# Patient Record
Sex: FEMALE | Race: WHITE | NOT HISPANIC OR LATINO | Employment: PART TIME | URBAN - METROPOLITAN AREA
[De-identification: names, ages, dates, MRNs, and addresses within clinical notes are randomized per-mention and may not be internally consistent; named-entity substitution may affect disease eponyms.]

---

## 2017-01-03 ENCOUNTER — APPOINTMENT (OUTPATIENT)
Dept: PHYSICAL THERAPY | Facility: REHABILITATION | Age: 57
End: 2017-01-03
Payer: COMMERCIAL

## 2017-01-03 PROCEDURE — 97140 MANUAL THERAPY 1/> REGIONS: CPT

## 2017-01-16 ENCOUNTER — APPOINTMENT (OUTPATIENT)
Dept: PHYSICAL THERAPY | Facility: REHABILITATION | Age: 57
End: 2017-01-16
Payer: COMMERCIAL

## 2017-01-16 PROCEDURE — 97140 MANUAL THERAPY 1/> REGIONS: CPT

## 2017-01-30 ENCOUNTER — APPOINTMENT (OUTPATIENT)
Dept: PHYSICAL THERAPY | Facility: REHABILITATION | Age: 57
End: 2017-01-30
Payer: COMMERCIAL

## 2017-01-30 PROCEDURE — 97140 MANUAL THERAPY 1/> REGIONS: CPT

## 2017-02-06 ENCOUNTER — APPOINTMENT (OUTPATIENT)
Dept: PHYSICAL THERAPY | Facility: REHABILITATION | Age: 57
End: 2017-02-06
Payer: COMMERCIAL

## 2017-02-13 ENCOUNTER — APPOINTMENT (OUTPATIENT)
Dept: PHYSICAL THERAPY | Facility: REHABILITATION | Age: 57
End: 2017-02-13
Payer: COMMERCIAL

## 2017-02-17 DIAGNOSIS — D69.6 THROMBOCYTOPENIA (HCC): ICD-10-CM

## 2017-02-17 DIAGNOSIS — K58.9 IRRITABLE BOWEL SYNDROME WITHOUT DIARRHEA: ICD-10-CM

## 2017-02-17 DIAGNOSIS — E04.1 NONTOXIC SINGLE THYROID NODULE: ICD-10-CM

## 2017-02-17 DIAGNOSIS — N30.10 CHRONIC INTERSTITIAL CYSTITIS WITHOUT HEMATURIA: ICD-10-CM

## 2017-02-17 DIAGNOSIS — E78.5 HYPERLIPIDEMIA: ICD-10-CM

## 2017-02-17 DIAGNOSIS — Z12.11 ENCOUNTER FOR SCREENING FOR MALIGNANT NEOPLASM OF COLON: ICD-10-CM

## 2017-02-20 ENCOUNTER — APPOINTMENT (OUTPATIENT)
Dept: PHYSICAL THERAPY | Facility: REHABILITATION | Age: 57
End: 2017-02-20
Payer: COMMERCIAL

## 2017-02-20 ENCOUNTER — APPOINTMENT (OUTPATIENT)
Dept: LAB | Facility: CLINIC | Age: 57
End: 2017-02-20
Payer: COMMERCIAL

## 2017-02-20 DIAGNOSIS — D69.6 THROMBOCYTOPENIA (HCC): ICD-10-CM

## 2017-02-20 DIAGNOSIS — N30.10 CHRONIC INTERSTITIAL CYSTITIS WITHOUT HEMATURIA: ICD-10-CM

## 2017-02-20 DIAGNOSIS — E78.5 HYPERLIPIDEMIA: ICD-10-CM

## 2017-02-20 DIAGNOSIS — K58.9 IRRITABLE BOWEL SYNDROME WITHOUT DIARRHEA: ICD-10-CM

## 2017-02-20 DIAGNOSIS — E04.1 NONTOXIC SINGLE THYROID NODULE: ICD-10-CM

## 2017-02-20 LAB
ALBUMIN SERPL BCP-MCNC: 3.8 G/DL (ref 3.5–5)
ALP SERPL-CCNC: 37 U/L (ref 46–116)
ALT SERPL W P-5'-P-CCNC: 22 U/L (ref 12–78)
ANION GAP SERPL CALCULATED.3IONS-SCNC: 7 MMOL/L (ref 4–13)
AST SERPL W P-5'-P-CCNC: 14 U/L (ref 5–45)
BACTERIA UR QL AUTO: NORMAL /HPF
BASOPHILS # BLD AUTO: 0.02 THOUSANDS/ΜL (ref 0–0.1)
BASOPHILS NFR BLD AUTO: 1 % (ref 0–1)
BILIRUB SERPL-MCNC: 0.54 MG/DL (ref 0.2–1)
BILIRUB UR QL STRIP: ABNORMAL
BUN SERPL-MCNC: 17 MG/DL (ref 5–25)
CALCIUM SERPL-MCNC: 8.8 MG/DL (ref 8.3–10.1)
CHLORIDE SERPL-SCNC: 105 MMOL/L (ref 100–108)
CHOLEST SERPL-MCNC: 172 MG/DL (ref 50–200)
CLARITY UR: CLEAR
CO2 SERPL-SCNC: 30 MMOL/L (ref 21–32)
COLOR UR: ABNORMAL
CREAT SERPL-MCNC: 0.82 MG/DL (ref 0.6–1.3)
EOSINOPHIL # BLD AUTO: 0.03 THOUSAND/ΜL (ref 0–0.61)
EOSINOPHIL NFR BLD AUTO: 1 % (ref 0–6)
ERYTHROCYTE [DISTWIDTH] IN BLOOD BY AUTOMATED COUNT: 13.2 % (ref 11.6–15.1)
GFR SERPL CREATININE-BSD FRML MDRD: >60 ML/MIN/1.73SQ M
GLUCOSE SERPL-MCNC: 90 MG/DL (ref 65–140)
GLUCOSE UR STRIP-MCNC: ABNORMAL MG/DL
HCT VFR BLD AUTO: 41.1 % (ref 34.8–46.1)
HDLC SERPL-MCNC: 88 MG/DL (ref 40–60)
HGB BLD-MCNC: 12.8 G/DL (ref 11.5–15.4)
HGB UR QL STRIP.AUTO: ABNORMAL
KETONES UR STRIP-MCNC: ABNORMAL MG/DL
LDLC SERPL CALC-MCNC: 72 MG/DL (ref 0–100)
LEUKOCYTE ESTERASE UR QL STRIP: ABNORMAL
LYMPHOCYTES # BLD AUTO: 0.98 THOUSANDS/ΜL (ref 0.6–4.47)
LYMPHOCYTES NFR BLD AUTO: 22 % (ref 14–44)
MCH RBC QN AUTO: 28.3 PG (ref 26.8–34.3)
MCHC RBC AUTO-ENTMCNC: 31.1 G/DL (ref 31.4–37.4)
MCV RBC AUTO: 91 FL (ref 82–98)
MONOCYTES # BLD AUTO: 0.42 THOUSAND/ΜL (ref 0.17–1.22)
MONOCYTES NFR BLD AUTO: 10 % (ref 4–12)
NEUTROPHILS # BLD AUTO: 2.93 THOUSANDS/ΜL (ref 1.85–7.62)
NEUTS SEG NFR BLD AUTO: 66 % (ref 43–75)
NITRITE UR QL STRIP: ABNORMAL
NON-SQ EPI CELLS URNS QL MICRO: NORMAL /HPF
NRBC BLD AUTO-RTO: 0 /100 WBCS
PH UR STRIP.AUTO: 6.5 [PH] (ref 4.5–8)
PLATELET # BLD AUTO: 124 THOUSANDS/UL (ref 149–390)
PMV BLD AUTO: 12.1 FL (ref 8.9–12.7)
POTASSIUM SERPL-SCNC: 3.8 MMOL/L (ref 3.5–5.3)
PROT SERPL-MCNC: 7.3 G/DL (ref 6.4–8.2)
PROT UR STRIP-MCNC: ABNORMAL MG/DL
RBC # BLD AUTO: 4.53 MILLION/UL (ref 3.81–5.12)
RBC #/AREA URNS AUTO: NORMAL /HPF
SODIUM SERPL-SCNC: 142 MMOL/L (ref 136–145)
SP GR UR STRIP.AUTO: 1.01 (ref 1–1.03)
T4 FREE SERPL-MCNC: 0.88 NG/DL (ref 0.76–1.46)
TRIGL SERPL-MCNC: 58 MG/DL
TSH SERPL DL<=0.05 MIU/L-ACNC: 4.81 UIU/ML (ref 0.36–3.74)
UROBILINOGEN UR QL STRIP.AUTO: ABNORMAL E.U./DL
WBC # BLD AUTO: 4.39 THOUSAND/UL (ref 4.31–10.16)
WBC #/AREA URNS AUTO: NORMAL /HPF

## 2017-02-20 PROCEDURE — 80061 LIPID PANEL: CPT

## 2017-02-20 PROCEDURE — 80053 COMPREHEN METABOLIC PANEL: CPT

## 2017-02-20 PROCEDURE — 85025 COMPLETE CBC W/AUTO DIFF WBC: CPT

## 2017-02-20 PROCEDURE — 36415 COLL VENOUS BLD VENIPUNCTURE: CPT

## 2017-02-20 PROCEDURE — 81001 URINALYSIS AUTO W/SCOPE: CPT

## 2017-02-20 PROCEDURE — 84443 ASSAY THYROID STIM HORMONE: CPT

## 2017-02-20 PROCEDURE — 84439 ASSAY OF FREE THYROXINE: CPT

## 2017-02-21 ENCOUNTER — APPOINTMENT (OUTPATIENT)
Dept: LAB | Facility: CLINIC | Age: 57
End: 2017-02-21
Payer: COMMERCIAL

## 2017-02-21 DIAGNOSIS — N30.10 CHRONIC INTERSTITIAL CYSTITIS WITHOUT HEMATURIA: ICD-10-CM

## 2017-02-21 DIAGNOSIS — D69.6 THROMBOCYTOPENIA (HCC): ICD-10-CM

## 2017-02-21 DIAGNOSIS — E04.1 NONTOXIC SINGLE THYROID NODULE: ICD-10-CM

## 2017-02-21 DIAGNOSIS — K58.9 IRRITABLE BOWEL SYNDROME WITHOUT DIARRHEA: ICD-10-CM

## 2017-02-21 DIAGNOSIS — E78.5 HYPERLIPIDEMIA: ICD-10-CM

## 2017-02-21 DIAGNOSIS — Z12.11 ENCOUNTER FOR SCREENING FOR MALIGNANT NEOPLASM OF COLON: ICD-10-CM

## 2017-02-21 LAB — HEMOCCULT STL QL IA: POSITIVE

## 2017-02-21 PROCEDURE — G0328 FECAL BLOOD SCRN IMMUNOASSAY: HCPCS

## 2017-02-22 ENCOUNTER — ALLSCRIPTS OFFICE VISIT (OUTPATIENT)
Dept: OTHER | Facility: OTHER | Age: 57
End: 2017-02-22

## 2017-02-24 ENCOUNTER — GENERIC CONVERSION - ENCOUNTER (OUTPATIENT)
Dept: OTHER | Facility: OTHER | Age: 57
End: 2017-02-24

## 2017-02-27 ENCOUNTER — APPOINTMENT (OUTPATIENT)
Dept: PHYSICAL THERAPY | Facility: REHABILITATION | Age: 57
End: 2017-02-27
Payer: COMMERCIAL

## 2017-02-27 PROCEDURE — 97112 NEUROMUSCULAR REEDUCATION: CPT

## 2017-03-13 ENCOUNTER — APPOINTMENT (OUTPATIENT)
Dept: LAB | Facility: CLINIC | Age: 57
End: 2017-03-13
Payer: COMMERCIAL

## 2017-03-13 DIAGNOSIS — N30.10 CHRONIC INTERSTITIAL CYSTITIS WITHOUT HEMATURIA: ICD-10-CM

## 2017-03-13 PROCEDURE — 87086 URINE CULTURE/COLONY COUNT: CPT

## 2017-03-14 LAB — BACTERIA UR CULT: NORMAL

## 2017-04-05 ENCOUNTER — ALLSCRIPTS OFFICE VISIT (OUTPATIENT)
Dept: OTHER | Facility: OTHER | Age: 57
End: 2017-04-05

## 2017-04-14 ENCOUNTER — APPOINTMENT (OUTPATIENT)
Dept: LAB | Facility: CLINIC | Age: 57
End: 2017-04-14
Payer: COMMERCIAL

## 2017-04-14 DIAGNOSIS — R39.15 URGENCY OF URINATION: ICD-10-CM

## 2017-04-14 LAB
BILIRUB UR QL STRIP: NEGATIVE
CLARITY UR: NORMAL
COLOR UR: YELLOW
GLUCOSE UR STRIP-MCNC: NEGATIVE MG/DL
HGB UR QL STRIP.AUTO: NEGATIVE
KETONES UR STRIP-MCNC: NEGATIVE MG/DL
LEUKOCYTE ESTERASE UR QL STRIP: NEGATIVE
NITRITE UR QL STRIP: NEGATIVE
PH UR STRIP.AUTO: 6 [PH] (ref 4.5–8)
PROT UR STRIP-MCNC: NEGATIVE MG/DL
SP GR UR STRIP.AUTO: 1.03 (ref 1–1.03)
UROBILINOGEN UR QL STRIP.AUTO: 0.2 E.U./DL

## 2017-04-14 PROCEDURE — 81003 URINALYSIS AUTO W/O SCOPE: CPT

## 2017-06-01 DIAGNOSIS — E04.1 NONTOXIC SINGLE THYROID NODULE: ICD-10-CM

## 2017-06-01 DIAGNOSIS — E03.9 HYPOTHYROIDISM: ICD-10-CM

## 2017-06-02 ENCOUNTER — APPOINTMENT (OUTPATIENT)
Dept: LAB | Facility: CLINIC | Age: 57
End: 2017-06-02
Payer: COMMERCIAL

## 2017-06-02 DIAGNOSIS — E03.9 HYPOTHYROIDISM: ICD-10-CM

## 2017-06-02 LAB
T4 FREE SERPL-MCNC: 0.76 NG/DL (ref 0.76–1.46)
TSH SERPL DL<=0.05 MIU/L-ACNC: 2.65 UIU/ML (ref 0.36–3.74)

## 2017-06-02 PROCEDURE — 84443 ASSAY THYROID STIM HORMONE: CPT

## 2017-06-02 PROCEDURE — 84439 ASSAY OF FREE THYROXINE: CPT

## 2017-06-02 PROCEDURE — 36415 COLL VENOUS BLD VENIPUNCTURE: CPT

## 2017-06-09 ENCOUNTER — ALLSCRIPTS OFFICE VISIT (OUTPATIENT)
Dept: OTHER | Facility: OTHER | Age: 57
End: 2017-06-09

## 2017-06-13 ENCOUNTER — TRANSCRIBE ORDERS (OUTPATIENT)
Dept: LAB | Facility: CLINIC | Age: 57
End: 2017-06-13

## 2017-06-13 ENCOUNTER — APPOINTMENT (OUTPATIENT)
Dept: LAB | Facility: CLINIC | Age: 57
End: 2017-06-13
Payer: COMMERCIAL

## 2017-06-13 DIAGNOSIS — Z00.8 HEALTH EXAMINATION IN POPULATION SURVEY: Primary | ICD-10-CM

## 2017-06-13 DIAGNOSIS — E55.9 UNSPECIFIED VITAMIN D DEFICIENCY: Primary | ICD-10-CM

## 2017-06-13 DIAGNOSIS — Z00.8 HEALTH EXAMINATION IN POPULATION SURVEY: ICD-10-CM

## 2017-06-13 LAB
25(OH)D3 SERPL-MCNC: 40.6 NG/ML (ref 30–100)
CHOLEST SERPL-MCNC: 195 MG/DL (ref 50–200)
EST. AVERAGE GLUCOSE BLD GHB EST-MCNC: 105 MG/DL
HBA1C MFR BLD: 5.3 % (ref 4.2–6.3)
HDLC SERPL-MCNC: 87 MG/DL (ref 40–60)
LDLC SERPL CALC-MCNC: 98 MG/DL (ref 0–100)
TRIGL SERPL-MCNC: 49 MG/DL

## 2017-06-13 PROCEDURE — 82306 VITAMIN D 25 HYDROXY: CPT

## 2017-06-13 PROCEDURE — 80061 LIPID PANEL: CPT

## 2017-06-13 PROCEDURE — 83036 HEMOGLOBIN GLYCOSYLATED A1C: CPT

## 2017-06-13 PROCEDURE — 36415 COLL VENOUS BLD VENIPUNCTURE: CPT

## 2017-07-28 ENCOUNTER — ANESTHESIA EVENT (OUTPATIENT)
Dept: GASTROENTEROLOGY | Facility: HOSPITAL | Age: 57
End: 2017-07-28
Payer: COMMERCIAL

## 2017-07-28 ENCOUNTER — ANESTHESIA (OUTPATIENT)
Dept: GASTROENTEROLOGY | Facility: HOSPITAL | Age: 57
End: 2017-07-28
Payer: COMMERCIAL

## 2017-07-28 ENCOUNTER — HOSPITAL ENCOUNTER (OUTPATIENT)
Facility: HOSPITAL | Age: 57
Setting detail: OUTPATIENT SURGERY
Discharge: HOME | End: 2017-07-28
Attending: COLON & RECTAL SURGERY | Admitting: COLON & RECTAL SURGERY
Payer: COMMERCIAL

## 2017-07-28 VITALS
TEMPERATURE: 97.8 F | HEIGHT: 67 IN | BODY MASS INDEX: 18.69 KG/M2 | WEIGHT: 119.05 LBS | HEART RATE: 67 BPM | OXYGEN SATURATION: 100 % | DIASTOLIC BLOOD PRESSURE: 68 MMHG | RESPIRATION RATE: 20 BRPM | SYSTOLIC BLOOD PRESSURE: 113 MMHG

## 2017-07-28 DIAGNOSIS — K52.9 COLITIS: ICD-10-CM

## 2017-07-28 PROCEDURE — 88305 TISSUE EXAM BY PATHOLOGIST: CPT | Performed by: COLON & RECTAL SURGERY

## 2017-07-28 RX ORDER — SODIUM CHLORIDE, SODIUM LACTATE, POTASSIUM CHLORIDE, CALCIUM CHLORIDE 600; 310; 30; 20 MG/100ML; MG/100ML; MG/100ML; MG/100ML
125 INJECTION, SOLUTION INTRAVENOUS CONTINUOUS
Status: DISCONTINUED | OUTPATIENT
Start: 2017-07-28 | End: 2017-07-28 | Stop reason: HOSPADM

## 2017-07-28 RX ORDER — OLOPATADINE HYDROCHLORIDE 2 MG/ML
1 SOLUTION/ DROPS OPHTHALMIC AS NEEDED
COMMUNITY
End: 2020-10-05 | Stop reason: SDUPTHER

## 2017-07-28 RX ORDER — MESALAMINE 800 MG/1
800 TABLET, DELAYED RELEASE ORAL 3 TIMES DAILY
COMMUNITY
End: 2018-12-27 | Stop reason: SDUPTHER

## 2017-07-28 RX ORDER — DICYCLOMINE HYDROCHLORIDE 10 MG/1
10 CAPSULE ORAL 2 TIMES DAILY
COMMUNITY
End: 2018-04-30 | Stop reason: SDUPTHER

## 2017-07-28 RX ORDER — ROSUVASTATIN CALCIUM 5 MG/1
5 TABLET, COATED ORAL EVERY OTHER DAY
COMMUNITY
End: 2019-12-09

## 2017-07-28 RX ORDER — PROPOFOL 10 MG/ML
INJECTION, EMULSION INTRAVENOUS AS NEEDED
Status: DISCONTINUED | OUTPATIENT
Start: 2017-07-28 | End: 2017-07-28 | Stop reason: SURG

## 2017-07-28 RX ORDER — CYCLOSPORINE 0.5 MG/ML
1 EMULSION OPHTHALMIC 2 TIMES DAILY
COMMUNITY

## 2017-07-28 RX ADMIN — SODIUM CHLORIDE, SODIUM LACTATE, POTASSIUM CHLORIDE, AND CALCIUM CHLORIDE 125 ML/HR: .6; .31; .03; .02 INJECTION, SOLUTION INTRAVENOUS at 10:01

## 2017-07-28 RX ADMIN — PROPOFOL 50 MG: 10 INJECTION, EMULSION INTRAVENOUS at 11:05

## 2017-07-28 RX ADMIN — PROPOFOL 50 MG: 10 INJECTION, EMULSION INTRAVENOUS at 11:09

## 2017-07-28 RX ADMIN — PROPOFOL 50 MG: 10 INJECTION, EMULSION INTRAVENOUS at 11:01

## 2017-07-28 RX ADMIN — PROPOFOL 100 MG: 10 INJECTION, EMULSION INTRAVENOUS at 10:58

## 2017-09-05 ENCOUNTER — GENERIC CONVERSION - ENCOUNTER (OUTPATIENT)
Dept: OTHER | Facility: OTHER | Age: 57
End: 2017-09-05

## 2017-09-06 ENCOUNTER — GENERIC CONVERSION - ENCOUNTER (OUTPATIENT)
Dept: OTHER | Facility: OTHER | Age: 57
End: 2017-09-06

## 2017-09-11 ENCOUNTER — APPOINTMENT (OUTPATIENT)
Dept: LAB | Facility: CLINIC | Age: 57
End: 2017-09-11
Payer: COMMERCIAL

## 2017-09-11 ENCOUNTER — TRANSCRIBE ORDERS (OUTPATIENT)
Dept: LAB | Facility: CLINIC | Age: 57
End: 2017-09-11

## 2017-09-11 DIAGNOSIS — R30.0 DYSURIA: Primary | ICD-10-CM

## 2017-09-11 DIAGNOSIS — R30.0 DYSURIA: ICD-10-CM

## 2017-09-11 PROCEDURE — 87086 URINE CULTURE/COLONY COUNT: CPT

## 2017-09-11 PROCEDURE — 87077 CULTURE AEROBIC IDENTIFY: CPT

## 2017-09-11 PROCEDURE — 87186 SC STD MICRODIL/AGAR DIL: CPT

## 2017-09-13 LAB — BACTERIA UR CULT: NORMAL

## 2017-10-01 DIAGNOSIS — E03.9 HYPOTHYROIDISM: ICD-10-CM

## 2017-10-02 ENCOUNTER — TRANSCRIBE ORDERS (OUTPATIENT)
Dept: LAB | Facility: CLINIC | Age: 57
End: 2017-10-02

## 2017-10-02 ENCOUNTER — ALLSCRIPTS OFFICE VISIT (OUTPATIENT)
Dept: OTHER | Facility: OTHER | Age: 57
End: 2017-10-02

## 2017-10-02 ENCOUNTER — APPOINTMENT (OUTPATIENT)
Dept: LAB | Facility: CLINIC | Age: 57
End: 2017-10-02
Payer: COMMERCIAL

## 2017-10-02 DIAGNOSIS — E03.9 UNSPECIFIED HYPOTHYROIDISM: Primary | ICD-10-CM

## 2017-10-02 DIAGNOSIS — E03.9 HYPOTHYROIDISM: ICD-10-CM

## 2017-10-02 LAB
BACTERIA UR QL AUTO: NORMAL /HPF
BILIRUB UR QL STRIP: NEGATIVE
CLARITY UR: CLEAR
COLOR UR: YELLOW
GLUCOSE UR STRIP-MCNC: NEGATIVE MG/DL
HGB UR QL STRIP.AUTO: NEGATIVE
HYALINE CASTS #/AREA URNS LPF: NORMAL /LPF
KETONES UR STRIP-MCNC: NEGATIVE MG/DL
LEUKOCYTE ESTERASE UR QL STRIP: NEGATIVE
NITRITE UR QL STRIP: NEGATIVE
NON-SQ EPI CELLS URNS QL MICRO: NORMAL /HPF
PH UR STRIP.AUTO: 7 [PH] (ref 4.5–8)
PROT UR STRIP-MCNC: NEGATIVE MG/DL
RBC #/AREA URNS AUTO: NORMAL /HPF
SP GR UR STRIP.AUTO: 1.01 (ref 1–1.03)
T4 FREE SERPL-MCNC: 0.78 NG/DL (ref 0.76–1.46)
TSH SERPL DL<=0.05 MIU/L-ACNC: 2.1 UIU/ML (ref 0.36–3.74)
UROBILINOGEN UR QL STRIP.AUTO: 0.2 E.U./DL
WBC #/AREA URNS AUTO: NORMAL /HPF

## 2017-10-02 PROCEDURE — 84443 ASSAY THYROID STIM HORMONE: CPT

## 2017-10-02 PROCEDURE — 87086 URINE CULTURE/COLONY COUNT: CPT

## 2017-10-02 PROCEDURE — 36415 COLL VENOUS BLD VENIPUNCTURE: CPT

## 2017-10-02 PROCEDURE — 84439 ASSAY OF FREE THYROXINE: CPT

## 2017-10-02 PROCEDURE — 81001 URINALYSIS AUTO W/SCOPE: CPT

## 2017-10-03 LAB — BACTERIA UR CULT: NORMAL

## 2017-10-20 ENCOUNTER — ALLSCRIPTS OFFICE VISIT (OUTPATIENT)
Dept: OTHER | Facility: OTHER | Age: 57
End: 2017-10-20

## 2017-10-21 NOTE — PROGRESS NOTES
Assessment  1  Nontoxic single thyroid nodule (241 0) (E04 1)   2  Irritable bowel syndrome (564 1) (K58 9)   3  Interstitial cystitis (595 1) (N30 10)   4  Other ulcerative colitis (556 8) (K51 80)   5  Hyperlipidemia (272 4) (E78 5)    Plan  Colon cancer screening    · (1) URINALYSIS (will reflex a microscopy if leukocytes, occult blood, protein or nitrites are not within  normal limits); Status:Active; Requested for:20Oct2017;   Colon cancer screening, Hyperlipidemia, Hypothyroid, Nephrolithiasis, Osteopenia,  Thrombocytopenia    · (1) T4, FREE; Status:Active; Requested for:20Oct2017;    · (1) TSH; Status:Active; Requested UPI:[id number];    · (1) VITAMIN D 25-HYDROXY; Status:Active; Requested for:20Oct2017;   Colon cancer screening, Hypothyroid    · (1) OCCULT BLOOD, FECAL IMMUNOCHEMICAL TEST; Status:Active; Requested MUH:[id number];   Hypothyroid    · (1) CBC/PLT/DIFF; Status:Active; Requested BPQ:55HBX9677;    · (1) COMPREHENSIVE METABOLIC PANEL; Status:Active; Requested for:20Oct2017;    · (1) LIPID PANEL FASTING W DIRECT LDL REFLEX; Status:Active; Requested KHW:[id number];     1  Nontoxic single thyroid nodule continue surveillance with ultrasound due for next study in 6 months  Present thyroid hormone and thyroid stimulating hormone values are normal   Repeat with next regular visit in 4 months  2   History of irritable bowel syndrome and mild indeterminate colitis  She has no active symptoms at this time  She is encouraged to continue with regular follow-up with her colon and rectal physician  Her most recent examination was this year for her colonoscopy  3   Hyperlipidemia good control continue Crestor 3 days a week 4  History of interstitial cystitis and recent urinary tract infection  She continues on Elmiron and Uribel medications per her urologist   Her urinary tract infection was successfully treated and follow-up culture was negative  Discussion/Summary  Discussion Summary:    In summary on this routine follow-up visit for this 20-year-old female patient she appears to be stable  We reviewed her most recent thyroid blood test as well as her most recent thyroid ultrasound  We will see her in approximately 4 months for annual complete physical examination with comprehensive blood work and electrocardiogram at that time  She will be receiving the flu shot outside the office in the near future  Counseling Documentation With Imm: total time of encounter was 25 minutes-- and-- 20 minutes was spent counseling  Chief Complaint  Chief Complaint Free Text Note Form: Patient is here today for a 4 month follow up w/ labs  History of Present Illness  HPI: This is a routine for month follow-up visit for this 20-year-old female patient  She presents today with no new complaints  She recently underwent a follow-up ultrasound of her thyroid gland for monitoring of her thyroid nodule  There was a marginal increase in the size of the thyroid nodule but it is still below 2-1/2 centimeters in diameter  On the recommendation of her endocrinologist she will have a follow-up ultrasound in 6 months  A review of her most recent blood work indicates that her free T4 and TSH values are in a normal range  She has no symptoms from this thyroid nodule  patient has recently had a urinary tract infection as well as an exacerbation of her interstitial cystitis  Most recent urine culture indicates resolution of the urinary tract infection  She also has a history of ulcerative colitis findings on colonoscopy with her most recent examination stable  Review of Systems  Complete-Female:   Constitutional: No fever, no chills, feels well, no tiredness, no recent weight gain or weight loss  Eyes: No complaints of eye pain, no red eyes, no eyesight problems, no discharge, no dry eyes, no itching of eyes     ENT: no complaints of earache, no loss of hearing, no nose bleeds, no nasal discharge, no sore throat, no hoarseness  Cardiovascular: No complaints of slow heart rate, no fast heart rate, no chest pain, no palpitations, no leg claudication, no lower extremity edema  Respiratory: No complaints of shortness of breath, no wheezing, no cough, no SOB on exertion, no orthopnea, no PND  Gastrointestinal: No complaints of abdominal pain, no constipation, no nausea or vomiting, no diarrhea, no bloody stools  Genitourinary: Recent flare-up of cystitis and urinary tract infection  Musculoskeletal: No complaints of arthralgias, no myalgias, no joint swelling or stiffness, no limb pain or swelling  Integumentary: No complaints of skin rash or lesions, no itching, no skin wounds, no breast pain or lump  Neurological: No complaints of headache, no confusion, no convulsions, no numbness, no dizziness or fainting, no tingling, no limb weakness, no difficulty walking  Psychiatric: Not suicidal, no sleep disturbance, no anxiety or depression, no change in personality, no emotional problems  Endocrine: No complaints of proptosis, no hot flashes, no muscle weakness, no deepening of the voice, no feelings of weakness  Hematologic/Lymphatic: No complaints of swollen glands, no swollen glands in the neck, does not bleed easily, does not bruise easily  Active Problems  1  Allergic rhinitis (477 9) (J30 9)   2  Anxiety (300 00) (F41 9)   3  Bronchitis (490) (J40)   4  Cellulitis of knee (682 6) (L03 119)   5  Chronic interstitial cystitis (595 1) (N30 10)   6  Dense breasts (793 82) (R92 2)   7  Dysuria (788 1) (R30 0)   8  Heme positive stool (792 1) (R19 5)   9  Hyperlipidemia (272 4) (E78 5)   10  Hypothyroid (244 9) (E03 9)   11  Interstitial cystitis (595 1) (N30 10)   12  Irritable bowel syndrome (564 1) (K58 9)   13  Nephrolithiasis (592 0) (N20 0)   14  Nontoxic single thyroid nodule (241 0) (E04 1)   15  Osteopenia (733 90) (M85 80)   16  Other ulcerative colitis (556 8) (K51 80)   17   Recurrent UTI (599 0) (N39 0)   18  Thrombocytopenia (287 5) (D69 6)   19  Urinary urgency (788 63) (R39 15)   20  Vaginal candidiasis (112 1) (B37 3)    Past Medical History  1  History of Appendiceal Procedure Assessment Fecolith   2  History of Encounter for routine gynecological examination (V72 31) (Z01 419)   3  History of Encounter for routine gynecological examination (V72 31) (Z01 419)   4  History of Encounter for screening mammogram for malignant neoplasm of breast (V76 12) (Z12 31)   5  History of Fungal infection (117 9) (B49)   6  History of atopic dermatitis (V13 3) (Z87 2)   7  History of herpes simplex infection (V12 09) (Z86 19)   8  History of senile atrophic vaginitis (V13 29) (Z87 42)   9  History of urinary frequency (V13 09) (Z87 898)   10  History of urinary tract infection (V13 02) (Z87 440)   11  History of Pap smear, as part of routine gynecological examination (V76 2) (Z01 419)   12  History of Pap smear, as part of routine gynecological examination (V76 2) (Z01 419)   13  History of Periodontal abscess (523 31) (K05 219)   14  History of Screening for colon cancer (V76 51) (Z12 11)   15  History of Screening for osteoporosis (V82 81) (Z13 820)   16  History of Vaginal atrophy (627 3) (N95 2)   17  History of Visit for screening mammogram (V76 12) (Z12 31)   18  History of Visit for screening mammogram (V76 12) (Z12 31)   19  History of Vitamin D deficiency (268 9) (E55 9)   20  History of Voiding dysfunction (599 9) (N39 8)  Active Problems And Past Medical History Reviewed: The active problems and past medical history were reviewed and updated today  Surgical History  1  History of  Section Low Transverse   2  History of Cholecystectomy   3  History of Diagnostic Cystoscopy   4  History of Open Treat Of Prox  Ulnar Fracture With Radial Dislocation  Surgical History Reviewed: The surgical history was reviewed and updated today  Family History  Mother    1   Family history of gallbladder disease (V18 59) (Z83 79)   2  Family history of thyroid disease (V18 19) (Z83 49)   3  Family history of Renal Disease  Father    4  Family history of diabetes mellitus (V18 0) (Z83 3)   5  Family history of Hypertension (V17 49)   6  Family history of Nephrolithiasis  Paternal Grandmother    9  Family history of diabetes mellitus (V18 0) (Z83 3)  Maternal Grandfather    8  Family history of lymphoma (V16 7) (Z80 2)  Paternal Aunt    5  Family history of malignant neoplasm of breast (V16 3) (Z80 3)  Family History Reviewed: The family history was reviewed and updated today  Social History   · Denied: History of Alcohol Use (History)   · Marital History - Currently    · Never A Smoker   · Denied: History of Smoker, Current Status Unknown  Social History Reviewed: The social history was reviewed and updated today  The social history was reviewed and is unchanged  Current Meds   1  ALPRAZolam 0 25 MG Oral Tablet; TAKE 1 TABLET Every 6 hours; Therapy: 18XQK8449 to (Evaluate:11Jun2017); Last Rx:01Jun2017 Ordered   2  Clotrimazole-Betamethasone 1-0 05 % External Cream; APPLY SPARINGLY TO THE AFFECTED   AREA(S) TWICE DAILY; Therapy: 15Gry3467 to (Evaluate:11Mar2018)  Requested for: 40Xqt1058; Last Rx:12Sep2017   Ordered   3  Crestor 5 MG Oral Tablet; Therapy: (Recorded:05Apr2017) to Recorded   4  Delzicol 400 MG Oral Capsule Delayed Release; TAKE TWO CAPSULES BY MOUTH 3 TIMES A DAY; Therapy: 22FFL5990 to (Last Rx:94Sth8395)  Requested for: 29Oab0221 Ordered   5  Dicyclomine HCl - 10 MG Oral Capsule; TAKE 1 CAPSULE 3 TIMES DAILY; Therapy: 76UAL5002 to (Evaluate:14Oct2016)  Requested for: 45RYP3361; Last Rx:20Oct2015   Ordered   6  Elmiron 100 MG Oral Capsule; TAKE 1 CAPSULE 3 TIMES DAILY; Therapy: 30YZF6204 to (Last Rx:79Yhg4803)  Requested for: 39Nkm7606 Ordered   7  Estrace 0 1 MG/GM Vaginal Cream; INSERT 1 APPLICATORFUL INTRAVAGINALLY AT BEDTIME   NIGHTLY;    Therapy: 04ERB0328 to ([id number])  Requested for: 70WWM5606; Last Rx:42Kub0875   Ordered   8  Fluconazole 100 MG Oral Tablet; TAKE 1 TABLET DAILY AS DIRECTED; Therapy: 53JTH8691 to (Ellie Waldron)  Requested for: 53EYJ8464; Last Rx:2017   Ordered   9  Fluticasone Propionate 50 MCG/ACT Nasal Suspension; USE 1 SPRAY IN EACH NOSTRIL TWICE   DAILY; Therapy: 57RYQ9539 to (Last Rx:2017)  Requested for: 60MMU7990 Ordered   10  Myrbetriq 25 MG Oral Tablet Extended Release 24 Hour; Take 1 tablet daily; Therapy: 02GGW2679 to (Evaluate:10Alo9796)  Requested for: 82ZPF2954; Last Rx:2017    Ordered   11  Omega 3 CAPS; Therapy: (Recorded:42Ued4814) to Recorded   12  Restasis 0 05 % Ophthalmic Emulsion; Therapy: 06HWX6378 to Recorded   13  Uribel 118 MG Oral Capsule; Therapy: (Recorded:2017) to Recorded   14  Vitamin D3 TABS; Therapy: (Recorded:75Pol8754) to Recorded  Medication List Reviewed: The medication list was reviewed and updated today  Allergies  1  Iodides  2  IVP Dye   3  Seasonal    Vitals  Vital Signs    Recorded: 93QPW3957 08:23AM   Temperature 98 5 F   Heart Rate 81   Systolic 784   Diastolic 68   Height 5 ft 7 in   Weight 126 lb    BMI Calculated 19 73   BSA Calculated 1 66   O2 Saturation 99     Physical Exam    Constitutional   General appearance: No acute distress, well appearing and well nourished  Eyes   Conjunctiva and lids: No swelling, erythema or discharge  Ears, Nose, Mouth, and Throat   External inspection of ears and nose: Normal     Pulmonary   Respiratory effort: No increased work of breathing or signs of respiratory distress  Auscultation of lungs: Clear to auscultation  Cardiovascular   Auscultation of heart: Normal rate and rhythm, normal S1 and S2, without murmurs  Examination of extremities for edema and/or varicosities: Normal     Lymphatic   Palpation of lymph nodes in neck: No lymphadenopathy      Musculoskeletal   Gait and station: Normal  Psychiatric   Orientation to person, place, and time: Normal     Mood and affect: Normal     Additional Exam:  Examination the patient is neck does not reveal any palpable thyroid nodule  Future Appointments    Date/Time Provider Specialty Site   12/15/2017 09:50 AM JANA Petty  Endocrinology [facility] ENDOCRINOLOGY   02/28/2018 08:30 AM JANA Eagle  Internal Medicine [facility] INTERNAL MED   04/10/2018 08:15 AM JANA Black  Urology [facility]   10/30/2017 08:30 AM JANA Mcpherson   Obstetrics/Gynecology [facility]   11/07/2017 02:00 PM Trista Slider, [address]  Urology [facility]     Signatures   Electronically signed by : JANA Watson ; Oct 20 2017 10:17AM EST                       (Author)

## 2017-10-27 NOTE — PROGRESS NOTES
Assessment  1  Dysuria (788 1) (R30 0)   2  Recurrent UTI (599 0) (N39 0)    Plan  Recurrent UTI    · Cephalexin 500 MG Oral Capsule; TAKE 1 CAPSULE 3 TIMES DAILY UNTIL GONE   Rx By: Chyna Alvarado; Dispense: 7 Days ; #:21 Capsule; Refill: 0;For: Recurrent UTI; ELISA = N; Verified Transmission to [address]; Last Updated By: System, SureScript[employer]; 10/2/2017 2:17:17 PM   · Myrbetriq 25 MG Oral Tablet Extended Release 24 Hour; Take 1 tablet daily   Rx By: Chyna Alvarado; Dispense: 30 Days ; #:30 Tablet Extended Release 24 Hour; Refill: 11; For: Recurrent UTI; ELISA = N; Verified Transmission to [address]; Last Updated By: System, SureScripts; 10/2/2017 2:17:18 PM   · US KIDNEY AND BLADDER WITH PVR; Status:Active; Requested ZFN:[id number];    Perform:[facility]iology; JIX:[id number];CTKPJRJ; For:Recurrent UTI; Ordered By:Ludivina Hooks; Discussion/Summary  Discussion Summary:   UTI, interstitial cystitis, dysuria, flank pain  is a 63 y/o female being managed by Dr Poole Grew  We will await her urine culture results  I recommended starting antibiotics  She was prescribed Keflex TID  This will be adjusted based on the final culture results  We discussed that if urine culture is positive for an infection she would complete the 7 day course of antibiotics and then would take a 30 day course of a low dose daily antibiotic suppression  She was instructed on a strict diet and to avoid bladder irritants  She will have a renal/bladder ultrasound to rule out kidney stones and ensure she is properly emptying her bladder  She was provided samples of Myrbetriq 25mg to help with her urinary symptoms  Use and side effects reviewed  She will continue to take Elmiron and Uribel  She will return for follow up in the next few weeks but was instructed to call with any worsening symptoms  All questions answered        Chief Complaint  Chief Complaint Free Text Note Form: Patient presents for severe dysuria      History of Present Illness  HPI: 63 y/o female with a history of interstitial cystitis and urethral stricture presents today with complaints of persistent dysuria and pain  She continues to take Elmiron and Uribel daily  She continues to follow with Dr Bradley Mckeon  She states last month she had dysuria and urinary frequency  She was found to have an UTI and was treated with 7 days of Macrobid  Her dysuria resolved but she continued to have urinary frequency and urgency  Then about 4 days after completing antibiotics she developed severe dysuria, suprapubic pain, worsening frequency, nocturia, and incomplete bladder emptying  She was prescribed a second course of antibiotics which she completed last week  Symptoms did not improve  She has also developed left flank pain  She had an episode of nausea today but no vomiting  She denies any fevers  She denies any gross hematuria  She continues to perform self dilation 5 to 6 times a week  She has a history of kidney stones and her last stone episode was over 20 years ago  Review of Systems  Complete-Female Urology:   Constitutional: No fever, no chills, feels well, no tiredness, no recent weight gain or weight loss  Respiratory: No complaints of shortness of breath, no wheezing, no cough, no SOB on exertion, no orthopnea, no PND  Cardiovascular: No complaints of slow heart rate, no fast heart rate, no chest pain, no palpitations, no leg claudication, no lower extremity edema  Gastrointestinal: No complaints of abdominal pain, no constipation, no nausea or vomiting, no diarrhea, no bloody stools  Genitourinary: dysuria,-- feelings of urinary urgency-- and-- stream quality good, but-- no urinary hesitancy,-- no empty sensation,-- no incontinence-- and-- no hematuria--    The patient presents with complaints of nocturia (5 times )  Musculoskeletal: No complaints of arthralgias, no myalgias, no joint swelling or stiffness, no limb pain or swelling     Integumentary: No complaints of skin rash or lesions, no itching, no skin wounds, no breast pain or lump  Hematologic/Lymphatic: No complaints of swollen glands, no swollen glands in the neck, does not bleed easily, does not bruise easily  Neurological: No complaints of headache, no confusion, no convulsions, no numbness, no dizziness or fainting, no tingling, no limb weakness, no difficulty walking  ROS Reviewed:   ROS reviewed  Active Problems  1  Allergic rhinitis (477 9) (J30 9)   2  Anxiety (300 00) (F41 9)   3  Bronchitis (490) (J40)   4  Cellulitis of knee (682 6) (L03 119)   5  Chronic interstitial cystitis (595 1) (N30 10)   6  Dense breasts (793 82) (R92 2)   7  Heme positive stool (792 1) (R19 5)   8  Hyperlipidemia (272 4) (E78 5)   9  Hypothyroid (244 9) (E03 9)   10  Interstitial cystitis (595 1) (N30 10)   11  Irritable bowel syndrome (564 1) (K58 9)   12  Nephrolithiasis (592 0) (N20 0)   13  Nontoxic single thyroid nodule (241 0) (E04 1)   14  Osteopenia (733 90) (M85 80)   15  Other ulcerative colitis (556 8) (K51 80)   16  Thrombocytopenia (287 5) (D69 6)   17  Urinary urgency (788 63) (R39 15)   18  Vaginal candidiasis (112 1) (B37 3)    Past Medical History  1  History of Appendiceal Procedure Assessment Fecolith   2  History of Encounter for routine gynecological examination (V72 31) (Z01 419)   3  History of Encounter for routine gynecological examination (V72 31) (Z01 419)   4  History of Encounter for screening mammogram for malignant neoplasm of breast   (V76 12) (Z12 31)   5  History of Fungal infection (117 9) (B49)   6  History of atopic dermatitis (V13 3) (Z87 2)   7  History of herpes simplex infection (V12 09) (Z86 19)   8  History of senile atrophic vaginitis (V13 29) (Z87 42)   9  History of urinary frequency (V13 09) (Z87 898)   10  History of urinary tract infection (V13 02) (Z87 440)   11  History of Pap smear, as part of routine gynecological examination (V76 2) (Z01 419)   12  History of Pap smear, as part of routine gynecological examination (V76 2) (Z01 419)   13  History of Periodontal abscess (523 31) (K05 219)   14  History of Screening for colon cancer (V76 51) (Z12 11)   15  History of Screening for osteoporosis (V82 81) (Z13 820)   16  History of Vaginal atrophy (627 3) (N95 2)   17  History of Visit for screening mammogram (V76 12) (Z12 31)   18  History of Visit for screening mammogram (V76 12) (Z12 31)   19  History of Vitamin D deficiency (268 9) (E55 9)   20  History of Voiding dysfunction (599 9) (N39 8)  Active Problems And Past Medical History Reviewed: The active problems and past medical history were reviewed and updated today  Surgical History  1  History of  Section Low Transverse   2  History of Cholecystectomy   3  History of Diagnostic Cystoscopy   4  History of Open Treat Of Prox  Ulnar Fracture With Radial Dislocation  Surgical History Reviewed: The surgical history was reviewed and updated today  Family History  Mother    1  Family history of gallbladder disease (V18 59) (Z83 79)   2  Family history of thyroid disease (V18 19) (Z83 49)   3  Family history of Renal Disease  Father    4  Family history of diabetes mellitus (V18 0) (Z83 3)   5  Family history of Hypertension (V17 49)   6  Family history of Nephrolithiasis  Paternal Grandmother    9  Family history of diabetes mellitus (V18 0) (Z83 3)  Maternal Grandfather    8  Family history of lymphoma (V16 7) (Z80 2)  Paternal Aunt    5  Family history of malignant neoplasm of breast (V16 3) (Z80 3)  Family History Reviewed: The family history was reviewed and updated today  Social History   · Denied: History of Alcohol Use (History)   · Marital History - Currently    · Never A Smoker   · Denied: History of Smoker, Current Status Unknown  Social History Reviewed: The social history was reviewed and updated today  The social history was reviewed and is unchanged        Current Meds   1  ALPRAZolam 0 25 MG Oral Tablet; TAKE 1 TABLET Every 6 hours; Therapy: 14NMV4371 to (Evaluate:11Jun2017); Last Rx:01Jun2017 Ordered   2  Azithromycin 250 MG Oral Tablet; Take as directed per package instructions; Therapy: 33KSJ2090 to (Last Rx:03Jun2017)  Requested for: 03Jun2017 Ordered   3  Cephalexin 500 MG Oral Capsule; TAKE 1 CAPSULE TWICE DAILY UNTIL GONE;   Therapy: 40QXC4780 to (Evaluate:07Sep2017)  Requested for: 17Nrt2633; Last   Rx:18Aug2017 Ordered   4  Clotrimazole-Betamethasone 1-0 05 % External Cream; APPLY SPARINGLY TO THE   AFFECTED AREA(S) TWICE DAILY; Therapy: 70Tij0281 to (Evaluate:11Mar2018)  Requested for: 12Sep2017; Last   Rx:12Sep2017 Ordered   5  Crestor 5 MG Oral Tablet; Therapy: (Recorded:05Apr2017) to Recorded   6  Delzicol 400 MG Oral Capsule Delayed Release; TAKE TWO CAPSULES BY MOUTH 3   TIMES A DAY; Therapy: 68JMM4290 to (Last Rx:30Aug2016)  Requested for: 30Aug2016 Ordered   7  Dicyclomine HCl - 10 MG Oral Capsule; TAKE 1 CAPSULE 3 TIMES DAILY; Therapy: 76CDG1131 to (Evaluate:14Oct2016)  Requested for: 53QOB5282; Last   Rx:20Oct2015 Ordered   8  DrRx Diflucan 150 MG #1; One pill today  Repeat in 4days; Therapy: 66FOS1792 to (Last Rx:03Jun2017) Ordered   9  Elmiron 100 MG Oral Capsule; TAKE 1 CAPSULE 3 TIMES DAILY; Therapy: 67NIC2163 to (Last Rx:65Jkx2738)  Requested for: 87Gvr4101 Ordered   10  Estrace 0 1 MG/GM Vaginal Cream; INSERT 1 APPLICATORFUL INTRAVAGINALLY AT    BEDTIME NIGHTLY; Therapy: 29JSI6327 to (Evaluate:27Jan2018)  Requested for: 51PHB5932; Last    Rx:16Myh4862 Ordered   11  Fluticasone Propionate 50 MCG/ACT Nasal Suspension; USE 1 SPRAY IN EACH    NOSTRIL TWICE DAILY; Therapy: 81IZI2857 to (Last Rx:03Jun2017)  Requested for: 03Jun2017 Ordered   12  Nitrofurantoin Macrocrystal 100 MG Oral Capsule; TAKE 1 CAPSULE EVERY 12 HOURS    DAILY; Therapy: 12Sep2017 to (Last Rx:12Sep2017)  Requested for: 12Sep2017 Ordered   13   Omega 3 CAPS; Therapy: (Recorded:57Oai6522) to Recorded   14  Osphena 60 MG Oral Tablet; One pill daily; Therapy: 02Sep2015 to (Evaluate:74Hnt1970)  Requested for: 02Sep2015; Last    Rx:02Sep2015 Ordered   15  Restasis 0 05 % Ophthalmic Emulsion; Therapy: 76ZRR6614 to Recorded   16  Uribel 118 MG Oral Capsule; Therapy: (Recorded:44Yxf3272) to Recorded   17  Vitamin D3 TABS; Therapy: (Recorded:73Hdx7602) to Recorded  Medication List Reviewed: The medication list was reviewed and updated today  Allergies  1  Iodides  2  IVP Dye   3  Seasonal    Vitals  Vital Signs    Recorded: 67YPO5494 01:47PM   Heart Rate 68   Systolic 535   Diastolic 64   Height 5 ft 7 in   Weight 123 lb 6 oz   BMI Calculated 19 32   BSA Calculated 1 65     Physical Exam    Constitutional   General appearance: No acute distress, well appearing and well nourished  Pulmonary   Respiratory effort: No increased work of breathing or signs of respiratory distress  Cardiovascular   Palpation of heart: Normal PMI, no thrills  Examination of extremities for edema and/or varicosities: Normal     Abdomen   Abdomen: Non-tender, no masses  Musculoskeletal   Gait and station: Normal     Skin   Skin and subcutaneous tissue: Normal without rashes or lesions  Future Appointments    Date/Time Provider Specialty Site   12/15/2017 09:50 AM JANA Grace  Endocrinology [facility] ENDOCRINOLOGY   10/20/2017 08:45 AM JANA Navarro  Internal Medicine [facility] INTERNAL MED   02/28/2018 08:30 AM JANA Navarro  Internal Medicine [facility] INTERNAL MED   04/10/2018 08:15 AM JANA Liu  Urology [facility] [address] [place] [address]   10/30/2017 08:30 AM JANA Mcdonnell   Obstetrics/Gynecology [place] [address] [facility]   11/07/2017 02:00 PM [facility]  Urology [facility] Bridget Sherwood     Signatures   Electronically signed by : Francine Laureano; Oct  2 2017  3:31PM EST (Author)    Electronically signed by : JANA Hernandez ; Oct  3 2017  8:13AM EST

## 2017-10-30 ENCOUNTER — ALLSCRIPTS OFFICE VISIT (OUTPATIENT)
Dept: OTHER | Facility: OTHER | Age: 57
End: 2017-10-30

## 2017-10-30 ENCOUNTER — LAB REQUISITION (OUTPATIENT)
Dept: LAB | Facility: HOSPITAL | Age: 57
End: 2017-10-30
Payer: COMMERCIAL

## 2017-10-30 DIAGNOSIS — Z01.419 ENCOUNTER FOR GYNECOLOGICAL EXAMINATION WITHOUT ABNORMAL FINDING: ICD-10-CM

## 2017-10-30 PROCEDURE — G0145 SCR C/V CYTO,THINLAYER,RESCR: HCPCS | Performed by: OBSTETRICS & GYNECOLOGY

## 2017-10-31 NOTE — PROGRESS NOTES
Assessment  1  Encounter for gynecological examination (2 31) (Z01 419)   2  Pap smear, as part of routine gynecological examination (V76 2) (Z01 419)   3  Visit for screening mammogram (6 12) (Z12 31)   4  Dense breasts (793 82) (R92 2)    Plan  Encounter for gynecological examination    · Follow-up visit in 1 year Evaluation and Treatment  Follow-up  Status: Hold For -  Scheduling  Requested for: [id number]   Ordered; For: Encounter for gynecological examination; Ordered By: Dio Allen Performed:  Due: 33LCA5276  Encounter for gynecological examination, Pap smear, as part of routine gynecological  examination    · (1) THIN PREP PAP WITH IMAGING; Status: In Progress - Specimen/Data  Collected,Retrospective By Protocol Authorization;   Done: 29PNQ9916   Perform:[facility] In Office Collection; Order Comments:Cervical/Endocervical; [id number]; Last Updated By:Amauri Spicer; 10/30/2017 8:50:13 AM;Ordered;For:Encounter for gynecological examination, Pap smear, as part of routine gynecological examination; Ordered By:Leda Chaudhry; Maturation index required? : No  HPV? : if ASCUS  PMH: Visit for screening mammogram    · * MAMMO SCREENING BILATERAL W CAD; Status:Hold For - Scheduling,Exact  Date,Retrospective By Protocol Authorization; Requested for:2017;    Perform:[facility]; BKE:[id number]; Last Updated By:Amauri Spicer; 10/30/2017 8:50:13 AM;Ordered; [address]: Visit for screening mammogram; Ordered By:Leda Chaudhry; Discussion/Summary  healthy adult female Currently, she eats a healthy diet  cervical cancer screening is current Pap test was done today Breast cancer screening: monthly self breast exam was advised, mammogram is current and mammogram has been ordered  Colorectal cancer screening: colorectal cancer screening is current  Osteoporosis screening: bone mineral density testing is current and bone mineral density testing has been ordered   Advice and education were given regarding nutrition, aerobic exercise, calcium supplements, vitamin D supplements and sunscreen use  Normal GYN Exam  Stressed  ordered    Vaginal Surgery - excellent results  SMear DOne without difficulty  GYN Exam in 1 year  if any problems develop during the interim    followed for a Thyroid Nodule by Dr [address] [facility]  The patient has the current Goals: [address]  The patent has the current Barriers: None  Patient is able to Self-Care  PATIENT EDUCATION RECORD She was given the following educational materials:  Ideas for a Healthy Life Style  The treatment plan was reviewed with the patient/guardian  The patient/guardian understands and agrees with the treatment plan     Self Referrals: No      Chief Complaint  Annual Visithas no problem or concern      History of Present Illness  HPI: No periods    Minimal Vasomotor symptoms  surgery with Dr Lashaun Jones  normal Bowel and Bladder habits (Does have IC and IBS)  pelvic or abdominal pain   GYN , Adult Female [facility]: The patient is being seen for a gynecology evaluation  The last health maintenance visit was 1 year(s) ago  General Health: The patient's health since the last visit is described as good  She has regular dental visits  -- She denies vision problems  -- She denies hearing loss  Lifestyle:  She consumes a diverse and healthy diet  -- She does not have any weight concerns  -- She exercises regularly  -- She does not use tobacco -- She denies drug use  Reproductive health: the patient is postmenopausal    Screening: cancer screening reviewed and current  metabolic screening reviewed and current  risk screening reviewed and current  Review of Systems    Constitutional: No fever, no chills, feels well, no tiredness, no recent weight gain or loss  ENT: no ear ache, no loss of hearing, no nosebleeds or nasal discharge, no sore throat or hoarseness     Cardiovascular: CHOLESTEROL, but-- no complaints of slow or fast heart rate, no chest pain, no palpitations, no leg claudication or lower extremity edema  Respiratory: no complaints of shortness of breath, no wheezing, no dyspnea on exertion, no orthopnea or PND  Breasts: no complaints of breast pain, breast lump or nipple discharge  Gastrointestinal: no complaints of abdominal pain, no constipation, no nausea or diarrhea, no vomiting, no bloody stools  Genitourinary: no complaints of dysuria, no incontinence, no pelvic pain, no dysmenorrhea, no vaginal discharge or abnormal vaginal bleeding  Musculoskeletal: no complaints of arthralgia, no myalgia, no joint swelling or stiffness, no limb pain or swelling  Integumentary: no complaints of skin rash or lesion, no itching or dry skin, no skin wounds  Neurological: no complaints of headache, no confusion, no numbness or tingling, no dizziness or fainting  Active Problems  1  Allergic rhinitis (477 9) (J30 9)   2  Anxiety (300 00) (F41 9)   3  Bronchitis (490) (J40)   4  Cellulitis of knee (682 6) (L03 119)   5  Chronic interstitial cystitis (595 1) (N30 10)   6  Colon cancer screening (V76 51) (Z12 11)   7  Dense breasts (793 82) (R92 2)   8  Dysuria (788 1) (R30 0)   9  Encounter for gynecological examination (V72 31) (Z01 419)   10  Heme positive stool (792 1) (R19 5)   11  Hyperlipidemia (272 4) (E78 5)   12  Hypothyroid (244 9) (E03 9)   13  Interstitial cystitis (595 1) (N30 10)   14  Irritable bowel syndrome (564 1) (K58 9)   15  Nephrolithiasis (592 0) (N20 0)   16  Nontoxic single thyroid nodule (241 0) (E04 1)   17  Osteopenia (733 90) (M85 80)   18  Other ulcerative colitis (556 8) (K51 80)   19  Pap smear, as part of routine gynecological examination (V76 2) (Z01 419)   20  Recurrent UTI (599 0) (N39 0)   21  Thrombocytopenia (287 5) (D69 6)   22  Urinary urgency (788 63) (R39 15)   23   Vaginal candidiasis (112 1) (B37 3)    Past Medical History   · History of Appendiceal Procedure Assessment Fecolith   · History of Encounter for routine gynecological examination (V72 31) (Z01 419)   · History of Encounter for routine gynecological examination (V72 31) (Z01 419)   · History of Encounter for screening mammogram for malignant neoplasm of breast  (V76 12) (Z12 31)   · History of Fungal infection (117 9) (B49)   · History of atopic dermatitis (V13 3) (Z87 2)   · History of herpes simplex infection (V12 09) (Z86 19)   · History of senile atrophic vaginitis (V13 29) (Z87 42)   · History of urinary frequency (V13 09) (H34 723)   · History of urinary tract infection (V13 02) (Z87 440)   · History of Pap smear, as part of routine gynecological examination (V76 2) (Z01 419)   · History of Pap smear, as part of routine gynecological examination (V76 2) (Z01 419)   · History of Periodontal abscess (523 31) (K05 219)   · History of Screening for colon cancer (V76 51) (Z12 11)   · History of Screening for osteoporosis (V82 81) (L25 146)   · History of Vaginal atrophy (627 3) (N95 2)   · History of Visit for screening mammogram (V76 12) (Z12 31)   · History of Visit for screening mammogram (V76 12) (Z12 31)   · History of Vitamin D deficiency (268 9) (E55 9)   · History of Voiding dysfunction (599 9) (N39 8)    Surgical History   · History of  Section Low Transverse   · History of Cholecystectomy   · History of Diagnostic Cystoscopy   · History of Open Treat Of Prox   Ulnar Fracture With Radial Dislocation    Family History  Mother    · Family history of gallbladder disease (V18 59) (Z83 79)   · Family history of thyroid disease (V18 19) (Z83 49)   · Family history of Renal Disease  Father    · Family history of diabetes mellitus (V18 0) (Z83 3)   · Family history of Hypertension (V17 49)   · Family history of Nephrolithiasis  Paternal Grandmother    · Family history of diabetes mellitus (V18 0) (Z83 3)  Maternal Grandfather    · Family history of lymphoma (V16 7) (Z80 2)  Paternal Aunt    · Family history of malignant neoplasm of breast (V16 3) (Z80 3)    Social History   · Denied: History of Alcohol Use (History)   · Marital History - Currently    · Never A Smoker   · Denied: History of Smoker, Current Status Unknown    Current Meds   1  ALPRAZolam 0 25 MG Oral Tablet; TAKE 1 TABLET Every 6 hours; Therapy: 20JMN4656 to (Evaluate:11Jun2017); Last Rx:01Jun2017 Ordered   2  Clotrimazole-Betamethasone 1-0 05 % External Cream; APPLY SPARINGLY TO THE   AFFECTED AREA(S) TWICE DAILY; Therapy: 72Gma8066 to (Evaluate:11Mar2018)  Requested for: 49Fba8131; Last   Rx:12Sep2017 Ordered   3  Crestor 5 MG Oral Tablet; Therapy: (Recorded:05Apr2017) to Recorded   4  Delzicol 400 MG Oral Capsule Delayed Release; TAKE TWO CAPSULES BY MOUTH 3   TIMES A DAY; Therapy: 39NMG5166 to (Last Rx:07Dlp8122)  Requested for: 70Hhf8941 Ordered   5  Dicyclomine HCl - 10 MG Oral Capsule; TAKE 1 CAPSULE 3 TIMES DAILY; Therapy: 96JKH8150 to (Evaluate:14Oct2016)  Requested for: 03GCS5376; Last   Rx:20Oct2015 Ordered   6  Elmiron 100 MG Oral Capsule; TAKE 1 CAPSULE 3 TIMES DAILY; Therapy: 26ZFV3180 to (Last Rx:16Mlu6390)  Requested for: 07Opj1604 Ordered   7  Estrace 0 1 MG/GM Vaginal Cream; INSERT 1 APPLICATORFUL INTRAVAGINALLY AT   BEDTIME NIGHTLY; Therapy: 61MAZ2280 to (Evaluate:27Jan2018)  Requested for: 22Qxc4102; Last   Rx:16Kjy1974 Ordered   8  Fluconazole 100 MG Oral Tablet; TAKE 1 TABLET DAILY AS DIRECTED; Therapy: 50WZE5610 to (Slick Alford)  Requested for: 28PTU9767; Last   Rx:03Oct2017 Ordered   9  Fluticasone Propionate 50 MCG/ACT Nasal Suspension; USE 1 SPRAY IN EACH   NOSTRIL TWICE DAILY; Therapy: 53HRI1434 to (Last Rx:03Oct2017)  Requested for: 08RJO6587 Ordered   10  Myrbetriq 25 MG Oral Tablet Extended Release 24 Hour; Take 1 tablet daily; Therapy: 00IGV5768 to (Evaluate:27Sep2018)  Requested for: 58OKI8315; Last    Rx:02Oct2017 Ordered   11  Omega 3 CAPS; Therapy: (Recorded:85Nex5850) to Recorded   12   Restasis 0 05 % Ophthalmic Emulsion; Therapy: 51JSO7208 to Recorded   13  Uribel 118 MG Oral Capsule; Therapy: (Recorded:96Pzr0766) to Recorded   14  Vitamin D3 TABS; Therapy: (Recorded:58Wwp7782) to Recorded    Allergies  1  Iodides  2  IVP Dye   3  Seasonal    Vitals   Recorded: 74LQT1082 79:13LQ   Systolic 249   Diastolic 64   Height 5 ft 7 in   Weight 124 lb 4 oz   BMI Calculated 19 46   BSA Calculated 1 65     Physical Exam    Constitutional   General appearance: No acute distress, well appearing and well nourished  Neck   Neck: Normal, supple, trachea midline, no masses  Thyroid: Normal, no thyromegaly  Pulmonary   Respiratory effort: No increased work of breathing or signs of respiratory distress  Auscultation of lungs: Clear to auscultation  Cardiovascular   Auscultation of heart: Normal rate and rhythm, normal S1 and S2, no murmurs  Peripheral vascular exam: Normal pulses Throughout  Genitourinary   External genitalia: Normal and no lesions appreciated  Vagina: Normal, no lesions or dryness appreciated  Urethra: Normal     Urethral meatus: Normal     Bladder: Normal, soft, non-tender and no prolapse or masses appreciated  Cervix: Normal, no palpable masses  Uterus: Normal, non-tender, not enlarged, and no palpable masses  Adnexa/parametria: Normal, non-tender and no fullness or masses appreciated  Anus, perineum, and rectum: Normal sphincter tone, no masses, and no prolapse  Chest   Breasts: Normal and no dimpling or skin changes noted  Abdomen   Abdomen: Normal, non-tender, and no organomegaly noted  Liver and spleen: No hepatomegaly or splenomegaly  Examination for hernias: No hernias appreciated  Stool sample for occult blood: Negative  Lymphatic   Palpation of lymph nodes in neck, axillae, groin and/or other locations: No lymphadenopathy or masses noted  Skin   Skin and subcutaneous tissue: Normal skin turgor and no rashes      Palpation of skin and subcutaneous tissue: Normal     Psychiatric   Orientation to person, place, and time: Normal     Mood and affect: Normal        Provider Comments  Provider Comments:   Boy working at [address] [place] [address] Dr    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

## 2017-11-06 LAB
LAB AP GYN PRIMARY INTERPRETATION: NORMAL
Lab: NORMAL

## 2017-11-07 ENCOUNTER — ALLSCRIPTS OFFICE VISIT (OUTPATIENT)
Dept: OTHER | Facility: OTHER | Age: 57
End: 2017-11-07

## 2017-11-09 NOTE — PROGRESS NOTES
Assessment    1  Recurrent UTI (599 0) (N39 0)    Plan  Recurrent UTI    · (1) URINE CULTURE; Source:Urine, Clean Catch; Status:Active - Retrospective ByProtocol Authorization; Requested TABBY:[id number];    Perform:[facility]; [id number]; Last Updated Nancy Lim; 2017 3:27:38 PM;Ordered;UTI; Texie Pac By:Jerel Hooks; Discussion/Summary  Discussion Summary:   UTI, interstitial cystitis, dysuria, flank pain  is a 61 y/o female being managed by Dr Obed Sal  There is no evidence of infection or kidney stones as a cause of her recent urinary symptoms  Her renal ultrasound was reviewed and there were no abnormal findings  PVR was 80 mL  Currently her symptoms have improved and she is doing well  We discussed continuing the use of Myrbetriq but she defers at this point secondary to lack of insurance coverage  She understands that we can restart this if symptoms would again worsened  We also discussed the need for urethral dilation  She wishes to continue with performing this 4 to 5 times a week  We discussed further evaluation with cystoscopy to see if this is truly necessary  She will further consider and discussed with her   She will continue to take Elmiron and Uribel  She will return in 3 months for follow-up was instructed to call with any worsening symptoms  All questions answered  Goals and Barriers: The patient has the current Goals: None  The patent has the current Barriers: None  Patient's Capacity to Self-Care: Patient is able to Self-Care  Chief Complaint  Chief Complaint Free Text Note Form: Patient presents for recurring UTI's      History of Present Illness  HPI: 61 y/o female with a history of interstitial cystitis and urethral stricture was recently evaluated for persistent dysuria and pain  She continues to take Elmiron and Uribel daily  She continues to follow with Dr Rashida Good  She continues to perform self dilation 5 to 6 times a week   Her urine testing was negative for bacterial infection  She was prescribed Myrbetriq which she took for 1 month  Currently she is doing well and symptoms have improved  She denies any significant complaints at this time  She does feel that her risk of dilation is a trigger for her cystitis flare ups  She denies any changes in her overall health her last visit  Review of Systems  Complete-Female Urology:  Constitutional: No fever, no chills, feels well, no tiredness, no recent weight gain or weight loss  Respiratory: No complaints of shortness of breath, no wheezing, no cough, no SOB on exertion, no orthopnea, no PND  Cardiovascular: No complaints of slow heart rate, no fast heart rate, no chest pain, no palpitations, no leg claudication, no lower extremity edema  Gastrointestinal: No complaints of abdominal pain, no constipation, no nausea or vomiting, no diarrhea, no bloody stools  Genitourinary: feelings of urinary urgency,-- Empty sensation-- (occasionally)-- and-- stream quality good, but-- no dysuria,-- no urinary hesitancy,-- no incontinence-- and-- no hematuria--   The patient presents with complaints of nocturia (2 times per night)  Musculoskeletal: No complaints of arthralgias, no myalgias, no joint swelling or stiffness, no limb pain or swelling  Integumentary: No complaints of skin rash or lesions, no itching, no skin wounds, no breast pain or lump  Hematologic/Lymphatic: No complaints of swollen glands, no swollen glands in the neck, does not bleed easily, does not bruise easily  Neurological: No complaints of headache, no confusion, no convulsions, no numbness, no dizziness or fainting, no tingling, no limb weakness, no difficulty walking  ROS Reviewed:   ROS reviewed  Active Problems    1  Allergic rhinitis (477 9) (J30 9)   2  Anxiety (300 00) (F41 9)   3  Bronchitis (490) (J40)   4  Cellulitis of knee (682 6) (L03 119)   5  Chronic interstitial cystitis (595 1) (N30 10)   6   Colon cancer screening (V76 51) (Z12 11)   7  Dense breasts (793 82) (R92 2)   8  Dysuria (788 1) (R30 0)   9  Encounter for gynecological examination (V72 31) (Z01 419)   10  Heme positive stool (792 1) (R19 5)   11  Hyperlipidemia (272 4) (E78 5)   12  Hypothyroid (244 9) (E03 9)   13  Interstitial cystitis (595 1) (N30 10)   14  Irritable bowel syndrome (564 1) (K58 9)   15  Nephrolithiasis (592 0) (N20 0)   16  Nontoxic single thyroid nodule (241 0) (E04 1)   17  Osteopenia (733 90) (M85 80)   18  Other ulcerative colitis (556 8) (K51 80)   19  Pap smear, as part of routine gynecological examination (V76 2) (Z01 419)   20  Recurrent UTI (599 0) (N39 0)   21  Thrombocytopenia (287 5) (D69 6)   22  Urinary urgency (788 63) (R39 15)   23  Vaginal candidiasis (112 1) (B37 3)   24  Visit for screening mammogram (V76 12) (Z12 31)    Past Medical History    1  History of Appendiceal Procedure Assessment Fecolith   2  History of Encounter for routine gynecological examination (V72 31) (Z01 419)   3  History of Encounter for routine gynecological examination (V72 31) (Z01 419)   4  History of Encounter for screening mammogram for malignant neoplasm of breast (V76 12) (Z12 31)   5  History of Fungal infection (117 9) (B49)   6  History of atopic dermatitis (V13 3) (Z87 2)   7  History of herpes simplex infection (V12 09) (Z86 19)   8  History of senile atrophic vaginitis (V13 29) (Z87 42)   9  History of urinary frequency (V13 09) (Z87 898)   10  History of urinary tract infection (V13 02) (Z87 440)   11  History of Pap smear, as part of routine gynecological examination (V76 2) (Z01 419)   12  History of Pap smear, as part of routine gynecological examination (V76 2) (Z01 419)   13  History of Periodontal abscess (523 31) (K05 219)   14  History of Screening for colon cancer (V76 51) (Z12 11)   15  History of Screening for osteoporosis (V82 81) (Z13 820)   16  History of Vaginal atrophy (627 3) (N95 2)   17   History of Visit for screening mammogram (V76 12) (Z12 31)   18  History of Visit for screening mammogram (V76 12) (Z12 31)   19  History of Vitamin D deficiency (268 9) (E55 9)   20  History of Voiding dysfunction (599 9) (N39 8)  Active Problems And Past Medical History Reviewed: The active problems and past medical history were reviewed and updated today  Surgical History  1  History of  Section Low Transverse   2  History of Cholecystectomy   3  History of Diagnostic Cystoscopy   4  History of Open Treat Of Prox  Ulnar Fracture With Radial Dislocation  Surgical History Reviewed: The surgical history was reviewed and updated today  Family History  Mother    1  Family history of gallbladder disease (V18 59) (Z83 79)   2  Family history of thyroid disease (V18 19) (Z83 49)   3  Family history of Renal Disease  Father    4  Family history of diabetes mellitus (V18 0) (Z83 3)   5  Family history of Hypertension (V17 49)   6  Family history of Nephrolithiasis  Paternal Grandmother    9  Family history of diabetes mellitus (V18 0) (Z83 3)  Maternal Grandfather    8  Family history of lymphoma (V16 7) (Z80 2)  Paternal Aunt    5  Family history of malignant neoplasm of breast (V16 3) (Z80 3)  Family History Reviewed: The family history was reviewed and updated today  Social History     · Denied: History of Alcohol Use (History)   · Marital History - Currently    · Never A Smoker   · Denied: History of Smoker, Current Status Unknown  Social History Reviewed: The social history was reviewed and updated today  The social history was reviewed and is unchanged  Current Meds   1  Crestor 5 MG Oral Tablet; Therapy: (Recorded:59Pib4730) to Recorded   2  Delzicol 400 MG Oral Capsule Delayed Release; TAKE TWO CAPSULES BY MOUTH 3 TIMES A DAY; Therapy: 39PVH9211 to (Last Rx:31Aok6907)  Requested for: 53Oqk5925 Ordered   3  Dicyclomine HCl - 10 MG Oral Capsule; TAKE 1 CAPSULE 3 TIMES DAILY;  Therapy: 51FGF3610 to (Evaluate:14Oct2016)  Requested for: 49HSM3178; Last Rx:20Oct2015 Ordered   4  Elmiron 100 MG Oral Capsule; TAKE 1 CAPSULE 3 TIMES DAILY; Therapy: 23KTK4552 to (Last Rx:42Tra0391)  Requested for: 60Syi4996 Ordered   5  Estrace 0 1 MG/GM Vaginal Cream; INSERT 1 APPLICATORFUL INTRAVAGINALLY AT BEDTIME NIGHTLY; Therapy: 33NDU0074 to (Evaluate:27Jan2018)  Requested for: 76Cvw0475; Last Rx:66Gas9705 Ordered   6  Restasis 0 05 % Ophthalmic Emulsion; Therapy: 14STV7775 to Recorded   7  Uribel 118 MG Oral Capsule; Therapy: (Recorded:05Apr2017) to Recorded   8  Vitamin D3 TABS; Therapy: (Recorded:01Jal9629) to Recorded  Medication List Reviewed: The medication list was reviewed and updated today  Allergies  1  Iodides  2  IVP Dye   3  Seasonal    Vitals  Vital Signs    Recorded: 28DSP9792 02:17PM   Heart Rate 64   Systolic 262   Diastolic 70   Height 5 ft 7 in   Weight 124 lb    BMI Calculated 19 42   BSA Calculated 1 65       Physical Exam   Constitutional  General appearance: No acute distress, well appearing and well nourished  Pulmonary  Respiratory effort: No increased work of breathing or signs of respiratory distress  Cardiovascular  Palpation of heart: Normal PMI, no thrills  Examination of extremities for edema and/or varicosities: Normal    Abdomen  Abdomen: Non-tender, no masses  Musculoskeletal  Gait and station: Normal    Skin  Skin and subcutaneous tissue: Normal without rashes or lesions  Future Appointments    Date/Time Provider Specialty Site   12/15/2017 09:50 AM JANA Grace  Endocrinology [facility] ENDOCRINOLOGY   02/28/2018 08:30 AM JANA Navarro  Internal Medicine [facility] INTERNAL MED   01/17/2018 02:45 PM JANA Liu   Urology [address]       Signatures   Electronically signed by : Francine Laureano [facility]; Nov 7 2017  8:11PM EST                       (Author)    Electronically signed by : JANA Farrell ; Nov 8 2017  8:12AM EST

## 2017-11-15 ENCOUNTER — ALLSCRIPTS OFFICE VISIT (OUTPATIENT)
Dept: OTHER | Facility: OTHER | Age: 57
End: 2017-11-15

## 2017-11-16 NOTE — PROGRESS NOTES
Assessment    1  Nontoxic single thyroid nodule (241 0) (E04 1)    Plan  Nontoxic single thyroid nodule    · Follow-up visit in 1 year Evaluation and Treatment  Follow-up  Status: Complete  Done:50Gup7497   Ordered;Nontoxic single thyroid nodule; Ordered By: Rashid Brooke Performed:  Due: 46FWN8780; Last Updated By: Juan Valenzuela; 11/15/2017 8:44:15 AM   · Follow-Up With Advanced Practitioner Evaluation and Treatment  Follow-up  Status:Complete  Done: 78RQR7486   Ordered; For: Nontoxic single thyroid nodule; Ordered By: Rashid Brooke Performed:  Due: 82WMT5787; Last Updated By: Juan Valenzuela; 11/15/2017 8:44:15 AM   · US THYROID; Status:Active; Requested JHS:[id number]; Perform:[facility]; UTI:41VSU3392;GJBHBAB;ZIPMBB thyroid nodule; Ordered By:French Hogue; Discussion/Summary  Discussion Summary:   Thyroid nodule-there is a slight increase in the size of the nodule  Therefore, repeat ultrasound in about a year  Counseling Documentation With Imm: The patient was counseled regarding diagnostic results,-- instructions for management,-- impressions  Medication SE Review and Pt Understands Tx: The treatment plan was reviewed with the patient/guardian  The patient/guardian understands and agrees with the treatment plan      Chief Complaint  Chief Complaint Free Text Note Form: Follow up  History of Present Illness  Thyroid Nodule (Brief): The patient is being seen for a routine clinic follow-up of a thyroid nodule  The patient is currently asymptomatic  No associated symptoms are reported  The patient is not currently being treated for this problem  Review of Systems  ROS Reviewed:   ROS reviewed  Endo Adult ROS Female Established v2 Update - [facility]:  Constitutional/General: no recent weight gain,-- no recent weight loss,-- no poor energy/fatigue,-- no increased energy level,-- no insomnia/sleep problems,-- no fever-- and-- no feeling weak  Breasts: no nipple discharge    Heart: no high blood pressure,-- no chest pain/tightness,-- no rapid/racing heart rate-- and-- no palpitations  Genitourinary - Urinary: frequent urination-- and-- urinating during the night, but-- no excess urination  Eyes: no blurred vision,-- no double vision,-- no bulging eyes,-- no gritty/scratchy eyes-- and-- no excessive tearing  Mouth / Throat: no hoarseness-- and-- no difficulty swallowing  Neck: no lumps,-- no swollen glands,-- no neck pain,-- no neck stiffness-- and-- no enlarged thyroid  Respiratory: no wheezing,-- no asthma-- and-- no persistent cough  Musculoskeletal: no muscle aches/pain,-- no joint aches/pain-- and-- no muscle weakness  Skin & Hair: dry skin,-- no acne,-- the hair texture was not oily,-- no hair loss-- and-- no excessive hair growth  Gastrointestinal: no constipation,-- diarrhea,-- no waking at night to drink-- and-- stomach ache  Neurological: no blackouts,-- no weakness-- and-- no tremors  Reproductive:  frequency of period is not applicable  -- duration of period is not applicable  -- Date of last menstruation is not applicable  -- regular periods are not applicable  -- discomfort with periods is not applicable  -- excessive bleeding during period is not applicable  -- mood swings are not applicable  Endocrine: no feeling hot frequently,-- no feeling cold frequently,-- no shifts between feeling hot and cold,-- cold hands or feet,-- no excessive sweating,-- no thyroid problems,-- no blood sugar problems,-- no excessive thirst,-- no excessive hunger,-- no change in shoe size,-- no nausea or vomiting-- and-- no shaky hands  Active Problems    1  Allergic rhinitis (477 9) (J30 9)   2  Anxiety (300 00) (F41 9)   3  Bronchitis (490) (J40)   4  Cellulitis of knee (682 6) (L03 119)   5  Chronic interstitial cystitis (595 1) (N30 10)   6  Colon cancer screening (V76 51) (Z12 11)   7  Dense breasts (793 82) (R92 2)   8  Dysuria (788 1) (R30 0)   9   Encounter for gynecological examination (N40 22) (Z01 419)   10  Heme positive stool (792 1) (R19 5)   11  Hyperlipidemia (272 4) (E78 5)   12  Hypothyroid (244 9) (E03 9)   13  Interstitial cystitis (595 1) (N30 10)   14  Irritable bowel syndrome (564 1) (K58 9)   15  Nephrolithiasis (592 0) (N20 0)   16  Nontoxic single thyroid nodule (241 0) (E04 1)   17  Osteopenia (733 90) (M85 80)   18  Other ulcerative colitis (556 8) (K51 80)   19  Pap smear, as part of routine gynecological examination (V76 2) (Z01 419)   20  Recurrent UTI (599 0) (N39 0)   21  Thrombocytopenia (287 5) (D69 6)   22  Urinary urgency (788 63) (R39 15)   23  Vaginal candidiasis (112 1) (B37 3)   24  Visit for screening mammogram (V76 12) (Z12 31)    Past Medical History    1  History of Appendiceal Procedure Assessment Fecolith   2  History of Encounter for routine gynecological examination (V72 31) (Z01 419)   3  History of Encounter for routine gynecological examination (V72 31) (Z01 419)   4  History of Encounter for screening mammogram for malignant neoplasm of breast (V76 12) (Z12 31)   5  History of Fungal infection (117 9) (B49)   6  History of atopic dermatitis (V13 3) (Z87 2)   7  History of herpes simplex infection (V12 09) (Z86 19)   8  History of senile atrophic vaginitis (V13 29) (Z87 42)   9  History of urinary frequency (V13 09) (Z87 898)   10  History of urinary tract infection (V13 02) (Z87 440)   11  History of Pap smear, as part of routine gynecological examination (V76 2) (Z01 419)   12  History of Pap smear, as part of routine gynecological examination (V76 2) (Z01 419)   13  History of Periodontal abscess (523 31) (K05 219)   14  History of Screening for colon cancer (V76 51) (Z12 11)   15  History of Screening for osteoporosis (V82 81) (Z13 820)   16  History of Vaginal atrophy (627 3) (N95 2)   17  History of Visit for screening mammogram (V76 12) (Z12 31)   18  History of Visit for screening mammogram (V76 12) (Z12 31)   19   History of Vitamin D deficiency (268 9) (E55 9)   20  History of Voiding dysfunction (599 9) (N39 8)    Surgical History  1  History of  Section Low Transverse   2  History of Cholecystectomy   3  History of Diagnostic Cystoscopy   4  History of Open Treat Of Prox  Ulnar Fracture With Radial Dislocation    Family History  Mother    1  Family history of gallbladder disease (V18 59) (Z83 79)   2  Family history of thyroid disease (V18 19) (Z83 49)   3  Family history of Renal Disease  Father    4  Family history of diabetes mellitus (V18 0) (Z83 3)   5  Family history of Hypertension (V17 49)   6  Family history of Nephrolithiasis  Paternal Grandmother    9  Family history of diabetes mellitus (V18 0) (Z83 3)  Maternal Grandfather    8  Family history of lymphoma (V16 7) (Z80 2)  Paternal Aunt    5  Family history of malignant neoplasm of breast (V16 3) (Z80 3)    Social History     · Denied: History of Alcohol Use (History)   · Marital History - Currently    · Never A Smoker   · Denied: History of Smoker, Current Status Unknown    Current Meds   1  Crestor 5 MG Oral Tablet; Therapy: (Recorded:2017) to Recorded   2  Delzicol 400 MG Oral Capsule Delayed Release; TAKE TWO CAPSULES BY MOUTH 3 TIMES A DAY; Therapy: 82FBI6053 to (Last Rx:2016)  Requested for: 44Rbr7820 Ordered   3  Dicyclomine HCl - 10 MG Oral Capsule; TAKE 1 CAPSULE 3 TIMES DAILY; Therapy: 60FWQ4732 to (Evaluate:2016)  Requested for: 88KXM6717; Last Rx:2015 Ordered   4  Elmiron 100 MG Oral Capsule; TAKE 1 CAPSULE 3 TIMES DAILY; Therapy: 55PDZ9876 to (Last Rx:04Hdf8600)  Requested for: 67Rnp0752 Ordered   5  Estrace 0 1 MG/GM Vaginal Cream; INSERT 1 APPLICATORFUL INTRAVAGINALLY AT BEDTIME NIGHTLY; Therapy: 15EQP7353 to (Evaluate:2018)  Requested for: 39Xfe5427; Last Rx:09Kdn1593 Ordered   6  Restasis 0 05 % Ophthalmic Emulsion; Therapy: 26XFW2937 to Recorded   7  Uribel 118 MG Oral Capsule; Therapy: (Recorded:2017) to Recorded   8   Vitamin D3 TABS; Therapy: (Recorded:24Ujm0207) to Recorded  Medication List Reviewed: The medication list was reviewed and updated today  Allergies  1  Iodides  2  IVP Dye   3  Seasonal    Vitals  Vital Signs    Recorded: 47GSO9394 08:31AM   Heart Rate 68   Systolic 732   Diastolic 60   Height 5 ft 7 in   Weight 126 lb    BMI Calculated 19 73   BSA Calculated 1 66       Physical Exam   Constitutional  General appearance: No acute distress, well appearing and well nourished  Eyes  Conjunctiva and lids: No swelling, erythema, or discharge  Pupils: Equal, round and reactive to light  The sclera are anicteric  Extraocular movements are intact  Ears, Nose, Mouth, and Throat  External inspection of ears, nose and lips: Normal    Oropharynx: Normal with no erythema, edema, exudate or lesions  Exam of Head: The head is atraumatic and normocephalic  Neck: The neck is supple  The thyroid is normal in size with no palpable nodules  Pulmonary  Auscultation of lungs: Clear to auscultation bilaterally with normal chest expansion  Cardiovascular  Auscultation of heart: Normal rate and rhythm with no murmurs, gallops or rubs  Abdomen  Abdomen: Abdomen is soft, non-tender with normal bowel sounds  Lymphatic  Palpation of lymph nodes: No supraclavicular or suboccipital lymphadenopathy  Musculoskeletal  Inspection/palpation of joints, bones, and muscles: Muscle bulk and tone is normal    Skin  Skin and subcutaneous tissue: Normal skin temperature and color  Neurologic  Reflexes: 2+ and symmetric  Motor Strength: Strength is 5/5 bilaterally  Psychiatric  Orientation to person, place and time: Normal    Mood and affect: Affect and attention span are normal        Results/Data  Diagnostic Studies Reviewed:  Ultrasound Ultrasound done on October 16, 2017 shows an 11 x 13 x 16 mm nodule in the mid left lobe which has slightly increased in size from 9 x 11 x 14 mm   The TIRAD criteria gives it three points and since this is less than 2 5 cm in size, the recommendation is to repeat an ultrasound in a year  Future Appointments    Date/Time Provider Specialty Site   01/17/2018 02:45 PM JANA Hawkins  Urology [facility]   02/28/2018 08:30 AM Griselda Medici, M D   Internal Medicine [facility] INTERNAL MED       Signatures   Electronically signed by : JANA Sevilla ; Nov 15 2017  8:47AM EST                       (Author)

## 2017-12-04 ENCOUNTER — GENERIC CONVERSION - ENCOUNTER (OUTPATIENT)
Dept: OBGYN CLINIC | Facility: CLINIC | Age: 57
End: 2017-12-04

## 2018-01-11 NOTE — PROGRESS NOTES
Assessment    1  Heme positive stool (792 1) (R19 5)   2  Hypothyroid (244 9) (E03 9)   3  Chronic interstitial cystitis (595 1) (N30 10)   4  Hyperlipidemia (272 4) (E78 5)   5  Interstitial cystitis (595 1) (N30 10)   6  Other ulcerative colitis (556 8) (K51 80)   7  Thrombocytopenia (287 5) (D69 6)   8  History of Borderline hypothyroidism (794 5) (R94 6)    Plan  Chronic interstitial cystitis    · (1) URINE CULTURE; Source:Urine, Clean Catch; Status:Active; Requested  for:75Mix7039; Health Maintenance    · EKG/ECG- POC; Status:Complete;   Done: 17MSN9383 10:00AM  Heme positive stool    · 2 - Sia Cline MD, [facility]  Colorectal Surgery, Gastroenterology Physician Referral   Consult Only: the expectation is that the referring provider will communicate back to  the patient on treatment options  Evaluation and Treatment: the expectation is that the  referred to provider will communicate back to the patient on treatment options  Status:  Active  Requested for: 84Fjt1644  Care Summary provided  : Yes  Hypothyroid    · (1) T4, FREE; Status:Active; Requested TYN:[id number];    · (1) TSH; Status:Active; Requested WFA:66RZG2726; Other ulcerative colitis    · 2 - Sia Cline MD, [facility]  Colorectal Surgery, Gastroenterology Physician Referral   Consult Only: the expectation is that the referring provider will communicate back to  the patient on treatment options  Evaluation and Treatment: the expectation is that the  referred to provider will communicate back to the patient on treatment options  Status:  Active  Requested for: 87Zqf6857  Care Summary provided  : Yes    #1 history of ulcerative colitis present testing of the stool is positive for blood  The patient is due for an annual colonoscopy  She is encouraged to this procedure done within the next 3-4 months  She has an established relationship with Dr Sia Cline and will contact him for the colonoscopy   She has no symptoms of abdominal pain or any visible blood or mucus in her stools presently  #2, history of thyroid nodule which has not changed significantly in size  The monitor with annual ultrasound testing  #3 increase in TSH and decrease in free T4 compared to previous studies  The patient is a mild hypo-thyroid at this time clinically she has no symptoms and prefers not to start medication presently  I agreed to this per approach and would like a follow-up blood test in 4 months to reevaluate the free T4 and a TSH  I discussed with the patient today the symptoms of hypothyroidism  The patient's mother is a diagnosed hypothyroid patient on replacement therapy  Symptoms over the next 4 months prior to her scheduled visit she is most certainly welcome to contact me for earlier appointment and blood work  #4 chronic interstitial cystitis plan is to continue with her urologist for regular visits her symptoms appear to be well-controlled at this time  The patient indicates no  #5 history of thrombocytopenia the reading on this years testing appears to be stable compared to the previous several tests  Patient indicates no problem with bruising or easy bleeding  Discussion/Summary  In summary the patient has essentially a stable examination  She has no new complaints on today's visit  We find that her chemistry testing of the thyroid indicates a trending toward a hypothyroid condition  Present time the patient has no clinical symptoms of hypothyroidism  We discussed with the symptoms are with the patient and indicated that we would like a follow-up test in 4 months  Should she develop any of the symptoms of hypothyroidism in the meantime she is welcome to contact me for an earlier appointment and blood work  The patient has a history of ulcerative colitis and had a heme positive stool on this year's examination  Is not altogether a surprise as frequently patients with ulcerative colitis can have heme-positive testing   She is due for her endoscopy this year with her colon and rectal doctor and I encouraged her to contact him to schedule appointment at her convenience  Chief Complaint  Pt here for annual physical       History of Present Illness  HM, Adult Female: The patient is being seen for a health maintenance evaluation  The last health maintenance visit was 1 year(s) ago  Social History: Household members include spouse and 2 son(s)  She is   Work status: working full time  The patient has never smoked cigarettes  She reports occasional alcohol use  She has never used illicit drugs  General Health: The patient's health since the last visit is described as good  She has regular dental visits  She denies vision problems  She denies hearing loss  Immunizations status: up to date  Lifestyle:  She consumes a diverse and healthy diet  She does not have any weight concerns  She exercises regularly  She does not use tobacco  She denies alcohol use  She denies drug use  Screening: cancer screening reviewed and current  metabolic screening reviewed and current  risk screening reviewed and current  Review of Systems    Constitutional: recent 1 lb weight gain, but No fever, no chills, feels well, no tiredness, no recent weight gain or weight loss  Eyes: No complaints of eye pain, no red eyes, no eyesight problems, no discharge, no dry eyes, no itching of eyes  ENT: no complaints of earache, no loss of hearing, no nose bleeds, no nasal discharge, no sore throat, no hoarseness  Cardiovascular: No complaints of slow heart rate, no fast heart rate, no chest pain, no palpitations, no leg claudication, no lower extremity edema  Respiratory: No complaints of shortness of breath, no wheezing, no cough, no SOB on exertion, no orthopnea, no PND  Gastrointestinal: No complaints of abdominal pain, no constipation, no nausea or vomiting, no diarrhea, no bloody stools     Genitourinary: No complaints of dysuria, no incontinence, no pelvic pain, no dysmenorrhea, no vaginal discharge or bleeding  Musculoskeletal: No complaints of arthralgias, no myalgias, no joint swelling or stiffness, no limb pain or swelling  Integumentary: No complaints of skin rash or lesions, no itching, no skin wounds, no breast pain or lump  Neurological: No complaints of headache, no confusion, no convulsions, no numbness, no dizziness or fainting, no tingling, no limb weakness, no difficulty walking  Psychiatric: Not suicidal, no sleep disturbance, no anxiety or depression, no change in personality, no emotional problems  Hematologic/Lymphatic: No complaints of swollen glands, no swollen glands in the neck, does not bleed easily, does not bruise easily  Active Problems    1  Chronic interstitial cystitis (595 1) (N30 10)   2  Dense breasts (793 82) (R92 2)   3  Hyperlipidemia (272 4) (E78 5)   4  Interstitial cystitis (595 1) (N30 10)   5  Irritable bowel syndrome (564 1) (K58 9)   6  Nephrolithiasis (592 0) (N20 0)   7  Nontoxic single thyroid nodule (241 0) (E04 1)   8  Osteopenia (733 90) (M85 80)   9  Other ulcerative colitis (556 8) (K51 80)   10   Thrombocytopenia (287 5) (D69 6)    Past Medical History    · History of Appendiceal Procedure Assessment Fecolith   · History of Dysuria (788 1) (R30 0)   · History of Encounter for routine gynecological examination (V72 31) (Z01 419)   · History of Encounter for routine gynecological examination (V72 31) (Z01 419)   · History of Encounter for screening mammogram for malignant neoplasm of breast  (V76 12) (Z12 31)   · History of Fungal infection (117 9) (B49)   · History of allergic rhinitis (V12 69) (Z87 09)   · History of atopic dermatitis (V13 3) (Z87 2)   · History of herpes simplex infection (V12 09) (Z86 19)   · History of senile atrophic vaginitis (V13 29) (Z87 42)   · History of urinary frequency (V13 09) (O50 256)   · History of urinary tract infection (V13 02) (Z87 440)   · History of Pap smear, as part of routine gynecological examination (V76 2) (Z01 419)   · History of Pap smear, as part of routine gynecological examination (V76 2) (Z01 419)   · History of Periodontal abscess (523 31) (K05 219)   · History of Screening for colon cancer (V76 51) (Z12 11)   · History of Screening for osteoporosis (V82 81) (C17 778)   · History of Urinary urgency (788 63) (R39 15)   · History of Vaginal atrophy (627 3) (N95 2)   · History of Visit for screening mammogram (V76 12) (Z12 31)   · History of Visit for screening mammogram (V76 12) (Z12 31)   · History of Vitamin D deficiency (268 9) (E55 9)   · History of Voiding dysfunction (599 9) (N39 8)    Surgical History    · History of  Section Low Transverse   · History of Cholecystectomy   · History of Diagnostic Cystoscopy   · History of Open Treat Of Prox  Ulnar Fracture With Radial Dislocation    Family History  Mother    · Family history of gallbladder disease (V18 59) (Z83 79)   · Family history of thyroid disease (V18 19) (Z83 49)   · Family history of Renal Disease  Father    · Family history of diabetes mellitus (V18 0) (Z83 3)   · Family history of Hypertension (V17 49)   · Family history of Nephrolithiasis  Paternal Grandmother    · Family history of diabetes mellitus (V18 0) (Z83 3)  Maternal Grandfather    · Family history of lymphoma (V16 7) (Z80 2)  Paternal Aunt    · Family history of malignant neoplasm of breast (V16 3) (Z80 3)    Social History    · Denied: History of Alcohol Use (History)   · Marital History - Currently    · Never A Smoker   · Denied: History of Smoker, Current Status Unknown    Current Meds   1  Delzicol 400 MG Oral Capsule Delayed Release; TAKE TWO CAPSULES BY MOUTH 3   TIMES A DAY; Therapy: 53URR1106 to (Last Rx:90Zge4747)  Requested for: 26Bjj4682 Ordered   2  Dicyclomine HCl - 10 MG Oral Capsule; TAKE 1 CAPSULE 3 TIMES DAILY; Therapy: 18ZWK2641 to (Evaluate:2016)  Requested for: 27ATY1075;  Last   Rx:2015 Ordered 3  Elmiron 100 MG Oral Capsule; TAKE 1 CAPSULE 3 TIMES DAILY; Therapy: 39DHV5038 to (Evaluate:57Fze5283)  Requested for: 94Ciz8900; Last   Rx:63Pkp5539 Ordered   4  Elmiron 100 MG Oral Capsule; TAKE 1 CAPSULE 3 TIMES DAILY; Therapy: 34XLH6615 to (Evaluate:30May2017)  Requested for: 09SYC9583; Last   Rx:30Jan2017 Ordered   5  Estrace 0 1 MG/GM Vaginal Cream; INSERT 1 APPLICATORFUL INTRAVAGINALLY AT   BEDTIME NIGHTLY; Therapy: 01TDQ8586 to (Last Rx:12May2016)  Requested for: 55YIO7597 Ordered   6  Omega 3 CAPS; Therapy: (Recorded:38Icr5725) to Recorded   7  Ondansetron 8 MG Oral Tablet Dispersible; One pill every 8 hours as needed for nausea   and vomiting; Therapy: 95DEO5944 to (Last Rx:13Jan2017)  Requested for: 24VSV3263 Ordered   8  Osphena 60 MG Oral Tablet; One pill daily; Therapy: 21Xba3742 to (Evaluate:09Evn4888)  Requested for: 42Byb7819; Last   Rx:43Azg1608 Ordered   9  Rosuvastatin Calcium 5 MG Oral Tablet; Take 1 tablet daily; Therapy: 55MAS9028 to (Madan Quinonez)  Requested for: 54ERD9351; Last   Rx:10Nov2016 Ordered   10  Vitamin D3 TABS; Therapy: (Recorded:05Uau6064) to Recorded    Allergies    1  Iodides    2  IVP Dye   3  Seasonal    Vitals   Recorded: 97Ydv2506 08:53AM   Heart Rate 75   Respiration 12   Systolic 013   Diastolic 78   Height 5 ft 7 in   Weight 123 lb 4 00 oz   BMI Calculated 19 3   BSA Calculated 1 65   O2 Saturation 99     Physical Exam    Constitutional   General appearance: No acute distress, well appearing and well nourished  Head and Face   Head and face: Normal     Eyes   Conjunctiva and lids: No swelling, erythema or discharge  Pupils and irises: Equal, round, reactive to light  Ophthalmoscopic examination: Normal fundi and optic discs  Ears, Nose, Mouth, and Throat   External inspection of ears and nose: Normal     Otoscopic examination: Tympanic membranes translucent with normal light reflex  Canals patent without erythema      Nasal mucosa, septum, and turbinates: Normal without edema or erythema  Lips, teeth, and gums: Normal, good dentition  Oropharynx: Normal with no erythema, edema, exudate or lesions  Neck   Neck: Supple, symmetric, trachea midline, no masses  Thyroid: Normal, no thyromegaly  Pulmonary   Respiratory effort: No increased work of breathing or signs of respiratory distress  Percussion of chest: Normal     Auscultation of lungs: Clear to auscultation  Cardiovascular   Auscultation of heart: Normal rate and rhythm, normal S1 and S2, no murmurs  Carotid pulses: 2+ bilaterally  Pedal pulses: 2+ bilaterally  Peripheral vascular exam: Normal     Examination of extremities for edema and/or varicosities: Normal     Abdomen   Abdomen: Non-tender, no masses  Liver and spleen: No hepatomegaly or splenomegaly  Lymphatic   Palpation of lymph nodes in neck: No lymphadenopathy  Musculoskeletal   Gait and station: Normal     Digits and nails: Normal without clubbing or cyanosis  Joints, bones, and muscles: Normal     Range of motion: Normal     Stability: Normal     Muscle strength/tone: Normal     Skin   Skin and subcutaneous tissue: Normal without rashes or lesions  Palpation of skin and subcutaneous tissue: Normal turgor  Neurologic   Cranial nerves: Cranial nerves II-XII intact  Cortical function: Normal mental status  Reflexes: 2+ and symmetric  Sensation: No sensory loss  Coordination: Normal finger to nose and heel to shin  Psychiatric   Judgment and insight: Normal     Orientation to person, place, and time: Normal     Recent and remote memory: Intact  Mood and affect: Normal        Results/Data  EKG/ECG- POC 99LEX6627 10:00AM Gladystine Mean     Test Name Result Flag Reference   EKG/ECG see scanned report,         Future Appointments    Date/Time Provider Specialty Site   12/15/2017 09:50 AM JANA Ortiz   Endocrinology [facility] ENDOCRINOLOGY   06/16/2017 09:30 AM Rick Tabares JANA Jimenez  Internal Medicine [facility] INTERNAL MED   02/28/2018 08:30 AM Sherie Kayser, M D  Internal Medicine [facility] INTERNAL MED   04/05/2017 09:30 AM JANA Naranjo  Urology [facility] FOR UROLOGY [facility]   10/30/2017 08:30 AM JANA Salazar   Obstetrics/Gynecology [address] OB/GYN     Signatures   Electronically signed by : JANA Pacheco ; Feb 22 2017  5:10PM EST                       (Author)

## 2018-01-11 NOTE — MISCELLANEOUS
To whom it may concern,        Mrs Ange Zhao is a patient under my medical care  It is come to my attention that she has received a jury duty summons  This patient has a chronic interstitial cystitis condition  which requires frequent and somewhat urgent visits to the bathroom  His condition is a chronic lifelong condition which will not improve  In view of this medical condition I would recommend a permanent excuse from the jury selection process  Thank you for your consideration in this matter  Respectfully,            Faisal GANDHI  September 6, 2017      Electronically signed by:Dogu GANDHI  Sep  6 2017 12:55PM EST      Electronically signed by:Doug GANDHI    Sep  6 2017 12:55PM EST

## 2018-01-12 VITALS
OXYGEN SATURATION: 95 % | HEIGHT: 67 IN | HEART RATE: 70 BPM | WEIGHT: 124 LBS | SYSTOLIC BLOOD PRESSURE: 100 MMHG | RESPIRATION RATE: 12 BRPM | TEMPERATURE: 98.2 F | DIASTOLIC BLOOD PRESSURE: 60 MMHG | BODY MASS INDEX: 19.46 KG/M2

## 2018-01-12 VITALS
WEIGHT: 124.25 LBS | BODY MASS INDEX: 19.5 KG/M2 | DIASTOLIC BLOOD PRESSURE: 64 MMHG | HEIGHT: 67 IN | SYSTOLIC BLOOD PRESSURE: 110 MMHG

## 2018-01-13 VITALS
SYSTOLIC BLOOD PRESSURE: 112 MMHG | WEIGHT: 123.38 LBS | HEIGHT: 67 IN | BODY MASS INDEX: 19.36 KG/M2 | DIASTOLIC BLOOD PRESSURE: 64 MMHG | HEART RATE: 68 BPM

## 2018-01-13 VITALS
TEMPERATURE: 98.5 F | HEIGHT: 67 IN | SYSTOLIC BLOOD PRESSURE: 102 MMHG | HEART RATE: 81 BPM | DIASTOLIC BLOOD PRESSURE: 68 MMHG | WEIGHT: 126 LBS | BODY MASS INDEX: 19.78 KG/M2 | OXYGEN SATURATION: 99 %

## 2018-01-13 VITALS
SYSTOLIC BLOOD PRESSURE: 110 MMHG | DIASTOLIC BLOOD PRESSURE: 60 MMHG | WEIGHT: 126 LBS | HEART RATE: 68 BPM | HEIGHT: 67 IN | BODY MASS INDEX: 19.78 KG/M2

## 2018-01-13 NOTE — MISCELLANEOUS
To Whom It May Concern,    Mrs Chani Javed is a patient under my medical care  It has come to my attention that she has received a jury duty summons  This patient has a chronic interstitial cystitis condition which  requires frequent and sometimes urgent visits to the bathroom  I would request that she be excused from the jury selection process because of this condition  Thank you for your consideration of this matter  Respectfully,         Keily GANDHI  September 5, 2017      Electronically signed by:Doug GANDHI    Sep  5 2017  7:04PM EST

## 2018-01-14 VITALS
HEART RATE: 75 BPM | OXYGEN SATURATION: 99 % | RESPIRATION RATE: 12 BRPM | HEIGHT: 67 IN | WEIGHT: 123.25 LBS | DIASTOLIC BLOOD PRESSURE: 78 MMHG | SYSTOLIC BLOOD PRESSURE: 128 MMHG | BODY MASS INDEX: 19.35 KG/M2

## 2018-01-14 VITALS
HEIGHT: 67 IN | BODY MASS INDEX: 19.82 KG/M2 | HEART RATE: 100 BPM | DIASTOLIC BLOOD PRESSURE: 62 MMHG | SYSTOLIC BLOOD PRESSURE: 100 MMHG | WEIGHT: 126.25 LBS

## 2018-01-14 VITALS
BODY MASS INDEX: 19.46 KG/M2 | HEART RATE: 64 BPM | SYSTOLIC BLOOD PRESSURE: 100 MMHG | HEIGHT: 67 IN | DIASTOLIC BLOOD PRESSURE: 70 MMHG | WEIGHT: 124 LBS

## 2018-01-14 NOTE — PROGRESS NOTES
Assessment    1  Hyperlipidemia (272 4) (E78 5)   2  Irritable bowel syndrome (564 1) (K58 9)   3  Vitamin D deficiency (268 9) (E55 9)   4  Chronic interstitial cystitis (595 1) (N30 10)   5  Nephrolithiasis (592 0) (N20 0)   6  Osteopenia (733 90) (M85 80)    Plan  Health Maintenance    · EKG/ECG- POC; Status:Unauthorized - Requires Signature; Requested for:[id number]; There's been a mild increase in the patient's lipid profile over the past year reviewed her diet and I suspect that his dietary indiscretions that led to the increase in her lipids  She will adjust her diet accordingly  Patient's urine will bowel syndrome at this time seems to be relatively [place] controlled and stable on her present medications    Patient does have a history of vitamin D deficiency and she is on vitamin D replacement  History of chronic interstitial cystitis symptoms presently are quiet and stable  Nephrolithiasis issue and has a renal calculus which is presently asymptomatic encouraged good fluid intakes  Osteopenia we'll continue with vitamin D replacement but avoid excessive calcium supplementation in view of the patient's history of nephrolithiasis  Chief Complaint  This is a routine annual physical examination for this 59-year-old woman who [place] any active symptoms or complaints at this time  History of Present Illness  HM, Adult Female: The patient is being seen for a health maintenance evaluation  The last health maintenance visit was 1 year(s) ago  Social History: Household members include spouse and 2 son(s)  She is   Work status: working part-time  The patient has never smoked cigarettes  She reports occasional alcohol use  The patient has no concerns about alcohol abuse  She has never used illicit drugs  General Health: The patient's health since the last visit is described as good  Screening: cancer screening reviewed and current  metabolic screening reviewed and current     risk screening reviewed and current  HPI: Ly Vogel presents today for routine annual examination and medical review  She denies any problems or issues at this time  She has a history of irritable bowel syndrome which is relatively stable at this point  She also has a history of any stone recent symptoms  She has a history of osteopenia and hyperlipidemia  She does have a history of interstitial cystitis which has been recently relatively quiet  She's undergone vaginal reconstruction to improve urethral drainage by Dr Ganga Bowen      Review of Systems    Constitutional: No fever, no chills, feels well, no tiredness, no recent weight gain or weight loss  Eyes: No complaints of eye pain, no red eyes, no eyesight problems, no discharge, no dry eyes, no itching of eyes  ENT: no complaints of earache, no loss of hearing, no nose bleeds, no nasal discharge, no sore throat, no hoarseness  Cardiovascular: No complaints of slow heart rate, no fast heart rate, no chest pain, no palpitations, no leg claudication, no lower extremity edema  Respiratory: No complaints of shortness of breath, no wheezing, no cough, no SOB on exertion, no orthopnea, no PND  Gastrointestinal: Occasional low abdominal cramping  Genitourinary: No complaints of dysuria, no incontinence, no pelvic pain, no dysmenorrhea, no vaginal discharge or bleeding  Musculoskeletal: No complaints of arthralgias, no myalgias, no joint swelling or stiffness, no limb pain or swelling  Integumentary: No complaints of skin rash or lesions, no itching, no skin wounds, no breast pain or lump  Neurological: No complaints of headache, no confusion, no convulsions, no numbness, no dizziness or fainting, no tingling, no limb weakness, no difficulty walking  Psychiatric: Not suicidal, no sleep disturbance, no anxiety or depression, no change in personality, no emotional problems     Hematologic/Lymphatic: No complaints of swollen glands, no swollen glands in the neck, does not bleed easily, does not bruise easily  Active Problems    1  Allergic rhinitis (477 9) (J30 9)   2  Appendiceal Procedure Assessment Fecolith   3  Atopic dermatitis (691 8) (L20 9)   4  Chronic interstitial cystitis (595 1) (N30 10)   5  Dense breasts (793 82) (R92 2)   6  Encounter for routine gynecological examination (V72 31) (Z01 419)   7  Encounter for screening mammogram for malignant neoplasm of breast (V76 12)   (Z12 31)   8  Fungal infection (117 9) (B49)   9  Herpes simplex infection (054 9) (B00 9)   10  Hyperlipidemia (272 4) (E78 5)   11  Interstitial cystitis (595 1) (N30 10)   12  Nephrolithiasis (592 0) (N20 0)   13  Osteopenia (733 90) (M85 80)   14  Other Ulcerative Colitis (556 8)   15  Pap smear, as part of routine gynecological examination (V76 2) (Z12 4)   16  Periodontal abscess (523 31) (K05 21)   17  Post-menopause atrophic vaginitis (627 3) (N95 2)   18  Screening for colon cancer (V76 51) (Z12 11)   19  Thrombocytopenia (287 5) (D69 6)   20  Urinary tract infection (599 0) (N39 0)   21  Visit for screening mammogram (V76 12) (Z12 31)   22  Voiding dysfunction (599 9) (N39 8)    Past Medical History    · History of Vaginal atrophy (627 3) (N95 2)    Surgical History    · Appendiceal Procedure Assessment Fecolith   · History of  Section Low Transverse   · History of Cholecystectomy   · History of Diagnostic Cystoscopy   · History of Open Treat Of Prox   Ulnar Fracture With Radial Dislocation    Family History    · Family history of gallbladder disease (V18 59) (Z83 79)   · Family history of thyroid disease (V18 19) (Z83 49)   · Family history of Renal Disease    · Family history of diabetes mellitus (V18 0) (Z83 3)   · Family history of Hypertension (V17 49)   · Family history of Nephrolithiasis    · Family history of diabetes mellitus (V18 0) (Z83 3)    · Family history of lymphoma (V16 7) (Z80 2)    · Family history of malignant neoplasm of breast (V16 3) (Z80 3)    Social History    · Denied: History of Alcohol Use (History)   · Marital History - Currently    · Never A Smoker   · Denied: History of Smoker, Current Status Unknown    Current Meds   1  Crestor TABS Recorded   2  Delzicol 400 MG Oral Capsule Delayed Release; Two pills three times a day; Therapy: 29YIG6330 to (Evaluate:21May2013)  Requested for: 05AKB6258; Last   Rx:71Twm0946 Ordered   3  Dicyclomine HCl - 10 MG Oral Capsule; One pill three times a day; Therapy: 29MAE7463 to (Last Rx:13Dbl4411)  Requested for: 95Aqk5644 Ordered   4  Dicyclomine HCl - 10 MG Oral Capsule; TAKE 1 CAPSULE 3 TIMES DAILY; Therapy: 34XRZ7094 to (Evaluate:14Oct2016)  Requested for: 05HHT3898; Last   Rx:29Qec8326 Ordered   5  Elmiron 100 MG Oral Capsule; TAKE 1 CAPSULE 3 TIMES DAILY; Therapy: 87IEF1777 to 0496 27 16 26)  Requested for: 03MOS7057; Last   Rx:73Gri0835 Ordered   6  Osphena 60 MG Oral Tablet; One pill daily; Therapy: 90Nfe6846 to (Evaluate:20Cyu6840)  Requested for: 67Rrr3150; Last   Rx:71Par5499 Ordered   7  Ultram 50 MG Oral Tablet; Therapy: (Recorded:56Tzv9865) to Recorded    Allergies    1  Iodides    2  IVP Dye   3  Seasonal    Vitals   Recorded: 92WJA7341 09:22AM   Temperature 97 5 F   Heart Rate 84   Respiration 12   Systolic 940   Diastolic 78   Height 5 ft 6 in   Weight 122 lb 2 08 oz   BMI Calculated 19 71   BSA Calculated 1 63   O2 Saturation 96     Physical Exam    Constitutional   General appearance: No acute distress, well appearing and well nourished  Eyes   Conjunctiva and lids: No swelling, erythema or discharge  Pupils and irises: Equal, round, reactive to light  Ophthalmoscopic examination: Normal fundi and optic discs  Ears, Nose, Mouth, and Throat   External inspection of ears and nose: Normal     Otoscopic examination: Tympanic membranes translucent with normal light reflex  Canals patent without erythema      Hearing: Normal     Nasal mucosa, septum, and turbinates: Normal without edema or erythema  Lips, teeth, and gums: Normal, good dentition  Oropharynx: Normal with no erythema, edema, exudate or lesions  Neck   Neck: Supple, symmetric, trachea midline, no masses  Thyroid: Normal, no thyromegaly  Pulmonary   Respiratory effort: No increased work of breathing or signs of respiratory distress  Percussion of chest: Normal     Palpation of chest: Normal     Auscultation of lungs: Clear to auscultation  Cardiovascular   Palpation of heart: Normal PMI, no thrills  Auscultation of heart: Normal rate and rhythm, normal S1 and S2, no murmurs  Carotid pulses: 2+ bilaterally  Abdominal aorta: Normal     Femoral pulses: 2+ bilaterally  Pedal pulses: 2+ bilaterally  Examination of extremities for edema and/or varicosities: Normal     Chest   Breasts: Normal, no dimpling or skin changes appreciated  Palpation of breasts and axillae: Normal, no masses palpated  Abdomen   Abdomen: Non-tender, no masses  Liver and spleen: No hepatomegaly or splenomegaly  Examination for hernias: No hernia appreciated  Anus, perineum, and rectum: Normal sphincter tone, no masses, no prolapse  Stool sample for occult blood: Negative  Lymphatic   Palpation of lymph nodes in neck: No lymphadenopathy  Palpation of lymph nodes in axillae: No lymphadenopathy  Palpation of lymph nodes in groin: No lymphadenopathy  Palpation of lymph nodes in other areas: No lymphadenopathy  Musculoskeletal   Gait and station: Normal     Digits and nails: Normal without clubbing or cyanosis  Joints, bones, and muscles: Normal     Range of motion: Normal     Stability: Normal     Muscle strength/tone: Normal     Skin   Skin and subcutaneous tissue: Normal without rashes or lesions  Palpation of skin and subcutaneous tissue: Normal turgor  Neurologic   Cranial nerves: Cranial nerves II-XII intact  Reflexes: 2+ and symmetric      Sensation: No sensory loss  Psychiatric   Judgment and insight: Normal     Orientation to person, place, and time: Normal     Recent and remote memory: Intact  Mood and affect: Normal        Future Appointments    Date/Time Provider Specialty Site   02/22/2017 09:00 AM JANA Hopkins  Internal Medicine [facility] INTERNAL MED   10/24/2016 08:30 AM JANA Nam   Obstetrics/Gynecology [address]  OB/GYN     Signatures   Electronically signed by : JANA Russell ; Feb 19 2016  1:22PM EST                       (Author)

## 2018-01-17 NOTE — MISCELLANEOUS
Message  sent copy of lab work to patient      Signatures   Electronically signed by : JANA Fernando ; Feb 24 2016 10:27AM EST                       (Author)

## 2018-01-29 PROCEDURE — 88312 SPECIAL STAINS GROUP 1: CPT | Performed by: PHYSICIAN ASSISTANT

## 2018-01-29 PROCEDURE — 88305 TISSUE EXAM BY PATHOLOGIST: CPT | Performed by: PHYSICIAN ASSISTANT

## 2018-01-29 PROCEDURE — 88305 TISSUE EXAM BY PATHOLOGIST: CPT | Performed by: PATHOLOGY

## 2018-01-29 PROCEDURE — 88312 SPECIAL STAINS GROUP 1: CPT | Performed by: PATHOLOGY

## 2018-01-31 ENCOUNTER — LAB REQUISITION (OUTPATIENT)
Dept: LAB | Facility: HOSPITAL | Age: 58
End: 2018-01-31
Payer: COMMERCIAL

## 2018-01-31 DIAGNOSIS — B35.1 TINEA UNGUIUM: ICD-10-CM

## 2018-02-07 ENCOUNTER — OFFICE VISIT (OUTPATIENT)
Dept: UROLOGY | Facility: CLINIC | Age: 58
End: 2018-02-07
Payer: COMMERCIAL

## 2018-02-07 VITALS
DIASTOLIC BLOOD PRESSURE: 72 MMHG | HEART RATE: 64 BPM | BODY MASS INDEX: 33.27 KG/M2 | WEIGHT: 212 LBS | SYSTOLIC BLOOD PRESSURE: 116 MMHG | HEIGHT: 67 IN

## 2018-02-07 DIAGNOSIS — N30.10 CHRONIC INTERSTITIAL CYSTITIS: Primary | ICD-10-CM

## 2018-02-07 DIAGNOSIS — R39.15 URINARY URGENCY: ICD-10-CM

## 2018-02-07 PROCEDURE — 99214 OFFICE O/P EST MOD 30 MIN: CPT | Performed by: UROLOGY

## 2018-02-07 RX ORDER — ESTRADIOL 0.1 MG/G
1 CREAM VAGINAL
COMMUNITY
Start: 2016-05-12 | End: 2018-06-12 | Stop reason: SDUPTHER

## 2018-02-07 RX ORDER — MELATONIN
COMMUNITY
End: 2018-02-28 | Stop reason: ALTCHOICE

## 2018-02-07 RX ORDER — CYCLOSPORINE 0.5 MG/ML
EMULSION OPHTHALMIC
COMMUNITY
Start: 2017-06-09 | End: 2018-02-28 | Stop reason: SDUPTHER

## 2018-02-07 NOTE — PROGRESS NOTES
2/7/2018    Ashley Major  1960  [id number]    Discussion and Plan    The patient continues to do well with performing vaginal dilation twice per week  She will continue with this regimen for now  Maintain Elmiron and Uribel  She will also continue her Estrace regimen as prescribed  Return in 1 year for routine visit  Assessment    Vaginal atrophy    Patient Active Problem List   Diagnosis    Chronic interstitial cystitis    Difficult or painful urination    Urinary urgency       History of Present Illness    Vinita Desai is a 62 y o  female seen today in regards to a history of interstitial cystitis and urethral stricture was recently evaluated for persistent dysuria and pain  She continues to take Elmiron and Uribel daily  She continues to follow with Dr Scout Banuelos  She continues to perform self dilation 5 to 6 times a week  Her urine testing was negative for bacterial infection  She was prescribed Myrbetriq which she took for 1 month  Currently she is doing well and symptoms have improved  She denies any significant complaints at this time  She does feel that her risk of dilation is a trigger for her cystitis flare ups  She denies any changes in her overall health her last visit  There is no evidence of infection or kidney stones as a cause of her recent urinary symptoms  Her renal ultrasound was reviewed and there were no abnormal findings  PVR was 80 mL  We also discussed the need for vaginal dilation  She wishes to continue with performing this 2 times a week and has reached a stable baseline  She will continue to take Elmiron and Uribel  Urinary Incontinence Screening    Flowsheet Row Most Recent Value   Urinary Incontinence   Urinary Incontinence? No   Incomplete emptying? No   Urinary frequency? Yes [has IC]   Urinary urgency? No   Urinary hesitancy? No   Dysuria (painful difficult urination)? No   Nocturia (waking up to use the bathroom)?   Yes [1-2 times ]   Straining (having to push to go)?  No   Weak stream?  No   Intermittent stream?  No   Post void dribbling? No   Vaginal pressure? No   Vaginal dryness? No              Past Medical History  Past Medical History:   Diagnosis Date    Atrophic vaginitis     Colon cancer (HCC)     Dermatitis     Dry eye     Frequency of urination     Fungal infection     Herpes     Hyperlipidemia     Interstitial cystitis     Osteoporosis     Periodontal abscess     [facility]cer[facility] [address]     Ulcerative colitis ([facility] [address] [facility]     UTI (urinary tract infection)     Vaginal atrophy     Vitamin D deficiency     Voiding dysfunction        Past Social History  Past Surgical History:   Procedure Laterality Date     SECTION      CHOLECYSTECTOMY      CYSTOSCOPY      KIDNEY STONE SURGERY      ORIF PROXIMAL ULNA FRACTURE      metal plates and screws removed    DE COLONOSCOPY FLX DX W/COLLJ SPEC WHEN PFRMD N/A 2017    Procedure: COLONOSCOPY;  Surgeon: Ned Mars MD;  Location: [facility]; Service: Colorectal       Past Family History  Family History   Problem Relation Age of Onset    Gallbladder disease Mother     Diabetes Father     Lymphoma Maternal Grandfather        Past Social history  Social History     Social History    Marital status: /Civil Union     Spouse name: N/A    Number of children: N/A    Years of education: N/A     Occupational History    Not on file       Social History Main Topics    Smoking status: Never Smoker    Smokeless tobacco: Never Used    Alcohol use Yes      Comment: socially - 1-3 drinks per month    Drug use: No    Sexual activity: Yes     Other Topics Concern    Not on file     Social History Narrative    No narrative on file       Current Medications  Current Outpatient Prescriptions   Medication Sig Dispense Refill    Cholecalciferol (VITAMIN D PO) Take 3,000 Units by mouth daily      cholecalciferol (VITAMIN D3) 1,000 units tablet Take by mouth      Cranberry (ELLURA PO) Take 1 capsule by mouth daily      cycloSPORINE (RESTASIS MULTIDOSE) 0 05 % ophthalmic emulsion Apply to eye      cycloSPORINE (RESTASIS) 0 05 % ophthalmic emulsion Administer 1 drop to both eyes 2 (two) times a day      dicyclomine (BENTYL) 10 mg capsule Take 10 mg by mouth 2 (two) times a day      estradiol (ESTRACE VAGINAL) 0 1 mg/g vaginal cream Insert 1 Applicatorful into the vagina      mesalamine (ASACOL) 800 MG EC tablet Take 800 mg by mouth 3 (three) times a day      Meth-Hyo-M Bl-Na Phos-Ph Sal (URO-MP PO) Take 1 capsule by mouth 4 (four) times a day      olopatadine HCl (PATADAY) 0 2 % opth drops Administer 1 drop to both eyes daily      Ospemifene (OSPHENA PO) Take 60 mg by mouth daily      pentosan polysulfate (ELMIRON) 100 mg capsule Take 100 mg by mouth 3 (three) times a day before meals      rosuvastatin (CRESTOR) 5 mg tablet Take 5 mg by mouth every other day      Omega-3 Fatty Acids (OMEGA 3 PO) Take 2 capsules by mouth daily       No current facility-administered medications for this visit  Allergies  Allergies   Allergen Reactions    Iodine Anaphylaxis     Allergy to Iodinated and non iodinated dye    Seasonal Ic  [Cholestatin]        Past Medical History, Social History, Family History, medications and allergies were reviewed  Review of Systems  Review of Systems   Constitutional: Negative  HENT: Negative  Eyes: Negative  Respiratory: Negative  Cardiovascular: Negative  Gastrointestinal: Negative  Endocrine: Negative  Musculoskeletal: Negative  Skin: Negative  Allergic/Immunologic: Negative  Neurological: Negative  Hematological: Negative  Psychiatric/Behavioral: Negative  Vitals  Vitals:    02/07/18 0917   BP: 116/72   Pulse: 64   Weight: 96 2 kg (212 lb)   Height: 5' 7" (1 702 m)         Physical Exam    Physical Exam   Constitutional: She is oriented to person, place, and time  She appears well-developed and well-nourished     HENT: Head: Normocephalic and atraumatic  Eyes: Pupils are equal, round, and reactive to light  Neck: Normal range of motion  Cardiovascular: Normal rate, regular rhythm and normal heart sounds  Pulmonary/Chest: Effort normal and breath sounds normal    Abdominal: Soft  Bowel sounds are normal  She exhibits no distension  There is no tenderness  There is no rebound and no guarding  Musculoskeletal: Normal range of motion  Neurological: She is alert and oriented to person, place, and time  Skin: Skin is warm, dry and intact  Psychiatric: She has a normal mood and affect  Nursing note and vitals reviewed        Results    No results found for: PSA  Lab Results   Component Value Date    GLUCOSE 90 02/20/2017    CALCIUM 8 8 02/20/2017     02/20/2017    K 3 8 02/20/2017    CO2 30 02/20/2017     02/20/2017    BUN 17 02/20/2017    CREATININE 0 82 02/20/2017     Lab Results   Component Value Date    WBC 4 39 02/20/2017    HGB 12 8 02/20/2017    HCT 41 1 02/20/2017    MCV 91 02/20/2017     (L) 02/20/2017       No results found for this or any previous visit (from the past 1 hour(s)) ]

## 2018-02-20 ENCOUNTER — LAB (OUTPATIENT)
Dept: LAB | Facility: CLINIC | Age: 58
End: 2018-02-20
Payer: COMMERCIAL

## 2018-02-20 DIAGNOSIS — E78.5 HYPERLIPIDEMIA: ICD-10-CM

## 2018-02-20 DIAGNOSIS — M85.80 OTHER SPECIFIED DISORDERS OF BONE DENSITY AND STRUCTURE, UNSPECIFIED SITE (CODE): ICD-10-CM

## 2018-02-20 DIAGNOSIS — D69.6 THROMBOCYTOPENIA (HCC): ICD-10-CM

## 2018-02-20 DIAGNOSIS — E03.9 HYPOTHYROIDISM: ICD-10-CM

## 2018-02-20 DIAGNOSIS — Z12.11 ENCOUNTER FOR SCREENING FOR MALIGNANT NEOPLASM OF COLON: ICD-10-CM

## 2018-02-20 DIAGNOSIS — N20.0 CALCULUS OF KIDNEY: ICD-10-CM

## 2018-02-20 LAB
25(OH)D3 SERPL-MCNC: 37.8 NG/ML (ref 30–100)
ALBUMIN SERPL BCP-MCNC: 3.6 G/DL (ref 3.5–5)
ALP SERPL-CCNC: 33 U/L (ref 46–116)
ALT SERPL W P-5'-P-CCNC: 20 U/L (ref 12–78)
ANION GAP SERPL CALCULATED.3IONS-SCNC: 6 MMOL/L (ref 4–13)
AST SERPL W P-5'-P-CCNC: 15 U/L (ref 5–45)
BACTERIA UR QL AUTO: ABNORMAL /HPF
BASOPHILS # BLD AUTO: 0.02 THOUSANDS/ΜL (ref 0–0.1)
BASOPHILS NFR BLD AUTO: 1 % (ref 0–1)
BILIRUB SERPL-MCNC: 0.35 MG/DL (ref 0.2–1)
BILIRUB UR QL STRIP: ABNORMAL
BUN SERPL-MCNC: 19 MG/DL (ref 5–25)
CALCIUM SERPL-MCNC: 8.9 MG/DL (ref 8.3–10.1)
CAOX CRY URNS QL MICRO: ABNORMAL /HPF
CHLORIDE SERPL-SCNC: 105 MMOL/L (ref 100–108)
CHOLEST SERPL-MCNC: 162 MG/DL (ref 50–200)
CLARITY UR: CLEAR
CO2 SERPL-SCNC: 29 MMOL/L (ref 21–32)
COLOR UR: ABNORMAL
CREAT SERPL-MCNC: 0.81 MG/DL (ref 0.6–1.3)
EOSINOPHIL # BLD AUTO: 0.04 THOUSAND/ΜL (ref 0–0.61)
EOSINOPHIL NFR BLD AUTO: 1 % (ref 0–6)
ERYTHROCYTE [DISTWIDTH] IN BLOOD BY AUTOMATED COUNT: 13.2 % (ref 11.6–15.1)
GFR SERPL CREATININE-BSD FRML MDRD: 81 ML/MIN/1.73SQ M
GLUCOSE P FAST SERPL-MCNC: 90 MG/DL (ref 65–99)
GLUCOSE UR STRIP-MCNC: ABNORMAL MG/DL
HCT VFR BLD AUTO: 41.6 % (ref 34.8–46.1)
HDLC SERPL-MCNC: 71 MG/DL (ref 40–60)
HGB BLD-MCNC: 13.1 G/DL (ref 11.5–15.4)
HGB UR QL STRIP.AUTO: ABNORMAL
KETONES UR STRIP-MCNC: ABNORMAL MG/DL
LDLC SERPL CALC-MCNC: 77 MG/DL (ref 0–100)
LEUKOCYTE ESTERASE UR QL STRIP: ABNORMAL
LYMPHOCYTES # BLD AUTO: 0.84 THOUSANDS/ΜL (ref 0.6–4.47)
LYMPHOCYTES NFR BLD AUTO: 20 % (ref 14–44)
MCH RBC QN AUTO: 28.2 PG (ref 26.8–34.3)
MCHC RBC AUTO-ENTMCNC: 31.5 G/DL (ref 31.4–37.4)
MCV RBC AUTO: 90 FL (ref 82–98)
MONOCYTES # BLD AUTO: 0.35 THOUSAND/ΜL (ref 0.17–1.22)
MONOCYTES NFR BLD AUTO: 8 % (ref 4–12)
MUCOUS THREADS UR QL AUTO: ABNORMAL
NEUTROPHILS # BLD AUTO: 2.91 THOUSANDS/ΜL (ref 1.85–7.62)
NEUTS SEG NFR BLD AUTO: 70 % (ref 43–75)
NITRITE UR QL STRIP: ABNORMAL
NON-SQ EPI CELLS URNS QL MICRO: ABNORMAL /HPF
NRBC BLD AUTO-RTO: 0 /100 WBCS
PLATELET # BLD AUTO: 149 THOUSANDS/UL (ref 149–390)
PMV BLD AUTO: 12 FL (ref 8.9–12.7)
POTASSIUM SERPL-SCNC: 3.7 MMOL/L (ref 3.5–5.3)
PROT SERPL-MCNC: 7.2 G/DL (ref 6.4–8.2)
PROT UR STRIP-MCNC: ABNORMAL MG/DL
RBC # BLD AUTO: 4.64 MILLION/UL (ref 3.81–5.12)
RBC #/AREA URNS AUTO: ABNORMAL /HPF
SODIUM SERPL-SCNC: 140 MMOL/L (ref 136–145)
SP GR UR STRIP.AUTO: 1.02 (ref 1–1.03)
T4 FREE SERPL-MCNC: 0.78 NG/DL (ref 0.76–1.46)
TRIGL SERPL-MCNC: 72 MG/DL
TSH SERPL DL<=0.05 MIU/L-ACNC: 3.03 UIU/ML (ref 0.36–3.74)
UROBILINOGEN UR QL STRIP.AUTO: ABNORMAL E.U./DL
WBC # BLD AUTO: 4.17 THOUSAND/UL (ref 4.31–10.16)
WBC #/AREA URNS AUTO: ABNORMAL /HPF

## 2018-02-20 PROCEDURE — 84443 ASSAY THYROID STIM HORMONE: CPT

## 2018-02-20 PROCEDURE — 80053 COMPREHEN METABOLIC PANEL: CPT

## 2018-02-20 PROCEDURE — 82306 VITAMIN D 25 HYDROXY: CPT

## 2018-02-20 PROCEDURE — 36415 COLL VENOUS BLD VENIPUNCTURE: CPT

## 2018-02-20 PROCEDURE — 85025 COMPLETE CBC W/AUTO DIFF WBC: CPT

## 2018-02-20 PROCEDURE — 80061 LIPID PANEL: CPT

## 2018-02-20 PROCEDURE — 84439 ASSAY OF FREE THYROXINE: CPT

## 2018-02-20 PROCEDURE — 81001 URINALYSIS AUTO W/SCOPE: CPT

## 2018-02-20 NOTE — PROGRESS NOTES
Please call the patient regarding her abnormal result  Thyroid levels and vitamin-D levels are normal  Urine testing should be reviewed by the primary care physician  The cholesterol levels are good

## 2018-02-21 ENCOUNTER — TELEPHONE (OUTPATIENT)
Dept: ENDOCRINOLOGY | Facility: CLINIC | Age: 58
End: 2018-02-21

## 2018-02-21 NOTE — TELEPHONE ENCOUNTER
----- Message from Shasta Chauhan MD sent at 2/20/2018  3:50 PM EST -----  Please call the patient regarding her abnormal result  Thyroid levels and vitamin-D levels are normal  Urine testing should be reviewed by the primary care physician  The cholesterol levels are good

## 2018-02-28 ENCOUNTER — OFFICE VISIT (OUTPATIENT)
Dept: INTERNAL MEDICINE CLINIC | Facility: CLINIC | Age: 58
End: 2018-02-28
Payer: COMMERCIAL

## 2018-02-28 VITALS
HEART RATE: 82 BPM | HEIGHT: 67 IN | BODY MASS INDEX: 19.46 KG/M2 | OXYGEN SATURATION: 97 % | SYSTOLIC BLOOD PRESSURE: 100 MMHG | DIASTOLIC BLOOD PRESSURE: 58 MMHG | WEIGHT: 124 LBS | TEMPERATURE: 98 F

## 2018-02-28 DIAGNOSIS — K58.2 IRRITABLE BOWEL SYNDROME WITH BOTH CONSTIPATION AND DIARRHEA: Primary | ICD-10-CM

## 2018-02-28 DIAGNOSIS — N30.10 CHRONIC INTERSTITIAL CYSTITIS: ICD-10-CM

## 2018-02-28 DIAGNOSIS — E03.9 HYPOTHYROIDISM, UNSPECIFIED TYPE: ICD-10-CM

## 2018-02-28 DIAGNOSIS — E04.1 NONTOXIC SINGLE THYROID NODULE: ICD-10-CM

## 2018-02-28 DIAGNOSIS — N20.0 NEPHROLITHIASIS: ICD-10-CM

## 2018-02-28 DIAGNOSIS — E55.9 VITAMIN D DEFICIENCY: ICD-10-CM

## 2018-02-28 DIAGNOSIS — D69.6 THROMBOCYTOPENIA (HCC): ICD-10-CM

## 2018-02-28 PROCEDURE — 99396 PREV VISIT EST AGE 40-64: CPT | Performed by: INTERNAL MEDICINE

## 2018-02-28 NOTE — ASSESSMENT & PLAN NOTE
Chronic interstitial cystitis symptoms are presently stable continue on present medications with regular follow-up with Urology

## 2018-02-28 NOTE — ASSESSMENT & PLAN NOTE
Most recent thyroid ultrasound indicates a single nodule below the cut off for biopsy recommendation  Recommend follow-up ultrasound of the thyroid in 1 year

## 2018-02-28 NOTE — ASSESSMENT & PLAN NOTE
Occasional flare-ups of irritable bowel presently relatively stable  She continues on dicyclomine 10 milligrams twice a day  Most recent colonoscopy appears to be stable with no evidence of any active inflammation in the intestines and no evidence of any polyps

## 2018-02-28 NOTE — ASSESSMENT & PLAN NOTE
History of thrombocytopenia of platelet count on this CBC is actually in the normal range follow-up testing in 1 year

## 2018-02-28 NOTE — ASSESSMENT & PLAN NOTE
History of nephrolithiasis the patient does have some calcium crystals in her urine recommend increasing fluid intake by 16 ounces daily

## 2018-02-28 NOTE — PROGRESS NOTES
Assessment/Plan:    Irritable bowel syndrome  Occasional flare-ups of irritable bowel presently relatively stable  She continues on dicyclomine 10 milligrams twice a day  Most recent colonoscopy appears to be stable with no evidence of any active inflammation in the intestines and no evidence of any polyps  Nontoxic single thyroid nodule  Most recent thyroid ultrasound indicates a single nodule below the cut off for biopsy recommendation  Recommend follow-up ultrasound of the thyroid in 1 year  Chronic interstitial cystitis  Chronic interstitial cystitis symptoms are presently stable continue on present medications with regular follow-up with Urology  Nephrolithiasis  History of nephrolithiasis the patient does have some calcium crystals in her urine recommend increasing fluid intake by 16 ounces daily  Thrombocytopenia (HCC)  History of thrombocytopenia of platelet count on this CBC is actually in the normal range follow-up testing in 1 year  Diagnoses and all orders for this visit:    Irritable bowel syndrome with both constipation and diarrhea    Hypothyroidism, unspecified type  -     T4, free; Future  -     TSH, 3rd generation; Future    Vitamin D deficiency  -     Vitamin D 25 hydroxy; Future    Nontoxic single thyroid nodule    Chronic interstitial cystitis    Nephrolithiasis    Thrombocytopenia (HCC)        Subjective:      Patient ID: Andreia Knott is a 62 y o  female  This is a routine annual health maintenance visit for this 19-year-old female patient  She presents today with no issues or medical complaints  She has a history of irritable bowel, interstitial cystitis, nephrolithiasis, thrombocytopenia, single thyroid nodule, and osteopenia  Her most recent colonoscopy was performed in July of 2017 and appear to be stable with no polyps in located on the examination    She is due for follow-up visit with her colon and rectal physician this year and her next colonoscopy is scheduled for   She is up-to-date on her gynecologic follow-up visits as well as mammograms  She continues with regular follow-up visits with Endocrinology for maintenance surveillance on her single thyroid nodule which is smaller than the recommended size for biopsies  She also continues with regular follow-up visits with her urologist for treatment of her interstitial cystitis  The following portions of the patient's history were reviewed and updated as appropriate:   She  has a past medical history of Atopic dermatitis; Atrophic vaginitis; Colon cancer ([facility] [address] ); Dermatitis; Dry eye; Frequency of urination; Fungal infection; Herpes; Hyperlipidemia; Interstitial cystitis; Osteoporosis; Periodontal abscess; Ulcerative colitis ([facility] [address] ); Ulcerative colitis ([facility] [address] ); Urinary frequency; UTI (urinary tract infection); Vaginal atrophy; Vitamin D deficiency; and Voiding dysfunction  She   Patient Active Problem List    Diagnosis Date Noted    Nontoxic single thyroid nodule 2016    Difficult or painful urination 2016    Urinary urgency 2016    Irritable bowel syndrome 2016    Thrombocytopenia ([facility] [address] ) 2016    Dense breasts 10/28/2014    Osteopenia 2013    Chronic interstitial cystitis 2013    Nephrolithiasis 2013     She  has a past surgical history that includes Kidney stone surgery; ORIF proximal ulna fracture; pr colonoscopy flx dx w/collj spec when pfrmd (N/A, 2017); Cystoscopy; Cholecystectomy; Other surgical history; and  section  Her family history includes Breast cancer in her paternal aunt; Diabetes in her father and paternal grandmother; Gallbladder disease in her mother; Hypertension in her father; Kidney disease in her mother; Lymphoma in her maternal grandfather; Nephrolithiasis in her father; Thyroid disease in her mother  She  reports that she has never smoked  She has never used smokeless tobacco  She reports that she drinks alcohol  She reports that she does not use drugs  Current Outpatient Prescriptions   Medication Sig Dispense Refill    Cholecalciferol (VITAMIN D PO) Take 3,000 Units by mouth daily      Cranberry (ELLURA PO) Take 1 capsule by mouth daily      cycloSPORINE (RESTASIS) 0 05 % ophthalmic emulsion Administer 1 drop to both eyes 2 (two) times a day      dicyclomine (BENTYL) 10 mg capsule Take 10 mg by mouth 2 (two) times a day      estradiol (ESTRACE VAGINAL) 0 1 mg/g vaginal cream Insert 1 Applicatorful into the vagina      mesalamine (ASACOL) 800 MG EC tablet Take 800 mg by mouth 3 (three) times a day      Meth-Hyo-M Bl-Na Phos-Ph Sal (URO-MP PO) Take 1 capsule by mouth 4 (four) times a day      olopatadine HCl (PATADAY) 0 2 % opth drops Administer 1 drop to both eyes daily      Omega-3 Fatty Acids (OMEGA 3 PO) Take 2 capsules by mouth daily      Ospemifene (OSPHENA PO) Take 60 mg by mouth daily      pentosan polysulfate (ELMIRON) 100 mg capsule Take 100 mg by mouth 3 (three) times a day before meals      rosuvastatin (CRESTOR) 5 mg tablet Take 5 mg by mouth every other day       No current facility-administered medications for this visit       Review of Systems   Constitutional: Negative  HENT: Negative  All other systems reviewed and are negative  Objective:      /58   Pulse 82   Temp 98 °F (36 7 °C)   Ht 5' 7" (1 702 m)   Wt 56 2 kg (124 lb)   SpO2 97%   BMI 19 42 kg/m²          Physical Exam   Constitutional: She is oriented to person, place, and time  She appears well-developed and well-nourished  No distress  HENT:   Head: Normocephalic and atraumatic     Right Ear: Tympanic membrane, external ear and ear canal normal    Left Ear: Tympanic membrane, external ear and ear canal normal    Nose: Nose normal    Mouth/Throat: Uvula is midline, oropharynx is clear and moist and mucous membranes are normal    Eyes: Conjunctivae and EOM are normal  Pupils are equal, round, and reactive to light  Right eye exhibits no discharge  Left eye exhibits no discharge  No scleral icterus  Neck: Normal range of motion  Neck supple  No JVD present  No tracheal deviation present  No thyromegaly present  Cardiovascular: Normal rate, regular rhythm, normal heart sounds and intact distal pulses  Exam reveals no gallop and no friction rub  No murmur heard  Pulmonary/Chest: Effort normal and breath sounds normal  No respiratory distress  She has no wheezes  She has no rales  She exhibits no tenderness  Abdominal: Soft  Bowel sounds are normal  She exhibits no distension and no mass  There is no tenderness  There is no rebound and no guarding  Musculoskeletal: Normal range of motion  She exhibits no edema, tenderness or deformity  Lymphadenopathy:     She has no cervical adenopathy  Neurological: She is alert and oriented to person, place, and time  She has normal reflexes  She displays normal reflexes  No cranial nerve deficit  She exhibits normal muscle tone  Coordination normal    Skin: Skin is warm and dry  No rash noted  She is not diaphoretic  No erythema  No pallor  Psychiatric: She has a normal mood and affect   Her behavior is normal  Judgment and thought content normal

## 2018-03-30 ENCOUNTER — APPOINTMENT (OUTPATIENT)
Dept: LAB | Facility: CLINIC | Age: 58
End: 2018-03-30
Payer: COMMERCIAL

## 2018-03-30 DIAGNOSIS — N39.0 RECURRENT URINARY TRACT INFECTION: ICD-10-CM

## 2018-03-30 DIAGNOSIS — R35.89 POLYURIA: ICD-10-CM

## 2018-03-30 DIAGNOSIS — R11.0 NAUSEA: ICD-10-CM

## 2018-03-30 DIAGNOSIS — R10.84 GENERALIZED ABDOMINAL PAIN: ICD-10-CM

## 2018-03-30 DIAGNOSIS — R10.84 GENERALIZED ABDOMINAL PAIN: Primary | ICD-10-CM

## 2018-03-30 LAB
ALBUMIN SERPL BCP-MCNC: 3.9 G/DL (ref 3.5–5)
ALP SERPL-CCNC: 36 U/L (ref 46–116)
ALT SERPL W P-5'-P-CCNC: 23 U/L (ref 12–78)
AMYLASE SERPL-CCNC: 56 IU/L (ref 25–115)
ANION GAP SERPL CALCULATED.3IONS-SCNC: 7 MMOL/L (ref 4–13)
AST SERPL W P-5'-P-CCNC: 18 U/L (ref 5–45)
BACTERIA UR QL AUTO: ABNORMAL /HPF
BASOPHILS # BLD AUTO: 0.02 THOUSANDS/ΜL (ref 0–0.1)
BASOPHILS NFR BLD AUTO: 1 % (ref 0–1)
BILIRUB SERPL-MCNC: 0.41 MG/DL (ref 0.2–1)
BILIRUB UR QL STRIP: NEGATIVE
BUN SERPL-MCNC: 18 MG/DL (ref 5–25)
CALCIUM SERPL-MCNC: 8.9 MG/DL (ref 8.3–10.1)
CHLORIDE SERPL-SCNC: 107 MMOL/L (ref 100–108)
CLARITY UR: CLEAR
CO2 SERPL-SCNC: 28 MMOL/L (ref 21–32)
COLOR UR: ABNORMAL
CREAT SERPL-MCNC: 0.86 MG/DL (ref 0.6–1.3)
EOSINOPHIL # BLD AUTO: 0.03 THOUSAND/ΜL (ref 0–0.61)
EOSINOPHIL NFR BLD AUTO: 1 % (ref 0–6)
ERYTHROCYTE [DISTWIDTH] IN BLOOD BY AUTOMATED COUNT: 13.2 % (ref 11.6–15.1)
GFR SERPL CREATININE-BSD FRML MDRD: 75 ML/MIN/1.73SQ M
GLUCOSE P FAST SERPL-MCNC: 92 MG/DL (ref 65–99)
GLUCOSE UR STRIP-MCNC: NEGATIVE MG/DL
HCT VFR BLD AUTO: 41.6 % (ref 34.8–46.1)
HGB BLD-MCNC: 12.9 G/DL (ref 11.5–15.4)
HGB UR QL STRIP.AUTO: NEGATIVE
HYALINE CASTS #/AREA URNS LPF: ABNORMAL /LPF
KETONES UR STRIP-MCNC: NEGATIVE MG/DL
LEUKOCYTE ESTERASE UR QL STRIP: NEGATIVE
LIPASE SERPL-CCNC: 144 U/L (ref 73–393)
LYMPHOCYTES # BLD AUTO: 0.89 THOUSANDS/ΜL (ref 0.6–4.47)
LYMPHOCYTES NFR BLD AUTO: 21 % (ref 14–44)
MCH RBC QN AUTO: 28.2 PG (ref 26.8–34.3)
MCHC RBC AUTO-ENTMCNC: 31 G/DL (ref 31.4–37.4)
MCV RBC AUTO: 91 FL (ref 82–98)
MONOCYTES # BLD AUTO: 0.46 THOUSAND/ΜL (ref 0.17–1.22)
MONOCYTES NFR BLD AUTO: 11 % (ref 4–12)
NEUTROPHILS # BLD AUTO: 2.83 THOUSANDS/ΜL (ref 1.85–7.62)
NEUTS SEG NFR BLD AUTO: 66 % (ref 43–75)
NITRITE UR QL STRIP: NEGATIVE
NON-SQ EPI CELLS URNS QL MICRO: ABNORMAL /HPF
NRBC BLD AUTO-RTO: 0 /100 WBCS
PH UR STRIP.AUTO: 6 [PH] (ref 4.5–8)
PLATELET # BLD AUTO: 142 THOUSANDS/UL (ref 149–390)
PMV BLD AUTO: 12.4 FL (ref 8.9–12.7)
POTASSIUM SERPL-SCNC: 3.8 MMOL/L (ref 3.5–5.3)
PROT SERPL-MCNC: 7.7 G/DL (ref 6.4–8.2)
PROT UR STRIP-MCNC: NEGATIVE MG/DL
RBC # BLD AUTO: 4.58 MILLION/UL (ref 3.81–5.12)
RBC #/AREA URNS AUTO: ABNORMAL /HPF
SODIUM SERPL-SCNC: 142 MMOL/L (ref 136–145)
SP GR UR STRIP.AUTO: 1.01 (ref 1–1.03)
UROBILINOGEN UR QL STRIP.AUTO: 0.2 E.U./DL
WBC # BLD AUTO: 4.23 THOUSAND/UL (ref 4.31–10.16)
WBC #/AREA URNS AUTO: ABNORMAL /HPF

## 2018-03-30 PROCEDURE — 85025 COMPLETE CBC W/AUTO DIFF WBC: CPT

## 2018-03-30 PROCEDURE — 82150 ASSAY OF AMYLASE: CPT

## 2018-03-30 PROCEDURE — 83690 ASSAY OF LIPASE: CPT

## 2018-03-30 PROCEDURE — 80053 COMPREHEN METABOLIC PANEL: CPT

## 2018-03-30 PROCEDURE — 36415 COLL VENOUS BLD VENIPUNCTURE: CPT

## 2018-03-30 PROCEDURE — 81001 URINALYSIS AUTO W/SCOPE: CPT | Performed by: INTERNAL MEDICINE

## 2018-04-08 DIAGNOSIS — N30.10 INTERSTITIAL CYSTITIS: Primary | ICD-10-CM

## 2018-04-09 DIAGNOSIS — N30.10 INTERSTITIAL CYSTITIS: ICD-10-CM

## 2018-04-09 RX ORDER — PENTOSAN POLYSULFATE SODIUM 100 MG/1
CAPSULE, GELATIN COATED ORAL
Qty: 90 CAPSULE | Refills: 0 | Status: SHIPPED | OUTPATIENT
Start: 2018-04-09 | End: 2018-04-09 | Stop reason: SDUPTHER

## 2018-04-09 RX ORDER — PENTOSAN POLYSULFATE SODIUM 100 MG/1
CAPSULE, GELATIN COATED ORAL
Qty: 90 CAPSULE | Refills: 0 | Status: SHIPPED | OUTPATIENT
Start: 2018-04-09 | End: 2018-05-09 | Stop reason: SDUPTHER

## 2018-04-30 DIAGNOSIS — R10.84 STOMACH CRAMPS, GENERALIZED: Primary | ICD-10-CM

## 2018-04-30 RX ORDER — DICYCLOMINE HYDROCHLORIDE 10 MG/1
CAPSULE ORAL
Qty: 270 CAPSULE | Refills: 0 | Status: SHIPPED | OUTPATIENT
Start: 2018-04-30 | End: 2018-09-18 | Stop reason: SDUPTHER

## 2018-05-07 ENCOUNTER — TELEPHONE (OUTPATIENT)
Dept: INTERNAL MEDICINE CLINIC | Facility: CLINIC | Age: 58
End: 2018-05-07

## 2018-05-09 DIAGNOSIS — N30.10 INTERSTITIAL CYSTITIS: ICD-10-CM

## 2018-05-09 RX ORDER — PENTOSAN POLYSULFATE SODIUM 100 MG/1
CAPSULE, GELATIN COATED ORAL
Qty: 90 CAPSULE | Refills: 0 | Status: SHIPPED | OUTPATIENT
Start: 2018-05-09 | End: 2018-05-09 | Stop reason: SDUPTHER

## 2018-05-09 RX ORDER — PENTOSAN POLYSULFATE SODIUM 100 MG/1
CAPSULE, GELATIN COATED ORAL
Qty: 90 CAPSULE | Refills: 0 | Status: SHIPPED | OUTPATIENT
Start: 2018-05-09 | End: 2019-04-01 | Stop reason: SDUPTHER

## 2018-06-07 DIAGNOSIS — J30.1 SEASONAL ALLERGIC RHINITIS DUE TO POLLEN: Primary | ICD-10-CM

## 2018-06-07 RX ORDER — FLUTICASONE PROPIONATE 50 MCG
2 SPRAY, SUSPENSION (ML) NASAL DAILY
Qty: 3 BOTTLE | Refills: 3 | Status: SHIPPED | OUTPATIENT
Start: 2018-06-07 | End: 2019-12-05

## 2018-06-12 ENCOUNTER — APPOINTMENT (OUTPATIENT)
Dept: LAB | Facility: CLINIC | Age: 58
End: 2018-06-12

## 2018-06-12 ENCOUNTER — TRANSCRIBE ORDERS (OUTPATIENT)
Dept: LAB | Facility: CLINIC | Age: 58
End: 2018-06-12

## 2018-06-12 DIAGNOSIS — Z00.8 HEALTH EXAMINATION IN POPULATION SURVEY: Primary | ICD-10-CM

## 2018-06-12 DIAGNOSIS — N95.2 VAGINAL ATROPHY: Primary | ICD-10-CM

## 2018-06-12 LAB
CHOLEST SERPL-MCNC: 162 MG/DL (ref 50–200)
EST. AVERAGE GLUCOSE BLD GHB EST-MCNC: 111 MG/DL
HBA1C MFR BLD: 5.5 % (ref 4.2–6.3)
HDLC SERPL-MCNC: 73 MG/DL (ref 40–60)
LDLC SERPL CALC-MCNC: 75 MG/DL (ref 0–100)
NONHDLC SERPL-MCNC: 89 MG/DL
TRIGL SERPL-MCNC: 72 MG/DL

## 2018-06-12 PROCEDURE — 83036 HEMOGLOBIN GLYCOSYLATED A1C: CPT

## 2018-06-12 PROCEDURE — 36415 COLL VENOUS BLD VENIPUNCTURE: CPT

## 2018-06-12 PROCEDURE — 80061 LIPID PANEL: CPT

## 2018-06-12 RX ORDER — ESTRADIOL 0.1 MG/G
CREAM VAGINAL
Qty: 42.5 G | Refills: 10 | Status: SHIPPED | OUTPATIENT
Start: 2018-06-12 | End: 2019-08-10 | Stop reason: SDUPTHER

## 2018-06-25 DIAGNOSIS — J40 BRONCHITIS: Primary | ICD-10-CM

## 2018-06-25 RX ORDER — AZITHROMYCIN 250 MG/1
TABLET, FILM COATED ORAL
Qty: 6 TABLET | Refills: 0 | Status: SHIPPED | OUTPATIENT
Start: 2018-06-25 | End: 2018-06-30

## 2018-07-12 DIAGNOSIS — D69.6 THROMBOCYTOPENIA (HCC): Primary | ICD-10-CM

## 2018-07-13 ENCOUNTER — APPOINTMENT (OUTPATIENT)
Dept: LAB | Facility: CLINIC | Age: 58
End: 2018-07-13
Payer: COMMERCIAL

## 2018-07-13 ENCOUNTER — TRANSCRIBE ORDERS (OUTPATIENT)
Dept: LAB | Facility: CLINIC | Age: 58
End: 2018-07-13

## 2018-07-13 DIAGNOSIS — I82.90 THROMBOSIS: Primary | ICD-10-CM

## 2018-07-13 LAB
BASOPHILS # BLD AUTO: 0.05 THOUSANDS/ΜL (ref 0–0.1)
BASOPHILS NFR BLD AUTO: 1 % (ref 0–1)
EOSINOPHIL # BLD AUTO: 0.07 THOUSAND/ΜL (ref 0–0.61)
EOSINOPHIL NFR BLD AUTO: 1 % (ref 0–6)
ERYTHROCYTE [DISTWIDTH] IN BLOOD BY AUTOMATED COUNT: 13.2 % (ref 11.6–15.1)
HCT VFR BLD AUTO: 42 % (ref 34.8–46.1)
HGB BLD-MCNC: 12.9 G/DL (ref 11.5–15.4)
IMM GRANULOCYTES # BLD AUTO: 0.01 THOUSAND/UL (ref 0–0.2)
IMM GRANULOCYTES NFR BLD AUTO: 0 % (ref 0–2)
LYMPHOCYTES # BLD AUTO: 0.87 THOUSANDS/ΜL (ref 0.6–4.47)
LYMPHOCYTES NFR BLD AUTO: 16 % (ref 14–44)
MCH RBC QN AUTO: 28 PG (ref 26.8–34.3)
MCHC RBC AUTO-ENTMCNC: 30.7 G/DL (ref 31.4–37.4)
MCV RBC AUTO: 91 FL (ref 82–98)
MONOCYTES # BLD AUTO: 0.37 THOUSAND/ΜL (ref 0.17–1.22)
MONOCYTES NFR BLD AUTO: 7 % (ref 4–12)
NEUTROPHILS # BLD AUTO: 4.24 THOUSANDS/ΜL (ref 1.85–7.62)
NEUTS SEG NFR BLD AUTO: 75 % (ref 43–75)
NRBC BLD AUTO-RTO: 0 /100 WBCS
PLATELET # BLD AUTO: 127 THOUSANDS/UL (ref 149–390)
PMV BLD AUTO: 12.2 FL (ref 8.9–12.7)
RBC # BLD AUTO: 4.6 MILLION/UL (ref 3.81–5.12)
WBC # BLD AUTO: 5.61 THOUSAND/UL (ref 4.31–10.16)

## 2018-07-13 PROCEDURE — 36415 COLL VENOUS BLD VENIPUNCTURE: CPT

## 2018-07-13 PROCEDURE — 85025 COMPLETE CBC W/AUTO DIFF WBC: CPT

## 2018-07-16 DIAGNOSIS — J02.0 PHARYNGITIS DUE TO STREPTOCOCCUS SPECIES: Primary | ICD-10-CM

## 2018-07-16 RX ORDER — AMOXICILLIN 500 MG/1
500 TABLET, FILM COATED ORAL 3 TIMES DAILY
Qty: 42 TABLET | Refills: 0 | Status: SHIPPED | OUTPATIENT
Start: 2018-07-16 | End: 2018-07-30

## 2018-08-07 DIAGNOSIS — N30.10 INTERSTITIAL CYSTITIS: Primary | ICD-10-CM

## 2018-08-29 ENCOUNTER — APPOINTMENT (OUTPATIENT)
Dept: LAB | Facility: CLINIC | Age: 58
End: 2018-08-29
Payer: COMMERCIAL

## 2018-08-29 DIAGNOSIS — E03.9 HYPOTHYROIDISM, UNSPECIFIED TYPE: ICD-10-CM

## 2018-08-29 DIAGNOSIS — E55.9 VITAMIN D DEFICIENCY: ICD-10-CM

## 2018-08-29 LAB
25(OH)D3 SERPL-MCNC: 25.6 NG/ML (ref 30–100)
T4 FREE SERPL-MCNC: 0.78 NG/DL (ref 0.76–1.46)
TSH SERPL DL<=0.05 MIU/L-ACNC: 3.94 UIU/ML (ref 0.36–3.74)

## 2018-08-29 PROCEDURE — 36415 COLL VENOUS BLD VENIPUNCTURE: CPT

## 2018-08-29 PROCEDURE — 84439 ASSAY OF FREE THYROXINE: CPT

## 2018-08-29 PROCEDURE — 84443 ASSAY THYROID STIM HORMONE: CPT

## 2018-08-29 PROCEDURE — 82306 VITAMIN D 25 HYDROXY: CPT

## 2018-09-04 ENCOUNTER — OFFICE VISIT (OUTPATIENT)
Dept: INTERNAL MEDICINE CLINIC | Facility: CLINIC | Age: 58
End: 2018-09-04
Payer: COMMERCIAL

## 2018-09-04 VITALS
TEMPERATURE: 98.5 F | RESPIRATION RATE: 14 BRPM | WEIGHT: 125.6 LBS | HEIGHT: 67 IN | BODY MASS INDEX: 19.71 KG/M2 | DIASTOLIC BLOOD PRESSURE: 60 MMHG | SYSTOLIC BLOOD PRESSURE: 108 MMHG | OXYGEN SATURATION: 98 % | HEART RATE: 89 BPM

## 2018-09-04 DIAGNOSIS — E04.1 NONTOXIC SINGLE THYROID NODULE: ICD-10-CM

## 2018-09-04 DIAGNOSIS — M85.80 OSTEOPENIA, UNSPECIFIED LOCATION: ICD-10-CM

## 2018-09-04 DIAGNOSIS — E55.9 VITAMIN D DEFICIENCY: ICD-10-CM

## 2018-09-04 DIAGNOSIS — K52.9 COLITIS: ICD-10-CM

## 2018-09-04 DIAGNOSIS — Z00.00 ROUTINE GENERAL MEDICAL EXAMINATION AT A HEALTH CARE FACILITY: ICD-10-CM

## 2018-09-04 DIAGNOSIS — N20.0 NEPHROLITHIASIS: ICD-10-CM

## 2018-09-04 DIAGNOSIS — E03.9 HYPOTHYROIDISM, UNSPECIFIED TYPE: Primary | ICD-10-CM

## 2018-09-04 DIAGNOSIS — N30.10 CHRONIC INTERSTITIAL CYSTITIS: ICD-10-CM

## 2018-09-04 PROCEDURE — 3008F BODY MASS INDEX DOCD: CPT | Performed by: INTERNAL MEDICINE

## 2018-09-04 PROCEDURE — 99214 OFFICE O/P EST MOD 30 MIN: CPT | Performed by: INTERNAL MEDICINE

## 2018-09-04 RX ORDER — OSPEMIFENE 60 MG/1
TABLET, FILM COATED ORAL
COMMUNITY
Start: 2018-08-07 | End: 2018-09-04 | Stop reason: SDUPTHER

## 2018-09-04 RX ORDER — METHENAMINE, SODIUM PHOSPHATE, MONOBASIC, ANHYDROUS, PHENYL SALICYLATE, METHYLENE BLUE AND HYOSCYAMINE SULFATE 118; 40.8; 36; 10; .12 MG/1; MG/1; MG/1; MG/1; MG/1
CAPSULE ORAL
COMMUNITY
Start: 2018-07-10 | End: 2018-11-13 | Stop reason: SDUPTHER

## 2018-09-04 RX ORDER — ERGOCALCIFEROL 1.25 MG/1
50000 CAPSULE ORAL WEEKLY
Qty: 8 CAPSULE | Refills: 0 | Status: SHIPPED | OUTPATIENT
Start: 2018-09-04 | End: 2018-09-05 | Stop reason: SDUPTHER

## 2018-09-04 NOTE — ASSESSMENT & PLAN NOTE
History of nephrolithiasis no active symptoms at this time avoid calcium supplementations in view of history of lithiasis

## 2018-09-04 NOTE — ASSESSMENT & PLAN NOTE
History of osteopenia the patient is reluctant to take any medications at this time other than vitamin-D  She cannot take calcium supplementations in view of her history of renal calculi  We do note that her vitamin-D level is subtherapeutic and I have asked her to take 82594 units of vitamin D weekly for 8 weeks followed by 3000 units of vitamin-D daily thereafter

## 2018-09-04 NOTE — PROGRESS NOTES
Assessment/Plan:    Colitis  History of colitis appears to be reasonably stable  I think stress plays a factor in flare-ups of her abdominal discomfort and diarrhea  She does use dicyclomine 3 times a day along with Asacol 3 times a day for control of her symptoms  Nontoxic single thyroid nodule  History of single nontoxic thyroid nodule  The nodule on last asks a year ago was sub biopsy size  I am unable to palpate a thyroid nodule on today's exam   We did review her blood work which shows a slight increase in TSH compared to a previous study  Her T4 remains essentially the same  The patient's mother has a history of hypothyroid condition  She does have an upcoming appointment with Endocrinology services at [facility] along with an ultrasound of her thyroid  Will defer any treatment pending the outcome of the ultrasound and consultation with Endocrinology  Osteopenia  History of osteopenia the patient is reluctant to take any medications at this time other than vitamin-D  She cannot take calcium supplementations in view of her history of renal calculi  We do note that her vitamin-D level is subtherapeutic and I have asked her to take 85405 units of vitamin D weekly for 8 weeks followed by 3000 units of vitamin-D daily thereafter  Chronic interstitial cystitis  History of chronic interstitial cystitis continue on Jose on and regular fell a follow-up visits with Urology services at [facility]  Nephrolithiasis  History of nephrolithiasis no active symptoms at this time avoid calcium supplementations in view of history of lithiasis  Diagnoses and all orders for this visit:    Hypothyroidism, unspecified type  -     T4, free; Future  -     TSH, 3rd generation; Future  -     T4, free; Future  -     TSH, 3rd generation; Future    Vitamin D deficiency  -     ergocalciferol (VITAMIN D2) 50,000 units;  Take 1 capsule (50,000 Units total) by mouth once a week    Routine general medical examination at a health care facility  -     CBC and differential; Future  -     Comprehensive metabolic panel; Future  -     Lipid panel; Future  -     UA w Reflex to Microscopic w Reflex to Culture -Lab Collect  -     Vitamin D 25 hydroxy; Future    Nontoxic single thyroid nodule    Chronic interstitial cystitis    Nephrolithiasis    Colitis    Other orders  -     Discontinue: OSPHENA 60 MG TABS;   -     Meth-Hyo-M Bl-Na Phos-Ph Sal (URO-MP) 118 MG CAPS;         Subjective:      Patient ID: Jose M Esqueda is a 62 y o  female  This is a routine follow-up visit for this 60-year-old female patient  She presents today with a history of thyroid nodule, colitis, chronic interstitial cystitis  She has had some intermittent symptoms of colitis as well as chronic interstitial cystitis  She is followed carefully by urology services at [facility] for her chronic interstitial cystitis as well as Dr Lorie Lieberman for her chronic colitis  She continues on Asacol for treatment of her chronic colitis along with Bentyl for breakthrough symptoms  For her chronic interstitial cystitis she continues on Elmiron  The following portions of the patient's history were reviewed and updated as appropriate:   She  has a past medical history of Atopic dermatitis; Atrophic vaginitis; Colon cancer ([address] ); Dermatitis; Dry eye; Frequency of urination; Fungal infection; Herpes; Hyperlipidemia; Interstitial cystitis; Osteoporosis; Periodontal abscess; Ulcerative colitis ([address] ); Ulcerative colitis ([address] ); Urinary frequency; UTI (urinary tract infection); Vaginal atrophy; Vitamin D deficiency; and Voiding dysfunction    She   Patient Active Problem List    Diagnosis Date Noted    Colitis 09/04/2018    Nontoxic single thyroid nodule 12/09/2016    Difficult or painful urination 07/05/2016    Urinary urgency 07/05/2016    Irritable bowel syndrome 02/19/2016    Thrombocytopenia (HCC) 02/09/2016    Dense breasts 10/28/2014    Osteopenia 2013    Chronic interstitial cystitis 2013    Nephrolithiasis 2013     She  has a past surgical history that includes Kidney stone surgery; ORIF proximal ulna fracture; pr colonoscopy flx dx w/collj spec when pfrmd (N/A, 2017); Cystoscopy; Cholecystectomy; Other surgical history; and  section  Her family history includes Breast cancer in her paternal aunt; Diabetes in her father and paternal grandmother; Gallbladder disease in her mother; Hypertension in her father; Kidney disease in her mother; Lymphoma in her maternal grandfather; Nephrolithiasis in her father; Thyroid disease in her mother  She  reports that she has never smoked  She has never used smokeless tobacco  She reports that she drinks alcohol  She reports that she does not use drugs  Current Outpatient Prescriptions   Medication Sig Dispense Refill    Cholecalciferol (VITAMIN D PO) Take 3,000 Units by mouth daily      Cranberry (ELLURA PO) Take 1 capsule by mouth daily      cycloSPORINE (RESTASIS) 0 05 % ophthalmic emulsion Administer 1 drop to both eyes 2 (two) times a day      dicyclomine (BENTYL) 10 mg capsule TAKE 1 CAPSULE BY MOUTH 3 TIMES A  capsule 0    ELMIRON 100 MG capsule TAKE 1 CAPSULE 3 TIMES DAILY   90 capsule 0    estradiol (ESTRACE VAGINAL) 0 1 mg/g vaginal cream One applicator into the vagina and nightly 42 5 g 10    fluticasone (FLONASE) 50 mcg/act nasal spray 2 sprays into each nostril daily 3 Bottle 3    mesalamine (ASACOL) 800 MG EC tablet Take 800 mg by mouth 3 (three) times a day      Meth-Hyo-M Bl-Na Phos-Ph Sal (URO-MP PO) Take 1 capsule by mouth 4 (four) times a day      Meth-Hyo-M Bl-Na Phos-Ph Sal (URO-MP) 118 MG CAPS       olopatadine HCl (PATADAY) 0 2 % opth drops Administer 1 drop to both eyes daily      Omega-3 Fatty Acids (OMEGA 3 PO) Take 2 capsules by mouth daily      Ospemifene (OSPHENA PO) Take 60 mg by mouth daily      pentosan polysulfate (ELMIRON) 100 mg capsule Take 1 capsule (100 mg total) by mouth 3 (three) times a day before meals 90 capsule 5    rosuvastatin (CRESTOR) 5 mg tablet Take 5 mg by mouth every other day      ergocalciferol (VITAMIN D2) 50,000 units Take 1 capsule (50,000 Units total) by mouth once a week 8 capsule 0     No current facility-administered medications for this visit       Review of Systems   Gastrointestinal: Positive for abdominal pain and diarrhea  Genitourinary: Positive for dysuria and frequency  All other systems reviewed and are negative  Objective:      /60   Pulse 89   Temp 98 5 °F (36 9 °C)   Resp 14   Ht 5' 7" (1 702 m)   Wt 57 kg (125 lb 9 6 oz)   SpO2 98%   BMI 19 67 kg/m²          Physical Exam   Constitutional: She is oriented to person, place, and time  Vital signs are normal  She appears well-developed and well-nourished  She is cooperative  No distress  HENT:   Head: Normocephalic and atraumatic  Right Ear: Hearing, tympanic membrane, external ear and ear canal normal    Left Ear: Hearing, tympanic membrane, external ear and ear canal normal    Nose: Nose normal  No mucosal edema  Mouth/Throat: Uvula is midline, oropharynx is clear and moist and mucous membranes are normal    Eyes: Conjunctivae and lids are normal  Pupils are equal, round, and reactive to light  Neck: No JVD present  Carotid bruit is not present  No thyromegaly present  Cardiovascular: Normal rate, regular rhythm, normal heart sounds and intact distal pulses  No murmur heard  Pulmonary/Chest: Effort normal and breath sounds normal  No respiratory distress  She has no wheezes  She has no rales  She exhibits no tenderness  Abdominal: Soft  Normal appearance and bowel sounds are normal    Musculoskeletal: Normal range of motion  She exhibits no edema  Lymphadenopathy:     She has no cervical adenopathy  Neurological: She is alert and oriented to person, place, and time  She has normal reflexes     Skin: Skin is warm, dry and intact  She is not diaphoretic  Psychiatric: She has a normal mood and affect  Her speech is normal and behavior is normal  Judgment and thought content normal  Cognition and memory are normal    Vitals reviewed

## 2018-09-04 NOTE — ASSESSMENT & PLAN NOTE
History of single nontoxic thyroid nodule  The nodule on last asks a year ago was sub biopsy size  I am unable to palpate a thyroid nodule on today's exam   We did review her blood work which shows a slight increase in TSH compared to a previous study  Her T4 remains essentially the same  The patient's mother has a history of hypothyroid condition  She does have an upcoming appointment with Endocrinology services at [facility] along with an ultrasound of her thyroid  Will defer any treatment pending the outcome of the ultrasound and consultation with Endocrinology

## 2018-09-04 NOTE — ASSESSMENT & PLAN NOTE
History of chronic interstitial cystitis continue on Jose on and regular fell a follow-up visits with Urology services at [facility]

## 2018-09-04 NOTE — ASSESSMENT & PLAN NOTE
History of colitis appears to be reasonably stable  I think stress plays a factor in flare-ups of her abdominal discomfort and diarrhea  She does use dicyclomine 3 times a day along with Asacol 3 times a day for control of her symptoms

## 2018-09-05 DIAGNOSIS — E55.9 VITAMIN D DEFICIENCY: ICD-10-CM

## 2018-09-05 RX ORDER — ERGOCALCIFEROL 1.25 MG/1
50000 CAPSULE ORAL WEEKLY
Qty: 8 CAPSULE | Refills: 0 | Status: ON HOLD | OUTPATIENT
Start: 2018-09-05 | End: 2019-02-19

## 2018-09-18 DIAGNOSIS — R10.84 STOMACH CRAMPS, GENERALIZED: ICD-10-CM

## 2018-09-18 RX ORDER — DICYCLOMINE HYDROCHLORIDE 10 MG/1
CAPSULE ORAL
Qty: 270 CAPSULE | Refills: 0 | Status: SHIPPED | OUTPATIENT
Start: 2018-09-18 | End: 2019-01-28

## 2018-10-02 DIAGNOSIS — M25.531 RIGHT WRIST PAIN: Primary | ICD-10-CM

## 2018-10-08 ENCOUNTER — OFFICE VISIT (OUTPATIENT)
Dept: OBGYN CLINIC | Facility: CLINIC | Age: 58
End: 2018-10-08
Payer: COMMERCIAL

## 2018-10-08 ENCOUNTER — OFFICE VISIT (OUTPATIENT)
Dept: OCCUPATIONAL THERAPY | Facility: CLINIC | Age: 58
End: 2018-10-08
Payer: COMMERCIAL

## 2018-10-08 VITALS
SYSTOLIC BLOOD PRESSURE: 117 MMHG | WEIGHT: 126.8 LBS | DIASTOLIC BLOOD PRESSURE: 66 MMHG | HEART RATE: 71 BPM | HEIGHT: 67 IN | BODY MASS INDEX: 19.9 KG/M2

## 2018-10-08 DIAGNOSIS — M65.4 DE QUERVAIN'S TENOSYNOVITIS, RIGHT: ICD-10-CM

## 2018-10-08 DIAGNOSIS — M65.4 DE QUERVAIN'S TENOSYNOVITIS, RIGHT: Primary | ICD-10-CM

## 2018-10-08 PROCEDURE — 99203 OFFICE O/P NEW LOW 30 MIN: CPT | Performed by: ORTHOPAEDIC SURGERY

## 2018-10-08 PROCEDURE — 20550 NJX 1 TENDON SHEATH/LIGAMENT: CPT | Performed by: ORTHOPAEDIC SURGERY

## 2018-10-08 PROCEDURE — L3808 WHFO, RIGID W/O JOINTS: HCPCS | Performed by: OCCUPATIONAL THERAPIST

## 2018-10-08 PROCEDURE — G8990 OTHER PT/OT CURRENT STATUS: HCPCS | Performed by: OCCUPATIONAL THERAPIST

## 2018-10-08 PROCEDURE — G8991 OTHER PT/OT GOAL STATUS: HCPCS | Performed by: OCCUPATIONAL THERAPIST

## 2018-10-08 PROCEDURE — G8992 OTHER PT/OT  D/C STATUS: HCPCS | Performed by: OCCUPATIONAL THERAPIST

## 2018-10-08 RX ORDER — BUPIVACAINE HYDROCHLORIDE 2.5 MG/ML
1 INJECTION, SOLUTION INFILTRATION; PERINEURAL
Status: COMPLETED | OUTPATIENT
Start: 2018-10-08 | End: 2018-10-08

## 2018-10-08 RX ORDER — BETAMETHASONE SODIUM PHOSPHATE AND BETAMETHASONE ACETATE 3; 3 MG/ML; MG/ML
6 INJECTION, SUSPENSION INTRA-ARTICULAR; INTRALESIONAL; INTRAMUSCULAR; SOFT TISSUE
Status: COMPLETED | OUTPATIENT
Start: 2018-10-08 | End: 2018-10-08

## 2018-10-08 RX ADMIN — BETAMETHASONE SODIUM PHOSPHATE AND BETAMETHASONE ACETATE 6 MG: 3; 3 INJECTION, SUSPENSION INTRA-ARTICULAR; INTRALESIONAL; INTRAMUSCULAR; SOFT TISSUE at 15:30

## 2018-10-08 RX ADMIN — BUPIVACAINE HYDROCHLORIDE 1 ML: 2.5 INJECTION, SOLUTION INFILTRATION; PERINEURAL at 15:30

## 2018-10-08 NOTE — PROGRESS NOTES
Splint    Diagnosis:   1  De Quervain's tenosynovitis, right  Ambulatory referral to PT/OT hand therapy      Indication: DeQuervain's tenosynovitis    Location: Right  wrist  Supplies: Orthotics  Thermoplastic material    Splint type: Radial thumb spica  Wearing Schedule: Remove for hygiene only  Describe Position: wrist neutral, thumb functional opposition    Precautions: univeral    Patient or Caregiver expresses understanding of wearing Schedule and Precautions? Yes  Patient or Caregiver able to don/doff orthotic independently? Yes    Written orders provided to patient? Yes  Orders Obtained: Written  Orders Obtained from: Dr Barbara Cavazos for 3 weeks continuously, remove for hygiene,  Ween out of splint after 3 weeks of wear    F/u with MD as advised, return to dept for splint adjustment PRN

## 2018-10-08 NOTE — PROGRESS NOTES
ASSESSMENT/PLAN:    Assessment:   de Quervain stenosis tenosynovitis  right    Plan:   The patient would like to proceed with her first steroid injection today  She may resume activities as tolerated and an order will be put in for a brace from physical therapy  She was given an information sheet today  Follow Up:  6  week(s)    To Do Next Visit:       General Discussions:     Irene Garcia Tenosynovitis: The anatomy and physiology of de Quervain's tenosynovitis was discussed with the patient today in the office  Edema and increased contact pressure within the first dorsal extensor compartment at the radial styloid can cause pain, crepitation, and limitation of function  Treatment options include resting thumb spica splints to decrease edema, oral anti-inflammatory medications, home or formal therapy exercises, up to 2 steroid injections within the first dorsal extensor compartment, or surgical release  While majority of patients do respond to conservative treatment, up to 20% may require surgical release  Operative Discussions:         _____________________________________________________  CHIEF COMPLAINT:  Chief Complaint   Patient presents with    Right Wrist - Pain, Cyst         SUBJECTIVE:  Jess Barrios is a 62y o  year old female who presents with right wrist pain and a ganglion cyst  The patient experiences Pain  Moderate  Intermittant  Sharp to the right wrist   This started  2 month(s) ago as Chronic due to "barre" exercise classes      Radiation: None  Previous Treatments: Resume activities as tolerated without relief  Associated symptoms: No Complaints    PAST MEDICAL HISTORY:  Past Medical History:   Diagnosis Date    Atopic dermatitis     last assessed 12/2/13    Atrophic vaginitis     last assessed 9/2/15    [facility] cancer ([facility] [address]     Dermatitis     Dry eye     Frequency of urination     Fungal infection     last assessed 8/14/13    Herpes     last assessed 6/27/14    Hyperlipidemia  Interstitial cystitis     Osteoporosis     Periodontal abscess     last assessed 13    [facility] [address]     Ulcerative colitis [facility] [address]     Urinary frequency     resolved 17    UTI (urinary tract infection)     Vaginal atrophy     Vitamin D deficiency     last assessed 16    Voiding dysfunction     last assessed 10/7/15       PAST SURGICAL HISTORY:  Past Surgical History:   Procedure Laterality Date     SECTION      last assessed sep 17 2014,low transverse    CHOLECYSTECTOMY      CYSTOSCOPY      diagnostic resolved 05    KIDNEY STONE SURGERY      ORIF PROXIMAL ULNA FRACTURE      metal plates and screws removed    OTHER SURGICAL HISTORY      appendiceal procedure assment fecolith, last assessed 13    [place] COLONOSCOPY FLX DX W/COLLJ SPEC WHEN PFRMD N/A 2017    Procedure: COLONOSCOPY;  Surgeon: Mari Porter MD;  [facility];   Service: Colorectal       FAMILY HISTORY:  Family History   Problem Relation Age of Onset    Gallbladder disease Mother     Thyroid disease Mother     Kidney disease Mother     Diabetes Father     Hypertension Father     Nephrolithiasis Father     Lymphoma Maternal Grandfather     Diabetes Paternal Grandmother     Breast cancer Paternal Aunt        SOCIAL HISTORY:  Social History   Substance Use Topics    Smoking status: Never Smoker    Smokeless tobacco: Never Used      Comment: HISTORY OF SMOKER CURRENT STATUS UNKNOWN PER ALLSCRIPTS    Alcohol use Yes      Comment: socially - 1-3 drinks per month       MEDICATIONS:    Current Outpatient Prescriptions:     Cholecalciferol (VITAMIN D PO), Take 3,000 Units by mouth daily, Disp: , Rfl:     Cranberry (ELLURA PO), Take 1 capsule by mouth daily, Disp: , Rfl:     cycloSPORINE (RESTASIS) 0 05 % ophthalmic emulsion, Administer 1 drop to both eyes 2 (two) times a day, Disp: , Rfl:     dicyclomine (BENTYL) 10 mg capsule, TAKE 1 CAPSULE BY MOUTH 3 TIMES A DAY, Disp: 270 capsule, Rfl: 0    ELMIRON 100 MG capsule, TAKE 1 CAPSULE 3 TIMES DAILY  , Disp: 90 capsule, Rfl: 0    ergocalciferol (VITAMIN D2) 50,000 units, Take 1 capsule (50,000 Units total) by mouth once a week, Disp: 8 capsule, Rfl: 0    estradiol (ESTRACE VAGINAL) 0 1 mg/g vaginal cream, One applicator into the vagina and nightly, Disp: 42 5 g, Rfl: 10    fluticasone (FLONASE) 50 mcg/act nasal spray, 2 sprays into each nostril daily (Patient taking differently: 2 sprays into each nostril as needed  ), Disp: 3 Bottle, Rfl: 3    mesalamine (ASACOL) 800 MG EC tablet, Take 800 mg by mouth 3 (three) times a day, Disp: , Rfl:     Meth-Hyo-M Bl-Na Phos-Ph Sal (URO-MP PO), Take 1 capsule by mouth 4 (four) times a day, Disp: , Rfl:     Meth-Hyo-M Bl-Na Phos-Ph Sal (URO-MP) 118 MG CAPS, , Disp: , Rfl:     olopatadine HCl (PATADAY) 0 2 % opth drops, Administer 1 drop to both eyes daily, Disp: , Rfl:     Ospemifene (OSPHENA PO), Take 60 mg by mouth daily, Disp: , Rfl:     pentosan polysulfate (ELMIRON) 100 mg capsule, Take 1 capsule (100 mg total) by mouth 3 (three) times a day before meals, Disp: 90 capsule, Rfl: 5    rosuvastatin (CRESTOR) 5 mg tablet, Take 5 mg by mouth every other day, Disp: , Rfl:     ALLERGIES:  Allergies   Allergen Reactions    Iodine Anaphylaxis     Allergy to Iodinated and non iodinated dye    Other Anaphylaxis     APPLES    Seasonal Ic  [Cholestatin]        REVIEW OF SYSTEMS:  Pertinent items are noted in HPI  A comprehensive review of systems was negative      LABS:  HgA1c:   Lab Results   Component Value Date    HGBA1C 5 5 06/12/2018     BMP:   Lab Results   Component Value Date    GLUCOSE 89 08/07/2015    CALCIUM 8 9 03/30/2018     03/30/2018    K 3 8 03/30/2018    CO2 28 03/30/2018     03/30/2018    BUN 18 03/30/2018    CREATININE 0 86 03/30/2018         _____________________________________________________  PHYSICAL EXAMINATION:  General: well developed and well nourished, alert, oriented times 3 and appears comfortable  Psychiatric: Normal  HEENT: Trachea Midline, No torticollis  Cardiovascular: No discernable arrhythmia  Pulmonary: No wheezing or stridor  Skin: Ganglion cyst over the 1st dorsal compartment of the right wrist  Neurovascular: Sensation Intact to the Median, Ulnar, Radial Nerve, Motor Intact to the Median, Ulnar, Radial Nerve and Pulses Intact    MUSCULOSKELETAL EXAMINATION:  RIGHT SIDE:  Indira Leslie:  Tenderness Radial Styloid, Positive Finkelstein's Test and Ganglion cyst to 1st dorsal compartment that transilluminates    _____________________________________________________  STUDIES REVIEWED:  No Studies to review      PROCEDURES PERFORMED:  Hand/upper extremity injection  Date/Time: 10/8/2018 3:30 PM  Consent given by: patient  Site marked: site marked  Supporting Documentation  Indications: tendon swelling   Procedure Details  Condition:de Quervain's tenosynovitis Site: R extensor compartment 1   Medications administered: 6 mg betamethasone acetate-betamethasone sodium phosphate 6 (3-3) mg/mL; 1 mL bupivacaine 0 25 %  Patient tolerance: patient tolerated the procedure well with no immediate complications  Dressing:  Sterile dressing applied           Scribe Attestation    I,:   Radha Schroeder am acting as a scribe while in the presence of the attending physician :        I,:   Cathy Jara MD personally performed the services described in this documentation    as scribed in my presence :

## 2018-10-08 NOTE — PROGRESS NOTES
ASSESSMENT/PLAN:    Assessment:   {Common Diagnosis:81743}    Plan:   {kmtreatments:04314}    Follow Up:  {follow up time choices:87756}    To Do Next Visit:  {To do next visit:64630::" "}    General Discussions:  {Nonoperative Discussions:02255::" "}    Operative Discussions:  {reylist:38085::" "}  ***    _____________________________________________________  CHIEF COMPLAINT:  Chief Complaint   Patient presents with    Right Wrist - Pain, Cyst         SUBJECTIVE:  Nobie All is a 62y o  year old female who presents with {Complaint:43769} to the {Operated side:71879} {Surgical location:}  This started  {NUMBER 1-9:41565} {TIME FRAME:36866} ago as {Causes:88245}      Radiation: {radiation of pain:51865}  Previous Treatments: {kmtreatments:86950} {kmrelief:44745}  Associated symptoms: {Complaint:67972}    PAST MEDICAL HISTORY:  Past Medical History:   Diagnosis Date    Atopic dermatitis     last assessed 13    Atrophic vaginitis     last assessed 9/2/15    Colon [facility] ([facility] [address]     Dermatitis     Dry eye     Frequency of urination     Fungal infection     last assessed 13    Herpes     last assessed 14    Hyperlipidemia     Interstitial cystitis     Osteoporosis     Periodontal abscess     last assessed 13    Ul[facility]ative [facility] ([facility] [address]     Ulcerative colitis ([facility] [address]     Urinary frequency     resolved 17    UTI (urinary tract infection)     Vaginal atrophy     Vitamin D deficiency     last assessed 16    Voiding dysfunction     last assessed 10/7/15       PAST SURGICAL HISTORY:  Past Surgical History:   Procedure Laterality Date     SECTION      last assessed sep 17 2014,low transverse    CHOLECYSTECTOMY      CYSTOSCOPY      diagnostic resolved 05    KIDNEY STONE SURGERY      ORIF PROXIMAL ULNA FRACTURE      metal plates and screws removed    OTHER SURGICAL HISTORY      appendiceal procedure assment fecolith, last assessed 13  [place] COLONOSCOPY FLX DX W/COLLJ SPEC WHEN PFRMD N/A 7/28/2017    Procedure: COLONOSCOPY;  Surgeon: Giselle Cunningham MD;  [facility]; Service: Colorectal       FAMILY HISTORY:  Family History   Problem Relation Age of Onset    Gallbladder disease Mother     Thyroid disease Mother     Kidney disease Mother     Diabetes Father     Hypertension Father     Nephrolithiasis Father     Lymphoma Maternal Grandfather     Diabetes Paternal Grandmother     Breast cancer Paternal Aunt        SOCIAL HISTORY:  Social History   Substance Use Topics    Smoking status: Never Smoker    Smokeless tobacco: Never Used      Comment: HISTORY OF SMOKER CURRENT STATUS UNKNOWN PER ALLSCRIPTS    Alcohol use Yes      Comment: socially - 1-3 drinks per month       MEDICATIONS:    Current Outpatient Prescriptions:     Cholecalciferol (VITAMIN D PO), Take 3,000 Units by mouth daily, Disp: , Rfl:     Cranberry (ELLURA PO), Take 1 capsule by mouth daily, Disp: , Rfl:     cycloSPORINE (RESTASIS) 0 05 % ophthalmic emulsion, Administer 1 drop to both eyes 2 (two) times a day, Disp: , Rfl:     dicyclomine (BENTYL) 10 mg capsule, TAKE 1 CAPSULE BY MOUTH 3 TIMES A DAY, Disp: 270 capsule, Rfl: 0    ELMIRON 100 MG capsule, TAKE 1 CAPSULE 3 TIMES DAILY  , Disp: 90 capsule, Rfl: 0    ergocalciferol (VITAMIN D2) 50,000 units, Take 1 capsule (50,000 Units total) by mouth once a week, Disp: 8 capsule, Rfl: 0    estradiol (ESTRACE VAGINAL) 0 1 mg/g vaginal cream, One applicator into the vagina and nightly, Disp: 42 5 g, Rfl: 10    fluticasone (FLONASE) 50 mcg/act nasal spray, 2 sprays into each nostril daily (Patient taking differently: 2 sprays into each nostril as needed  ), Disp: 3 Bottle, Rfl: 3    mesalamine (ASACOL) 800 MG EC tablet, Take 800 mg by mouth 3 (three) times a day, Disp: , Rfl:     Meth-Hyo-M Bl-Na Phos-Ph Sal (URO-MP PO), Take 1 capsule by mouth 4 (four) times a day, Disp: , Rfl:     Meth-Hyo-M Bl-Na Phos-Ph Sal (URO-MP) 118 MG CAPS, , Disp: , Rfl:     olopatadine HCl (PATADAY) 0 2 % opth drops, Administer 1 drop to both eyes daily, Disp: , Rfl:     Ospemifene (OSPHENA PO), Take 60 mg by mouth daily, Disp: , Rfl:     pentosan polysulfate (ELMIRON) 100 mg capsule, Take 1 capsule (100 mg total) by mouth 3 (three) times a day before meals, Disp: 90 capsule, Rfl: 5    rosuvastatin (CRESTOR) 5 mg tablet, Take 5 mg by mouth every other day, Disp: , Rfl:     ALLERGIES:  Allergies   Allergen Reactions    Iodine Anaphylaxis     Allergy to Iodinated and non iodinated dye    Other Anaphylaxis     APPLES    Seasonal Ic  [Cholestatin]        REVIEW OF SYSTEMS:  {REVIEWOFSYSTEMS:92637::"Pertinent items are noted in HPI ","A comprehensive review of systems was negative "}    LABS:  HgA1c:   Lab Results   Component Value Date    HGBA1C 5 5 2018     BMP:   Lab Results   Component Value Date    GLUCOSE 89 2015    CALCIUM 8 9 2018     2018    K 3 8 2018    CO2 28 2018     2018    BUN 18 2018    CREATININE 0 86 2018         _____________________________________________________  PHYSICAL EXAMINATION:  General: {1234:10619::"well developed and well nourished","alert","oriented times 3","appears comfortable"}  Psychiatric: {Psych:52229::"Normal"}  HEENT: Trachea Midline, No torticollis  Cardiovascular: No discernable arrhythmia  Pulmonary: No wheezing or stridor  Skin: {Skin exam:74732::"No masses, erthema, lacerations, fluctation, ulcerations"}  Neurovascular: {Nerve Exam:75209::"Sensation Intact to the Median, Ulnar, Radial Nerve","Motor Intact to the Median, Ulnar, Radial Nerve","Pulses Intact"}    MUSCULOSKELETAL EXAMINATION:  {Side Examined:74651}    _____________________________________________________  STUDIES REVIEWED:  {kmimagin::"No Studies to review"}      PROCEDURES PERFORMED:  Procedures  {Ye Green done:31628::"No Procedures performed today"}

## 2018-10-15 ENCOUNTER — ANNUAL EXAM (OUTPATIENT)
Dept: OBGYN CLINIC | Facility: CLINIC | Age: 58
End: 2018-10-15
Payer: COMMERCIAL

## 2018-10-15 VITALS — SYSTOLIC BLOOD PRESSURE: 120 MMHG | DIASTOLIC BLOOD PRESSURE: 70 MMHG | WEIGHT: 125.6 LBS | BODY MASS INDEX: 19.67 KG/M2

## 2018-10-15 DIAGNOSIS — Z01.419 ENCOUNTER FOR ANNUAL ROUTINE GYNECOLOGICAL EXAMINATION: ICD-10-CM

## 2018-10-15 DIAGNOSIS — Z12.39 BREAST CANCER SCREENING: ICD-10-CM

## 2018-10-15 DIAGNOSIS — Z01.419 PAP SMEAR, AS PART OF ROUTINE GYNECOLOGICAL EXAMINATION: Primary | ICD-10-CM

## 2018-10-15 PROCEDURE — 87624 HPV HI-RISK TYP POOLED RSLT: CPT | Performed by: OBSTETRICS & GYNECOLOGY

## 2018-10-15 PROCEDURE — 99396 PREV VISIT EST AGE 40-64: CPT | Performed by: OBSTETRICS & GYNECOLOGY

## 2018-10-15 PROCEDURE — G0145 SCR C/V CYTO,THINLAYER,RESCR: HCPCS | Performed by: OBSTETRICS & GYNECOLOGY

## 2018-10-15 RX ORDER — OSPEMIFENE 60 MG/1
TABLET, FILM COATED ORAL DAILY
COMMUNITY
Start: 2018-09-29 | End: 2021-09-21 | Stop reason: ALTCHOICE

## 2018-10-15 NOTE — PROGRESS NOTES
Assessment/Plan:    No problem-specific Assessment & Plan notes found for this encounter  Normal gynecological physical examination  Self-breast examination stressed  Mammogram ordered  Discussed regular exercise, healthy diet, importance of vitamin D and calcium supplements  Discussed importance of sun block use during periods of prolonged sun exposure  Patient will be seen in 1 year for routine gynecologic and medical examination  Patient will call office for any problems, concerns, or issues which may arise during the interim  Diagnoses and all orders for this visit:    Pap smear, as part of routine gynecological examination  -     Liquid-based pap, screening    Encounter for annual routine gynecological examination    Breast cancer screening  -     Mammo screening bilateral w cad; Future    Other orders  -     OSPHENA 60 MG TABS;         Subjective:      Patient ID: Dennys Glover is a 62 y o  female  Patient is a 59-year-old female who presents today for her annual gynecologic and medical examination  Patient denies any vaginal bleeding or discharge, vasomotor symptoms, insomnia, mood lability, or weight change  Patient admits to dysuria and abdominal pain related to a history of interstitial cystitis and irritable bowel syndrome  Patient is followed and being treated for these conditions  Otherwise, patient reports normal bowel and bladder function  Patient notes that she was recently diagnosed with and is being treated for DeQuervain's tenosynovitis in her right wrist and is also has an upcoming ultrasound for a chronic thyroid nodule  Patient denies any weight change or hot/cold intolerance  Patient denies any breast changes, masses, pain or discharge and reports regular self-breast exams  Mammogram for upcoming year ordered today  Patient is up to date in regards to colonoscopy screening  Patient reports a healthy lifestyle and regular exercise                   The following portions of the patient's history were reviewed and updated as appropriate: allergies, current medications, past family history, past medical history, past social history, past surgical history and problem list     Review of Systems   Constitutional: Negative  HENT: Negative  Eyes: Negative  Respiratory: Negative  Cardiovascular: Negative  Gastrointestinal: Positive for abdominal pain  Endocrine: Negative  Negative for cold intolerance and heat intolerance  Genitourinary: Positive for dysuria  Musculoskeletal: Negative  Skin: Negative  Neurological: Negative  Psychiatric/Behavioral: Negative  Objective:      /70   Wt 57 kg (125 lb 9 6 oz)   BMI 19 67 kg/m²          Physical Exam   Constitutional: She appears well-developed and well-nourished  HENT:   Head: Normocephalic  Eyes: Pupils are equal, round, and reactive to light  Neck: Normal range of motion  Neck supple  Cardiovascular: Normal rate and regular rhythm  Pulmonary/Chest: Effort normal and breath sounds normal  Right breast exhibits no inverted nipple, no mass, no nipple discharge, no skin change and no tenderness  Left breast exhibits no inverted nipple, no mass, no nipple discharge, no skin change and no tenderness  Breasts are symmetrical    Abdominal: Soft  Normal appearance and bowel sounds are normal    Genitourinary: Rectum normal, vagina normal and uterus normal  Rectal exam shows guaiac negative stool  Pelvic exam was performed with patient supine  Right adnexum displays no mass, no tenderness and no fullness  Left adnexum displays no mass, no tenderness and no fullness  No vaginal discharge found  Lymphadenopathy:     She has no cervical adenopathy  She has no axillary adenopathy  Right: No inguinal and no supraclavicular adenopathy present  Left: No inguinal and no supraclavicular adenopathy present  Neurological: She is alert  Skin: Skin is intact   No rash noted  Psychiatric: She has a normal mood and affect   Her speech is normal and behavior is normal  Judgment and thought content normal  Cognition and memory are normal

## 2018-10-17 ENCOUNTER — OFFICE VISIT (OUTPATIENT)
Dept: OCCUPATIONAL THERAPY | Age: 58
End: 2018-10-17

## 2018-10-17 DIAGNOSIS — M65.4 DE QUERVAIN'S TENOSYNOVITIS, RIGHT: Primary | ICD-10-CM

## 2018-10-17 NOTE — PROGRESS NOTES
Kristi Daniel was seen for a minor splint adjustment  She c/o splint rubbing over the dorsal thumb and MCP joint  Moleskin was applied and stockinette covering the thumb was issued  Pt is able to don/doff splint easily  Pt was instructed to allow the thumb to rest in the splint as she was holding the thumb in an extended position against the splint  Also instructed pt to avoid using the thumb when wearing the splint  No charge for today's visit

## 2018-10-18 ENCOUNTER — TELEPHONE (OUTPATIENT)
Dept: ENDOCRINOLOGY | Facility: CLINIC | Age: 58
End: 2018-10-18

## 2018-10-18 LAB
HPV HR 12 DNA CVX QL NAA+PROBE: NEGATIVE
HPV16 DNA CVX QL NAA+PROBE: NEGATIVE
HPV18 DNA CVX QL NAA+PROBE: NEGATIVE

## 2018-10-18 NOTE — TELEPHONE ENCOUNTER
----- Message from Mallorie Carey MD sent at 10/18/2018  3:07 PM EDT -----  Thyroid nodule was stable      Sent to my chart

## 2018-10-19 LAB
LAB AP GYN PRIMARY INTERPRETATION: NORMAL
Lab: NORMAL
PATH INTERP SPEC-IMP: NORMAL

## 2018-10-23 ENCOUNTER — CLINICAL SUPPORT (OUTPATIENT)
Dept: INTERNAL MEDICINE CLINIC | Facility: CLINIC | Age: 58
End: 2018-10-23
Payer: COMMERCIAL

## 2018-10-23 ENCOUNTER — TELEPHONE (OUTPATIENT)
Dept: OBGYN CLINIC | Facility: CLINIC | Age: 58
End: 2018-10-23

## 2018-10-23 DIAGNOSIS — Z23 NEED FOR INFLUENZA VACCINATION: Primary | ICD-10-CM

## 2018-10-23 PROCEDURE — 90682 RIV4 VACC RECOMBINANT DNA IM: CPT

## 2018-10-23 PROCEDURE — 90471 IMMUNIZATION ADMIN: CPT

## 2018-10-23 NOTE — TELEPHONE ENCOUNTER
----- Message from Perri Kaplan MD sent at 10/22/2018  9:38 AM EDT -----  Pap was negative    However there was some yeast seen on the slide    Patient may use Monistat if symptomatic    Thanks

## 2018-11-13 ENCOUNTER — OFFICE VISIT (OUTPATIENT)
Dept: ENDOCRINOLOGY | Facility: CLINIC | Age: 58
End: 2018-11-13
Payer: COMMERCIAL

## 2018-11-13 VITALS
DIASTOLIC BLOOD PRESSURE: 60 MMHG | BODY MASS INDEX: 19.78 KG/M2 | HEIGHT: 67 IN | SYSTOLIC BLOOD PRESSURE: 118 MMHG | HEART RATE: 67 BPM | WEIGHT: 126 LBS

## 2018-11-13 DIAGNOSIS — R79.89 ELEVATED TSH: ICD-10-CM

## 2018-11-13 DIAGNOSIS — E04.1 NONTOXIC SINGLE THYROID NODULE: Primary | ICD-10-CM

## 2018-11-13 PROCEDURE — 99213 OFFICE O/P EST LOW 20 MIN: CPT | Performed by: INTERNAL MEDICINE

## 2018-11-13 NOTE — PATIENT INSTRUCTIONS
Thyroid Nodules   WHAT YOU NEED TO KNOW:   What are thyroid nodules? Thyroid nodules are growths on your thyroid gland  Your thyroid makes hormones that help control your body temperature, heart rate, and growth  The hormones also control how fast your body uses food for energy  Some nodules are lumps of tissue, and others are filled with fluid  What increases my risk for thyroid nodules? A lack of iodine in the foods you eat is the most common cause of thyroid nodules  The following may increase your risk:  · Autoimmune thyroid disorders, such as Hashimoto disease    · Medical conditions, such as cancer, a thyroid infection, thyroid goiter, or a thyroid cyst    · A family history  of thyroid nodules or thyroid cancer    · Pregnancy  that causes your body to create more hormones    · Past radiation  treatment to your head or neck  What are the signs and symptoms of thyroid nodules? A small nodule may have no signs or symptoms  As your nodule grows, you may be able to see a lump on your neck  A large nodule may press on your airway or neck veins and cause the following:  · A cough or choking and hoarse voice    · Flushed face and swollen neck or neck veins    · Noisy, high-pitched breathing    · Pain when you swallow or trouble swallowing    · Trouble breathing when you lie down  How are thyroid nodules diagnosed? · Blood tests  are done to check the level of thyroid hormones in your body  A blood test may also show if you have an autoimmune disease that caused your nodules  · An ultrasound  uses sound waves to show pictures of your thyroid on a screen  · A fine-needle biopsy  is done to get a tissue sample from your thyroid gland to be tested  How are thyroid nodules treated? · Thyroid medicine  is given to bring your thyroid hormone levels back to a normal range  · Radioactive iodine  is given to damage cells in your thyroid gland and decrease the size of your nodules       · Laser ablation  is done to make your nodules smaller  Ask for more information about laser ablation  · Surgery  may be done to remove all or part of your thyroid gland  Surgery is done if your nodules are cancerous  Ask for more information about thyroid surgery  What can I do to manage my thyroid nodules? · Eat iodine-rich foods  Examples include fish, seaweed, dairy products, eggs, beans, and lean meat  Iodized salt also contains iodine  You may need to use iodized table salt when you cook and season your food  Iodine may be added to bread or to your drinking water  Ask for a list of foods that contain iodine, and ask how much iodine you need each day  · Go to follow-up appointments  Write down your questions so you remember to ask them during your visits  When should I contact my healthcare provider? · You have a new cough that does not improve  · You begin choking or have new or increased trouble swallowing  · Your voice becomes hoarse  · You are losing weight without trying  · You have questions or concerns about your condition or care  When should I seek immediate care or call 911? · You have sudden chest pain or trouble breathing  · Your symptoms worsen, even after you take your medicines  CARE AGREEMENT:   You have the right to help plan your care  Learn about your health condition and how it may be treated  Discuss treatment options with your caregivers to decide what care you want to receive  You always have the right to refuse treatment  The above information is an  only  It is not intended as medical advice for individual conditions or treatments  Talk to your doctor, nurse or pharmacist before following any medical regimen to see if it is safe and effective for you  © 2017 [employer] Information is for End User's use only and may not be sold, redistributed or otherwise used for commercial purposes   All illustrations and images included in CareNotes® are the copyrighted property of A [employer]  or Edwin Alvarado

## 2018-11-13 NOTE — PROGRESS NOTES
Ashley Major 62 y o  female MRN: [id number]    Encounter: [id number]      Assessment/Plan     Assessment: This is a 62y o -year-old female with thyroid nodule and elevated TSH  Plan:  1  Thyroid nodule was stable  Repeat ultrasound in a year  2  Elevated TSH with low normal free T4-she could be developing hypothyroidism  Since she does have propensity for autoimmunity, I have ordered thyroid antibodies panel  If the antibodies are positive, it may be reasonable to start on low-dose of thyroid hormone replacement  CC:   Thyroid nodule    History of Present Illness     HPI:  51-year-old woman with thyroid nodule for about three years for which she did have a repeat ultrasound  She denies any difficulty swallowing or breathing  She denies any symptoms of hypo or hyperthyroidism with except for fatigue  On recent laboratory testing, she is noted to have an elevated TSH  Besides fatigue, she has no other symptoms of hypothyroidism  Review of Systems   Constitutional: Positive for fatigue  Negative for chills and fever  HENT: Negative for trouble swallowing and voice change  Respiratory: Negative for shortness of breath  Cardiovascular: Negative for chest pain  Gastrointestinal: Negative for constipation, diarrhea, nausea and vomiting  Endocrine: Negative for cold intolerance and heat intolerance  All other systems reviewed and are negative        Historical Information   Past Medical History:   Diagnosis Date    Atopic dermatitis     last assessed 12/2/13    Atrophic vaginitis     last assessed 9/2/15    [facility] [address] [facility]     Dermatitis     Dry eye     Frequency of urination     Fungal infection     last assessed 8/14/13    Herpes     last assessed 6/27/14    Hyperlipidemia     Interstitial cystitis     Osteoporosis     Periodontal abscess     last assessed 9/6/13    Ulcerative colitis [facility] [address] [facility]     Ulcerative colitis [facility] [address] [facility]     Urinary frequency     resolved 2/22/17  UTI (urinary tract infection)     Vaginal atrophy     Vitamin D deficiency     last assessed 16    Voiding dysfunction     last assessed 10/7/15     Past Surgical History:   Procedure Laterality Date     SECTION      last assessed sep 17 2014,low transverse    CHOLECYSTECTOMY      CYSTOSCOPY      diagnostic resolved 05    KIDNEY STONE SURGERY      ORIF PROXIMAL ULNA FRACTURE      metal plates and screws removed    OTHER SURGICAL HISTORY      appendiceal procedure assment fecolith, last assessed 13    [place] COLONOSCOPY FLX DX W/COLLJ SPEC WHEN PFRMD N/A 2017    Procedure: COLONOSCOPY;  Surgeon: Yuli Barrera MD;  [facility]; Service: Colorectal     Social History   History   Alcohol Use    Yes     Comment: socially - 1-3 drinks per month     History   Drug Use No     History   Smoking Status    Never Smoker   Smokeless Tobacco    Never Used     Comment: HISTORY OF SMOKER CURRENT STATUS UNKNOWN PER ALLSCRIPTS     Family History:   Family History   Problem Relation Age of Onset    Gallbladder disease Mother     Thyroid disease Mother     Kidney disease Mother     Diabetes Father     Hypertension Father     Nephrolithiasis Father     Lymphoma Maternal Grandfather     Diabetes Paternal Grandmother     Breast cancer Paternal Aunt        Meds/Allergies   Current Outpatient Prescriptions   Medication Sig Dispense Refill    Cholecalciferol (VITAMIN D PO) Take 3,000 Units by mouth daily      Cranberry (ELLURA PO) Take 1 capsule by mouth daily      cycloSPORINE (RESTASIS) 0 05 % ophthalmic emulsion Administer 1 drop to both eyes 2 (two) times a day      dicyclomine (BENTYL) 10 mg capsule TAKE 1 CAPSULE BY MOUTH 3 TIMES A  capsule 0    ELMIRON 100 MG capsule TAKE 1 CAPSULE 3 TIMES DAILY   90 capsule 0    estradiol (ESTRACE VAGINAL) 0 1 mg/g vaginal cream One applicator into the vagina and nightly 42 5 g 10    fluticasone (FLONASE) 50 mcg/act nasal spray 2 sprays into each nostril daily (Patient taking differently: 2 sprays into each nostril as needed  ) 3 Bottle 3    mesalamine (ASACOL) 800 MG EC tablet Take 800 mg by mouth 3 (three) times a day      Meth-Hyo-M Bl-Na Phos-Ph Sal (URO-MP PO) Take 1 capsule by mouth 4 (four) times a day      olopatadine HCl (PATADAY) 0 2 % opth drops Administer 1 drop to both eyes daily      OSPHENA 60 MG TABS       rosuvastatin (CRESTOR) 5 mg tablet Take 5 mg by mouth every other day      ergocalciferol (VITAMIN D2) 50,000 units Take 1 capsule (50,000 Units total) by mouth once a week (Patient not taking: Reported on 11/13/2018 ) 8 capsule 0     No current facility-administered medications for this visit  Allergies   Allergen Reactions    Iodine Anaphylaxis     Allergy to Iodinated and non iodinated dye    Other Anaphylaxis     APPLES    Seasonal Ic  [Cholestatin]        Objective   Vitals: Blood pressure 118/60, pulse 67, height 5' 6 5" (1 689 m), weight 57 2 kg (126 lb)  Physical Exam   Constitutional: She is oriented to person, place, and time  She appears well-developed and well-nourished  No distress  HENT:   Head: Normocephalic and atraumatic  Mouth/Throat: Oropharynx is clear and moist and mucous membranes are normal  No oropharyngeal exudate  Eyes: Conjunctivae, EOM and lids are normal  Right eye exhibits no discharge  Left eye exhibits no discharge  No scleral icterus  Neck: Neck supple  No thyromegaly present  Cardiovascular: Normal rate, regular rhythm and normal heart sounds  Exam reveals no gallop and no friction rub  No murmur heard  Pulmonary/Chest: Effort normal and breath sounds normal  No respiratory distress  She has no wheezes  Abdominal: Soft  Bowel sounds are normal  She exhibits no distension  There is no tenderness  Musculoskeletal: Normal range of motion  She exhibits no edema, tenderness or deformity     Lymphadenopathy:        Head (right side): No occipital adenopathy present  Head (left side): No occipital adenopathy present  Right: No supraclavicular adenopathy present  Left: No supraclavicular adenopathy present  Neurological: She is alert and oriented to person, place, and time  No cranial nerve deficit  Skin: Skin is warm and intact  No rash noted  She is not diaphoretic  No erythema  Psychiatric: She has a normal mood and affect  Her behavior is normal    Vitals reviewed  The history was obtained from the review of the chart, patient  Lab Results:   Lab Results   Component Value Date/Time    TSH 3RD GENERATON 3 944 (H) 08/29/2018 02:02 PM    TSH 3RD GENERATON 3 030 02/20/2018 08:09 AM    Free T4 0 78 08/29/2018 02:02 PM    Free T4 0 78 02/20/2018 08:09 AM       Imaging Studies:     Most recent ultrasound done on October 17, 2018 shows a stable thyroid nodule  I have personally reviewed pertinent reports  Portions of the record may have been created with voice recognition software  Occasional wrong word or "sound a like" substitutions may have occurred due to the inherent limitations of voice recognition software  Read the chart carefully and recognize, using context, where substitutions have occurred

## 2018-11-13 NOTE — LETTER
November 13, 2018     Terri Velasco MD  [address]  [address] [place] [address], [address] [place] [address],[address]    Patient: Erika Mendez   YOB: 1960   Date of Visit: 11/13/2018       Dear Dr Hoda Milton:    Thank you for referring Janine Charles to me for evaluation  Below are my notes for this consultation  If you have questions, please do not hesitate to call me  I look forward to following your patient along with you  Sincerely,        Josafat Bullock MD        CC: No Recipients  Josafat Bullock MD  11/13/2018  8:44 AM  Sign at close encounter   Erika Mendez 62 y o  female MRN: [id number]    Encounter: [id number]      Assessment/Plan     Assessment: This is a 62y o -year-old female with thyroid nodule and elevated TSH  Plan:  1  Thyroid nodule was stable  Repeat ultrasound in a year  2  Elevated TSH with low normal free T4-she could be developing hypothyroidism  Since she does have propensity for autoimmunity, I have ordered thyroid antibodies panel  If the antibodies are positive, it may be reasonable to start on low-dose of thyroid hormone replacement  CC:   Thyroid nodule    History of Present Illness     HPI:  60-year-old woman with thyroid nodule for about three years for which she did have a repeat ultrasound  She denies any difficulty swallowing or breathing  She denies any symptoms of hypo or hyperthyroidism with except for fatigue  On recent laboratory testing, she is noted to have an elevated TSH  Besides fatigue, she has no other symptoms of hypothyroidism  Review of Systems   Constitutional: Positive for fatigue  Negative for chills and fever  HENT: Negative for trouble swallowing and voice change  Respiratory: Negative for shortness of breath  Cardiovascular: Negative for chest pain  Gastrointestinal: Negative for constipation, diarrhea, nausea and vomiting  Endocrine: Negative for cold intolerance and heat intolerance     All other systems reviewed and are negative  Historical Information   Past Medical History:   Diagnosis Date    Atopic dermatitis     last assessed 13    Atrophic vaginitis     last assessed 9/2/15    [facility] [address]     Dermatitis     Dry eye     Frequency of urination     Fungal infection     last assessed 13    Herpes     last assessed 14    Hyperlipidemia     Interstitial cystitis     Osteoporosis     Periodontal abscess     last assessed 13    [facility]cerative colitis [facility] [address] [facility]     [facility]     Urinary frequency     resolved 17    UTI (urinary tract infection)     Vaginal atrophy     Vitamin D deficiency     last assessed 16    Voiding dysfunction     last assessed 10/7/15     Past Surgical History:   Procedure Laterality Date     SECTION      last assessed sep 17 2014,low transverse    CHOLECYSTECTOMY      CYSTOSCOPY      diagnostic resolved 05    KIDNEY STONE SURGERY      ORIF PROXIMAL ULNA FRACTURE      metal plates and screws removed    OTHER SURGICAL HISTORY      appendiceal procedure assment fecolith, last assessed 13    [place] COLONOSCOPY FLX DX W/COLLJ SPEC WHEN PFRMD N/A 2017    Procedure: COLONOSCOPY;  Surgeon: Jihan Hernandez MD;  [facility];   Service: Colorectal     Social History   History   Alcohol Use    Yes     Comment: socially - 1-3 drinks per month     History   Drug Use No     History   Smoking Status    Never Smoker   Smokeless Tobacco    Never Used     Comment: HISTORY OF SMOKER CURRENT STATUS UNKNOWN PER ALLSCRIPTS     Family History:   Family History   Problem Relation Age of Onset    Gallbladder disease Mother     Thyroid disease Mother     Kidney disease Mother     Diabetes Father     Hypertension Father     Nephrolithiasis Father     Lymphoma Maternal Grandfather     Diabetes Paternal Grandmother     Breast cancer Paternal Aunt        Meds/Allergies   Current Outpatient Prescriptions   Medication Sig Dispense Refill    Cholecalciferol (VITAMIN D PO) Take 3,000 Units by mouth daily      Cranberry (ELLURA PO) Take 1 capsule by mouth daily      cycloSPORINE (RESTASIS) 0 05 % ophthalmic emulsion Administer 1 drop to both eyes 2 (two) times a day      dicyclomine (BENTYL) 10 mg capsule TAKE 1 CAPSULE BY MOUTH 3 TIMES A  capsule 0    ELMIRON 100 MG capsule TAKE 1 CAPSULE 3 TIMES DAILY  90 capsule 0    estradiol (ESTRACE VAGINAL) 0 1 mg/g vaginal cream One applicator into the vagina and nightly 42 5 g 10    fluticasone (FLONASE) 50 mcg/act nasal spray 2 sprays into each nostril daily (Patient taking differently: 2 sprays into each nostril as needed  ) 3 Bottle 3    mesalamine (ASACOL) 800 MG EC tablet Take 800 mg by mouth 3 (three) times a day      Meth-Hyo-M Bl-Na Phos-Ph Sal (URO-MP PO) Take 1 capsule by mouth 4 (four) times a day      olopatadine HCl (PATADAY) 0 2 % opth drops Administer 1 drop to both eyes daily      OSPHENA 60 MG TABS       rosuvastatin (CRESTOR) 5 mg tablet Take 5 mg by mouth every other day      ergocalciferol (VITAMIN D2) 50,000 units Take 1 capsule (50,000 Units total) by mouth once a week (Patient not taking: Reported on 11/13/2018 ) 8 capsule 0     No current facility-administered medications for this visit  Allergies   Allergen Reactions    Iodine Anaphylaxis     Allergy to Iodinated and non iodinated dye    Other Anaphylaxis     APPLES    Seasonal Ic  [Cholestatin]        Objective   Vitals: Blood pressure 118/60, pulse 67, height 5' 6 5" (1 689 m), weight 57 2 kg (126 lb)  Physical Exam   Constitutional: She is oriented to person, place, and time  She appears well-developed and well-nourished  No distress  HENT:   Head: Normocephalic and atraumatic  Mouth/Throat: Oropharynx is clear and moist and mucous membranes are normal  No oropharyngeal exudate  Eyes: Conjunctivae, EOM and lids are normal  Right eye exhibits no discharge   Left eye exhibits no discharge  No scleral icterus  Neck: Neck supple  No thyromegaly present  Cardiovascular: Normal rate, regular rhythm and normal heart sounds  Exam reveals no gallop and no friction rub  No murmur heard  Pulmonary/Chest: Effort normal and breath sounds normal  No respiratory distress  She has no wheezes  Abdominal: Soft  Bowel sounds are normal  She exhibits no distension  There is no tenderness  Musculoskeletal: Normal range of motion  She exhibits no edema, tenderness or deformity  Lymphadenopathy:        Head (right side): No occipital adenopathy present  Head (left side): No occipital adenopathy present  Right: No supraclavicular adenopathy present  Left: No supraclavicular adenopathy present  Neurological: She is alert and oriented to person, place, and time  No cranial nerve deficit  Skin: Skin is warm and intact  No rash noted  She is not diaphoretic  No erythema  Psychiatric: She has a normal mood and affect  Her behavior is normal    Vitals reviewed  The history was obtained from the review of the chart, patient  Lab Results:   Lab Results   Component Value Date/Time    TSH 3RD GENERATON 3 944 (H) 08/29/2018 02:02 PM    TSH 3RD GENERATON 3 030 02/20/2018 08:09 AM    Free T4 0 78 08/29/2018 02:02 PM    Free T4 0 78 02/20/2018 08:09 AM       Imaging Studies:     Most recent ultrasound done on October 17, 2018 shows a stable thyroid nodule  I have personally reviewed pertinent reports  Portions of the record may have been created with voice recognition software  Occasional wrong word or "sound a like" substitutions may have occurred due to the inherent limitations of voice recognition software  Read the chart carefully and recognize, using context, where substitutions have occurred

## 2018-11-20 ENCOUNTER — TRANSCRIBE ORDERS (OUTPATIENT)
Dept: LAB | Facility: CLINIC | Age: 58
End: 2018-11-20

## 2018-11-20 ENCOUNTER — LAB (OUTPATIENT)
Dept: LAB | Facility: CLINIC | Age: 58
End: 2018-11-20
Payer: COMMERCIAL

## 2018-11-20 DIAGNOSIS — R30.0 DYSURIA: ICD-10-CM

## 2018-11-20 DIAGNOSIS — R30.0 DYSURIA: Primary | ICD-10-CM

## 2018-11-20 DIAGNOSIS — N39.0 RECURRENT UTI: ICD-10-CM

## 2018-11-20 DIAGNOSIS — E03.9 HYPOTHYROIDISM, UNSPECIFIED TYPE: ICD-10-CM

## 2018-11-20 DIAGNOSIS — E04.1 NONTOXIC SINGLE THYROID NODULE: ICD-10-CM

## 2018-11-20 LAB
BACTERIA UR QL AUTO: ABNORMAL /HPF
BILIRUB UR QL STRIP: ABNORMAL
CLARITY UR: CLEAR
COLOR UR: ABNORMAL
GLUCOSE UR STRIP-MCNC: ABNORMAL MG/DL
HGB UR QL STRIP.AUTO: ABNORMAL
KETONES UR STRIP-MCNC: ABNORMAL MG/DL
LEUKOCYTE ESTERASE UR QL STRIP: ABNORMAL
NITRITE UR QL STRIP: ABNORMAL
NON-SQ EPI CELLS URNS QL MICRO: ABNORMAL /HPF
PROT UR STRIP-MCNC: ABNORMAL MG/DL
RBC #/AREA URNS AUTO: ABNORMAL /HPF
SP GR UR STRIP.AUTO: 1.02 (ref 1–1.03)
T4 FREE SERPL-MCNC: 0.87 NG/DL (ref 0.76–1.46)
TSH SERPL DL<=0.05 MIU/L-ACNC: 3.84 UIU/ML (ref 0.36–3.74)
UROBILINOGEN UR QL STRIP.AUTO: ABNORMAL E.U./DL
WBC #/AREA URNS AUTO: ABNORMAL /HPF

## 2018-11-20 PROCEDURE — 36415 COLL VENOUS BLD VENIPUNCTURE: CPT

## 2018-11-20 PROCEDURE — 84439 ASSAY OF FREE THYROXINE: CPT

## 2018-11-20 PROCEDURE — 84443 ASSAY THYROID STIM HORMONE: CPT

## 2018-11-20 PROCEDURE — 86376 MICROSOMAL ANTIBODY EACH: CPT

## 2018-11-20 PROCEDURE — 87086 URINE CULTURE/COLONY COUNT: CPT

## 2018-11-20 PROCEDURE — 81001 URINALYSIS AUTO W/SCOPE: CPT

## 2018-11-20 PROCEDURE — 86800 THYROGLOBULIN ANTIBODY: CPT

## 2018-11-21 ENCOUNTER — TELEPHONE (OUTPATIENT)
Dept: ENDOCRINOLOGY | Facility: CLINIC | Age: 58
End: 2018-11-21

## 2018-11-21 DIAGNOSIS — E04.1 NONTOXIC UNINODULAR GOITER: Primary | ICD-10-CM

## 2018-11-21 DIAGNOSIS — E04.1 NONTOXIC SINGLE THYROID NODULE: Primary | ICD-10-CM

## 2018-11-21 LAB
BACTERIA UR CULT: NORMAL
THYROGLOB AB SERPL-ACNC: 4.2 IU/ML (ref 0–0.9)
THYROPEROXIDASE AB SERPL-ACNC: 115 IU/ML (ref 0–34)

## 2018-11-21 RX ORDER — LEVOTHYROXINE SODIUM 0.05 MG/1
50 TABLET ORAL DAILY
Qty: 30 TABLET | Refills: 5 | Status: SHIPPED | OUTPATIENT
Start: 2018-11-21 | End: 2019-04-29 | Stop reason: SDUPTHER

## 2018-11-21 NOTE — TELEPHONE ENCOUNTER
----- Message from Chadd Blevins MD sent at 11/21/2018 10:11 AM EST -----  Thyroid antibodies are positive  Consider starting levothyroxine 50 mcg per day  Check TSH and free T4 in six weeks      Sent to my chart

## 2018-11-21 NOTE — PROGRESS NOTES
Thyroid antibodies are positive  Consider starting levothyroxine 50 mcg per day  Check TSH and free T4 in six weeks      Sent to my chart

## 2018-11-28 ENCOUNTER — OFFICE VISIT (OUTPATIENT)
Dept: OBGYN CLINIC | Facility: HOSPITAL | Age: 58
End: 2018-11-28
Payer: COMMERCIAL

## 2018-11-28 VITALS
HEART RATE: 73 BPM | HEIGHT: 67 IN | DIASTOLIC BLOOD PRESSURE: 71 MMHG | SYSTOLIC BLOOD PRESSURE: 108 MMHG | WEIGHT: 126 LBS | BODY MASS INDEX: 19.78 KG/M2

## 2018-11-28 DIAGNOSIS — M65.4 DE QUERVAIN'S TENOSYNOVITIS, RIGHT: Primary | ICD-10-CM

## 2018-11-28 PROCEDURE — 99213 OFFICE O/P EST LOW 20 MIN: CPT | Performed by: ORTHOPAEDIC SURGERY

## 2018-11-28 NOTE — PROGRESS NOTES
ASSESSMENT/PLAN:    Assessment:   de Quervain stenosis tenosynovitis  right    Plan: We will continue to treat conservatively at this time per the patient's request    Patient will continue brace as needed  Patient declined injection today  Follow Up:  6 weeks    To Do Next Visit:   re-evaluation     General Discussions:     Soham Gaspar Tenosynovitis: The anatomy and physiology of de Quervain's tenosynovitis was discussed with the patient today in the office  Edema and increased contact pressure within the first dorsal extensor compartment at the radial styloid can cause pain, crepitation, and limitation of function  Treatment options include resting thumb spica splints to decrease edema, oral anti-inflammatory medications, home or formal therapy exercises, up to 2 steroid injections within the first dorsal extensor compartment, or surgical release  While majority of patients do respond to conservative treatment, up to 20% may require surgical release      _________________________________________________  CHIEF COMPLAINT:  No chief complaint on file  SUBJECTIVE:  Arias Rubi is a 62y o  year old female who presents for follow up regarding de Quervain stenosis tenosynovitis  right  Since last visit, Arias Rubi has tried steroid injections and bracing with only partial relief    Today there is Pain  Moderate  Intermittant  Sharp and Dull to the right wrist   Patient has been wearing her brace as instructed  Radiation: None      PAST MEDICAL HISTORY:  Past Medical History:   Diagnosis Date    Atopic dermatitis     last assessed 12/2/13    Atrophic vaginitis     last assessed 9/2/15    [facility] [address]     Dermatitis     Dry eye     Frequency of urination     Fungal infection     last assessed 8/14/13    Herpes     last assessed 6/27/14    Hyperlipidemia     Interstitial cystitis     Osteoporosis     Periodontal abscess     last assessed 9/6/13    Ulcerative colitis ([facility] [address] [facility]     Ulcerative colitis ([facility] [address]     Urinary frequency     resolved 17    UTI (urinary tract infection)     Vaginal atrophy     Vitamin D deficiency     last assessed 16    Voiding dysfunction     last assessed 10/7/15       PAST SURGICAL HISTORY:  Past Surgical History:   Procedure Laterality Date     SECTION      last assessed sep 17 2014,low transverse    CHOLECYSTECTOMY      CYSTOSCOPY      diagnostic resolved 05    KIDNEY STONE SURGERY      ORIF PROXIMAL ULNA FRACTURE      metal plates and screws removed    OTHER SURGICAL HISTORY      appendiceal procedure assment fecolith, last assessed 13    [place] COLONOSCOPY FLX DX W/COLLJ SPEC WHEN PFRMD N/A 2017    Procedure: COLONOSCOPY;  Surgeon: Angela López MD;  [facility]; Service: Colorectal       FAMILY HISTORY:  Family History   Problem Relation Age of Onset    Gallbladder disease Mother     Thyroid disease Mother     Kidney disease Mother     Diabetes Father     Hypertension Father     Nephrolithiasis Father     Lymphoma Maternal Grandfather     Diabetes Paternal Grandmother     Breast cancer Paternal Aunt        SOCIAL HISTORY:  Social History   Substance Use Topics    Smoking status: Never Smoker    Smokeless tobacco: Never Used      Comment: HISTORY OF SMOKER CURRENT STATUS UNKNOWN PER ALLSCRIPTS    Alcohol use Yes      Comment: socially - 1-3 drinks per month       MEDICATIONS:    Current Outpatient Prescriptions:     Cholecalciferol (VITAMIN D PO), Take 3,000 Units by mouth daily, Disp: , Rfl:     Cranberry (ELLURA PO), Take 1 capsule by mouth daily, Disp: , Rfl:     cycloSPORINE (RESTASIS) 0 05 % ophthalmic emulsion, Administer 1 drop to both eyes 2 (two) times a day, Disp: , Rfl:     dicyclomine (BENTYL) 10 mg capsule, TAKE 1 CAPSULE BY MOUTH 3 TIMES A DAY, Disp: 270 capsule, Rfl: 0    ELMIRON 100 MG capsule, TAKE 1 CAPSULE 3 TIMES DAILY  , Disp: 90 capsule, Rfl: 0    ergocalciferol (VITAMIN D2) 50,000 units, Take 1 capsule (50,000 Units total) by mouth once a week, Disp: 8 capsule, Rfl: 0    estradiol (ESTRACE VAGINAL) 0 1 mg/g vaginal cream, One applicator into the vagina and nightly, Disp: 42 5 g, Rfl: 10    fluticasone (FLONASE) 50 mcg/act nasal spray, 2 sprays into each nostril daily (Patient taking differently: 2 sprays into each nostril as needed  ), Disp: 3 Bottle, Rfl: 3    levothyroxine 50 mcg tablet, Take 1 tablet (50 mcg total) by mouth daily, Disp: 30 tablet, Rfl: 5    mesalamine (ASACOL) 800 MG EC tablet, Take 800 mg by mouth 3 (three) times a day, Disp: , Rfl:     Meth-Hyo-M Bl-Na Phos-Ph Sal (URO-MP PO), Take 1 capsule by mouth 4 (four) times a day, Disp: , Rfl:     olopatadine HCl (PATADAY) 0 2 % opth drops, Administer 1 drop to both eyes daily, Disp: , Rfl:     OSPHENA 60 MG TABS, , Disp: , Rfl:     rosuvastatin (CRESTOR) 5 mg tablet, Take 5 mg by mouth every other day, Disp: , Rfl:     ALLERGIES:  Allergies   Allergen Reactions    Iodine Anaphylaxis     Allergy to Iodinated and non iodinated dye    Other Anaphylaxis     APPLES    Seasonal Ic  [Cholestatin]        REVIEW OF SYSTEMS:  Pertinent items are noted in HPI  A comprehensive review of systems was negative      LABS:  HgA1c:   Lab Results   Component Value Date    HGBA1C 5 5 06/12/2018     BMP:   Lab Results   Component Value Date    GLUCOSE 89 08/07/2015    CALCIUM 8 9 03/30/2018     08/07/2015    K 3 8 03/30/2018    CO2 28 03/30/2018     03/30/2018    BUN 18 03/30/2018    CREATININE 0 86 03/30/2018           _____________________________________________________  PHYSICAL EXAMINATION:  General: well developed and well nourished, alert, oriented times 3 and appears comfortable  Psychiatric: Normal  HEENT: Trachea Midline, No torticollis  Cardiovascular: No discernable arrhythmia  Pulmonary: No wheezing or stridor  Skin: No masses, erthema, lacerations, fluctation, ulcerations  Neurovascular: Sensation Intact to the Median, Ulnar, Radial Nerve, Motor Intact to the Median, Ulnar, Radial Nerve and Pulses Intact    MUSCULOSKELETAL EXAMINATION:  RIGHT SIDE:  Reena Silva:  Tenderness Radial Styloid and Positive Finkelstein's Test   Full ROM, no crepitation    _____________________________________________________  STUDIES REVIEWED:  No Studies to review      PROCEDURES PERFORMED:  Procedures        Scribe Attestation    I,:   Medhat Cowan am acting as a scribe while in the presence of the attending physician :        I,:   Mina Vigil MD personally performed the services described in this documentation    as scribed in my presence :

## 2018-12-05 ENCOUNTER — APPOINTMENT (OUTPATIENT)
Dept: LAB | Facility: CLINIC | Age: 58
End: 2018-12-05
Payer: COMMERCIAL

## 2018-12-05 PROCEDURE — 87086 URINE CULTURE/COLONY COUNT: CPT

## 2018-12-06 ENCOUNTER — OFFICE VISIT (OUTPATIENT)
Dept: OBGYN CLINIC | Facility: CLINIC | Age: 58
End: 2018-12-06
Payer: COMMERCIAL

## 2018-12-06 VITALS
BODY MASS INDEX: 19.46 KG/M2 | SYSTOLIC BLOOD PRESSURE: 158 MMHG | DIASTOLIC BLOOD PRESSURE: 60 MMHG | HEIGHT: 67 IN | WEIGHT: 124 LBS

## 2018-12-06 DIAGNOSIS — N89.8 VAGINAL IRRITATION: Primary | ICD-10-CM

## 2018-12-06 LAB — BACTERIA UR CULT: NORMAL

## 2018-12-06 PROCEDURE — 87255 GENET VIRUS ISOLATE HSV: CPT | Performed by: OBSTETRICS & GYNECOLOGY

## 2018-12-06 PROCEDURE — 99213 OFFICE O/P EST LOW 20 MIN: CPT | Performed by: OBSTETRICS & GYNECOLOGY

## 2018-12-06 PROCEDURE — 87070 CULTURE OTHR SPECIMN AEROBIC: CPT | Performed by: OBSTETRICS & GYNECOLOGY

## 2018-12-06 RX ORDER — METHENAMINE, PHENYL SALICYLATE, SODIUM PHOSPHATE, MONOBASIC, ANHYDROUS, METHYLENE BLUE ANHYDROUS, AND HYOSCYAMINE SULFATE 118; 36; 40.8; 10; .12 MG/1; MG/1; MG/1; MG/1; MG/1
CAPSULE ORAL
Status: ON HOLD | COMMUNITY
Start: 2018-12-03 | End: 2019-02-19

## 2018-12-06 NOTE — PROGRESS NOTES
Assessment/Plan:      Diagnoses and all orders for this visit:    Vaginal irritation  -     Cancel: Herpes simplex virus culture  -     VAGINOSIS DNA PROBE (AFFIRM)  -     Genital Comprehensive Culture  -     Herpes simplex virus culture    Other orders  -     Meth-Hyo-M Bl-Na Phos-Ph Sal (DASHAWN VAZQUEZ) 118 MG CAPS;           Subjective:     Patient ID: Dennys Glover is a 62 y o  female  Patient is a 49-year-old female who presents today complaining of vaginal irritation and itching    Patient denies any abnormal bleeding or any specific discharge    Patient has past medical history of HSV but has had no outbreaks in years    She denies any urinary complaints including burning frequency or hematuria    She also denies any fever shakes or chills    She denies any abnormal vaginal bleeding      We obtained a complete battery of vaginal cultures including HSV    She was also advised to do warm baths with tea bags    Further treatment will be based upon final culture results    All questions were answered in detail for her at today's visit    Patient will call should any problems issues or concerns arise      Total time spent at today's visit was 20 min which greater than 50% was spent face-to-face counseling and coordination care        Review of Systems   Constitutional: Negative  HENT: Negative  Eyes: Negative  Respiratory: Negative  Cardiovascular: Negative  Followed for cholesterol   Gastrointestinal: Negative  Endocrine: Negative  Followed for thyroid   Genitourinary: Negative  Musculoskeletal: Negative  Skin: Negative  Neurological: Negative  Psychiatric/Behavioral: Negative  Objective:     Physical Exam   Constitutional: She is oriented to person, place, and time  She appears well-developed and well-nourished  HENT:   Head: Normocephalic  Eyes: Pupils are equal, round, and reactive to light  Neck: Normal range of motion     Pulmonary/Chest: Effort normal    Abdominal: Soft  There is no tenderness  Genitourinary: Vagina normal  No labial fusion  There is no rash, tenderness, lesion or injury on the right labia  There is no rash, tenderness, lesion or injury on the left labia  No erythema or bleeding in the vagina  No vaginal discharge found  Genitourinary Comments: Vaginal cultures obtained including HSV screen as well   Musculoskeletal: Normal range of motion  Neurological: She is alert and oriented to person, place, and time  Skin: Skin is dry  Psychiatric: She has a normal mood and affect   Her speech is normal and behavior is normal  Judgment and thought content normal  Cognition and memory are normal

## 2018-12-08 LAB — BACTERIA GENITAL AEROBE CULT: NORMAL

## 2018-12-11 ENCOUNTER — TELEPHONE (OUTPATIENT)
Dept: OBGYN CLINIC | Facility: CLINIC | Age: 58
End: 2018-12-11

## 2018-12-11 LAB — HSV SPEC CULT: NORMAL

## 2018-12-11 NOTE — TELEPHONE ENCOUNTER
Patient was called, Negative results were given  Patient stated she is still having some problems and would like a call back to discuss  Best number to call back to would be [phone]

## 2018-12-11 NOTE — TELEPHONE ENCOUNTER
----- Message from Mindy Lo MD sent at 12/10/2018 10:01 AM EST -----  Vaginal culture is negative    Awaiting the final results for the herpes culture    Thanks

## 2018-12-11 NOTE — TELEPHONE ENCOUNTER
----- Message from Bandar Silvestre MD sent at 12/11/2018  3:20 PM EST -----  Herpes culture Negative    Thanks

## 2018-12-13 ENCOUNTER — TELEPHONE (OUTPATIENT)
Dept: OBGYN CLINIC | Facility: CLINIC | Age: 58
End: 2018-12-13

## 2018-12-13 NOTE — TELEPHONE ENCOUNTER
Pt called back said she would like Dr Kobe Mayes to call her with questions about symptoms she is experiencing  Pt said she does not want Shannon to call her   Please not other number was incorrect [phone]

## 2018-12-13 NOTE — TELEPHONE ENCOUNTER
----- Message from Daily Granda MD sent at 12/10/2018 10:01 AM EST -----  Vaginal culture is negative    Awaiting the final results for the herpes culture    Thanks

## 2018-12-27 DIAGNOSIS — K52.9 COLITIS WITHOUT COMPLICATION: Primary | ICD-10-CM

## 2018-12-27 RX ORDER — MESALAMINE 800 MG/1
TABLET, DELAYED RELEASE ORAL
Qty: 270 TABLET | Refills: 0 | Status: SHIPPED | OUTPATIENT
Start: 2018-12-27 | End: 2019-06-17 | Stop reason: SDUPTHER

## 2019-01-04 NOTE — TELEPHONE ENCOUNTER
Called patient this morning    Reassured her regarding treatment of atrophic vaginitis recently    Patient will call should any problems issues or concerns arise for her    Patient to be followed up at the office as planned

## 2019-01-07 ENCOUNTER — LAB (OUTPATIENT)
Dept: LAB | Facility: CLINIC | Age: 59
End: 2019-01-07
Payer: COMMERCIAL

## 2019-01-07 ENCOUNTER — TRANSCRIBE ORDERS (OUTPATIENT)
Dept: LAB | Facility: CLINIC | Age: 59
End: 2019-01-07

## 2019-01-07 DIAGNOSIS — E04.1 NONTOXIC SINGLE THYROID NODULE: ICD-10-CM

## 2019-01-07 LAB
T4 FREE SERPL-MCNC: 1.1 NG/DL (ref 0.76–1.46)
TSH SERPL DL<=0.05 MIU/L-ACNC: 1.1 UIU/ML (ref 0.36–3.74)

## 2019-01-07 PROCEDURE — 84443 ASSAY THYROID STIM HORMONE: CPT

## 2019-01-07 PROCEDURE — 84439 ASSAY OF FREE THYROXINE: CPT

## 2019-01-07 PROCEDURE — 36415 COLL VENOUS BLD VENIPUNCTURE: CPT

## 2019-01-08 ENCOUNTER — TELEPHONE (OUTPATIENT)
Dept: ENDOCRINOLOGY | Facility: CLINIC | Age: 59
End: 2019-01-08

## 2019-01-08 NOTE — TELEPHONE ENCOUNTER
Patient would like a call back about what to do next  She said that a message was left on her phone about her lab results  She said she is newly taking medication for her thyroid and she wants to know what Dr Darwin Taveras wants her to do next  Please call her at [phone]  Thank you

## 2019-01-09 ENCOUNTER — PREP FOR PROCEDURE (OUTPATIENT)
Dept: OBGYN CLINIC | Facility: HOSPITAL | Age: 59
End: 2019-01-09

## 2019-01-09 ENCOUNTER — OFFICE VISIT (OUTPATIENT)
Dept: OBGYN CLINIC | Facility: HOSPITAL | Age: 59
End: 2019-01-09
Payer: COMMERCIAL

## 2019-01-09 VITALS
HEART RATE: 123 BPM | HEIGHT: 67 IN | WEIGHT: 121 LBS | SYSTOLIC BLOOD PRESSURE: 119 MMHG | DIASTOLIC BLOOD PRESSURE: 86 MMHG | BODY MASS INDEX: 18.99 KG/M2

## 2019-01-09 DIAGNOSIS — M65.4 DE QUERVAIN'S TENOSYNOVITIS, RIGHT: Primary | ICD-10-CM

## 2019-01-09 PROCEDURE — 99214 OFFICE O/P EST MOD 30 MIN: CPT | Performed by: ORTHOPAEDIC SURGERY

## 2019-01-09 RX ORDER — CEFAZOLIN SODIUM 2 G/50ML
2000 SOLUTION INTRAVENOUS ONCE
Status: CANCELLED | OUTPATIENT
Start: 2019-01-09 | End: 2019-01-09

## 2019-01-09 NOTE — TELEPHONE ENCOUNTER
Spoke with patient  All questions were answered  Repeat laboratory testing at the next visit unless she has symptoms suggestive of worsening thyroid function

## 2019-01-09 NOTE — PROGRESS NOTES
ASSESSMENT/PLAN:    Assessment:   Right DeQuervain's tenosynovitis    Plan:   Right DeQuervain's release to be performed under sedation    Follow Up: After Surgery    To Do Next Visit:   Mandi huber    Operative Discussions:  Ariela Barrios Release: The anatomy and physiology of de Quervain's tenosynovitis was discussed with the patient today in the office  Edema and increased contact pressure within the first dorsal extensor compartment at the radial styloid can cause pain, crepitation, and limitation of function  Treatment options include resting thumb spica splints to decrease edema, oral anti-inflammatory medications, home or formal therapy exercises, up to 2 steroid injections within the first dorsal extensor compartment, or surgical release  While majority of patients do respond to conservative treatment, up to 20% may require surgical release  The patient has elected to undergo a release of the first dorsal extensor compartment (de Quervain's)  A small incision will be made over the radial styloid of the wrist, the tendon sheath holding the abductor pollicis longus and extensor pollicis brevis will be released  In the postoperative period, light activities are allowed immediately, driving is allowed when narcotic medication has stopped, and the incision may get wet after 2 days  Heavy activities (lifting more than approximately 10 pounds) will be allowed after the follow up appointment in 1-2 weeks  While the pain and discomfort within the wrist typically improves rapidly, some residual discomfort may be present for up to 6 weeks  The patient may notice temporary numbness within the superficial sensory branch of the radial nerve secondary to a traction neuropraxia  This is often a self-limiting condition  The patient has an understanding of the above mentioned discussion  Approximate success rate is 98%  The risks and benefits of the procedure were explained to the patient, which include, but are not limited to: Bleeding, infection, recurrence, pain, scar, damage to tendons, damage to nerves, and damage to blood vessels, failure to give desired results and complications related to anesthesia   These risks, along with alternative conservative treatment options, and postoperative protocols were voiced back and understood by the patient   All questions were answered to the patient's satisfaction   The patient agrees to comply with a standard postoperative protocol, and is willing to proceed   Education was provided via written and auditory forms   There were no barriers to learning  Written handouts regarding wound care, incision and scar care, and general preoperative information was provided to the patient   Prior to surgery, the patient may be requested to stop all anti-inflammatory medications   Prophylactic aspirin, Plavix, and Coumadin may be allowed to be continued   Medications including vitamin E , ginkgo, and fish oil are requested to be stopped approximately one week prior to surgery   Hypertensive medications and beta blockers, if taken, should be continued  _____________________________________________________  CHIEF COMPLAINT:  Chief Complaint   Patient presents with    Right Wrist - Follow-up         SUBJECTIVE:  Christa Cope is a 62y o  year old female who presents for follow up regarding right Dequervain's  Patient states her wrist is still causing a lot of pain and causing difficulties with ADLs  Has tried bracing and injections without full relief  Describes slight burning sensation, but no n/t         PAST MEDICAL HISTORY:  Past Medical History:   Diagnosis Date    Atopic dermatitis     last assessed 12/2/13    Atrophic vaginitis     last assessed 9/2/15    [facility] [address]     Dermatitis     Dry eye     Frequency of urination     Fungal infection     last assessed 8/14/13    Herpes     last assessed 6/27/14    Hyperlipidemia     Interstitial cystitis     Osteoporosis     Periodontal abscess     last assessed 13    [facility]ative [facility] [address]     Ulcerative colitis ([facility] [address]     Urinary frequency     resolved 17    UTI (urinary tract infection)     Vaginal atrophy     Vitamin D deficiency     last assessed 16    Voiding dysfunction     last assessed 10/7/15       PAST SURGICAL HISTORY:  Past Surgical History:   Procedure Laterality Date     SECTION      last assessed sep 17 2014,low transverse    CHOLECYSTECTOMY      CYSTOSCOPY      diagnostic resolved 05    KIDNEY STONE SURGERY      ORIF PROXIMAL ULNA FRACTURE      metal plates and screws removed    OTHER SURGICAL HISTORY      appendiceal procedure assment fecolith, last assessed 13    AL COLONOSCOPY FLX DX W/COLLJ SPEC WHEN PFRMD N/A 2017    Procedure: COLONOSCOPY;  Surgeon: Joey Valencia MD;  [facility];   Service: Colorectal       FAMILY HISTORY:  Family History   Problem Relation Age of Onset    Gallbladder disease Mother     Thyroid disease Mother     Kidney disease Mother     Diabetes Father     Hypertension Father     Nephrolithiasis Father     Lymphoma Maternal Grandfather     Diabetes Paternal Grandmother     Breast cancer Paternal Aunt        SOCIAL HISTORY:  Social History   Substance Use Topics    Smoking status: Never Smoker    Smokeless tobacco: Never Used      Comment: HISTORY OF SMOKER CURRENT STATUS UNKNOWN PER [facility]SCRIPTS    Alcohol use Yes      Comment: socially - 1-3 drinks per month       MEDICATIONS:    Current Outpatient Prescriptions:     Cholecalciferol (VITAMIN D PO), Take 3,000 Units by mouth daily, Disp: , Rfl:     Cranberry (ELLURA PO), Take 1 capsule by mouth daily, Disp: , Rfl:     cycloSPORINE (RESTASIS) 0 05 % ophthalmic emulsion, Administer 1 drop to both eyes 2 (two) times a day, Disp: , Rfl:     dicyclomine (BENTYL) 10 mg capsule, TAKE 1 CAPSULE BY MOUTH 3 TIMES A DAY, Disp: 270 capsule, Rfl: 0    ELMIRON 100 MG capsule, TAKE 1 CAPSULE 3 TIMES DAILY  , Disp: 90 capsule, Rfl: 0    ergocalciferol (VITAMIN D2) 50,000 units, Take 1 capsule (50,000 Units total) by mouth once a week, Disp: 8 capsule, Rfl: 0    estradiol (ESTRACE VAGINAL) 0 1 mg/g vaginal cream, One applicator into the vagina and nightly, Disp: 42 5 g, Rfl: 10    fluticasone (FLONASE) 50 mcg/act nasal spray, 2 sprays into each nostril daily (Patient taking differently: 2 sprays into each nostril as needed  ), Disp: 3 Bottle, Rfl: 3    levothyroxine 50 mcg tablet, Take 1 tablet (50 mcg total) by mouth daily, Disp: 30 tablet, Rfl: 5    mesalamine (ASACOL) 800 MG EC tablet, TAKE ONE TABLET BY MOUTH 3 TIMES A DAY, Disp: 270 tablet, Rfl: 0    Meth-Hyo-M Bl-Na Phos-Ph Sal (URO-MP PO), Take 1 capsule by mouth 4 (four) times a day, Disp: , Rfl:     Meth-Hyo-M Bl-Na Phos-Ph Sal (VILAMIT MB) 118 MG CAPS, , Disp: , Rfl:     olopatadine HCl (PATADAY) 0 2 % opth drops, Administer 1 drop to both eyes daily, Disp: , Rfl:     OSPHENA 60 MG TABS, , Disp: , Rfl:     rosuvastatin (CRESTOR) 5 mg tablet, Take 5 mg by mouth every other day, Disp: , Rfl:     ALLERGIES:  Allergies   Allergen Reactions    Iodine Anaphylaxis     Allergy to Iodinated and non iodinated dye    Other Anaphylaxis     APPLES    Seasonal Ic  [Cholestatin]        REVIEW OF SYSTEMS:  Pertinent items are noted in HPI  A comprehensive review of systems was negative      LABS:  HgA1c:   Lab Results   Component Value Date    HGBA1C 5 5 06/12/2018     BMP:   Lab Results   Component Value Date    GLUCOSE 89 08/07/2015    CALCIUM 8 9 03/30/2018     08/07/2015    K 3 8 03/30/2018    CO2 28 03/30/2018     03/30/2018    BUN 18 03/30/2018    CREATININE 0 86 03/30/2018           _____________________________________________________  PHYSICAL EXAMINATION:  General: well developed and well nourished, alert, oriented times 3 and appears comfortable  Psychiatric: Normal  HEENT: Trachea Midline, No torticollis  Cardiovascular: No discernable arrhythmia  Pulmonary: No wheezing or stridor  Skin: No erthema, lacerations, fluctation, ulcerations  Neurovascular: Sensation Intact to the Median, Ulnar, Radial Nerve, Motor Intact to the Median, Ulnar, Radial Nerve and Pulses Intact    MUSCULOSKELETAL EXAMINATION:  RIGHT SIDE:  Espiridion Octavio:  Tenderness Radial Styloid, Positive Finkelstein's Test and pt with cyst seen along these tendons with crepitation    _____________________________________________________  STUDIES REVIEWED:  No Studies to review      PROCEDURES PERFORMED:  Procedures  No Procedures performed today   Scribe Attestation    I,:   Vu Liu PA-C am acting as a scribe while in the presence of the attending physician :        I,:   Torie Harmon MD personally performed the services described in this documentation    as scribed in my presence :

## 2019-01-09 NOTE — H&P
ASSESSMENT/PLAN:    Assessment:   Right DeQuervain's tenosynovitis    Plan:   Right DeQuervain's release to be performed under sedation    Follow Up: After Surgery    To Do Next Visit:   Mandi huber    Operative Discussions:  Ephraim Anaya Release: The anatomy and physiology of de Quervain's tenosynovitis was discussed with the patient today in the office  Edema and increased contact pressure within the first dorsal extensor compartment at the radial styloid can cause pain, crepitation, and limitation of function  Treatment options include resting thumb spica splints to decrease edema, oral anti-inflammatory medications, home or formal therapy exercises, up to 2 steroid injections within the first dorsal extensor compartment, or surgical release  While majority of patients do respond to conservative treatment, up to 20% may require surgical release  The patient has elected to undergo a release of the first dorsal extensor compartment (de Quervain's)  A small incision will be made over the radial styloid of the wrist, the tendon sheath holding the abductor pollicis longus and extensor pollicis brevis will be released  In the postoperative period, light activities are allowed immediately, driving is allowed when narcotic medication has stopped, and the incision may get wet after 2 days  Heavy activities (lifting more than approximately 10 pounds) will be allowed after the follow up appointment in 1-2 weeks  While the pain and discomfort within the wrist typically improves rapidly, some residual discomfort may be present for up to 6 weeks  The patient may notice temporary numbness within the superficial sensory branch of the radial nerve secondary to a traction neuropraxia  This is often a self-limiting condition  The patient has an understanding of the above mentioned discussion  Approximate success rate is 98%  The risks and benefits of the procedure were explained to the patient, which include, but are not limited to: Bleeding, infection, recurrence, pain, scar, damage to tendons, damage to nerves, and damage to blood vessels, failure to give desired results and complications related to anesthesia   These risks, along with alternative conservative treatment options, and postoperative protocols were voiced back and understood by the patient   All questions were answered to the patient's satisfaction   The patient agrees to comply with a standard postoperative protocol, and is willing to proceed   Education was provided via written and auditory forms   There were no barriers to learning  Written handouts regarding wound care, incision and scar care, and general preoperative information was provided to the patient   Prior to surgery, the patient may be requested to stop all anti-inflammatory medications   Prophylactic aspirin, Plavix, and Coumadin may be allowed to be continued   Medications including vitamin E , ginkgo, and fish oil are requested to be stopped approximately one week prior to surgery   Hypertensive medications and beta blockers, if taken, should be continued  _____________________________________________________  CHIEF COMPLAINT:  Chief Complaint   Patient presents with    Right Wrist - Follow-up         SUBJECTIVE:  Avtar Barr is a 62y o  year old female who presents for follow up regarding right Dequervain's  Patient states her wrist is still causing a lot of pain and causing difficulties with ADLs  Has tried bracing and injections without full relief  Describes slight burning sensation, but no n/t         PAST MEDICAL HISTORY:  Past Medical History:   Diagnosis Date    Atopic dermatitis     last assessed 12/2/13    Atrophic vaginitis     last assessed 9/2/15    Colon cancer ([facility] [address]     Dermatitis     Dry eye     Frequency of urination     Fungal infection     last assessed 8/14/13    Herpes     last assessed 6/27/14    Hyperlipidemia     Interstitial cystitis     Osteoporosis     Periodontal abscess     last assessed 13    [facility] [address]     Ulcerative colitis ([facility] [address]     Urinary frequency     resolved 17    UTI (urinary tract infection)     Vaginal atrophy     Vitamin D deficiency     last assessed 16    Voiding dysfunction     last assessed 10/7/15       PAST SURGICAL HISTORY:  Past Surgical History:   Procedure Laterality Date     SECTION      last assessed sep 17 2014,low transverse    CHOLECYSTECTOMY      CYSTOSCOPY      diagnostic resolved 05    KIDNEY STONE SURGERY      ORIF PROXIMAL ULNA FRACTURE      metal plates and screws removed    OTHER SURGICAL HISTORY      appendiceal procedure assment fecolith, last assessed 13    AR COLONOSCOPY FLX DX W/COLLJ SPEC WHEN PFRMD N/A 2017    Procedure: COLONOSCOPY;  Surgeon: Aleta Bishop MD;  [facility];   Service: Colorectal       FAMILY HISTORY:  Family History   Problem Relation Age of Onset    Gallbladder disease Mother     Thyroid disease Mother     Kidney disease Mother     Diabetes Father     Hypertension Father     Nephrolithiasis Father     Lymphoma Maternal Grandfather     Diabetes Paternal Grandmother     Breast cancer Paternal Aunt        SOCIAL HISTORY:  Social History   Substance Use Topics    Smoking status: Never Smoker    Smokeless tobacco: Never Used      Comment: HISTORY OF SMOKER CURRENT STATUS UNKNOWN PER ALLSCRIPTS    Alcohol use Yes      Comment: socially - 1-3 drinks per month       MEDICATIONS:    Current Outpatient Prescriptions:     Cholecalciferol (VITAMIN D PO), Take 3,000 Units by mouth daily, Disp: , Rfl:     Cranberry (ELLURA PO), Take 1 capsule by mouth daily, Disp: , Rfl:     cycloSPORINE (RESTASIS) 0 05 % ophthalmic emulsion, Administer 1 drop to both eyes 2 (two) times a day, Disp: , Rfl:     dicyclomine (BENTYL) 10 mg capsule, TAKE 1 CAPSULE BY MOUTH 3 TIMES A DAY, Disp: 270 capsule, Rfl: 0    ELMIRON 100 MG capsule, TAKE 1 CAPSULE 3 TIMES DAILY  , Disp: 90 capsule, Rfl: 0    ergocalciferol (VITAMIN D2) 50,000 units, Take 1 capsule (50,000 Units total) by mouth once a week, Disp: 8 capsule, Rfl: 0    estradiol (ESTRACE VAGINAL) 0 1 mg/g vaginal cream, One applicator into the vagina and nightly, Disp: 42 5 g, Rfl: 10    fluticasone (FLONASE) 50 mcg/act nasal spray, 2 sprays into each nostril daily (Patient taking differently: 2 sprays into each nostril as needed  ), Disp: 3 Bottle, Rfl: 3    levothyroxine 50 mcg tablet, Take 1 tablet (50 mcg total) by mouth daily, Disp: 30 tablet, Rfl: 5    mesalamine (ASACOL) 800 MG EC tablet, TAKE ONE TABLET BY MOUTH 3 TIMES A DAY, Disp: 270 tablet, Rfl: 0    Meth-Hyo-M Bl-Na Phos-Ph Sal (URO-MP PO), Take 1 capsule by mouth 4 (four) times a day, Disp: , Rfl:     Meth-Hyo-M Bl-Na Phos-Ph Sal (VILAMIT MB) 118 MG CAPS, , Disp: , Rfl:     olopatadine HCl (PATADAY) 0 2 % opth drops, Administer 1 drop to both eyes daily, Disp: , Rfl:     OSPHENA 60 MG TABS, , Disp: , Rfl:     rosuvastatin (CRESTOR) 5 mg tablet, Take 5 mg by mouth every other day, Disp: , Rfl:     ALLERGIES:  Allergies   Allergen Reactions    Iodine Anaphylaxis     Allergy to Iodinated and non iodinated dye    Other Anaphylaxis     APPLES    Seasonal Ic  [Cholestatin]        REVIEW OF SYSTEMS:  Pertinent items are noted in HPI  A comprehensive review of systems was negative      LABS:  HgA1c:   Lab Results   Component Value Date    HGBA1C 5 5 06/12/2018     BMP:   Lab Results   Component Value Date    GLUCOSE 89 08/07/2015    CALCIUM 8 9 03/30/2018     08/07/2015    K 3 8 03/30/2018    CO2 28 03/30/2018     03/30/2018    BUN 18 03/30/2018    CREATININE 0 86 03/30/2018           _____________________________________________________  PHYSICAL EXAMINATION:  General: well developed and well nourished, alert, oriented times 3 and appears comfortable  Psychiatric: Normal  HEENT: Trachea Midline, No torticollis  Cardiovascular: No discernable arrhythmia  Pulmonary: No wheezing or stridor  Skin: No erthema, lacerations, fluctation, ulcerations  Neurovascular: Sensation Intact to the Median, Ulnar, Radial Nerve, Motor Intact to the Median, Ulnar, Radial Nerve and Pulses Intact    MUSCULOSKELETAL EXAMINATION:  RIGHT SIDE:  Angel Ngoc:  Tenderness Radial Styloid, Positive Finkelstein's Test and pt with cyst seen along these tendons with crepitation    _____________________________________________________  STUDIES REVIEWED:  No Studies to review      PROCEDURES PERFORMED:  Procedures  No Procedures performed today   Scribe Attestation    I,:   Ho Mehta PA-C am acting as a scribe while in the presence of the attending physician :        I,:   Jearldine Goodell, MD personally performed the services described in this documentation    as scribed in my presence :

## 2019-01-11 ENCOUNTER — PREP FOR PROCEDURE (OUTPATIENT)
Dept: OBGYN CLINIC | Facility: HOSPITAL | Age: 59
End: 2019-01-11

## 2019-01-28 DIAGNOSIS — K58.0 IRRITABLE BOWEL SYNDROME WITH DIARRHEA: Primary | ICD-10-CM

## 2019-01-28 RX ORDER — DICYCLOMINE HYDROCHLORIDE 10 MG/1
10 CAPSULE ORAL
Qty: 270 CAPSULE | Refills: 3 | Status: SHIPPED | OUTPATIENT
Start: 2019-01-28 | End: 2020-03-02 | Stop reason: SDUPTHER

## 2019-01-29 ENCOUNTER — APPOINTMENT (OUTPATIENT)
Dept: LAB | Facility: CLINIC | Age: 59
End: 2019-01-29
Payer: COMMERCIAL

## 2019-01-29 DIAGNOSIS — M65.4 DE QUERVAIN'S TENOSYNOVITIS, RIGHT: ICD-10-CM

## 2019-01-29 LAB
ANION GAP SERPL CALCULATED.3IONS-SCNC: 8 MMOL/L (ref 4–13)
BASOPHILS # BLD AUTO: 0.03 THOUSANDS/ΜL (ref 0–0.1)
BASOPHILS NFR BLD AUTO: 1 % (ref 0–1)
BUN SERPL-MCNC: 18 MG/DL (ref 5–25)
CALCIUM SERPL-MCNC: 9.4 MG/DL (ref 8.3–10.1)
CHLORIDE SERPL-SCNC: 104 MMOL/L (ref 100–108)
CO2 SERPL-SCNC: 29 MMOL/L (ref 21–32)
CREAT SERPL-MCNC: 0.85 MG/DL (ref 0.6–1.3)
EOSINOPHIL # BLD AUTO: 0.03 THOUSAND/ΜL (ref 0–0.61)
EOSINOPHIL NFR BLD AUTO: 1 % (ref 0–6)
ERYTHROCYTE [DISTWIDTH] IN BLOOD BY AUTOMATED COUNT: 12.6 % (ref 11.6–15.1)
GFR SERPL CREATININE-BSD FRML MDRD: 76 ML/MIN/1.73SQ M
GLUCOSE SERPL-MCNC: 84 MG/DL (ref 65–140)
HCT VFR BLD AUTO: 40.1 % (ref 34.8–46.1)
HGB BLD-MCNC: 12.8 G/DL (ref 11.5–15.4)
IMM GRANULOCYTES # BLD AUTO: 0 THOUSAND/UL (ref 0–0.2)
IMM GRANULOCYTES NFR BLD AUTO: 0 % (ref 0–2)
LYMPHOCYTES # BLD AUTO: 0.92 THOUSANDS/ΜL (ref 0.6–4.47)
LYMPHOCYTES NFR BLD AUTO: 18 % (ref 14–44)
MCH RBC QN AUTO: 28.6 PG (ref 26.8–34.3)
MCHC RBC AUTO-ENTMCNC: 31.9 G/DL (ref 31.4–37.4)
MCV RBC AUTO: 90 FL (ref 82–98)
MONOCYTES # BLD AUTO: 0.37 THOUSAND/ΜL (ref 0.17–1.22)
MONOCYTES NFR BLD AUTO: 7 % (ref 4–12)
NEUTROPHILS # BLD AUTO: 3.67 THOUSANDS/ΜL (ref 1.85–7.62)
NEUTS SEG NFR BLD AUTO: 73 % (ref 43–75)
NRBC BLD AUTO-RTO: 0 /100 WBCS
PLATELET # BLD AUTO: 131 THOUSANDS/UL (ref 149–390)
PMV BLD AUTO: 11.6 FL (ref 8.9–12.7)
POTASSIUM SERPL-SCNC: 3.7 MMOL/L (ref 3.5–5.3)
RBC # BLD AUTO: 4.48 MILLION/UL (ref 3.81–5.12)
SODIUM SERPL-SCNC: 141 MMOL/L (ref 136–145)
WBC # BLD AUTO: 5.02 THOUSAND/UL (ref 4.31–10.16)

## 2019-01-29 PROCEDURE — 36415 COLL VENOUS BLD VENIPUNCTURE: CPT

## 2019-01-29 PROCEDURE — 80048 BASIC METABOLIC PNL TOTAL CA: CPT

## 2019-01-29 PROCEDURE — 85025 COMPLETE CBC W/AUTO DIFF WBC: CPT

## 2019-02-08 NOTE — PROGRESS NOTES
2/12/2019    Rossi Moore  1960  [id number]    Discussion and Plan    Dietary, hydration, lifestyle recommendations provided help reduce symptoms  She will continue L neuron and uro Bell as needed  Urine culture will be sent again today  Return in 1 year for routine evaluation  Assessment      Patient Active Problem List   Diagnosis    Chronic interstitial cystitis    Difficult or painful urination    Urinary urgency    Dense breasts    Irritable bowel syndrome    Nephrolithiasis    Nontoxic single thyroid nodule    Osteopenia    Thrombocytopenia (HCC)    Colitis    De Quervain's tenosynovitis, right       History of Present Illness    Flory Hernandes is a 62 y o  female seen today in regards to a history of interstitial cystitis and urethral stricture was recently evaluated for persistent dysuria and pain  She continues to take Elmiron and Uribel daily  She continues to follow with Dr Leonardo Lacey for severe vaginal atrophy  She continues to perform self dilation 1 times a week  Her urine testing was negative for bacterial infection  She does feel that her risk of dilation is a trigger for her cystitis flare ups  Her renal ultrasound was reviewed and there were no abnormal findings  PVR was 80 mL  We also discussed the need for vaginal dilation  She wishes to continue with performing this 2 times a week and has reached a stable baseline  She will continue to take Elmiron and Uribel  Overtly had significant stressors over the past few months which exacerbated her urinary symptoms    Urinary cultures thus far have been negative        Urinary Symptom Assessment        Past Medical History  Past Medical History:   Diagnosis Date    Atopic dermatitis     last assessed 12/2/13    Atrophic vaginitis     last assessed 9/2/15    [facility] [address]     Dermatitis     Dry eye     Frequency of urination     Fungal infection     last assessed 8/14/13    Herpes     last assessed 6/27/14    Hyperlipidemia  Interstitial cystitis     Osteoporosis     Periodontal abscess     last assessed 13    [facility]ative [facility] [address]     Ulcerative colitis [facility] [address]     Urinary frequency     resolved 17    UTI (urinary tract infection)     Vaginal atrophy     Vitamin D deficiency     last assessed 16    Voiding dysfunction     last assessed 10/7/15       Past Social History  Past Surgical History:   Procedure Laterality Date     SECTION      last assessed sep 17 2014,low transverse    CHOLECYSTECTOMY      CYSTOSCOPY      diagnostic resolved 05    KIDNEY STONE SURGERY      ORIF PROXIMAL ULNA FRACTURE      metal plates and screws removed    OTHER SURGICAL HISTORY      appendiceal procedure assment fecolith, last assessed 13    CA COLONOSCOPY FLX DX W/COLLJ SPEC WHEN PFRMD N/A 2017    Procedure: COLONOSCOPY;  Surgeon: Ray Cook MD;  [facility];   Service: Colorectal       Past Family History  Family History   Problem Relation Age of Onset    Gallbladder disease Mother     Thyroid disease Mother     Kidney disease Mother     Diabetes Father     Hypertension Father     Nephrolithiasis Father     Lymphoma Maternal Grandfather     Diabetes Paternal Grandmother     Breast cancer Paternal Aunt        Past Social history  Social History     Socioeconomic History    Marital status: /Civil Union     Spouse name: Not on file    Number of children: Not on file    Years of education: Not on file    Highest education level: Not on file   Occupational History    Not on file   Social Needs    Financial resource strain: Not on file    Food insecurity:     Worry: Not on file     Inability: Not on file    Transportation needs:     Medical: Not on file     Non-medical: Not on file   Tobacco Use    Smoking status: Never Smoker    Smokeless tobacco: Never Used    Tobacco comment: HISTORY OF SMOKER CURRENT STATUS UNKNOWN PER ALLSCRIPTS   Substance and Sexual Activity    Alcohol use: Yes     Comment: socially - 1-3 drinks per month    Drug use: No    Sexual activity: Yes   Lifestyle    Physical activity:     Days per week: Not on file     Minutes per session: Not on file    Stress: Not on file   Relationships    Social connections:     Talks on phone: Not on file     Gets together: Not on file     Attends Spiritism service: Not on file     Active member of club or organization: Not on file     Attends meetings of clubs or organizations: Not on file     Relationship status: Not on file    Intimate partner violence:     Fear of current or ex partner: Not on file     Emotionally abused: Not on file     Physically abused: Not on file     Forced sexual activity: Not on file   Other Topics Concern    Not on file   Social History Narrative    Currently        Current Medications  Current Outpatient Medications   Medication Sig Dispense Refill    Cholecalciferol (VITAMIN D PO) Take 3,000 Units by mouth daily      Cranberry (ELLURA PO) Take 1 capsule by mouth daily      cycloSPORINE (RESTASIS) 0 05 % ophthalmic emulsion Administer 1 drop to both eyes 2 (two) times a day      dicyclomine (BENTYL) 10 mg capsule Take 1 capsule (10 mg total) by mouth 4 (four) times a day (before meals and at bedtime) 270 capsule 3    ELMIRON 100 MG capsule TAKE 1 CAPSULE 3 TIMES DAILY   90 capsule 0    ergocalciferol (VITAMIN D2) 50,000 units Take 1 capsule (50,000 Units total) by mouth once a week 8 capsule 0    estradiol (ESTRACE VAGINAL) 0 1 mg/g vaginal cream One applicator into the vagina and nightly 42 5 g 10    fluticasone (FLONASE) 50 mcg/act nasal spray 2 sprays into each nostril daily (Patient taking differently: 2 sprays into each nostril as needed  ) 3 Bottle 3    levothyroxine 50 mcg tablet Take 1 tablet (50 mcg total) by mouth daily 30 tablet 5    mesalamine (ASACOL) 800 MG EC tablet TAKE ONE TABLET BY MOUTH 3 TIMES A  tablet 0    Meth-Hyo-M Bl-Na Phos-Ph Sal (URO-MP PO) Take 1 capsule by mouth 4 (four) times a day      Meth-Hyo-M Bl-Na Phos-Ph Sal (VILAMIT MB) 118 MG CAPS       olopatadine HCl (PATADAY) 0 2 % opth drops Administer 1 drop to both eyes daily      OSPHENA 60 MG TABS       rosuvastatin (CRESTOR) 5 mg tablet Take 5 mg by mouth every other day       No current facility-administered medications for this visit  Allergies  Allergies   Allergen Reactions    Iodine Anaphylaxis     Allergy to Iodinated and non iodinated dye    Other Anaphylaxis     APPLES    Seasonal Ic  [Cholestatin]        Past Medical History, Social History, Family History, medications and allergies were reviewed  Review of Systems  Review of Systems   Constitutional: Negative  HENT: Negative  Eyes: Negative  Respiratory: Negative  Cardiovascular: Negative  Gastrointestinal: Negative  Endocrine: Negative  Genitourinary: Positive for frequency, urgency and vaginal pain  Negative for decreased urine volume, difficulty urinating and hematuria  Musculoskeletal: Negative  Skin: Negative  Allergic/Immunologic: Negative  Neurological: Negative  Hematological: Negative  Psychiatric/Behavioral: Negative  Vitals  Vitals:    02/12/19 1002   BP: 120/78   BP Location: Left arm   Patient Position: Sitting   Cuff Size: Adult   Pulse: 92   Weight: 54 4 kg (120 lb)   Height: 5' 7" (1 702 m)         Physical Exam    Physical Exam   Constitutional: She is oriented to person, place, and time  She appears well-developed and well-nourished  HENT:   Head: Normocephalic and atraumatic  Eyes: Pupils are equal, round, and reactive to light  Neck: Normal range of motion  Cardiovascular: Normal rate, regular rhythm and normal heart sounds  Pulmonary/Chest: Effort normal and breath sounds normal    Abdominal: Soft  Bowel sounds are normal  She exhibits no distension  There is no tenderness  There is no rebound and no guarding     Musculoskeletal: Normal range of motion  Neurological: She is alert and oriented to person, place, and time  Skin: Skin is warm, dry and intact  Psychiatric: She has a normal mood and affect  Nursing note and vitals reviewed        Results    Below listed labs, pathology results, and radiology images were personally reviewed:    No results found for: PSA  Lab Results   Component Value Date    GLUCOSE 89 08/07/2015    CALCIUM 9 4 01/29/2019     08/07/2015    K 3 7 01/29/2019    CO2 29 01/29/2019     01/29/2019    BUN 18 01/29/2019    CREATININE 0 85 01/29/2019     Lab Results   Component Value Date    WBC 5 02 01/29/2019    HGB 12 8 01/29/2019    HCT 40 1 01/29/2019    MCV 90 01/29/2019     (L) 01/29/2019       No results found for this or any previous visit (from the past 1 hour(s)) ]

## 2019-02-12 ENCOUNTER — OFFICE VISIT (OUTPATIENT)
Dept: UROLOGY | Facility: CLINIC | Age: 59
End: 2019-02-12
Payer: COMMERCIAL

## 2019-02-12 VITALS
HEIGHT: 67 IN | HEART RATE: 92 BPM | WEIGHT: 120 LBS | SYSTOLIC BLOOD PRESSURE: 120 MMHG | BODY MASS INDEX: 18.83 KG/M2 | DIASTOLIC BLOOD PRESSURE: 78 MMHG

## 2019-02-12 DIAGNOSIS — R30.0 DIFFICULT OR PAINFUL URINATION: ICD-10-CM

## 2019-02-12 DIAGNOSIS — R39.15 URINARY URGENCY: Primary | ICD-10-CM

## 2019-02-12 PROCEDURE — 87086 URINE CULTURE/COLONY COUNT: CPT | Performed by: UROLOGY

## 2019-02-12 PROCEDURE — 99214 OFFICE O/P EST MOD 30 MIN: CPT | Performed by: UROLOGY

## 2019-02-13 LAB — BACTERIA UR CULT: NORMAL

## 2019-02-13 NOTE — PRE-PROCEDURE INSTRUCTIONS
Pre-Surgery Instructions:   Medication Instructions    Cholecalciferol (VITAMIN D PO) Instructed patient per Anesthesia Guidelines   Cranberry (ELLURA PO) Patient was instructed by Physician and understands   cycloSPORINE (RESTASIS) 0 05 % ophthalmic emulsion Instructed patient per Anesthesia Guidelines   dicyclomine (BENTYL) 10 mg capsule Instructed patient per Anesthesia Guidelines   ELMIRON 100 MG capsule Patient was instructed by Physician and understands   ergocalciferol (VITAMIN D2) 50,000 units Instructed patient per Anesthesia Guidelines   estradiol (ESTRACE VAGINAL) 0 1 mg/g vaginal cream Instructed patient per Anesthesia Guidelines   levothyroxine 50 mcg tablet Instructed patient per Anesthesia Guidelines   mesalamine (ASACOL) 800 MG EC tablet Patient was instructed by Physician and understands   Meth-Hyo-M Bl-Na Phos-Ph Sal (VILAMIT MB) 118 MG CAPS Patient was instructed by Physician and understands   olopatadine HCl (PATADAY) 0 2 % opth drops Patient was instructed by Physician and understands   OSPHENA 60 MG TABS Patient was instructed by Physician and understands   rosuvastatin (CRESTOR) 5 mg tablet Instructed patient per Anesthesia Guidelines  Herbal Med Class     Stop taking this herbal medications at least one week prior to surgery/procedure  HRT Med Class     Continue to take this medication on your normal schedule  If this is an oral medication and you take it in the morning, then you may take this medicine with a sip of water  This medication may need to be discontinued for a least 4 weeks prior to your surgery if the surgical procedure is associated with a high risk of blod clots as in hip or knee replacement for example  Please consult with your Surgeon to discuss discontinuing this medication before your surgery  Selective Estrogen Mod Med Class     Continue to take this medication on your normal schedule    If this is an oral medication and you take it in the morning, then you may take this medicine with a sip of water  This medication may need to be discontinued for a least 4 weeks prior to your surgery if the surgical procedure is associated with a high risk of blod clots as in hip or knee replacement for example  Please consult with your Surgeon to discuss discontinuing this medication before your surgery  Statin Med Class     Continue to take this medication on your normal schedule  If this is an oral medication and you take it in the morning, then you may take this medicine with a sip of water  Sulfasalazine/Azathioprine Med Class     Stop taking this medication 7 days prior to surgery/procedure     Thyroxine Med Class     Continue to take this medication on your normal schedule  If this is an oral medication and you take it in the morning, then you may take this medicine with a sip   Pre op instructions reviewed with patient on 2/13  Meds,bathing and hospital instructions reviewed at this time with understanding

## 2019-02-18 ENCOUNTER — ANESTHESIA EVENT (OUTPATIENT)
Dept: PERIOP | Facility: HOSPITAL | Age: 59
End: 2019-02-18
Payer: COMMERCIAL

## 2019-02-19 ENCOUNTER — HOSPITAL ENCOUNTER (OUTPATIENT)
Facility: HOSPITAL | Age: 59
Setting detail: OUTPATIENT SURGERY
Discharge: HOME | End: 2019-02-19
Attending: ORTHOPAEDIC SURGERY | Admitting: ORTHOPAEDIC SURGERY
Payer: COMMERCIAL

## 2019-02-19 ENCOUNTER — ANESTHESIA (OUTPATIENT)
Dept: PERIOP | Facility: HOSPITAL | Age: 59
End: 2019-02-19
Payer: COMMERCIAL

## 2019-02-19 VITALS
HEART RATE: 69 BPM | BODY MASS INDEX: 18.83 KG/M2 | DIASTOLIC BLOOD PRESSURE: 65 MMHG | SYSTOLIC BLOOD PRESSURE: 116 MMHG | HEIGHT: 67 IN | TEMPERATURE: 99.7 F | WEIGHT: 120 LBS | RESPIRATION RATE: 20 BRPM | OXYGEN SATURATION: 100 %

## 2019-02-19 DIAGNOSIS — M65.4 DE QUERVAIN'S TENOSYNOVITIS, RIGHT: Primary | ICD-10-CM

## 2019-02-19 PROCEDURE — 25111 REMOVE WRIST TENDON LESION: CPT | Performed by: ORTHOPAEDIC SURGERY

## 2019-02-19 PROCEDURE — 25000 INCISION OF TENDON SHEATH: CPT | Performed by: ORTHOPAEDIC SURGERY

## 2019-02-19 RX ORDER — HYDROCODONE BITARTRATE AND ACETAMINOPHEN 5; 325 MG/1; MG/1
1 TABLET ORAL EVERY 6 HOURS PRN
Qty: 5 TABLET | Refills: 0 | Status: SHIPPED | OUTPATIENT
Start: 2019-02-19 | End: 2019-02-21 | Stop reason: SDUPTHER

## 2019-02-19 RX ORDER — ACETAMINOPHEN 325 MG/1
650 TABLET ORAL ONCE
Status: DISCONTINUED | OUTPATIENT
Start: 2019-02-19 | End: 2019-02-19 | Stop reason: HOSPADM

## 2019-02-19 RX ORDER — MAGNESIUM HYDROXIDE 1200 MG/15ML
LIQUID ORAL AS NEEDED
Status: DISCONTINUED | OUTPATIENT
Start: 2019-02-19 | End: 2019-02-19 | Stop reason: HOSPADM

## 2019-02-19 RX ORDER — LIDOCAINE HYDROCHLORIDE AND EPINEPHRINE 10; 10 MG/ML; UG/ML
INJECTION, SOLUTION INFILTRATION; PERINEURAL AS NEEDED
Status: DISCONTINUED | OUTPATIENT
Start: 2019-02-19 | End: 2019-02-19 | Stop reason: HOSPADM

## 2019-02-19 RX ORDER — ONDANSETRON 2 MG/ML
4 INJECTION INTRAMUSCULAR; INTRAVENOUS EVERY 4 HOURS PRN
Status: DISCONTINUED | OUTPATIENT
Start: 2019-02-19 | End: 2019-02-19 | Stop reason: HOSPADM

## 2019-02-19 RX ORDER — MIDAZOLAM HYDROCHLORIDE 1 MG/ML
INJECTION INTRAMUSCULAR; INTRAVENOUS AS NEEDED
Status: DISCONTINUED | OUTPATIENT
Start: 2019-02-19 | End: 2019-02-19 | Stop reason: SURG

## 2019-02-19 RX ORDER — SODIUM CHLORIDE, SODIUM LACTATE, POTASSIUM CHLORIDE, CALCIUM CHLORIDE 600; 310; 30; 20 MG/100ML; MG/100ML; MG/100ML; MG/100ML
20 INJECTION, SOLUTION INTRAVENOUS CONTINUOUS
Status: DISCONTINUED | OUTPATIENT
Start: 2019-02-19 | End: 2019-02-19 | Stop reason: HOSPADM

## 2019-02-19 RX ORDER — ONDANSETRON 2 MG/ML
4 INJECTION INTRAMUSCULAR; INTRAVENOUS ONCE AS NEEDED
Status: DISCONTINUED | OUTPATIENT
Start: 2019-02-19 | End: 2019-02-19 | Stop reason: HOSPADM

## 2019-02-19 RX ORDER — SODIUM CHLORIDE, SODIUM LACTATE, POTASSIUM CHLORIDE, CALCIUM CHLORIDE 600; 310; 30; 20 MG/100ML; MG/100ML; MG/100ML; MG/100ML
125 INJECTION, SOLUTION INTRAVENOUS CONTINUOUS
Status: DISCONTINUED | OUTPATIENT
Start: 2019-02-19 | End: 2019-02-19 | Stop reason: HOSPADM

## 2019-02-19 RX ORDER — FENTANYL CITRATE/PF 50 MCG/ML
25 SYRINGE (ML) INJECTION
Status: DISCONTINUED | OUTPATIENT
Start: 2019-02-19 | End: 2019-02-19 | Stop reason: HOSPADM

## 2019-02-19 RX ORDER — SENNOSIDES 8.6 MG
650 CAPSULE ORAL EVERY 8 HOURS
Qty: 15 TABLET | Refills: 0 | Status: SHIPPED | OUTPATIENT
Start: 2019-02-19 | End: 2019-03-05 | Stop reason: ALTCHOICE

## 2019-02-19 RX ORDER — NAPROXEN SODIUM 220 MG
220 TABLET ORAL 2 TIMES DAILY WITH MEALS
Qty: 10 TABLET | Refills: 0 | Status: SHIPPED | OUTPATIENT
Start: 2019-02-19 | End: 2019-03-05 | Stop reason: ALTCHOICE

## 2019-02-19 RX ORDER — PROPOFOL 10 MG/ML
INJECTION, EMULSION INTRAVENOUS AS NEEDED
Status: DISCONTINUED | OUTPATIENT
Start: 2019-02-19 | End: 2019-02-19 | Stop reason: SURG

## 2019-02-19 RX ORDER — CEFAZOLIN SODIUM 1 G/50ML
SOLUTION INTRAVENOUS AS NEEDED
Status: DISCONTINUED | OUTPATIENT
Start: 2019-02-19 | End: 2019-02-19 | Stop reason: SURG

## 2019-02-19 RX ORDER — PROPOFOL 10 MG/ML
INJECTION, EMULSION INTRAVENOUS CONTINUOUS PRN
Status: DISCONTINUED | OUTPATIENT
Start: 2019-02-19 | End: 2019-02-19 | Stop reason: SURG

## 2019-02-19 RX ORDER — HYDROCODONE BITARTRATE AND ACETAMINOPHEN 5; 325 MG/1; MG/1
1 TABLET ORAL EVERY 6 HOURS PRN
Status: DISCONTINUED | OUTPATIENT
Start: 2019-02-19 | End: 2019-02-19 | Stop reason: HOSPADM

## 2019-02-19 RX ADMIN — CEFAZOLIN SODIUM 1000 MG: 1 SOLUTION INTRAVENOUS at 09:31

## 2019-02-19 RX ADMIN — MIDAZOLAM 2 MG: 1 INJECTION INTRAMUSCULAR; INTRAVENOUS at 09:26

## 2019-02-19 RX ADMIN — SODIUM CHLORIDE, SODIUM LACTATE, POTASSIUM CHLORIDE, AND CALCIUM CHLORIDE 125 ML/HR: .6; .31; .03; .02 INJECTION, SOLUTION INTRAVENOUS at 08:23

## 2019-02-19 RX ADMIN — PROPOFOL 80 MCG/KG/MIN: 10 INJECTION, EMULSION INTRAVENOUS at 09:34

## 2019-02-19 RX ADMIN — PROPOFOL 50 MG: 10 INJECTION, EMULSION INTRAVENOUS at 09:32

## 2019-02-19 NOTE — ANESTHESIA POSTPROCEDURE EVALUATION
Post-Op Assessment Note    CV Status:  Stable    Pain management: adequate     Mental Status:  Alert and awake   Hydration Status:  Euvolemic   PONV Controlled:  Controlled   Airway Patency:  Patent   Post Op Vitals Reviewed: Yes      Staff: CRNA, Anesthesiologist           BP      Temp      Pulse     Resp      SpO2

## 2019-02-19 NOTE — H&P
H&P Exam - Orthopedics   Charity Greenberg 62 y o  female MRN: [id number]  Unit/Bed#: [facility] 01    Assessment/Plan   Assessment:  Right de Quervain with ganglion cyst  Plan:  Right de Quervain release    History of Present Illness   HPI:  Charity Greenberg is a 62 y o  y o  female who presents with right de Quervain with right wrist pain over the 1st dorsal extensor compartment that failed conservative management with discomfort upon wrist and thumb range of motion  Historical Information  Review Of Systems:   · Skin: Normal  · Neuro: See HPI  · Musculoskeletal: See HPI  · 14 point review of systems negative except as stated above     Past Medical History:   Past Medical History:   Diagnosis Date    Atopic dermatitis     last assessed 13    Atrophic vaginitis     last assessed 9/2/15    Colon cancer ([facility] [address]     Dermatitis     Dry eye     Frequency of urination     Fungal infection     last assessed 13    Herpes     last assessed 14    Hyperlipidemia     Interstitial cystitis     Osteoporosis     Periodontal abscess     last assessed 13    PONV (postoperative nausea and vomiting)     Ulcerative colitis ([address]     Ulcerative colitis ([address]     Urinary frequency     resolved 17    UTI (urinary tract infection)     Vaginal atrophy     Vitamin D deficiency     last assessed 16    Voiding dysfunction     last assessed 10/7/15       Past Surgical History:   Past Surgical History:   Procedure Laterality Date     SECTION      last assessed sep 17 2014,low transverse    CHOLECYSTECTOMY      CYSTOSCOPY      diagnostic resolved 05    KIDNEY STONE SURGERY      ORIF PROXIMAL ULNA FRACTURE      metal plates and screws removed    OTHER SURGICAL HISTORY      appendiceal procedure assment fecolith, last assessed 13    TX COLONOSCOPY FLX DX W/COLLJ SPEC WHEN PFRMD N/A 2017    Procedure: COLONOSCOPY;  Surgeon: Lit Perez MD;  [facility];   Service: Colorectal       Family History:  Family history reviewed and non-contributory  Family History   Problem Relation Age of Onset    Gallbladder disease Mother     Thyroid disease Mother     Kidney disease Mother     Diabetes Father     Hypertension Father     Nephrolithiasis Father     Lymphoma Maternal Grandfather     Diabetes Paternal Grandmother     Breast cancer Paternal Aunt        Social History:  Social History     Socioeconomic History    Marital status: /Civil Union     Spouse name: None    Number of children: None    Years of education: None    Highest education level: None   Occupational History    None   Social Needs    Financial resource strain: None    Food insecurity:     Worry: None     Inability: None    Transportation needs:     Medical: None     Non-medical: None   Tobacco Use    Smoking status: Never Smoker    Smokeless tobacco: Never Used    Tobacco comment: HISTORY OF SMOKER CURRENT STATUS UNKNOWN PER ALLSCRIPTS   Substance and Sexual Activity    Alcohol use: Yes     Comment: socially - 1-3 drinks per month    Drug use: No    Sexual activity: Yes   Lifestyle    Physical activity:     Days per week: None     Minutes per session: None    Stress: None   Relationships    Social connections:     Talks on phone: None     Gets together: None     Attends Mandaeism service: None     Active member of club or organization: None     Attends meetings of clubs or organizations: None     Relationship status: None    Intimate partner violence:     Fear of current or ex partner: None     Emotionally abused: None     Physically abused: None     Forced sexual activity: None   Other Topics Concern    None   Social History Narrative    Currently        Allergies:    Allergies   Allergen Reactions    Iodine Anaphylaxis     Allergy to Iodinated and non iodinated dye    Other Anaphylaxis     APPLES    TAPE  Also rash at times    Seasonal Ic [Cholestatin] Allergic Rhinitis Labs:  0   Lab Value Date/Time    HCT 40 1 01/29/2019 1040    HCT 42 0 07/13/2018 0812    HCT 41 6 03/30/2018 0726    HCT 40 8 08/07/2015 0740    HCT 42 3 07/10/2015 0726    HCT 41 4 01/12/2015 0905    HGB 12 8 01/29/2019 1040    HGB 12 9 07/13/2018 0812    HGB 12 9 03/30/2018 0726    HGB 12 8 08/07/2015 0740    HGB 13 6 07/10/2015 0726    HGB 13 0 01/12/2015 0905    WBC 5 02 01/29/2019 1040    WBC 5 61 07/13/2018 0812    WBC 4 23 (L) 03/30/2018 0726    WBC 3 04 (L) 08/07/2015 0740    WBC 3 24 (L) 07/10/2015 0726    WBC 3 56 (L) 01/12/2015 0905    ESR 4 06/07/2016 0945    ESR 6 02/16/2015 1223    CRP <3 0 06/07/2016 0945       Meds:    Current Facility-Administered Medications:     ceFAZolin (ANCEF) 2 G in sodium chloride 0 9% 50 ml IVPB (CMPD ORDER), 2,000 mg, Intravenous, Once, MILKA Kamara    lactated ringers infusion, 125 mL/hr, Intravenous, Continuous, Emily Cerna MD, Last Rate: 125 mL/hr at 02/19/19 0823, 125 mL/hr at 02/19/19 6741    lactated ringers infusion, 125 mL/hr, Intravenous, Continuous, Katey Ontiveros MD    lidocaine-epinephrine (XYLOCAINE/EPINEPHRINE) 1 %-1:100,000 20 mL, sodium bicarbonate 2 mEq infiltration, , Infiltration, Once, Tobi Quintero MD    Blood Culture:   No results found for: BLOODCX    Wound Culture:   No results found for: WOUNDCULT    Ins and Outs:  No intake/output data recorded  Physical Exam  /76   Pulse 74   Temp 97 9 °F (36 6 °C) (Tympanic Core)   Resp 16   Ht 5' 7" (1 702 m)   Wt 54 4 kg (120 lb)   LMP  (LMP Unknown)   SpO2 100%   BMI 18 79 kg/m²   Gen: Alert and oriented to person, place, time  HEENT: EOMI, eyes clear, moist mucus membranes, hearing intact  Respiratory: Bilateral chest rise   No audible wheezing found  Cardiovascular: Regular Rate and Rhythm  Abdomen: soft nontender/nondistended  Ortho Exam:  Positive Finkelstein test right wrist with a small 1 x 1 small mm ganglion cyst   Patient with positive Lela Pearl  Patient with tenderness 1st dorsal extensor compartment  Neuro Exam:   The patient is neurovascularly intact in the median, ulnar, and radial nerve distribution  There is normal sensation and good capillary refill within the digits  2+ pulses              Lab Results: Reviewed  Imaging: Reviewed

## 2019-02-19 NOTE — ANESTHESIA PREPROCEDURE EVALUATION
Review of Systems/Medical History  Patient summary reviewed    History of anesthetic complications PONV    Cardiovascular  Hyperlipidemia,    Pulmonary  Negative pulmonary ROS        GI/Hepatic  Negative GI/hepatic ROS     Comment: Ulcerative colitis     Kidney stones,        Endo/Other  History of thyroid disease (thyroid nodule) ,      GYN  Negative gynecology ROS          Hematology  Negative hematology ROS      Musculoskeletal  Negative musculoskeletal ROS   Comment: dequervain's synovitis        Neurology  Negative neurology ROS      Psychology   Negative psychology ROS              Physical Exam    Airway    Mallampati score: III  TM Distance: >3 FB  Neck ROM: full     Dental   No notable dental hx     Cardiovascular      Pulmonary      Other Findings        Anesthesia Plan  ASA Score- 2     Anesthesia Type- IV sedation with anesthesia with ASA Monitors  Additional Monitors:   Airway Plan:         Plan Factors-    Induction- intravenous  Postoperative Plan-     Informed Consent- Anesthetic plan and risks discussed with patient  I personally reviewed this patient with the CRNA  Discussed and agreed on the Anesthesia Plan with the CRNA  Gilmar Cadet

## 2019-02-21 DIAGNOSIS — M65.4 DE QUERVAIN'S TENOSYNOVITIS, RIGHT: ICD-10-CM

## 2019-02-21 RX ORDER — HYDROCODONE BITARTRATE AND ACETAMINOPHEN 5; 325 MG/1; MG/1
1 TABLET ORAL EVERY 6 HOURS PRN
Qty: 15 TABLET | Refills: 0 | Status: SHIPPED | OUTPATIENT
Start: 2019-02-21 | End: 2019-02-26

## 2019-02-28 ENCOUNTER — APPOINTMENT (OUTPATIENT)
Dept: LAB | Facility: CLINIC | Age: 59
End: 2019-02-28
Payer: COMMERCIAL

## 2019-02-28 DIAGNOSIS — E03.9 HYPOTHYROIDISM, UNSPECIFIED TYPE: ICD-10-CM

## 2019-02-28 DIAGNOSIS — Z00.00 ROUTINE GENERAL MEDICAL EXAMINATION AT A HEALTH CARE FACILITY: ICD-10-CM

## 2019-02-28 LAB
25(OH)D3 SERPL-MCNC: 52 NG/ML (ref 30–100)
BACTERIA UR QL AUTO: ABNORMAL /HPF
BASOPHILS # BLD AUTO: 0.03 THOUSANDS/ΜL (ref 0–0.1)
BASOPHILS NFR BLD AUTO: 1 % (ref 0–1)
BILIRUB UR QL STRIP: ABNORMAL
CHOLEST SERPL-MCNC: 187 MG/DL (ref 50–200)
CLARITY UR: CLEAR
COLOR UR: ABNORMAL
EOSINOPHIL # BLD AUTO: 0.04 THOUSAND/ΜL (ref 0–0.61)
EOSINOPHIL NFR BLD AUTO: 1 % (ref 0–6)
ERYTHROCYTE [DISTWIDTH] IN BLOOD BY AUTOMATED COUNT: 12.8 % (ref 11.6–15.1)
GLUCOSE UR STRIP-MCNC: ABNORMAL MG/DL
HCT VFR BLD AUTO: 42.3 % (ref 34.8–46.1)
HDLC SERPL-MCNC: 80 MG/DL (ref 40–60)
HGB BLD-MCNC: 13.2 G/DL (ref 11.5–15.4)
HGB UR QL STRIP.AUTO: ABNORMAL
IMM GRANULOCYTES # BLD AUTO: 0.01 THOUSAND/UL (ref 0–0.2)
IMM GRANULOCYTES NFR BLD AUTO: 0 % (ref 0–2)
KETONES UR STRIP-MCNC: ABNORMAL MG/DL
LDLC SERPL CALC-MCNC: 90 MG/DL (ref 0–100)
LEUKOCYTE ESTERASE UR QL STRIP: ABNORMAL
LYMPHOCYTES # BLD AUTO: 0.97 THOUSANDS/ΜL (ref 0.6–4.47)
LYMPHOCYTES NFR BLD AUTO: 23 % (ref 14–44)
MCH RBC QN AUTO: 28.5 PG (ref 26.8–34.3)
MCHC RBC AUTO-ENTMCNC: 31.2 G/DL (ref 31.4–37.4)
MCV RBC AUTO: 91 FL (ref 82–98)
MONOCYTES # BLD AUTO: 0.37 THOUSAND/ΜL (ref 0.17–1.22)
MONOCYTES NFR BLD AUTO: 9 % (ref 4–12)
NEUTROPHILS # BLD AUTO: 2.84 THOUSANDS/ΜL (ref 1.85–7.62)
NEUTS SEG NFR BLD AUTO: 66 % (ref 43–75)
NITRITE UR QL STRIP: ABNORMAL
NON-SQ EPI CELLS URNS QL MICRO: ABNORMAL /HPF
NONHDLC SERPL-MCNC: 107 MG/DL
NRBC BLD AUTO-RTO: 0 /100 WBCS
PLATELET # BLD AUTO: 123 THOUSANDS/UL (ref 149–390)
PMV BLD AUTO: 12.5 FL (ref 8.9–12.7)
PROT UR STRIP-MCNC: ABNORMAL MG/DL
RBC # BLD AUTO: 4.63 MILLION/UL (ref 3.81–5.12)
RBC #/AREA URNS AUTO: ABNORMAL /HPF
SP GR UR STRIP.AUTO: 1.02 (ref 1–1.03)
T4 FREE SERPL-MCNC: 1 NG/DL (ref 0.76–1.46)
TRIGL SERPL-MCNC: 85 MG/DL
TSH SERPL DL<=0.05 MIU/L-ACNC: 1.93 UIU/ML (ref 0.36–3.74)
UROBILINOGEN UR QL STRIP.AUTO: ABNORMAL E.U./DL
WBC # BLD AUTO: 4.26 THOUSAND/UL (ref 4.31–10.16)
WBC #/AREA URNS AUTO: ABNORMAL /HPF

## 2019-02-28 PROCEDURE — 84439 ASSAY OF FREE THYROXINE: CPT

## 2019-02-28 PROCEDURE — 81001 URINALYSIS AUTO W/SCOPE: CPT | Performed by: INTERNAL MEDICINE

## 2019-02-28 PROCEDURE — 36415 COLL VENOUS BLD VENIPUNCTURE: CPT

## 2019-02-28 PROCEDURE — 82306 VITAMIN D 25 HYDROXY: CPT

## 2019-02-28 PROCEDURE — 84443 ASSAY THYROID STIM HORMONE: CPT

## 2019-02-28 PROCEDURE — 80061 LIPID PANEL: CPT

## 2019-02-28 PROCEDURE — 85025 COMPLETE CBC W/AUTO DIFF WBC: CPT

## 2019-03-01 ENCOUNTER — OFFICE VISIT (OUTPATIENT)
Dept: OBGYN CLINIC | Facility: HOSPITAL | Age: 59
End: 2019-03-01

## 2019-03-01 VITALS
SYSTOLIC BLOOD PRESSURE: 121 MMHG | BODY MASS INDEX: 19.4 KG/M2 | HEART RATE: 97 BPM | DIASTOLIC BLOOD PRESSURE: 74 MMHG | WEIGHT: 123.6 LBS | HEIGHT: 67 IN

## 2019-03-01 DIAGNOSIS — Z47.89 AFTERCARE FOLLOWING SURGERY OF THE MUSCULOSKELETAL SYSTEM: Primary | ICD-10-CM

## 2019-03-01 PROCEDURE — 99024 POSTOP FOLLOW-UP VISIT: CPT | Performed by: ORTHOPAEDIC SURGERY

## 2019-03-01 NOTE — PROGRESS NOTES
Assessment:   Right de Quervain's release with exc ganglion cyst 2/19/19    Plan:   Start hand therapy  Scar massage once steri strips fall off  Activities as tolerated    Follow Up:  6  week(s)    To Do Next Visit:  recheck      CHIEF COMPLAINT:  Chief Complaint   Patient presents with    Right Wrist - Post-op         SUBJECTIVE:  Elvin Pati is a 62y o  year old female who presents for follow up after Right de Quervain's release with excision of ganglion cyst first dorsal compartment 2/19/19  Today patient states overall she is doing well with minimal pain         PHYSICAL EXAMINATION:  General: well developed and well nourished, alert, oriented times 3 and appears comfortable  Psychiatric: Normal    MUSCULOSKELETAL EXAMINATION:  Incision: Clean, dry, intact  Range of Motion: opposition intact and full composite fist possible  Neurovascular status: Neuro intact, good cap refill  Activity Restrictions: No restrictions         STUDIES REVIEWED:  No Studies to review      PROCEDURES PERFORMED:  Procedures  No Procedures performed today       Scribe Attestation    I,:   Christa Julien am acting as a scribe while in the presence of the attending physician :        I,:   Jearldine Goodell, MD personally performed the services described in this documentation    as scribed in my presence :

## 2019-03-05 ENCOUNTER — OFFICE VISIT (OUTPATIENT)
Dept: INTERNAL MEDICINE CLINIC | Facility: CLINIC | Age: 59
End: 2019-03-05
Payer: COMMERCIAL

## 2019-03-05 VITALS
TEMPERATURE: 97.8 F | DIASTOLIC BLOOD PRESSURE: 60 MMHG | SYSTOLIC BLOOD PRESSURE: 108 MMHG | BODY MASS INDEX: 19.62 KG/M2 | OXYGEN SATURATION: 98 % | WEIGHT: 125 LBS | HEIGHT: 67 IN | HEART RATE: 81 BPM

## 2019-03-05 DIAGNOSIS — N20.0 NEPHROLITHIASIS: ICD-10-CM

## 2019-03-05 DIAGNOSIS — D70.9 GRANULOCYTOPENIA (HCC): ICD-10-CM

## 2019-03-05 DIAGNOSIS — E04.1 NONTOXIC SINGLE THYROID NODULE: ICD-10-CM

## 2019-03-05 DIAGNOSIS — Z79.899 LONG TERM USE OF DRUG: ICD-10-CM

## 2019-03-05 DIAGNOSIS — N30.10 CHRONIC INTERSTITIAL CYSTITIS: ICD-10-CM

## 2019-03-05 DIAGNOSIS — K52.9 COLITIS: ICD-10-CM

## 2019-03-05 DIAGNOSIS — D69.6 THROMBOCYTOPENIA (HCC): Primary | ICD-10-CM

## 2019-03-05 PROBLEM — E03.8 OTHER SPECIFIED HYPOTHYROIDISM: Status: ACTIVE | Noted: 2019-03-05

## 2019-03-05 PROCEDURE — 99396 PREV VISIT EST AGE 40-64: CPT | Performed by: INTERNAL MEDICINE

## 2019-03-05 NOTE — ASSESSMENT & PLAN NOTE
Chronic interstitial cystitis is stable at the present time  No evidence of infection on recent urinalysis recommend continue present Wandy on 100 mg 3 times a day regular follow-up with Urology is recommended

## 2019-03-05 NOTE — ASSESSMENT & PLAN NOTE
Mild granulocytopenia reviewed with the patient no indication of increased frequency of infection  Will continue to monitor

## 2019-03-05 NOTE — ASSESSMENT & PLAN NOTE
History of thyroid nodule she is followed by Endocrinology Services at [facility]  Her last ultrasound was November of 2018 recommend follow-up scan ultrasound of the thyroid gland in November of 2019

## 2019-03-05 NOTE — ASSESSMENT & PLAN NOTE
History of nephrolithiasis there is no evidence of any crystals on the patient's most recent urinalysis recommend continued aggressive hydration

## 2019-03-05 NOTE — ASSESSMENT & PLAN NOTE
Chronic mild thrombocytopenia noted on the patient's blood work will continue to monitor no evidence of any increased bruising or bleeding

## 2019-03-05 NOTE — ASSESSMENT & PLAN NOTE
Hypothyroid condition reviewed the patient's free T4 and TSH which are both in normal range recommend continuation of levothyroxine 50 mcg daily

## 2019-03-05 NOTE — PROGRESS NOTES
Assessment/Plan:    Colitis  This patient has a history of colitis she reports no recent flare-ups of symptoms  She continues on Asacol 800 mg 3 times a day with good control symptoms  Regular follow-up with her gastroenterologist is recommended  Nontoxic single thyroid nodule  History of thyroid nodule she is followed by Endocrinology Services at [facility]  Her last ultrasound was November of 2018 recommend follow-up scan ultrasound of the thyroid gland in November of 2019  Other specified hypothyroidism  Hypothyroid condition reviewed the patient's free T4 and TSH which are both in normal range recommend continuation of levothyroxine 50 mcg daily  Chronic interstitial cystitis  Chronic interstitial cystitis is stable at the present time  No evidence of infection on recent urinalysis recommend continue present Wandy on 100 mg 3 times a day regular follow-up with Urology is recommended  Nephrolithiasis  History of nephrolithiasis there is no evidence of any crystals on the patient's most recent urinalysis recommend continued aggressive hydration  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HCC)  Mild granulocytopenia reviewed with the patient no indication of increased frequency of infection  Will continue to monitor  Thrombocytopenia (HCC)  Chronic mild thrombocytopenia noted on the patient's blood work will continue to monitor no evidence of any increased bruising or bleeding  Diagnoses and all orders for this visit:    Thrombocytopenia ([address] )    Long term use of drug  -     Comprehensive metabolic panel; Future    Colitis    Nontoxic single thyroid nodule    Chronic interstitial cystitis    Nephrolithiasis        Subjective:      Patient ID: Elizabeth Glez is a 62 y o  female  This is an annual health maintenance visit for this 66-year-old female patient  She presents today having recently undergone surgery on her right hand for a ganglionic cyst and De Quervain's tenosynovitis    The wound is healing nicely and the patient will be starting physical therapy as per her orthopedic surgeon  This patient has a history of colitis and chronic interstitial cystitis  She reports no flare-up of the symptoms recently  She has had no chest pain palpitations shortness of breath nor any recent infections  The following portions of the patient's history were reviewed and updated as appropriate:   She  has a past medical history of Atopic dermatitis, Atrophic vaginitis, Colon cancer ([facility] [address] ), Dermatitis, Dry eye, Frequency of urination, Fungal infection, Herpes, Hyperlipidemia, Interstitial cystitis, Osteoporosis, Periodontal abscess, PONV (postoperative nausea and vomiting), Ulcerative colitis ([address] ), Ulcerative colitis ([address] ), Urinary frequency, UTI (urinary tract infection), Vaginal atrophy, Vitamin D deficiency, and Voiding dysfunction  She   Patient Active Problem List    Diagnosis Date Noted    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[facility] [address] ) 2019    Other specified hypothyroidism 2019    De Quervain's tenosynovitis, right 2019    Colitis 2018    Nontoxic single thyroid nodule 2016    Irritable bowel syndrome 2016    Thrombocytopenia ([facility] [address] ) 2016    Dense breasts 10/28/2014    Osteopenia 2013    Chronic interstitial cystitis 2013    Nephrolithiasis 2013     She  has a past surgical history that includes Kidney stone surgery; ORIF proximal ulna fracture; pr colonoscopy flx dx w/collj spec when pfrmd (N/A, 2017); Cystoscopy; Cholecystectomy; Other surgical history;  section; pr incis tendon sheath,radial styloid (Right, 2019); and Ganglion cyst excision (Right, 2019)  Her family history includes Breast cancer in her paternal aunt; Diabetes in her father and paternal grandmother; Gallbladder disease in her mother; Hypertension in her father; Kidney disease in her mother; Lymphoma in her maternal grandfather; Nephrolithiasis in her father;  Thyroid disease in her mother  She  reports that she has never smoked  She has never used smokeless tobacco  She reports that she drinks alcohol  She reports that she does not use drugs  Current Outpatient Medications   Medication Sig Dispense Refill    Cholecalciferol (VITAMIN D PO) Take 3,000 Units by mouth daily      Cranberry (ELLURA PO) Take 1 capsule by mouth daily      cycloSPORINE (RESTASIS) 0 05 % ophthalmic emulsion Administer 1 drop to both eyes 2 (two) times a day      dicyclomine (BENTYL) 10 mg capsule Take 1 capsule (10 mg total) by mouth 4 (four) times a day (before meals and at bedtime) 270 capsule 3    ELMIRON 100 MG capsule TAKE 1 CAPSULE 3 TIMES DAILY  90 capsule 0    estradiol (ESTRACE VAGINAL) 0 1 mg/g vaginal cream One applicator into the vagina and nightly 42 5 g 10    fluticasone (FLONASE) 50 mcg/act nasal spray 2 sprays into each nostril daily (Patient taking differently: 2 sprays into each nostril as needed  ) 3 Bottle 3    levothyroxine 50 mcg tablet Take 1 tablet (50 mcg total) by mouth daily 30 tablet 5    mesalamine (ASACOL) 800 MG EC tablet TAKE ONE TABLET BY MOUTH 3 TIMES A  tablet 0    Meth-Hyo-M Bl-Na Phos-Ph Sal (URO-MP PO) Take 1 capsule by mouth 4 (four) times a day      olopatadine HCl (PATADAY) 0 2 % opth drops Administer 1 drop to both eyes as needed       OSPHENA 60 MG TABS daily       rosuvastatin (CRESTOR) 5 mg tablet Take 5 mg by mouth every other day       No current facility-administered medications for this visit       Review of Systems   Skin:        Healing surgical wound of the right hand  All other systems reviewed and are negative          Objective:      /60 (BP Location: Left arm, Patient Position: Sitting, Cuff Size: Adult)   Pulse 81   Temp 97 8 °F (36 6 °C) (Tympanic)   Ht 5' 7" (1 702 m)   Wt 56 7 kg (125 lb)   LMP  (LMP Unknown)   SpO2 98%   BMI 19 58 kg/m²          Physical Exam   Constitutional: She is oriented to person, place, and time  Vital signs are normal  She appears well-developed and well-nourished  She is cooperative  No distress  HENT:   Head: Normocephalic and atraumatic  Right Ear: Hearing, tympanic membrane, external ear and ear canal normal    Left Ear: Hearing, tympanic membrane, external ear and ear canal normal    Nose: Nose normal  No mucosal edema  Mouth/Throat: Uvula is midline, oropharynx is clear and moist and mucous membranes are normal    Eyes: Pupils are equal, round, and reactive to light  Conjunctivae and lids are normal  Right eye exhibits no discharge  Left eye exhibits no discharge  Neck: No JVD present  Carotid bruit is not present  No tracheal deviation present  No thyromegaly present  Cardiovascular: Normal rate, regular rhythm, normal heart sounds and intact distal pulses  Exam reveals no gallop and no friction rub  No murmur heard  Pulmonary/Chest: Effort normal and breath sounds normal  No stridor  No respiratory distress  She has no wheezes  She has no rales  She exhibits no tenderness  Abdominal: Soft  Normal appearance and bowel sounds are normal  She exhibits no distension and no mass  There is no tenderness  There is no rebound and no guarding  No hernia  Musculoskeletal: Normal range of motion  She exhibits tenderness  She exhibits no edema  Tenderness over previous surgical site of the right hand  No indication of any infection process at the surgical site  Lymphadenopathy:     She has no cervical adenopathy  Neurological: She is alert and oriented to person, place, and time  She has normal reflexes  She displays normal reflexes  No cranial nerve deficit or sensory deficit  She exhibits normal muscle tone  Coordination normal    Skin: Skin is warm, dry and intact  No rash noted  She is not diaphoretic  No erythema  No pallor  Psychiatric: She has a normal mood and affect   Her speech is normal and behavior is normal  Judgment and thought content normal  Cognition and memory are normal    Vitals reviewed

## 2019-03-05 NOTE — ASSESSMENT & PLAN NOTE
This patient has a history of colitis she reports no recent flare-ups of symptoms  She continues on Asacol 800 mg 3 times a day with good control symptoms  Regular follow-up with her gastroenterologist is recommended

## 2019-03-08 ENCOUNTER — EVALUATION (OUTPATIENT)
Dept: OCCUPATIONAL THERAPY | Facility: CLINIC | Age: 59
End: 2019-03-08
Payer: COMMERCIAL

## 2019-03-08 DIAGNOSIS — Z47.89 AFTERCARE FOLLOWING SURGERY OF THE MUSCULOSKELETAL SYSTEM: ICD-10-CM

## 2019-03-08 PROCEDURE — 97165 OT EVAL LOW COMPLEX 30 MIN: CPT | Performed by: OCCUPATIONAL THERAPIST

## 2019-03-08 PROCEDURE — 97110 THERAPEUTIC EXERCISES: CPT | Performed by: OCCUPATIONAL THERAPIST

## 2019-03-08 NOTE — PROGRESS NOTES
OT Evaluation     Today's date: 3/8/2019  Patient name: Michell Hogue  : 1960  MRN: [id number]  Referring provider: Didier Jalloh MD  Dx:   Encounter Diagnosis     ICD-10-CM    1  Aftercare following surgery of the musculoskeletal system Z47 89 Ambulatory referral to PT/OT hand therapy    right wrist de Quervain's release with exc ganglion cyst                  Assessment  Assessment details: Tamir Henry is s/p R 1st dorsal compartment release on 19  She presents with edema and functionally limiting pain  Her wrist ROM is significantly limited at this time  She is able to form a full fist and oppose to her small finger  She demonstrates generalized soreness to the 1st dorsal compartment and surrounding structures  See below for a detailed assessment  Impairments: abnormal or restricted ROM, activity intolerance, impaired physical strength and pain with function    Symptom irritability: moderateUnderstanding of Dx/Px/POC: good   Prognosis: good    Goals  STG: Patient will be compliant with post operative precautions (if applicable) and home exercise program in 1 week  STG: Pain will be reduced by 50% in 3 weeks  STG: Inflammation/edema/joint effusion will be reduced by 50% and patient will be independent with self management techniques in 4 weeks  STG: Range of motion of wrist will be improved to WF=50, WE=50, RD=20, UD=25 in 3 weeks  STG: Strength will be improved to 50 in 4 weeks  LTG: Range of motion of wrist will be improved to WF=70 WE=80, RD=25, UD=35 in 6-8 weeks  LTG: Strength will be improved to 60 pounds in 6-8 weeks  LTG: Performance in ADLs and IADLS will be improved to prior level of function with the affected extremity within 6 weeks  LTG: Performance in work activity will be improved to prior level of function with the affected extremity within 6 weeks  LTG: FOTO score increase by 20 points within 6 weeks             Plan  Plan details: Treatment to include modalities, manual therapy, PRE's, HEP, and orthotics as appropriate  Patient would benefit from: skilled OT and OT eval  Planned modality interventions: thermotherapy: hydrocollator packs and ultrasound  Planned therapy interventions: home exercise program, joint mobilization, manual therapy, therapeutic exercise, patient education, therapeutic activities, massage, stretching and strengthening  Frequency: 2x week  Duration in visits: [address] beginning date: 3/8/2019  Plan of Care expiration date: 2019  Treatment plan discussed with: patient        Subjective Evaluation    History of Present Illness  Date of surgery: 2019  Mechanism of injury: surgery  Mechanism of injury: Tim Sebastian is referred after a R 1st dorsal compartment release on 19  She reports symptoms for 6 months prior to surgery and she did not receive relief with splinting and a cortisone injection  Pain  Current pain ratin (While at rest)  At worst pain ratin  Quality: sharp, pulling and throbbing  Relieving factors: rest    Social Support    Employment status: working ([employer] )  Hand dominance: right    Treatments  Current treatment: occupational therapy  Patient Goals  Patient goals for therapy: decreased pain, increased strength and increased motion          Objective     Tenderness     Right Wrist/Hand   Tenderness in the first dorsal compartment and carpometacarpal joint       Active Range of Motion     Left Wrist   Wrist flexion: 80 degrees   Wrist extension: 75 degrees     Right Wrist   Wrist flexion: 35 degrees   Wrist extension: 35 degrees   Radial deviation: 10 degrees   Ulnar deviation: 17 degrees with pain    Left Thumb   Flexion     MP: 53 degrees    DIP: 89 degrees  Palmar Abduction     CMC: 75 degrees  Radial abduction    CMC: 75 degrees    Right Thumb   Flexion     MP: 49    DIP: 72  Palmar Abduction    CMC: 45  Radial Abduction    CMC: 53    Additional Active Range of Motion Details  WF with DF=30    Strength/Myotome Testing     Left Wrist/Hand      (2nd hand position)     Trial 1: 44 2    Thumb Strength  Key/Lateral Pinch     [place] 1: 10 6  Palmar/Three-Point Pinch     Trial 1: 9 7    Right Wrist/Hand      (2nd hand position)     Trial 1: 35 6    Thumb Strength   Key/Lateral Pinch     Trial 1: 8 2  Palmar/Three-Point Pinch     Trial 1: 8 2    Swelling     Left Wrist/Hand   Thumb     Proximal: 5 1 cm  Circumference wrist: 14 cm    Right Wrist/Hand   Thumb     Proximal: 5 4 cm  Circumference wrist: 15 3 cm

## 2019-03-08 NOTE — PROGRESS NOTES
Daily Note     Today's date: 3/8/2019  Patient name: Kelton Gutierrez  : 1960  MRN: [id number]  Referring provider: Floridalma Lind MD  Dx:   Encounter Diagnosis     ICD-10-CM    1  Aftercare following surgery of the musculoskeletal system Z47 89 Ambulatory referral to PT/OT hand therapy    right wrist de Quervain's release with exc ganglion cyst                  Subjective: "are you gentle?"      Objective: See treatment diary below  Assessment: Tolerated treatment well  Patient would benefit from continued OT  Plan: Progress treatment as tolerated            Specialty Daily Treatment Diary     Manual  3/8       IASTM 5'       Wrist ROM 5'                                   Exercise Diary  3/8       HEP: Stretching, TG, Scar mgt Issued                                                                                                                                                                   Modalities 3/8       MHP 10'

## 2019-03-12 ENCOUNTER — OFFICE VISIT (OUTPATIENT)
Dept: OCCUPATIONAL THERAPY | Facility: CLINIC | Age: 59
End: 2019-03-12
Payer: COMMERCIAL

## 2019-03-12 ENCOUNTER — APPOINTMENT (OUTPATIENT)
Dept: LAB | Facility: CLINIC | Age: 59
End: 2019-03-12
Payer: COMMERCIAL

## 2019-03-12 ENCOUNTER — TRANSCRIBE ORDERS (OUTPATIENT)
Dept: LAB | Facility: CLINIC | Age: 59
End: 2019-03-12

## 2019-03-12 DIAGNOSIS — Z47.89 AFTERCARE FOLLOWING SURGERY OF THE MUSCULOSKELETAL SYSTEM: Primary | ICD-10-CM

## 2019-03-12 DIAGNOSIS — Z79.899 LONG TERM USE OF DRUG: ICD-10-CM

## 2019-03-12 DIAGNOSIS — R30.0 DYSURIA: Primary | ICD-10-CM

## 2019-03-12 LAB
ALBUMIN SERPL BCP-MCNC: 4.1 G/DL (ref 3.5–5)
ALP SERPL-CCNC: 35 U/L (ref 46–116)
ALT SERPL W P-5'-P-CCNC: 18 U/L (ref 12–78)
ANION GAP SERPL CALCULATED.3IONS-SCNC: 6 MMOL/L (ref 4–13)
AST SERPL W P-5'-P-CCNC: 16 U/L (ref 5–45)
BILIRUB SERPL-MCNC: 0.49 MG/DL (ref 0.2–1)
BUN SERPL-MCNC: 19 MG/DL (ref 5–25)
CALCIUM SERPL-MCNC: 9 MG/DL (ref 8.3–10.1)
CHLORIDE SERPL-SCNC: 108 MMOL/L (ref 100–108)
CO2 SERPL-SCNC: 29 MMOL/L (ref 21–32)
CREAT SERPL-MCNC: 0.83 MG/DL (ref 0.6–1.3)
GFR SERPL CREATININE-BSD FRML MDRD: 78 ML/MIN/1.73SQ M
GLUCOSE P FAST SERPL-MCNC: 88 MG/DL (ref 65–99)
POTASSIUM SERPL-SCNC: 3.8 MMOL/L (ref 3.5–5.3)
PROT SERPL-MCNC: 7.5 G/DL (ref 6.4–8.2)
SODIUM SERPL-SCNC: 143 MMOL/L (ref 136–145)

## 2019-03-12 PROCEDURE — 87086 URINE CULTURE/COLONY COUNT: CPT

## 2019-03-12 PROCEDURE — 97140 MANUAL THERAPY 1/> REGIONS: CPT | Performed by: OCCUPATIONAL THERAPIST

## 2019-03-12 PROCEDURE — 97110 THERAPEUTIC EXERCISES: CPT | Performed by: OCCUPATIONAL THERAPIST

## 2019-03-12 PROCEDURE — 80053 COMPREHEN METABOLIC PANEL: CPT

## 2019-03-12 PROCEDURE — 36415 COLL VENOUS BLD VENIPUNCTURE: CPT

## 2019-03-12 NOTE — PROGRESS NOTES
Daily Note     Today's date: 3/12/2019  Patient name: Cathy Forbes  : 1960  MRN: [id number]  Referring provider: Mercy Alanis MD  Dx:   Encounter Diagnosis     ICD-10-CM    1  Aftercare following surgery of the musculoskeletal system Z47 89                   Subjective: "I don't like this" referring to the scar massage      Objective: See treatment diary below  Assessment: Improved wrist extension  Pain with STM over the scar  Plan: Progress treatment as tolerated            Specialty Daily Treatment Diary     Manual  3/8 3/12      IASTM 5' 10'      Wrist ROM 5' 5'                                  Exercise Diary  3/8 3/12      HEP: Stretching, TG, Scar mgt Issued       Wrist maze  10x      Keypegs  1x      Extension web  Yellow 3x10       Power web  Red thumb flexion 3x10       Flex bar  Red flex/extend 2x10                                                                                                                           Modalities 3/8 3/12      MHP 10' 10'

## 2019-03-14 ENCOUNTER — TELEPHONE (OUTPATIENT)
Dept: OBGYN CLINIC | Facility: CLINIC | Age: 59
End: 2019-03-14

## 2019-03-14 LAB — BACTERIA UR CULT: NORMAL

## 2019-03-14 NOTE — TELEPHONE ENCOUNTER
Spoke with patient regarding DXA scan results  No change in T-score in b/l hip  2 9% decrease in BMD in forearm; still in osteoporotic range  Patient states she was on Fosamax for a while, but stopped  Does not want to be on injectables  She is very active, but cannot take calcium secondary to kidney stones  Continues to follow with rheumatologist   Discussed the risk of hip fracture  Continue to monitor DXA; repeat scan in 2 years

## 2019-03-15 ENCOUNTER — OFFICE VISIT (OUTPATIENT)
Dept: OCCUPATIONAL THERAPY | Facility: CLINIC | Age: 59
End: 2019-03-15
Payer: COMMERCIAL

## 2019-03-15 DIAGNOSIS — Z47.89 AFTERCARE FOLLOWING SURGERY OF THE MUSCULOSKELETAL SYSTEM: Primary | ICD-10-CM

## 2019-03-15 PROCEDURE — 97140 MANUAL THERAPY 1/> REGIONS: CPT | Performed by: OCCUPATIONAL THERAPIST

## 2019-03-15 PROCEDURE — 97110 THERAPEUTIC EXERCISES: CPT | Performed by: OCCUPATIONAL THERAPIST

## 2019-03-15 NOTE — PROGRESS NOTES
Daily Note     Today's date: 3/15/2019  Patient name: Kenny Biggs  : 1960  MRN: [id number]  Referring provider: Justyna Smith MD  Dx:   Encounter Diagnosis     ICD-10-CM    1  Aftercare following surgery of the musculoskeletal system Z47 89                   Subjective: "I banged my wrist so hard yesterday"      Objective: See treatment diary below  Assessment: Better motion today,  however with STM  Plan: Focus on functional activities           Specialty Daily Treatment Diary     Manual  3/8 3/12 3/15     IASTM 5' 10' 10'     Wrist ROM 5' 5' 5'                                 Exercise Diary  3/8 3/12 3/15     HEP: Stretching, TG, Scar mgt Issued       Wrist maze  10x      Keypegs  1x 1x     Extension web  Yellow 3x10  Yellow 3x10      Power web  Red thumb flexion 3x10  Red thumb flexion 3x10     Flex bar  Red flex/extend 2x10  Red flex/extend 2x10                                                                                                                          Modalities 3/8 3/12 3/15     MHP 10' 10' 10'

## 2019-03-19 ENCOUNTER — OFFICE VISIT (OUTPATIENT)
Dept: OCCUPATIONAL THERAPY | Facility: CLINIC | Age: 59
End: 2019-03-19
Payer: COMMERCIAL

## 2019-03-19 DIAGNOSIS — Z47.89 AFTERCARE FOLLOWING SURGERY OF THE MUSCULOSKELETAL SYSTEM: Primary | ICD-10-CM

## 2019-03-19 PROCEDURE — 97110 THERAPEUTIC EXERCISES: CPT | Performed by: OCCUPATIONAL THERAPIST

## 2019-03-19 PROCEDURE — 97150 GROUP THERAPEUTIC PROCEDURES: CPT | Performed by: OCCUPATIONAL THERAPIST

## 2019-03-19 PROCEDURE — 97140 MANUAL THERAPY 1/> REGIONS: CPT | Performed by: OCCUPATIONAL THERAPIST

## 2019-03-19 NOTE — PROGRESS NOTES
Daily Note     Today's date: 3/19/2019  Patient name: René Menjivar  : 1960  MRN: [id number]  Referring provider: Panchito Templeton MD  Dx:   Encounter Diagnosis     ICD-10-CM    1  Aftercare following surgery of the musculoskeletal system Z47 89                   Subjective: "My hand is feeling better actually"       Objective: See treatment diary below  WF=53  WE=50      Assessment: gradually improving wrist motion and extrinsic tightness  Also less tenderness to the scar  Plan: Focus on functional activities           Specialty Daily Treatment Diary     Manual  3/8 3/12 3/15 3/19    IASTM 5' 10' 10' 5'    Wrist ROM 5' 5' 5' 5'                                Exercise Diary  3/8 3/12 3/15 3/19    HEP: Stretching, TG, Scar mgt Issued       Wrist maze  10x      Keypegs  1x 1x 1x    Extension web  Yellow 3x10  Yellow 3x10  Yellow 3x10     Power web  Red thumb flexion 3x10  Red thumb flexion 3x10     Flex bar  Red flex/extend 2x10  Red flex/extend 2x10  Red flex/extend 2x10     Pegs    In with green, out with 4th     Wrist strengthening     2# 2x10     Jar opening     [place] 10/10                                                                                                Modalities 3/8 3/12 3/15 3/19    MHP 10' 10' 10' 10'

## 2019-03-22 ENCOUNTER — OFFICE VISIT (OUTPATIENT)
Dept: OCCUPATIONAL THERAPY | Facility: CLINIC | Age: 59
End: 2019-03-22
Payer: COMMERCIAL

## 2019-03-22 DIAGNOSIS — Z47.89 AFTERCARE FOLLOWING SURGERY OF THE MUSCULOSKELETAL SYSTEM: Primary | ICD-10-CM

## 2019-03-22 PROCEDURE — 97110 THERAPEUTIC EXERCISES: CPT | Performed by: OCCUPATIONAL THERAPIST

## 2019-03-22 NOTE — PROGRESS NOTES
Daily Note     Today's date: 3/22/2019  Patient name: Kelton Gutierrez  : 1960  MRN: [id number]  Referring provider: Floridalma Lind MD  Dx:   Encounter Diagnosis     ICD-10-CM    1  Aftercare following surgery of the musculoskeletal system Z47 89                   Subjective: "My hand is feeling better actually"       Objective: See treatment diary below  WF=50  WE=57      Assessment: CMC pain, especially with resistive pinch  7 degrees of WE improvement from last session  Plan: Focus on functional activities           Specialty Daily Treatment Diary     Manual  3/8 3/12 3/15 3/19 3/22   IASTM 5' 10' 10' 5' 5'   Wrist ROM 5' 5' 5' 5' 5'                               Exercise Diary  3/8 3/12 3/15 3/19 3/22   HEP: Stretching, TG, Scar mgt Issued       Wrist maze  10x      Keypegs  1x 1x 1x 1x   Extension web  Yellow 3x10  Yellow 3x10  Yellow 3x10  Yellow 3x10   Power web  Red thumb flexion 3x10  Red thumb flexion 3x10     Flex bar  Red flex/extend 2x10  Red flex/extend 2x10  Red flex/extend 2x10  Red flex/extend    Pegs    In with green, out with 4th  In with green, out 4th    Wrist strengthening     2# 2x10  3# 3x10   Jar opening     [place] 10/10    Power web flexion      Green 3x10                                                                                       Modalities 3/8 3/12 3/15 3/19 3/22   MHP 10' 10' 10' 10' 15'

## 2019-03-25 ENCOUNTER — OFFICE VISIT (OUTPATIENT)
Dept: OCCUPATIONAL THERAPY | Facility: CLINIC | Age: 59
End: 2019-03-25
Payer: COMMERCIAL

## 2019-03-25 DIAGNOSIS — Z47.89 AFTERCARE FOLLOWING SURGERY OF THE MUSCULOSKELETAL SYSTEM: Primary | ICD-10-CM

## 2019-03-25 PROCEDURE — 97110 THERAPEUTIC EXERCISES: CPT | Performed by: OCCUPATIONAL THERAPIST

## 2019-03-25 NOTE — PROGRESS NOTES
Daily Note     Today's date: 3/25/2019  Patient name: Corina Silvestre  : 1960  MRN: [id number]  Referring provider: Grace Garza MD  Dx:   Encounter Diagnosis     ICD-10-CM    1  Aftercare following surgery of the musculoskeletal system Z47 89                   Subjective: "I think it is the therapy that is helping"      Objective: See treatment diary below  WF=66  WE=58    Assessment: Good improvement in motion of wrist  CMC continues to be painful and may be a separate issue  Plan: Focus on functional activities           Specialty Daily Treatment Diary     Manual  3/25 3/12 3/15 3/19 3/22   IASTM 5' 10' 10' 5' 5'   Wrist ROM 5' 5' 5' 5' 5'                               Exercise Diary  3/25 3/12 3/15 3/19 3/22   HEP: Stretching, TG, Scar mgt        Wrist maze  10x      Keypegs  1x 1x 1x 1x   Extension web yellow 3x10  Yellow 3x10  Yellow 3x10  Yellow 3x10  Yellow 3x10   Power web  Red thumb flexion 3x10  Red thumb flexion 3x10     Flex bar  Red flex/extend 2x10  Red flex/extend 2x10  Red flex/extend 2x10  Red flex/extend    Pegs    In with green, out with 4th  In with green, out 4th    Wrist strengthening  3# 3x10    2# 2x10  3# 3x10   Jar opening  Orange 10/10   Orange 10/10    Power web flexion      Green 3x10   Ball on wall walking  Green x10       Ball on wall circles Green 3x10                                                                            Modalities 3/25 3/12 3/15 3/19 3/22   MHP 10' 10' 10' 10' 15'

## 2019-03-29 ENCOUNTER — OFFICE VISIT (OUTPATIENT)
Dept: OCCUPATIONAL THERAPY | Facility: CLINIC | Age: 59
End: 2019-03-29
Payer: COMMERCIAL

## 2019-03-29 DIAGNOSIS — Z47.89 AFTERCARE FOLLOWING SURGERY OF THE MUSCULOSKELETAL SYSTEM: Primary | ICD-10-CM

## 2019-03-29 PROCEDURE — 97140 MANUAL THERAPY 1/> REGIONS: CPT | Performed by: OCCUPATIONAL THERAPIST

## 2019-03-29 PROCEDURE — 97110 THERAPEUTIC EXERCISES: CPT | Performed by: OCCUPATIONAL THERAPIST

## 2019-03-29 NOTE — PROGRESS NOTES
Daily Note     Today's date: 3/29/2019  Patient name: Kenny Biggs  : 1960  MRN: [id number]  Referring provider: Justyna Smith MD  Dx:   Encounter Diagnosis     ICD-10-CM    1  Aftercare following surgery of the musculoskeletal system Z47 89                   Subjective: "I think it is the therapy that is helping"      Objective: See treatment diary below  Assessment: Scar irritation from bracelet catching  She is making good progress and would benefit from increased weight bearing activity and also increased resistive activity in PRE  Plan: Focus on functional activities           Specialty Daily Treatment Diary     Manual  3/25 3/29 3/15 3/19 3/22   IASTM 5' 10' 10' 5' 5'   Wrist ROM 5' 5' 5' 5' 5'                               Exercise Diary  3/25 3/29 3/15 3/19 3/22   HEP: Stretching, TG, Scar mgt        Wrist maze        Keypegs   1x 1x 1x   Extension web yellow 3x10  Yellow 3x10  Yellow 3x10  Yellow 3x10  Yellow 3x10   Power web  Green thumb flexion 3x10  Red thumb flexion 3x10     Flex bar  Green flex/extend 2x10  Red flex/extend 2x10  Red flex/extend 2x10  Red flex/extend    Pegs    In with green, out with 4th  In with green, out 4th    Wrist strengthening  3# 3x10  3# 3x10   2# 2x10  3# 3x10   Jar opening  [place]ange 10/10 [place] 10/10  Orange 10/10    Power web flexion      Green 3x10   Ball on wall walking  Green x10 Green x10      Ball on wall circles Green 3x10  Green 3x10                                                                           Modalities 3/25 3/29 3/15 3/19 3/22   MHP 10' 15' 10' 10' 15'

## 2019-04-01 ENCOUNTER — TELEPHONE (OUTPATIENT)
Dept: OBGYN CLINIC | Facility: HOSPITAL | Age: 59
End: 2019-04-01

## 2019-04-01 ENCOUNTER — OFFICE VISIT (OUTPATIENT)
Dept: OCCUPATIONAL THERAPY | Facility: CLINIC | Age: 59
End: 2019-04-01
Payer: COMMERCIAL

## 2019-04-01 DIAGNOSIS — N30.10 CHRONIC INTERSTITIAL CYSTITIS: Primary | ICD-10-CM

## 2019-04-01 DIAGNOSIS — L30.9 DERMATITIS: ICD-10-CM

## 2019-04-01 DIAGNOSIS — Z47.89 AFTERCARE FOLLOWING SURGERY OF THE MUSCULOSKELETAL SYSTEM: Primary | ICD-10-CM

## 2019-04-01 DIAGNOSIS — N30.10 INTERSTITIAL CYSTITIS: ICD-10-CM

## 2019-04-01 PROCEDURE — 97110 THERAPEUTIC EXERCISES: CPT | Performed by: OCCUPATIONAL THERAPIST

## 2019-04-01 RX ORDER — CLOTRIMAZOLE AND BETAMETHASONE DIPROPIONATE 10; .64 MG/G; MG/G
CREAM TOPICAL 2 TIMES DAILY
Qty: 30 G | Refills: 5 | Status: SHIPPED | OUTPATIENT
Start: 2019-04-01 | End: 2019-09-03

## 2019-04-03 ENCOUNTER — OFFICE VISIT (OUTPATIENT)
Dept: OBGYN CLINIC | Facility: HOSPITAL | Age: 59
End: 2019-04-03

## 2019-04-03 VITALS
WEIGHT: 121.8 LBS | SYSTOLIC BLOOD PRESSURE: 136 MMHG | BODY MASS INDEX: 19.12 KG/M2 | HEIGHT: 67 IN | DIASTOLIC BLOOD PRESSURE: 80 MMHG | HEART RATE: 91 BPM

## 2019-04-03 DIAGNOSIS — D17.21 LIPOMA OF RIGHT UPPER EXTREMITY: ICD-10-CM

## 2019-04-03 DIAGNOSIS — M65.4 DE QUERVAIN'S TENOSYNOVITIS, RIGHT: Primary | ICD-10-CM

## 2019-04-03 DIAGNOSIS — T81.41XA ABSCESS INVOLVING SUTURE: ICD-10-CM

## 2019-04-03 PROCEDURE — 99024 POSTOP FOLLOW-UP VISIT: CPT | Performed by: ORTHOPAEDIC SURGERY

## 2019-04-05 ENCOUNTER — TRANSCRIBE ORDERS (OUTPATIENT)
Dept: LAB | Facility: CLINIC | Age: 59
End: 2019-04-05

## 2019-04-05 ENCOUNTER — APPOINTMENT (OUTPATIENT)
Dept: LAB | Facility: CLINIC | Age: 59
End: 2019-04-05
Payer: COMMERCIAL

## 2019-04-05 ENCOUNTER — OFFICE VISIT (OUTPATIENT)
Dept: OCCUPATIONAL THERAPY | Facility: CLINIC | Age: 59
End: 2019-04-05
Payer: COMMERCIAL

## 2019-04-05 DIAGNOSIS — R30.0 DYSURIA: Primary | ICD-10-CM

## 2019-04-05 DIAGNOSIS — Z47.89 AFTERCARE FOLLOWING SURGERY OF THE MUSCULOSKELETAL SYSTEM: Primary | ICD-10-CM

## 2019-04-05 DIAGNOSIS — R30.0 DYSURIA: ICD-10-CM

## 2019-04-05 PROCEDURE — 87086 URINE CULTURE/COLONY COUNT: CPT

## 2019-04-05 PROCEDURE — 97530 THERAPEUTIC ACTIVITIES: CPT | Performed by: OCCUPATIONAL THERAPIST

## 2019-04-05 PROCEDURE — 97110 THERAPEUTIC EXERCISES: CPT | Performed by: OCCUPATIONAL THERAPIST

## 2019-04-06 LAB — BACTERIA UR CULT: NORMAL

## 2019-04-09 ENCOUNTER — OFFICE VISIT (OUTPATIENT)
Dept: INTERNAL MEDICINE CLINIC | Facility: CLINIC | Age: 59
End: 2019-04-09
Payer: COMMERCIAL

## 2019-04-09 VITALS
RESPIRATION RATE: 14 BRPM | HEIGHT: 67 IN | BODY MASS INDEX: 18.65 KG/M2 | WEIGHT: 118.8 LBS | OXYGEN SATURATION: 97 % | DIASTOLIC BLOOD PRESSURE: 80 MMHG | HEART RATE: 92 BPM | TEMPERATURE: 98.5 F | SYSTOLIC BLOOD PRESSURE: 118 MMHG

## 2019-04-09 DIAGNOSIS — R22.41 FOOT MASS, RIGHT: Primary | ICD-10-CM

## 2019-04-09 PROCEDURE — 99213 OFFICE O/P EST LOW 20 MIN: CPT | Performed by: INTERNAL MEDICINE

## 2019-04-09 PROCEDURE — 1036F TOBACCO NON-USER: CPT | Performed by: INTERNAL MEDICINE

## 2019-04-09 PROCEDURE — 3008F BODY MASS INDEX DOCD: CPT | Performed by: INTERNAL MEDICINE

## 2019-04-10 ENCOUNTER — OFFICE VISIT (OUTPATIENT)
Dept: OCCUPATIONAL THERAPY | Facility: CLINIC | Age: 59
End: 2019-04-10
Payer: COMMERCIAL

## 2019-04-10 DIAGNOSIS — Z47.89 AFTERCARE FOLLOWING SURGERY OF THE MUSCULOSKELETAL SYSTEM: Primary | ICD-10-CM

## 2019-04-10 PROCEDURE — 97110 THERAPEUTIC EXERCISES: CPT | Performed by: OCCUPATIONAL THERAPIST

## 2019-04-10 PROCEDURE — 97140 MANUAL THERAPY 1/> REGIONS: CPT | Performed by: OCCUPATIONAL THERAPIST

## 2019-04-16 ENCOUNTER — OFFICE VISIT (OUTPATIENT)
Dept: OCCUPATIONAL THERAPY | Facility: CLINIC | Age: 59
End: 2019-04-16
Payer: COMMERCIAL

## 2019-04-16 DIAGNOSIS — Z47.89 AFTERCARE FOLLOWING SURGERY OF THE MUSCULOSKELETAL SYSTEM: Primary | ICD-10-CM

## 2019-04-16 PROCEDURE — 97140 MANUAL THERAPY 1/> REGIONS: CPT | Performed by: OCCUPATIONAL THERAPIST

## 2019-04-16 PROCEDURE — 97530 THERAPEUTIC ACTIVITIES: CPT | Performed by: OCCUPATIONAL THERAPIST

## 2019-04-16 PROCEDURE — 97110 THERAPEUTIC EXERCISES: CPT | Performed by: OCCUPATIONAL THERAPIST

## 2019-04-29 DIAGNOSIS — E04.1 NONTOXIC UNINODULAR GOITER: ICD-10-CM

## 2019-04-30 ENCOUNTER — OFFICE VISIT (OUTPATIENT)
Dept: OCCUPATIONAL THERAPY | Facility: CLINIC | Age: 59
End: 2019-04-30
Payer: COMMERCIAL

## 2019-04-30 DIAGNOSIS — Z47.89 AFTERCARE FOLLOWING SURGERY OF THE MUSCULOSKELETAL SYSTEM: Primary | ICD-10-CM

## 2019-04-30 PROCEDURE — 97110 THERAPEUTIC EXERCISES: CPT | Performed by: OCCUPATIONAL THERAPIST

## 2019-04-30 RX ORDER — LEVOTHYROXINE SODIUM 0.05 MG/1
50 TABLET ORAL DAILY
Qty: 90 TABLET | Refills: 1 | Status: SHIPPED | OUTPATIENT
Start: 2019-04-30 | End: 2019-11-06 | Stop reason: SDUPTHER

## 2019-06-17 DIAGNOSIS — K52.9 COLITIS: Primary | ICD-10-CM

## 2019-06-17 DIAGNOSIS — K58.2 IRRITABLE BOWEL SYNDROME WITH BOTH CONSTIPATION AND DIARRHEA: ICD-10-CM

## 2019-06-17 DIAGNOSIS — K52.9 COLITIS WITHOUT COMPLICATION: ICD-10-CM

## 2019-06-17 RX ORDER — MESALAMINE 800 MG/1
800 TABLET, DELAYED RELEASE ORAL 3 TIMES DAILY
Qty: 270 TABLET | Refills: 3 | Status: SHIPPED | OUTPATIENT
Start: 2019-06-17 | End: 2020-07-27 | Stop reason: SDUPTHER

## 2019-07-16 ENCOUNTER — APPOINTMENT (OUTPATIENT)
Dept: LAB | Facility: CLINIC | Age: 59
End: 2019-07-16
Payer: COMMERCIAL

## 2019-07-16 ENCOUNTER — TRANSCRIBE ORDERS (OUTPATIENT)
Dept: LAB | Facility: CLINIC | Age: 59
End: 2019-07-16

## 2019-07-16 DIAGNOSIS — R30.0 DYSURIA: Primary | ICD-10-CM

## 2019-07-16 DIAGNOSIS — N39.0 RECURRENT UTI: ICD-10-CM

## 2019-07-16 DIAGNOSIS — E04.1 THYROID NODULE: ICD-10-CM

## 2019-07-16 PROCEDURE — 36415 COLL VENOUS BLD VENIPUNCTURE: CPT

## 2019-07-16 PROCEDURE — 87086 URINE CULTURE/COLONY COUNT: CPT

## 2019-07-17 LAB — BACTERIA UR CULT: NORMAL

## 2019-07-18 LAB — MISCELLANEOUS LAB TEST RESULT: NORMAL

## 2019-07-22 ENCOUNTER — TELEPHONE (OUTPATIENT)
Dept: ENDOCRINOLOGY | Facility: CLINIC | Age: 59
End: 2019-07-22

## 2019-07-22 NOTE — TELEPHONE ENCOUNTER
----- Message from Flora Pierce MD sent at 7/19/2019  9:45 AM EDT -----  The thyroid antibodies are positive      Sent to my chart

## 2019-07-29 ENCOUNTER — TELEPHONE (OUTPATIENT)
Dept: ENDOCRINOLOGY | Facility: CLINIC | Age: 59
End: 2019-07-29

## 2019-07-29 NOTE — TELEPHONE ENCOUNTER
Left a message re: her test results for the thyroid AB report  Dr Leeanne Gaytan told me that there was nothing to do about this it is her body creating AB due to her being hypothyroid    I asked pt to call back if there are any more questions

## 2019-07-30 NOTE — TELEPHONE ENCOUNTER
Spoke with pt and reassured her that the lab reports were OK and she should not worry about the results; Celia Isabel

## 2019-08-10 DIAGNOSIS — N95.2 VAGINAL ATROPHY: ICD-10-CM

## 2019-08-12 RX ORDER — ESTRADIOL 0.1 MG/G
CREAM VAGINAL
Qty: 42.5 G | Refills: 2 | Status: SHIPPED | OUTPATIENT
Start: 2019-08-12 | End: 2020-03-02 | Stop reason: SDUPTHER

## 2019-08-26 DIAGNOSIS — A08.4 VIRAL GASTROENTERITIS: Primary | ICD-10-CM

## 2019-08-26 RX ORDER — ONDANSETRON HYDROCHLORIDE 8 MG/1
8 TABLET, FILM COATED ORAL EVERY 8 HOURS PRN
Qty: 20 TABLET | Refills: 0 | Status: SHIPPED | OUTPATIENT
Start: 2019-08-26 | End: 2019-12-30

## 2019-09-03 ENCOUNTER — OFFICE VISIT (OUTPATIENT)
Dept: INTERNAL MEDICINE CLINIC | Facility: CLINIC | Age: 59
End: 2019-09-03
Payer: COMMERCIAL

## 2019-09-03 VITALS
HEART RATE: 79 BPM | DIASTOLIC BLOOD PRESSURE: 60 MMHG | OXYGEN SATURATION: 96 % | WEIGHT: 121 LBS | SYSTOLIC BLOOD PRESSURE: 100 MMHG | BODY MASS INDEX: 18.99 KG/M2 | TEMPERATURE: 97.6 F | HEIGHT: 67 IN

## 2019-09-03 DIAGNOSIS — N30.00 ACUTE CYSTITIS WITHOUT HEMATURIA: ICD-10-CM

## 2019-09-03 DIAGNOSIS — D70.9 GRANULOCYTOPENIA (HCC): ICD-10-CM

## 2019-09-03 DIAGNOSIS — E04.1 NONTOXIC SINGLE THYROID NODULE: ICD-10-CM

## 2019-09-03 DIAGNOSIS — E03.9 HYPOTHYROIDISM, UNSPECIFIED TYPE: ICD-10-CM

## 2019-09-03 DIAGNOSIS — K52.9 COLITIS: ICD-10-CM

## 2019-09-03 DIAGNOSIS — N30.10 CHRONIC INTERSTITIAL CYSTITIS: Primary | ICD-10-CM

## 2019-09-03 DIAGNOSIS — Z12.11 COLON CANCER SCREENING: ICD-10-CM

## 2019-09-03 DIAGNOSIS — Z13.1 SCREENING FOR DIABETES MELLITUS: ICD-10-CM

## 2019-09-03 DIAGNOSIS — E03.8 OTHER SPECIFIED HYPOTHYROIDISM: ICD-10-CM

## 2019-09-03 DIAGNOSIS — D69.6 THROMBOCYTOPENIA (HCC): ICD-10-CM

## 2019-09-03 DIAGNOSIS — Z13.220 SCREENING FOR HYPERLIPIDEMIA: ICD-10-CM

## 2019-09-03 DIAGNOSIS — M85.80 OSTEOPENIA, UNSPECIFIED LOCATION: ICD-10-CM

## 2019-09-03 PROCEDURE — 99214 OFFICE O/P EST MOD 30 MIN: CPT | Performed by: INTERNAL MEDICINE

## 2019-09-03 RX ORDER — TROSPIUM CHLORIDE 20 MG/1
20 TABLET, FILM COATED ORAL 2 TIMES DAILY
Qty: 60 TABLET | Refills: 5 | Status: SHIPPED | OUTPATIENT
Start: 2019-09-03

## 2019-09-03 NOTE — PROGRESS NOTES
Assessment/Plan:    Irritable bowel syndrome  History of irritable bowel syndrome recommend the continuation of Bentyl twice a day for symptomatic control  Dosing can be increased up to 4 times a day if breakthrough symptoms  Patient reports no recent episodes of flare up  Colitis  This patient with a history of colitis has been stable over the past several months  She reports no breakthrough symptoms or rectal bleeding  She continues on Asacol 800 milligrams 3 times a day with follow-up with GI services  Other specified hypothyroidism  Hypothyroid condition on replacement therapy reviewed her most recent blood work which indicates a normal value for TSH and free T4  I recommend the continuation of supplementation using levothyroxine at 50 micrograms daily  Follow-up testing in 6 months    Nontoxic single thyroid nodule  History of nontoxic single thyroid nodule patient is due for a follow-up ultrasound this November  She has a request for this study  Will review the results when they are completed  Osteopenia  History of osteopenia without fracture  The patient's most probable location is in the wrist   She is not interested in any medications such as Prolia  She continues on vitamin-D supplementation with good level she is unable to take for calcium supplementation due to history of renal calculi  Most recent visit with her rheumatologist was reviewed during today's visit  Thrombocytopenia ([address] )  History of mild thrombocytopenia follow-up study requested for her next visit no evidence of any easy bruisability or bleeding  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HCC)  History of mild granulocytopenia no evidence of increased risk rate of infections  Follow-up requested in 6 months       Diagnoses and all orders for this visit:    Chronic interstitial cystitis  -     trospium chloride (SANCTURA) 20 mg tablet;  Take 1 tablet (20 mg total) by mouth 2 (two) times a day    Thrombocytopenia (HCC)  -     CBC and differential; Future    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HCC)  -     CBC and differential; Future    Screening for hyperlipidemia  -     Lipid panel; Future    Screening for diabetes mellitus  -     Comprehensive metabolic panel; Future    Colon cancer screening  -     Occult Blood, Fecal Immunochemical; Future    Acute cystitis without hematuria  -     UA w Reflex to Microscopic w Reflex to Culture -Lab Collect    Hypothyroidism, unspecified type  -     TSH, 3rd generation; Future  -     T4, free; Future    Colitis    Other specified hypothyroidism    Nontoxic single thyroid nodule    Osteopenia, unspecified location        Subjective:      Patient ID: Tyler Ragsdale is a 61 y o  female  Call female patient presents today for routine 6 month checkup  She has a history of interstitial cystitis as well as colitis  She indicates that her symptoms have been relatively stable over the past several months  She is noted to have a history of hypothyroid condition and most recently had autoimmune testing for her thyroid which came back positive  She continues on levothyroxine under the direction of her endocrinologist   We reviewed today her most recent thyroid test which indicate excellent replacement values on her free T4 as well as TSH  She has a history of osteoporosis with no fracture history  She continues on vitamin-D as well as regular weight-bearing exercise  She is supposed to the use of Prolia and similar medications  She is unable to take calcium supplementation due to her history of renal lithiasis  The patient does have a history of mild granulocytopenia as well as mild thrombocytopenia follow-up blood work will be obtained on her next visit  She is reminded to have a flu shot this fall        The following portions of the patient's history were reviewed and updated as appropriate:   She  has a past medical history of Atopic dermatitis, Atrophic vaginitis, Colon cancer ([address] ), Dermatitis, Dry eye, Frequency of urination, Fungal infection, Herpes, Hyperlipidemia, Interstitial cystitis, Osteoporosis, Periodontal abscess, PONV (postoperative nausea and vomiting), Ulcerative colitis ([facility] [address] ), Ulcerative colitis ([address] ), Urinary frequency, UTI (urinary tract infection), Vaginal atrophy, Vitamin D deficiency, and Voiding dysfunction  She   Patient Active Problem List    Diagnosis Date Noted    Foot mass, right 2019    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[facility] [address] ) 2019    Other specified hypothyroidism 2019    De Quervain's tenosynovitis, right 2019    Colitis 2018    Nontoxic single thyroid nodule 2016    Irritable bowel syndrome 2016    Thrombocytopenia ([facility] [address] ) 2016    Dense breasts 10/28/2014    Osteopenia 2013    Chronic interstitial cystitis 2013    Nephrolithiasis 2013     She  has a past surgical history that includes Kidney stone surgery; ORIF proximal ulna fracture; pr colonoscopy flx dx w/collj spec when pfrmd (N/A, 2017); Cystoscopy; Cholecystectomy; Other surgical history;  section; pr incis tendon sheath,radial styloid (Right, 2019); and Ganglion cyst excision (Right, 2019)  Her family history includes Breast cancer in her paternal aunt; Diabetes in her father and paternal grandmother; Gallbladder disease in her mother; Hypertension in her father; Kidney disease in her mother; Lymphoma in her maternal grandfather; Nephrolithiasis in her father; Thyroid disease in her mother  She  reports that she has never smoked  She has never used smokeless tobacco  She reports that she drinks alcohol  She reports that she does not use drugs    Current Outpatient Medications   Medication Sig Dispense Refill    Cholecalciferol (VITAMIN D PO) Take 3,000 Units by mouth daily      Cranberry (ELLURA PO) Take 1 capsule by mouth daily      cycloSPORINE (RESTASIS) 0 05 % ophthalmic emulsion Administer 1 drop to both eyes 2 (two) times a day      dicyclomine (BENTYL) 10 mg capsule Take 1 capsule (10 mg total) by mouth 4 (four) times a day (before meals and at bedtime) 270 capsule 3    estradiol (ESTRACE) 0 1 mg/g vaginal cream INSERT 1 APPLICATORFUL VAGINALLY NIGHTLY 42 5 g 2    fluticasone (FLONASE) 50 mcg/act nasal spray 2 sprays into each nostril daily 3 Bottle 3    levothyroxine 50 mcg tablet Take 1 tablet (50 mcg total) by mouth daily 90 tablet 1    mesalamine (ASACOL) 800 MG EC tablet Take 1 tablet (800 mg total) by mouth 3 (three) times a day 270 tablet 3    Meth-Hyo-M Bl-Na Phos-Ph Sal (URO-MP PO) Take 1 capsule by mouth 4 (four) times a day      olopatadine HCl (PATADAY) 0 2 % opth drops Administer 1 drop to both eyes as needed       ondansetron (ZOFRAN) 8 mg tablet Take 1 tablet (8 mg total) by mouth every 8 (eight) hours as needed for nausea or vomiting 20 tablet 0    OSPHENA 60 MG TABS daily       pentosan polysulfate (ELMIRON) 100 mg capsule One pill 3 times a day 90 capsule 4    rosuvastatin (CRESTOR) 5 mg tablet Take 5 mg by mouth every other day      trospium chloride (SANCTURA) 20 mg tablet Take 1 tablet (20 mg total) by mouth 2 (two) times a day 60 tablet 5     No current facility-administered medications for this visit       Review of Systems   All other systems reviewed and are negative  Objective:      /60 (BP Location: Left arm, Patient Position: Sitting, Cuff Size: Adult)   Pulse 79   Temp 97 6 °F (36 4 °C) (Tympanic)   Ht 5' 7" (1 702 m)   Wt 54 9 kg (121 lb)   LMP  (LMP Unknown)   SpO2 96%   BMI 18 95 kg/m²          Physical Exam   Constitutional: She is oriented to person, place, and time  Vital signs are normal  She appears well-developed and well-nourished  She is cooperative  HENT:   Right Ear: Hearing, tympanic membrane, external ear and ear canal normal    Left Ear: Hearing, tympanic membrane, external ear and ear canal normal    Nose: Nose normal  No mucosal edema     Mouth/Throat: Uvula is midline, oropharynx is clear and moist and mucous membranes are normal    Eyes: Pupils are equal, round, and reactive to light  Conjunctivae and lids are normal    Neck: Neck supple  No JVD present  Carotid bruit is not present  No tracheal deviation present  No thyromegaly present  Cardiovascular: Normal rate, regular rhythm, normal heart sounds and intact distal pulses  No murmur heard  Pulmonary/Chest: Effort normal and breath sounds normal  No stridor  No respiratory distress  She has no wheezes  Abdominal: Normal appearance  Musculoskeletal: Normal range of motion  She exhibits no edema  Lymphadenopathy:     She has no cervical adenopathy  Neurological: She is alert and oriented to person, place, and time  She has normal reflexes  Skin: Skin is warm, dry and intact  Psychiatric: She has a normal mood and affect  Her speech is normal and behavior is normal  Judgment and thought content normal  Cognition and memory are normal    Vitals reviewed

## 2019-09-03 NOTE — ASSESSMENT & PLAN NOTE
History of mild thrombocytopenia follow-up study requested for her next visit no evidence of any easy bruisability or bleeding

## 2019-09-03 NOTE — ASSESSMENT & PLAN NOTE
This patient with a history of colitis has been stable over the past several months  She reports no breakthrough symptoms or rectal bleeding  She continues on Asacol 800 milligrams 3 times a day with follow-up with GI services

## 2019-09-03 NOTE — ASSESSMENT & PLAN NOTE
History of irritable bowel syndrome recommend the continuation of Bentyl twice a day for symptomatic control  Dosing can be increased up to 4 times a day if breakthrough symptoms  Patient reports no recent episodes of flare up

## 2019-09-03 NOTE — ASSESSMENT & PLAN NOTE
History of mild granulocytopenia no evidence of increased risk rate of infections    Follow-up requested in 6 months

## 2019-09-03 NOTE — ASSESSMENT & PLAN NOTE
History of nontoxic single thyroid nodule patient is due for a follow-up ultrasound this November  She has a request for this study  Will review the results when they are completed

## 2019-09-03 NOTE — ASSESSMENT & PLAN NOTE
Hypothyroid condition on replacement therapy reviewed her most recent blood work which indicates a normal value for TSH and free T4  I recommend the continuation of supplementation using levothyroxine at 50 micrograms daily    Follow-up testing in 6 months

## 2019-09-03 NOTE — ASSESSMENT & PLAN NOTE
History of osteopenia without fracture  The patient's most probable location is in the wrist   She is not interested in any medications such as Prolia  She continues on vitamin-D supplementation with good level she is unable to take for calcium supplementation due to history of renal calculi  Most recent visit with her rheumatologist was reviewed during today's visit

## 2019-09-04 ENCOUNTER — OFFICE VISIT (OUTPATIENT)
Dept: OBGYN CLINIC | Facility: CLINIC | Age: 59
End: 2019-09-04
Payer: COMMERCIAL

## 2019-09-04 VITALS
DIASTOLIC BLOOD PRESSURE: 72 MMHG | WEIGHT: 121 LBS | BODY MASS INDEX: 18.99 KG/M2 | HEIGHT: 67 IN | SYSTOLIC BLOOD PRESSURE: 118 MMHG

## 2019-09-04 DIAGNOSIS — R10.2 PELVIC PAIN: Primary | ICD-10-CM

## 2019-09-04 DIAGNOSIS — N94.9 VAGINAL BURNING: ICD-10-CM

## 2019-09-04 DIAGNOSIS — N89.8 VAGINAL IRRITATION: ICD-10-CM

## 2019-09-04 PROCEDURE — 87070 CULTURE OTHR SPECIMN AEROBIC: CPT | Performed by: OBSTETRICS & GYNECOLOGY

## 2019-09-04 PROCEDURE — 87147 CULTURE TYPE IMMUNOLOGIC: CPT | Performed by: OBSTETRICS & GYNECOLOGY

## 2019-09-04 PROCEDURE — 99213 OFFICE O/P EST LOW 20 MIN: CPT | Performed by: OBSTETRICS & GYNECOLOGY

## 2019-09-04 RX ORDER — DESOXIMETASONE 2.5 MG/G
CREAM TOPICAL
Qty: 30 G | Refills: 3 | Status: SHIPPED | OUTPATIENT
Start: 2019-09-04 | End: 2019-10-25 | Stop reason: ALTCHOICE

## 2019-09-04 NOTE — PROGRESS NOTES
Assessment/Plan:      There are no diagnoses linked to this encounter  Subjective:     Patient ID: Tyler Ragsdale is a 61 y o  female  Pt is a 62 y/o female who presents today for nausea, pelvic pain, and vaginal burning all that started a few days ago  The nausea is not food related and can coexist with the pelvic pain but other times does not  The nausea seems to come out of no where and then disappears just as suddenly  The pelvic pain is b/l but does not occur on both sides together all the time, sometimes it is one side and other times the other  The vaginal burning was the worst last night which is what brought the pt in for an appointment today  The pt is not sexually active  The pt denies any vaginal or vulval changes, tenderness, redness, itching, sores, discharge, or bleeding  Pt denies fevers, chills, fatigue, appetite change, abdominal pain, vomiting, diarrhea, dysuria, hematuria  Pt has had yeast infections in the past    Pt uses Esterase cream    In light of the intermittent pelvic pain and discomfort, will check pelvic ultrasound    General vaginal culture was obtained at today's visit  Total time for today's visit was 20 minutes of which greater than 50% was spent face-to-face counseling the patient and coordination of care  Review of Systems   Constitutional: Negative  Negative for appetite change, diaphoresis, fatigue, fever and unexpected weight change  HENT: Negative  Eyes: Negative  Respiratory: Negative  Cardiovascular: Negative  Gastrointestinal: Positive for nausea  Negative for abdominal pain, blood in stool, constipation, diarrhea and vomiting  Endocrine: Negative  Negative for cold intolerance and heat intolerance  Genitourinary: Positive for pelvic pain  Negative for dysuria, frequency, hematuria, urgency, vaginal bleeding, vaginal discharge and vaginal pain  Vaginal burning   Musculoskeletal: Negative  Skin: Negative  Allergic/Immunologic: Negative  Neurological: Negative  Hematological: Negative  Negative for adenopathy  Psychiatric/Behavioral: Negative  Objective:     Physical Exam   Constitutional: She is oriented to person, place, and time  She appears well-developed and well-nourished  HENT:   Head: Normocephalic  Eyes: Pupils are equal, round, and reactive to light  Neck: Normal range of motion  Neck supple  Cardiovascular: Normal rate and regular rhythm  Pulmonary/Chest: Effort normal and breath sounds normal    Abdominal: Soft  Genitourinary: Vagina normal and uterus normal  No labial fusion  There is no rash, tenderness, lesion or injury on the right labia  There is no rash, tenderness, lesion or injury on the left labia  Uterus is not enlarged, not fixed and not tender  Cervix exhibits no discharge and no friability  Right adnexum displays no mass, no tenderness and no fullness  Left adnexum displays no mass, no tenderness and no fullness  No erythema, tenderness or bleeding in the vagina  Genitourinary Comments: Minimal atrophic change and discharge  Pelvic US ordered   Musculoskeletal: Normal range of motion  Lymphadenopathy: No inguinal adenopathy noted on the right or left side  Neurological: She is alert and oriented to person, place, and time  Skin: Skin is warm and dry  No rash noted  No erythema  Psychiatric: She has a normal mood and affect   Her behavior is normal  Judgment and thought content normal

## 2019-09-04 NOTE — PATIENT INSTRUCTIONS
Topic:  Intermittent pelvic pain and vaginal irritation and discharge    Appropriate cultures and pelvic ultrasound ordered at today's visit    All questions were answered for the patient    Further treatment will be based upon final results    Patient will call for any problems issues or concerns that arise for her

## 2019-09-06 LAB — BACTERIA GENITAL AEROBE CULT: NORMAL

## 2019-10-07 ENCOUNTER — ULTRASOUND (OUTPATIENT)
Dept: OBGYN CLINIC | Facility: CLINIC | Age: 59
End: 2019-10-07
Payer: COMMERCIAL

## 2019-10-07 ENCOUNTER — ANNUAL EXAM (OUTPATIENT)
Dept: OBGYN CLINIC | Facility: CLINIC | Age: 59
End: 2019-10-07
Payer: COMMERCIAL

## 2019-10-07 VITALS — WEIGHT: 132 LBS | BODY MASS INDEX: 20.67 KG/M2 | SYSTOLIC BLOOD PRESSURE: 100 MMHG | DIASTOLIC BLOOD PRESSURE: 62 MMHG

## 2019-10-07 DIAGNOSIS — Z01.419 PAP SMEAR, AS PART OF ROUTINE GYNECOLOGICAL EXAMINATION: ICD-10-CM

## 2019-10-07 DIAGNOSIS — R10.2 PELVIC PAIN IN FEMALE: Primary | ICD-10-CM

## 2019-10-07 DIAGNOSIS — Z01.419 ENCOUNTER FOR GYNECOLOGICAL EXAMINATION WITHOUT ABNORMAL FINDING: ICD-10-CM

## 2019-10-07 DIAGNOSIS — Z12.39 SCREENING FOR BREAST CANCER: Primary | ICD-10-CM

## 2019-10-07 PROCEDURE — 99396 PREV VISIT EST AGE 40-64: CPT | Performed by: OBSTETRICS & GYNECOLOGY

## 2019-10-07 PROCEDURE — 76856 US EXAM PELVIC COMPLETE: CPT | Performed by: OBSTETRICS & GYNECOLOGY

## 2019-10-07 PROCEDURE — G0145 SCR C/V CYTO,THINLAYER,RESCR: HCPCS | Performed by: OBSTETRICS & GYNECOLOGY

## 2019-10-07 NOTE — PROGRESS NOTES
Assessment/Plan:    No problem-specific Assessment & Plan notes found for this encounter  Normal gynecological physical examination  Self-breast examination stressed  Mammogram ordered  Discussed regular exercise, healthy diet, importance of vitamin D and calcium supplements  Discussed importance of sun block use during periods of prolonged sun exposure  Patient will be seen in 1 year for routine gynecologic and medical examination  Patient will call office for any problems, concerns, or issues which may arise during the interim  Diagnoses and all orders for this visit:    Screening for breast cancer  -     Mammo screening bilateral w cad; Future    Encounter for gynecological examination without abnormal finding  -     Liquid-based pap, screening    Pap smear, as part of routine gynecological examination        Subjective:      Patient ID: Marcie Cary is a 61 y o  female  Patient is a 59-year-old female who presents today for her annual gynecologic medical examination    Patient denies any bleeding at this time    She also denies any significant vasomotor symptoms as well  She experiences occasional hot flashes  Patient reports normal bowel and bladder habits at this time    She denies any significant pelvic or abdominal pain    She has experienced some intermittent episodes of pelvic discomfort and pelvic ultrasound obtained at today's visit was within normal limits  Patient reports normal appetite    She is being followed for cholesterol and hypothyroidism    She is up-to-date with screening mammography moderate Fe and does regular self-breast examinations    Arrangements were made for her annual mammogram in the EMR system at today's visit    She is also up-to-date with colonoscopy screening as well        The following portions of the patient's history were reviewed and updated as appropriate: allergies, current medications, past family history, past medical history, past social history, past surgical history and problem list     Review of Systems   Constitutional: Negative  Negative for appetite change, diaphoresis, fatigue, fever and unexpected weight change  HENT: Negative  Eyes: Negative  Respiratory: Negative  Cardiovascular: Negative  Followed for cholesterol   Gastrointestinal: Negative  Negative for abdominal pain, blood in stool, constipation, diarrhea, nausea and vomiting  Endocrine: Negative  Negative for cold intolerance and heat intolerance  Followed for hypothyroidism   Genitourinary: Negative  Negative for dysuria, frequency, hematuria, urgency, vaginal bleeding, vaginal discharge and vaginal pain  Musculoskeletal: Negative  Skin: Negative  Allergic/Immunologic: Negative  Neurological: Negative  Hematological: Negative  Negative for adenopathy  Psychiatric/Behavioral: Negative  Objective:      /62   Wt 59 9 kg (132 lb)   LMP  (LMP Unknown)   BMI 20 67 kg/m²          Physical Exam   Constitutional: She is oriented to person, place, and time  She appears well-developed and well-nourished  HENT:   Head: Normocephalic and atraumatic  Eyes: Pupils are equal, round, and reactive to light  Neck: Normal range of motion  Neck supple  Cardiovascular: Normal rate and regular rhythm  Pulmonary/Chest: Effort normal and breath sounds normal  Right breast exhibits no inverted nipple, no mass, no nipple discharge, no skin change and no tenderness  Left breast exhibits no inverted nipple, no mass, no nipple discharge, no skin change and no tenderness  Breasts are symmetrical    Abdominal: Soft  Normal appearance and bowel sounds are normal    Genitourinary: Rectum normal, vagina normal and uterus normal  Rectal exam shows guaiac negative stool  Pelvic exam was performed with patient supine  There is no rash or lesion on the right labia  There is no rash or lesion on the left labia   Uterus is not enlarged and not tender  Cervix exhibits no discharge and no friability  Right adnexum displays no mass, no tenderness and no fullness  Left adnexum displays no mass, no tenderness and no fullness  No erythema, tenderness or bleeding in the vagina  No vaginal discharge found  Musculoskeletal: Normal range of motion  Lymphadenopathy:     She has no cervical adenopathy  She has no axillary adenopathy  Right: No inguinal and no supraclavicular adenopathy present  Left: No inguinal and no supraclavicular adenopathy present  Neurological: She is alert and oriented to person, place, and time  Skin: Skin is warm, dry and intact  No rash noted  Psychiatric: She has a normal mood and affect   Her speech is normal and behavior is normal  Judgment and thought content normal  Cognition and memory are normal

## 2019-10-07 NOTE — PROGRESS NOTES
AMB US Pelvic Non OB  Date/Time: 10/7/2019 8:59 AM  Performed by: Yolanda Wiggins  Authorized by: River Shaikh MD     Procedure details:     Indications: non-obstetric abdominal pain      Technique:  US Pelvic, Non-OB with complete exam  Uterine findings:     Length (cm): 7 9    Height (cm):  6    Width (cm):  5 2    Uterine adhesions: not identified      Adnexal mass: not identified      Polyps: not identified      Myomas: not identified      Endometrial stripe: identified      Endometrial hyperplasia: not identified      Endometrium thickness (mm):  7  Left ovary findings:     Left ovary:  Visualized    Cysts: not identified      Length (cm): 2 8    Height (cm): 2 5    Width (cm): 2 3  Right ovary findings:     Right ovary:  Visualized    Cysts: not identified      Length (cm): 3 4    Height (cm): 2 6    Width (cm): 2 5  Other findings:     Free pelvic fluid: not identified      Free peritoneal fluid: not identified    Post-Procedure Details:     Impression:  WNL    Tolerance:   Tolerated well, no immediate complications

## 2019-10-08 NOTE — PROGRESS NOTES
Pelvic ultrasound results carefully reviewed    No abnormalities were noted    Results discussed with patient

## 2019-10-14 LAB
LAB AP GYN PRIMARY INTERPRETATION: NORMAL
Lab: NORMAL

## 2019-10-23 ENCOUNTER — TELEPHONE (OUTPATIENT)
Dept: INTERNAL MEDICINE CLINIC | Facility: CLINIC | Age: 59
End: 2019-10-23

## 2019-10-23 NOTE — TELEPHONE ENCOUNTER
Pt is experiencing the following symptoms: right side pain, nausea, vomiting and loose stool for the last month  Pt would appreciate a call back from [facility] for medical advice  She can be reached back at [phone]  Thank you!

## 2019-10-23 NOTE — TELEPHONE ENCOUNTER
Call returned to the patient we discussed her symptoms I would like to see her in the office she will be coming in on Friday at 1:00 a m  Elissa Del Rosario

## 2019-10-25 ENCOUNTER — OFFICE VISIT (OUTPATIENT)
Dept: INTERNAL MEDICINE CLINIC | Facility: CLINIC | Age: 59
End: 2019-10-25
Payer: COMMERCIAL

## 2019-10-25 VITALS
DIASTOLIC BLOOD PRESSURE: 74 MMHG | TEMPERATURE: 99.3 F | HEART RATE: 82 BPM | BODY MASS INDEX: 18.99 KG/M2 | OXYGEN SATURATION: 98 % | RESPIRATION RATE: 14 BRPM | WEIGHT: 121 LBS | HEIGHT: 67 IN | SYSTOLIC BLOOD PRESSURE: 106 MMHG

## 2019-10-25 DIAGNOSIS — N20.0 RENAL LITHIASIS: ICD-10-CM

## 2019-10-25 DIAGNOSIS — R19.7 DIARRHEA, UNSPECIFIED TYPE: Primary | ICD-10-CM

## 2019-10-25 DIAGNOSIS — R10.10 PAIN OF UPPER ABDOMEN: ICD-10-CM

## 2019-10-25 DIAGNOSIS — R39.9 UTI SYMPTOMS: ICD-10-CM

## 2019-10-25 PROCEDURE — 3008F BODY MASS INDEX DOCD: CPT | Performed by: INTERNAL MEDICINE

## 2019-10-25 PROCEDURE — 99214 OFFICE O/P EST MOD 30 MIN: CPT | Performed by: INTERNAL MEDICINE

## 2019-10-25 RX ORDER — NITROFURANTOIN 25; 75 MG/1; MG/1
CAPSULE ORAL
COMMUNITY
Start: 2016-06-20 | End: 2019-10-25 | Stop reason: ALTCHOICE

## 2019-10-25 RX ORDER — TRAMADOL HYDROCHLORIDE 50 MG/1
TABLET ORAL
COMMUNITY
End: 2019-11-25 | Stop reason: ALTCHOICE

## 2019-10-25 RX ORDER — BIOTIN 1 MG
TABLET ORAL
COMMUNITY
End: 2019-10-25 | Stop reason: SDUPTHER

## 2019-10-25 RX ORDER — CHLORAL HYDRATE 500 MG
CAPSULE ORAL
COMMUNITY
End: 2019-11-25 | Stop reason: ALTCHOICE

## 2019-10-25 NOTE — ASSESSMENT & PLAN NOTE
This patient presents today with symptoms of intermittent abdominal pain located in the upper quadrants right and left  She also has occasionally left flank discomfort  After examining the patient and reviewing her symptoms which are well detailed in her HPI we have requested further testing to try to determine the etiology of her pain  I have asked her to have an ultrasound of the abdomen as well as a flat plate of the abdomen  We have ordered an amylase lipase CBC comprehensive metabolic profile and ultrasound Ng of the abdomen  Differential diagnosis of this patient's symptoms includes biliary tree disease, colitis, renal lithiasis, irritable bowel syndrome  Will review the results of study she may ultimately need a HIDA scan and possibly a CT scan and of the abdomen and pelvis and possibly colonoscopy  I have encouraged her to continue to use her Bentyl medication and increasing it from twice a day to 3 times a day to see if there is any benefit to her symptoms

## 2019-10-25 NOTE — PROGRESS NOTES
Assessment/Plan:    Pain of upper abdomen  This patient presents today with symptoms of intermittent abdominal pain located in the upper quadrants right and left  She also has occasionally left flank discomfort  After examining the patient and reviewing her symptoms which are well detailed in her HPI we have requested further testing to try to determine the etiology of her pain  I have asked her to have an ultrasound of the abdomen as well as a flat plate of the abdomen  We have ordered an amylase lipase CBC comprehensive metabolic profile and ultrasound Ng of the abdomen  Differential diagnosis of this patient's symptoms includes biliary tree disease, colitis, renal lithiasis, irritable bowel syndrome  Will review the results of study she may ultimately need a HIDA scan and possibly a CT scan and of the abdomen and pelvis and possibly colonoscopy  I have encouraged her to continue to use her Bentyl medication and increasing it from twice a day to 3 times a day to see if there is any benefit to her symptoms  Diarrhea  Intermittent diarrhea social with abdominal pain no indication at this time of infectious stool etiology  Will evaluate with studies as outlined in the assessment and plan of abdominal pain  Diagnoses and all orders for this visit:    Diarrhea, unspecified type  -     US abdomen complete; Future  -     CBC and differential; Future  -     Comprehensive metabolic panel; Future  -     Amylase; Future  -     Lipase; Future    UTI symptoms  -     UA w Reflex to Microscopic w Reflex to Culture -Lab Collect    Renal lithiasis  -     XR abdomen 1 view kub; Future    Other orders  -     Discontinue: nitrofurantoin (MACROBID) 100 mg capsule; Take by mouth  -     Omega-3 1000 MG CAPS; Take by mouth  -     Discontinue: Cholecalciferol (VITAMIN D3) 1000 units CAPS; Take by mouth  -     traMADol (ULTRAM) 50 mg tablet; Take by mouth        Subjective:      Patient ID: Luz Randhawa is a 61 y o  female  This 15-year-old female patient presents today for evaluation of abdominal symptoms  She presents with a low-grade temperature of 99 3°  She indicates that she has been experiencing intermittent abdominal discomfort in the bilateral upper quadrants of the abdomen as well as the left flank  She describes this pain as sharp and at times doll  Of she indicates that when she experiences the pain is sometimes last for several hours  The symptoms frequently occur at nighttime  She has had nausea associated with the pain but only 1 episode of actual vomiting  She has had loose bowel movements associated with the symptoms of pain which she notes no blood mucus her undigested food in the diarrhea  She finds that there is no direct correlation between eating meals and the pain nausea and diarrhea  She has had no increase in flatulence of the bowel or stomach  She has at times hyper active bowel sounds  She does occasionally experience chills with these symptoms but denies any sweats  She denies any change in her urination  New    30 minutes spent during this visit with the patient reviewing history and examination and answering questions to the patient's satisfaction      The following portions of the patient's history were reviewed and updated as appropriate:   She  has a past medical history of Atopic dermatitis, Atrophic vaginitis, Colon cancer ([address] ), Dermatitis, Dry eye, Frequency of urination, Fungal infection, Herpes, Hyperlipidemia, Interstitial cystitis, Osteoporosis, Periodontal abscess, PONV (postoperative nausea and vomiting), Ulcerative colitis ([address] ), Ulcerative colitis ([address] ), Urinary frequency, UTI (urinary tract infection), Vaginal atrophy, Vitamin D deficiency, and Voiding dysfunction    She   Patient Active Problem List    Diagnosis Date Noted    Diarrhea 10/25/2019    Pain of upper abdomen 10/25/2019    Foot mass, right 04/09/2019    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    Other specified hypothyroidism 2019    De Quervain's tenosynovitis, right 2019    Colitis 2018    Nontoxic single thyroid nodule 2016    Irritable bowel syndrome 2016    Thrombocytopenia [facility] ) 2016    Dense breasts 10/28/2014    Osteopenia 2013    Chronic interstitial cystitis 2013    Nephrolithiasis 2013     She  has a past surgical history that includes Kidney stone surgery; ORIF proximal ulna fracture; pr colonoscopy flx dx w/collj spec when pfrmd (N/A, 2017); Cystoscopy; Cholecystectomy; Other surgical history;  section; pr incis tendon sheath,radial styloid (Right, 2019); and Ganglion cyst excision (Right, 2019)  Her family history includes Breast cancer in her paternal aunt; Diabetes in her father and paternal grandmother; Gallbladder disease in her mother; Hypertension in her father; Kidney disease in her mother; Lymphoma in her maternal grandfather; Nephrolithiasis in her father; Thyroid disease in her mother  She  reports that she has never smoked  She has never used smokeless tobacco  She reports that she drinks alcohol  She reports that she does not use drugs    Current Outpatient Medications   Medication Sig Dispense Refill    Cholecalciferol (VITAMIN D PO) Take 3,000 Units by mouth daily      Cranberry (ELLURA PO) Take 1 capsule by mouth daily      cycloSPORINE (RESTASIS) 0 05 % ophthalmic emulsion Administer 1 drop to both eyes 2 (two) times a day      dicyclomine (BENTYL) 10 mg capsule Take 1 capsule (10 mg total) by mouth 4 (four) times a day (before meals and at bedtime) 270 capsule 3    estradiol (ESTRACE) 0 1 mg/g vaginal cream INSERT 1 APPLICATORFUL VAGINALLY NIGHTLY 42 5 g 2    levothyroxine 50 mcg tablet Take 1 tablet (50 mcg total) by mouth daily 90 tablet 1    mesalamine (ASACOL) 800 MG EC tablet Take 1 tablet (800 mg total) by mouth 3 (three) times a day 270 tablet 3    Meth-Hyo-M Bl-Na Phos-Ph Sal (URO-MP PO) Take 1 capsule by mouth 4 (four) times a day      olopatadine HCl (PATADAY) 0 2 % opth drops Administer 1 drop to both eyes as needed       ondansetron (ZOFRAN) 8 mg tablet Take 1 tablet (8 mg total) by mouth every 8 (eight) hours as needed for nausea or vomiting 20 tablet 0    OSPHENA 60 MG TABS daily       pentosan polysulfate (ELMIRON) 100 mg capsule One pill 3 times a day 90 capsule 4    rosuvastatin (CRESTOR) 5 mg tablet Take 5 mg by mouth every other day      trospium chloride (SANCTURA) 20 mg tablet Take 1 tablet (20 mg total) by mouth 2 (two) times a day 60 tablet 5    fluticasone (FLONASE) 50 mcg/act nasal spray 2 sprays into each nostril daily (Patient not taking: Reported on 10/25/2019) 3 Bottle 3    Omega-3 1000 MG CAPS Take by mouth      traMADol (ULTRAM) 50 mg tablet Take by mouth       No current facility-administered medications for this visit       Review of Systems   Constitutional: Positive for chills  Negative for appetite change, diaphoresis and fever  Gastrointestinal: Positive for abdominal pain, diarrhea, nausea and vomiting  Negative for blood in stool, constipation and rectal pain  All other systems reviewed and are negative  Objective:      /74   Pulse 82   Temp 99 3 °F (37 4 °C)   Resp 14   Ht 5' 7" (1 702 m)   Wt 54 9 kg (121 lb)   LMP  (LMP Unknown)   SpO2 98%   BMI 18 95 kg/m²          Physical Exam   Constitutional: She is oriented to person, place, and time  Vital signs are normal  She appears well-developed and well-nourished  She is cooperative  HENT:   Right Ear: Hearing, tympanic membrane, external ear and ear canal normal    Left Ear: Hearing, tympanic membrane, external ear and ear canal normal    Nose: Nose normal  No mucosal edema  Mouth/Throat: Uvula is midline, oropharynx is clear and moist and mucous membranes are normal    Eyes: Pupils are equal, round, and reactive to light   Conjunctivae and lids are normal    Neck: No JVD present  Carotid bruit is not present  No thyromegaly present  Cardiovascular: Normal rate, regular rhythm, normal heart sounds and intact distal pulses  No murmur heard  Pulmonary/Chest: Effort normal and breath sounds normal  No respiratory distress  Abdominal: Soft  Normal appearance and bowel sounds are normal  She exhibits no distension and no mass  There is tenderness  There is no rebound and no guarding  Examination of the abdomen today reveals slight tenderness in the right upper quadrant as well as the right lower quadrant  There are no masses palpable on examination of the abdomen  She does have normoactive bowel sounds on today's visit  We find no evidence of any costovertebral angle tenderness on either side  There is no evidence of any rebound  Musculoskeletal: Normal range of motion  She exhibits no edema  Lymphadenopathy:     She has no cervical adenopathy  Neurological: She is alert and oriented to person, place, and time  She has normal reflexes  Skin: Skin is warm, dry and intact  Psychiatric: She has a normal mood and affect  Her speech is normal and behavior is normal  Judgment and thought content normal  Cognition and memory are normal    Vitals reviewed

## 2019-10-25 NOTE — ASSESSMENT & PLAN NOTE
Intermittent diarrhea social with abdominal pain no indication at this time of infectious stool etiology  Will evaluate with studies as outlined in the assessment and plan of abdominal pain

## 2019-10-28 ENCOUNTER — APPOINTMENT (OUTPATIENT)
Dept: LAB | Facility: CLINIC | Age: 59
End: 2019-10-28
Payer: COMMERCIAL

## 2019-10-28 DIAGNOSIS — R19.7 DIARRHEA, UNSPECIFIED TYPE: ICD-10-CM

## 2019-10-28 LAB
ALBUMIN SERPL BCP-MCNC: 3.8 G/DL (ref 3.5–5)
ALP SERPL-CCNC: 40 U/L (ref 46–116)
ALT SERPL W P-5'-P-CCNC: 21 U/L (ref 12–78)
AMYLASE SERPL-CCNC: 60 IU/L (ref 25–115)
ANION GAP SERPL CALCULATED.3IONS-SCNC: 8 MMOL/L (ref 4–13)
AST SERPL W P-5'-P-CCNC: 18 U/L (ref 5–45)
BASOPHILS # BLD AUTO: 0.04 THOUSANDS/ΜL (ref 0–0.1)
BASOPHILS NFR BLD AUTO: 1 % (ref 0–1)
BILIRUB SERPL-MCNC: 0.4 MG/DL (ref 0.2–1)
BILIRUB UR QL STRIP: NEGATIVE
BUN SERPL-MCNC: 16 MG/DL (ref 5–25)
CALCIUM SERPL-MCNC: 9 MG/DL (ref 8.3–10.1)
CHLORIDE SERPL-SCNC: 106 MMOL/L (ref 100–108)
CLARITY UR: CLEAR
CO2 SERPL-SCNC: 28 MMOL/L (ref 21–32)
COLOR UR: YELLOW
CREAT SERPL-MCNC: 0.96 MG/DL (ref 0.6–1.3)
EOSINOPHIL # BLD AUTO: 0.05 THOUSAND/ΜL (ref 0–0.61)
EOSINOPHIL NFR BLD AUTO: 1 % (ref 0–6)
ERYTHROCYTE [DISTWIDTH] IN BLOOD BY AUTOMATED COUNT: 12.9 % (ref 11.6–15.1)
GFR SERPL CREATININE-BSD FRML MDRD: 65 ML/MIN/1.73SQ M
GLUCOSE P FAST SERPL-MCNC: 95 MG/DL (ref 65–99)
GLUCOSE UR STRIP-MCNC: NEGATIVE MG/DL
HCT VFR BLD AUTO: 42 % (ref 34.8–46.1)
HGB BLD-MCNC: 13.3 G/DL (ref 11.5–15.4)
HGB UR QL STRIP.AUTO: NEGATIVE
IMM GRANULOCYTES # BLD AUTO: 0 THOUSAND/UL (ref 0–0.2)
IMM GRANULOCYTES NFR BLD AUTO: 0 % (ref 0–2)
KETONES UR STRIP-MCNC: NEGATIVE MG/DL
LEUKOCYTE ESTERASE UR QL STRIP: NEGATIVE
LIPASE SERPL-CCNC: 113 U/L (ref 73–393)
LYMPHOCYTES # BLD AUTO: 0.84 THOUSANDS/ΜL (ref 0.6–4.47)
LYMPHOCYTES NFR BLD AUTO: 22 % (ref 14–44)
MCH RBC QN AUTO: 29 PG (ref 26.8–34.3)
MCHC RBC AUTO-ENTMCNC: 31.7 G/DL (ref 31.4–37.4)
MCV RBC AUTO: 92 FL (ref 82–98)
MONOCYTES # BLD AUTO: 0.42 THOUSAND/ΜL (ref 0.17–1.22)
MONOCYTES NFR BLD AUTO: 11 % (ref 4–12)
NEUTROPHILS # BLD AUTO: 2.5 THOUSANDS/ΜL (ref 1.85–7.62)
NEUTS SEG NFR BLD AUTO: 65 % (ref 43–75)
NITRITE UR QL STRIP: NEGATIVE
NRBC BLD AUTO-RTO: 0 /100 WBCS
PH UR STRIP.AUTO: 6.5 [PH]
PLATELET # BLD AUTO: 134 THOUSANDS/UL (ref 149–390)
PMV BLD AUTO: 11.2 FL (ref 8.9–12.7)
POTASSIUM SERPL-SCNC: 3.9 MMOL/L (ref 3.5–5.3)
PROT SERPL-MCNC: 7.4 G/DL (ref 6.4–8.2)
PROT UR STRIP-MCNC: NEGATIVE MG/DL
RBC # BLD AUTO: 4.58 MILLION/UL (ref 3.81–5.12)
SODIUM SERPL-SCNC: 142 MMOL/L (ref 136–145)
SP GR UR STRIP.AUTO: 1.01 (ref 1–1.03)
UROBILINOGEN UR QL STRIP.AUTO: 0.2 E.U./DL
WBC # BLD AUTO: 3.85 THOUSAND/UL (ref 4.31–10.16)

## 2019-10-28 PROCEDURE — 83690 ASSAY OF LIPASE: CPT

## 2019-10-28 PROCEDURE — 36415 COLL VENOUS BLD VENIPUNCTURE: CPT

## 2019-10-28 PROCEDURE — 85025 COMPLETE CBC W/AUTO DIFF WBC: CPT

## 2019-10-28 PROCEDURE — 80053 COMPREHEN METABOLIC PANEL: CPT

## 2019-10-28 PROCEDURE — 82150 ASSAY OF AMYLASE: CPT

## 2019-10-28 PROCEDURE — 81003 URINALYSIS AUTO W/O SCOPE: CPT | Performed by: INTERNAL MEDICINE

## 2019-11-01 ENCOUNTER — HOSPITAL ENCOUNTER (OUTPATIENT)
Dept: RADIOLOGY | Facility: HOSPITAL | Age: 59
Discharge: HOME | End: 2019-11-01
Attending: INTERNAL MEDICINE
Payer: COMMERCIAL

## 2019-11-01 ENCOUNTER — HOSPITAL ENCOUNTER (OUTPATIENT)
Dept: ULTRASOUND IMAGING | Facility: HOSPITAL | Age: 59
Discharge: HOME | End: 2019-11-01
Attending: INTERNAL MEDICINE
Payer: COMMERCIAL

## 2019-11-01 DIAGNOSIS — N20.0 RENAL LITHIASIS: ICD-10-CM

## 2019-11-01 DIAGNOSIS — R19.7 DIARRHEA, UNSPECIFIED TYPE: ICD-10-CM

## 2019-11-01 PROCEDURE — 74018 RADEX ABDOMEN 1 VIEW: CPT

## 2019-11-01 PROCEDURE — 76700 US EXAM ABDOM COMPLETE: CPT

## 2019-11-04 DIAGNOSIS — R10.10 PAIN OF UPPER ABDOMEN: Primary | ICD-10-CM

## 2019-11-06 DIAGNOSIS — N30.10 INTERSTITIAL CYSTITIS: ICD-10-CM

## 2019-11-06 DIAGNOSIS — E04.1 NONTOXIC UNINODULAR GOITER: ICD-10-CM

## 2019-11-06 RX ORDER — LEVOTHYROXINE SODIUM 0.05 MG/1
50 TABLET ORAL DAILY
Qty: 90 TABLET | Refills: 1 | Status: SHIPPED | OUTPATIENT
Start: 2019-11-06 | End: 2020-05-04 | Stop reason: SDUPTHER

## 2019-11-13 ENCOUNTER — APPOINTMENT (OUTPATIENT)
Dept: LAB | Facility: CLINIC | Age: 59
End: 2019-11-13
Payer: COMMERCIAL

## 2019-11-13 ENCOUNTER — TRANSCRIBE ORDERS (OUTPATIENT)
Dept: LAB | Facility: CLINIC | Age: 59
End: 2019-11-13

## 2019-11-13 DIAGNOSIS — R30.0 DYSURIA: Primary | ICD-10-CM

## 2019-11-13 DIAGNOSIS — R30.0 DYSURIA: ICD-10-CM

## 2019-11-13 PROCEDURE — 87086 URINE CULTURE/COLONY COUNT: CPT

## 2019-11-14 DIAGNOSIS — R35.0 INCREASED FREQUENCY OF URINATION: Primary | ICD-10-CM

## 2019-11-14 PROBLEM — N39.8 DYSFUNCTIONAL VOIDING OF URINE: Status: ACTIVE | Noted: 2019-11-14

## 2019-11-14 LAB — BACTERIA UR CULT: NORMAL

## 2019-11-14 RX ORDER — CEPHALEXIN 500 MG/1
500 CAPSULE ORAL EVERY 12 HOURS SCHEDULED
Qty: 14 CAPSULE | Refills: 0 | Status: SHIPPED | OUTPATIENT
Start: 2019-11-14 | End: 2019-11-21

## 2019-11-20 ENCOUNTER — HOSPITAL ENCOUNTER (OUTPATIENT)
Dept: NON INVASIVE DIAGNOSTICS | Facility: CLINIC | Age: 59
Discharge: HOME | End: 2019-11-20
Payer: COMMERCIAL

## 2019-11-20 DIAGNOSIS — R10.10 PAIN OF UPPER ABDOMEN: ICD-10-CM

## 2019-11-20 DIAGNOSIS — R10.9 ABDOMINAL PAIN, UNSPECIFIED ABDOMINAL LOCATION: Primary | ICD-10-CM

## 2019-11-20 PROCEDURE — 78227 HEPATOBIL SYST IMAGE W/DRUG: CPT

## 2019-11-20 PROCEDURE — A9537 TC99M MEBROFENIN: HCPCS

## 2019-11-20 RX ADMIN — SINCALIDE 1.1 MCG: 5 INJECTION, POWDER, LYOPHILIZED, FOR SOLUTION INTRAVENOUS at 08:56

## 2019-11-25 ENCOUNTER — OFFICE VISIT (OUTPATIENT)
Dept: ENDOCRINOLOGY | Facility: CLINIC | Age: 59
End: 2019-11-25
Payer: COMMERCIAL

## 2019-11-25 VITALS
WEIGHT: 123.4 LBS | SYSTOLIC BLOOD PRESSURE: 110 MMHG | BODY MASS INDEX: 19.33 KG/M2 | HEART RATE: 72 BPM | DIASTOLIC BLOOD PRESSURE: 78 MMHG

## 2019-11-25 DIAGNOSIS — E03.8 OTHER SPECIFIED HYPOTHYROIDISM: ICD-10-CM

## 2019-11-25 DIAGNOSIS — E04.1 NONTOXIC SINGLE THYROID NODULE: Primary | ICD-10-CM

## 2019-11-25 DIAGNOSIS — M85.80 OSTEOPENIA, UNSPECIFIED LOCATION: ICD-10-CM

## 2019-11-25 PROCEDURE — 99214 OFFICE O/P EST MOD 30 MIN: CPT | Performed by: INTERNAL MEDICINE

## 2019-11-25 NOTE — PROGRESS NOTES
[facility] 61 y o  female MRN: [id number]    Encounter: [id number]      Assessment/Plan     Assessment: This is a 61y o -year-old female with hypothyroidism, thyroid nodule and osteopenia  Plan:  1  Hypothyroidism-continue thyroid hormone supplementation  Her TSH is in the target range  2  Thyroid nodule-repeat ultrasound in one year  I have asked her to have this done at [facility] since the facility she is using is not using TI-RADS classification  3  Osteopenia-we discussed obtaining 1200 mg of calcium through the diet  CC:   Hypothyroidism    History of Present Illness     HPI:  22-year-old woman with hypothyroidism for about a year who is currently on levothyroxine 50 mcg per day  She denies any side effects of the medications  She denies any symptoms of hypo or hyperthyroidism  For the thyroid nodule, she had a repeat ultrasound of the thyroid that is stable  She denies any difficulty swallowing or breathing  For osteopenia, she is obtaining vitamin-D but most likely not adequate amount of calcium  Review of Systems   Constitutional: Negative for chills and fever  Respiratory: Negative for shortness of breath  Cardiovascular: Negative for chest pain  Gastrointestinal: Negative for constipation, diarrhea, nausea and vomiting  Endocrine: Negative for cold intolerance and heat intolerance  All other systems reviewed and are negative        Historical Information   Past Medical History:   Diagnosis Date    Atopic dermatitis     last assessed 12/2/13    Atrophic vaginitis     last assessed 9/2/15    [facility]     Dermatitis     Dry eye     Frequency of urination     Fungal infection     last assessed 8/14/13    Herpes     last assessed 6/27/14    Hyperlipidemia     Interstitial cystitis     Osteoporosis     Periodontal abscess     last assessed 9/6/13    PONV (postoperative nausea and vomiting)     [facility]     [facility] [address] [facility]     Urinary frequency     resolved 17    UTI (urinary tract infection)     Vaginal atrophy     Vitamin D deficiency     last assessed 16    Voiding dysfunction     last assessed 10/7/15     Past Surgical History:   Procedure Laterality Date     SECTION      last assessed sep 17 2014,low transverse    CHOLECYSTECTOMY      CYSTOSCOPY      diagnostic resolved 05    GANGLION CYST EXCISION Right 2019    Procedure: EXCISION GANGLION CYST RIGHT FIRST DORSAL EXTENSOR COMPARTMENT;  Surgeon: Casey Edward MD;  Location: [facility];  Service: Orthopedics    KIDNEY STONE SURGERY      ORIF PROXIMAL ULNA FRACTURE      metal plates and screws removed    OTHER SURGICAL HISTORY      appendiceal procedure assment fecolith, last assessed 13    [place] COLONOSCOPY FLX DX W/COLLJ SPEC WHEN PFRMD N/A 2017    Procedure: COLONOSCOPY;  Surgeon: Clarke Lind MD;  [facility];   Service: Colorectal    [place] INCIS TENDON SHEATH,RADIAL STYLOID Right 2019    Procedure: Michaela Crook;  Surgeon: Casey Edward MD;  [facility]: [facility];  Service: Orthopedics     Social History   Social History     Substance and Sexual Activity   Alcohol Use Yes    Comment: socially - 1-3 drinks per month     Social History     Substance and Sexual Activity   Drug Use No     Social History     Tobacco Use   Smoking Status Never Smoker   Smokeless Tobacco Never Used   Tobacco Comment    HISTORY OF SMOKER CURRENT STATUS UNKNOWN PER [facility]     Family History:   Family History   Problem Relation Age of Onset    Gallbladder disease Mother     Thyroid disease Mother     Kidney disease Mother     Diabetes Father     Hypertension Father     Nephrolithiasis Father     Lymphoma Maternal Grandfather     Diabetes Paternal Grandmother     Breast cancer Paternal Aunt        Meds/Allergies   Current Outpatient Medications   Medication Sig Dispense Refill    Cholecalciferol (VITAMIN D PO) Take 3,000 Units by mouth daily      Cranberry (ELLURA PO) Take 1 capsule by mouth daily      cycloSPORINE (RESTASIS) 0 05 % ophthalmic emulsion Administer 1 drop to both eyes 2 (two) times a day      dicyclomine (BENTYL) 10 mg capsule Take 1 capsule (10 mg total) by mouth 4 (four) times a day (before meals and at bedtime) 270 capsule 3    estradiol (ESTRACE) 0 1 mg/g vaginal cream INSERT 1 APPLICATORFUL VAGINALLY NIGHTLY 42 5 g 2    levothyroxine 50 mcg tablet Take 1 tablet (50 mcg total) by mouth daily 90 tablet 1    mesalamine (ASACOL) 800 MG EC tablet Take 1 tablet (800 mg total) by mouth 3 (three) times a day 270 tablet 3    Meth-Hyo-M Bl-Na Phos-Ph Sal (URO-MP PO) Take 1 capsule by mouth 4 (four) times a day      OSPHENA 60 MG TABS daily       pentosan polysulfate (ELMIRON) 100 mg capsule One pill 3 times a day 90 capsule 4    rosuvastatin (CRESTOR) 5 mg tablet Take 5 mg by mouth every other day      trospium chloride (SANCTURA) 20 mg tablet Take 1 tablet (20 mg total) by mouth 2 (two) times a day 60 tablet 5    fluticasone (FLONASE) 50 mcg/act nasal spray 2 sprays into each nostril daily (Patient not taking: Reported on 10/25/2019) 3 Bottle 3    olopatadine HCl (PATADAY) 0 2 % opth drops Administer 1 drop to both eyes as needed       ondansetron (ZOFRAN) 8 mg tablet Take 1 tablet (8 mg total) by mouth every 8 (eight) hours as needed for nausea or vomiting (Patient not taking: Reported on 11/25/2019) 20 tablet 0     No current facility-administered medications for this visit  Allergies   Allergen Reactions    Iodine Anaphylaxis     Allergy to Iodinated and non iodinated dye    Other Anaphylaxis     APPLES    TAPE  Also rash at times    Seasonal Ic [Cholestatin] Allergic Rhinitis       Objective   Vitals: Blood pressure 110/78, pulse 72, weight 56 kg (123 lb 6 4 oz)  Physical Exam   Constitutional: She is oriented to person, place, and time   She appears well-developed and well-nourished  No distress  HENT:   Head: Normocephalic and atraumatic  Mouth/Throat: Oropharynx is clear and moist and mucous membranes are normal  No oropharyngeal exudate  Eyes: Conjunctivae, EOM and lids are normal  Right eye exhibits no discharge  Left eye exhibits no discharge  No scleral icterus  Neck: Neck supple  No thyromegaly present  Cardiovascular: Normal rate, regular rhythm and normal heart sounds  Exam reveals no gallop and no friction rub  No murmur heard  Pulmonary/Chest: Effort normal and breath sounds normal  No respiratory distress  She has no wheezes  Abdominal: Soft  Bowel sounds are normal  She exhibits no distension  There is no tenderness  Musculoskeletal: Normal range of motion  She exhibits no edema, tenderness or deformity  Lymphadenopathy:        Head (right side): No occipital adenopathy present  Head (left side): No occipital adenopathy present  Right: No supraclavicular adenopathy present  Left: No supraclavicular adenopathy present  Neurological: She is alert and oriented to person, place, and time  No cranial nerve deficit  Skin: Skin is warm and intact  No rash noted  She is not diaphoretic  No erythema  Psychiatric: She has a normal mood and affect  Her behavior is normal    Vitals reviewed  The history was obtained from the review of the chart, patient  Lab Results:   Lab Results   Component Value Date/Time    TSH 3RD GENERATON 1 930 02/28/2019 09:48 AM    TSH 3RD GENERATON 1 101 01/07/2019 01:10 PM    Free T4 1 00 02/28/2019 09:48 AM    Free T4 1 10 01/07/2019 01:10 PM       Portions of the record may have been created with voice recognition software  Occasional wrong word or "sound a like" substitutions may have occurred due to the inherent limitations of voice recognition software  Read the chart carefully and recognize, using context, where substitutions have occurred

## 2019-12-02 DIAGNOSIS — Z12.39 SCREENING FOR BREAST CANCER: ICD-10-CM

## 2019-12-04 ENCOUNTER — TELEPHONE (OUTPATIENT)
Dept: INTERNAL MEDICINE CLINIC | Facility: CLINIC | Age: 59
End: 2019-12-04

## 2019-12-04 DIAGNOSIS — K58.2 IRRITABLE BOWEL SYNDROME WITH BOTH CONSTIPATION AND DIARRHEA: Primary | ICD-10-CM

## 2019-12-04 DIAGNOSIS — K52.9 COLITIS: ICD-10-CM

## 2019-12-04 NOTE — TELEPHONE ENCOUNTER
Call returned to the patient we discussed her visit with Ariela Torres and his recommendation for a EGD and colonoscopy as well as allergy testing  While her HIDA scan appears to be abnormal he is not convinced that the gallbladder is the cause of all of her symptoms  She does have history of colitis as well as irritable bowel syndrome any wants to make sure it is not exacerbation of those problems

## 2019-12-05 PROBLEM — L20.9 AD (ATOPIC DERMATITIS): Status: ACTIVE | Noted: 2019-12-05

## 2019-12-05 PROBLEM — L20.84 INTRINSIC ATOPIC DERMATITIS: Status: ACTIVE | Noted: 2019-12-05

## 2019-12-05 PROBLEM — T78.1XXA POLLEN-FOOD ALLERGY: Status: ACTIVE | Noted: 2019-12-05

## 2019-12-05 PROBLEM — Z91.041 ALLERGY TO IODINATED CONTRAST MEDIA: Status: ACTIVE | Noted: 2019-12-05

## 2019-12-05 PROBLEM — J30.9 ALLERGIC RHINITIS: Status: ACTIVE | Noted: 2019-12-05

## 2019-12-05 PROBLEM — K05.219 PERIODONTAL ABSCESS: Status: ACTIVE | Noted: 2019-12-05

## 2019-12-05 PROBLEM — N39.0 URINARY TRACT INFECTION: Status: ACTIVE | Noted: 2019-12-05

## 2019-12-05 PROBLEM — B49 INFECTION DUE TO FUNGUS: Status: ACTIVE | Noted: 2019-12-05

## 2019-12-05 PROBLEM — R10.84 GENERALIZED ABDOMINAL PAIN: Status: ACTIVE | Noted: 2019-12-05

## 2019-12-05 PROBLEM — B00.9 HERPES SIMPLEX VIRUS (HSV) INFECTION: Status: ACTIVE | Noted: 2019-12-05

## 2019-12-05 PROBLEM — F41.9 ANXIETY: Status: ACTIVE | Noted: 2017-06-01

## 2019-12-05 PROBLEM — H10.13 ALLERGIC CONJUNCTIVITIS OF BOTH EYES: Status: ACTIVE | Noted: 2019-12-05

## 2019-12-09 DIAGNOSIS — E78.01 FAMILIAL HYPERCHOLESTEROLEMIA: Primary | ICD-10-CM

## 2019-12-09 RX ORDER — ROSUVASTATIN CALCIUM 5 MG/1
5 TABLET, COATED ORAL DAILY
Qty: 90 TABLET | Refills: 3 | Status: SHIPPED | OUTPATIENT
Start: 2019-12-09 | End: 2020-03-10

## 2019-12-10 ENCOUNTER — OFFICE VISIT (OUTPATIENT)
Dept: UROLOGY | Facility: CLINIC | Age: 59
End: 2019-12-10
Payer: COMMERCIAL

## 2019-12-10 ENCOUNTER — TELEPHONE (OUTPATIENT)
Dept: UROLOGY | Facility: MEDICAL CENTER | Age: 59
End: 2019-12-10

## 2019-12-10 VITALS
DIASTOLIC BLOOD PRESSURE: 64 MMHG | HEIGHT: 67 IN | SYSTOLIC BLOOD PRESSURE: 120 MMHG | HEART RATE: 98 BPM | BODY MASS INDEX: 18.94 KG/M2 | WEIGHT: 120.7 LBS

## 2019-12-10 DIAGNOSIS — N20.0 KIDNEY STONE: Primary | ICD-10-CM

## 2019-12-10 DIAGNOSIS — N20.0 CALCULUS OF KIDNEY: Primary | ICD-10-CM

## 2019-12-10 PROCEDURE — 99214 OFFICE O/P EST MOD 30 MIN: CPT | Performed by: UROLOGY

## 2019-12-10 RX ORDER — TAMSULOSIN HYDROCHLORIDE 0.4 MG/1
0.4 CAPSULE ORAL
Qty: 7 CAPSULE | Refills: 0 | Status: SHIPPED | OUTPATIENT
Start: 2019-12-10 | End: 2019-12-30

## 2019-12-10 NOTE — TELEPHONE ENCOUNTER
I would recommend imaging prior to visit  If she is severely symptomatic, she should go to the ER  I don't see recent imaging indicating hydronephrosis

## 2019-12-10 NOTE — PROGRESS NOTES
12/10/2019    Pantera Moore  1960  [id number]    Discussion and Plan      Dietary and hydration recommendations provided help minimize future stone risk  Given a greater than 50% chance at spontaneous passage in with symptoms well managed currently, we discussed a trial of medical management  Script for Flomax sent to pharmacy  KUB will be repeated in the next 3-5 days with consideration of ureteroscopy if the stone fails to progress or symptoms worsen  All questions answered at this time  1  Calculus of kidney  - XR abdomen 1 view kub; Future  - tamsulosin (FLOMAX) 0 4 mg; Take 1 capsule (0 4 mg total) by mouth daily with dinner  Dispense: 7 capsule; Refill: 0    Assessment      Patient Active Problem List   Diagnosis    Atrophic vaginitis    Urinary urgency    Dense breasts    Irritable bowel syndrome    Kidney stone    Nontoxic single thyroid nodule    Osteoporosis    Thrombocytopenia (HCC)    Other ulcerative colitis with abscess (HCC)    De Quervain's tenosynovitis, right    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HCC)    Other specified hypothyroidism    Foot mass, right    Diarrhea    Pain of upper abdomen    Dysfunctional voiding of urine    Increased frequency of urination    Difficult or painful urination    Allergic conjunctivitis of both eyes    Allergic rhinitis    Intrinsic atopic dermatitis    Anxiety    Hashimoto's thyroiditis    Herpes simplex virus (HSV) infection    History of fracture    Hyperlipidemia    Infection due to fungus    Malaise and fatigue    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HCC)    Noninfectious gastroenteritis    Periodontal abscess    Urinary tract infection    Vitamin D deficiency    Pollen-food allergy    Allergy to iodinated contrast media       History of Present Illness    Kimi Voss is a 61 y o  female seen today in regards to a history of  And intermittent left flank pain  Not associated with fever or chills  No significant nausea or vomiting    Symptoms well managed currently  CT scan was recently performed demonstrating a 6 mm proximal left calculus       Urinary Symptom Assessment        Past Medical History  Past Medical History:   Diagnosis Date    Atopic dermatitis     last assessed 13    Atrophic vaginitis     last assessed 9/2/15    Dermatitis     Dry eye     Frequency of urination     Fungal infection     last assessed 13    Herpes     last assessed 14    Hyperlipidemia     Hypothyroidism     Interstitial cystitis     Osteoporosis     Periodontal abscess     last assessed 13    PONV (postoperative nausea and vomiting)     Thyroid nodule     Ulcerative colitis ([address] )     Urinary frequency     resolved 17    UTI (urinary tract infection)     Vaginal atrophy     Vitamin D deficiency     last assessed 16    Voiding dysfunction     last assessed 10/7/15       Past Social History  Past Surgical History:   Procedure Laterality Date     SECTION       and      CYSTOSCOPY      diagnostic resolved 05    GANGLION CYST EXCISION Right 2019    Procedure: EXCISION GANGLION CYST RIGHT FIRST DORSAL EXTENSOR COMPARTMENT;  Surgeon: Anastasia Ash MD;  [facility];  Service: Orthopedics    KIDNEY STONE SURGERY      ORIF PROXIMAL ULNA FRACTURE      metal plates and screws removed    OTHER SURGICAL HISTORY      punch biospy     NM COLONOSCOPY FLX DX W/COLLJ SPEC WHEN PFRMD N/A 2017    Procedure: COLONOSCOPY;  Surgeon: Umm Solis MD;  [facility];   Service: Colorectal    NM INCIS TENDON SHEATH,RADIAL STYLOID Right 2019    Procedure: Zeynep Inch;  Surgeon: Anastasia Ash MD;  [facility];  Service: Orthopedics    TUBAL LIGATION      WISDOM TOOTH EXTRACTION  26    X2        Past Family History  Family History   Problem Relation Age of Onset    Gallbladder disease Mother     Thyroid disease Mother     Kidney disease Mother     Breast cancer Mother     Diabetes Father     Hypertension Father     Nephrolithiasis Father     Lymphoma Maternal Grandfather     Diabetes Paternal [de-identified]     Breast cancer Paternal Aunt     No Known Problems Son     No Known Problems Son        Past Social history  Social History     Socioeconomic History    Marital status: /Civil Union     Spouse name: James Morris Number of children: 2    Years of education: Not on file    Highest education level: Not on file   Occupational History    Not on file   Social Needs    Financial resource strain: Not on file    Food insecurity:     Worry: Not on file     Inability: Not on file    Transportation needs:     Medical: Not on file     Non-medical: Not on file   Tobacco Use    Smoking status: Never Smoker    Smokeless tobacco: Never Used   Substance and Sexual Activity    Alcohol use: Yes     Comment: socially - 1-3 drinks per month    Drug use: No    Sexual activity: Yes   Lifestyle    Physical activity:     Days per week: 4 days     Minutes per session: 60 min    Stress: Not on file   Relationships    Social connections:     Talks on phone: Not on file     Gets together: Not on file     Attends Church service: Not on file     Active member of club or organization: Not on file     Attends meetings of clubs or organizations: Not on file     Relationship status: Not on file    Intimate partner violence:     Fear of current or ex partner: Not on file     Emotionally abused: Not on file     Physically abused: Not on file     Forced sexual activity: Not on file   Other Topics Concern    Not on file   Social History Narrative    Currently         Patient lives in a home that was built in [address] [place] [address]    Gas/forced hot air     Carpet eloise in the bedroom     Finished basement-dry-no mold or musty smell     Dehumidifier in the basement     No air  or purifiers     Humidifier in the winter months     Central air     Home is smoke free Patient does not live close to open fields or wooded area         No pets         Caffeine: seldom hot tea or soda     Chocolate consumed everyday         Patient lives with spouse and children        Current Medications  Current Outpatient Medications   Medication Sig Dispense Refill    Cholecalciferol (VITAMIN D PO) Take 3,000 Units by mouth daily      Cranberry (ELLURA PO) Take 1 capsule by mouth daily      cycloSPORINE (RESTASIS) 0 05 % ophthalmic emulsion Administer 1 drop to both eyes 2 (two) times a day      dicyclomine (BENTYL) 10 mg capsule Take 1 capsule (10 mg total) by mouth 4 (four) times a day (before meals and at bedtime) 270 capsule 3    estradiol (ESTRACE) 0 1 mg/g vaginal cream INSERT 1 APPLICATORFUL VAGINALLY NIGHTLY 42 5 g 2    levothyroxine 50 mcg tablet Take 1 tablet (50 mcg total) by mouth daily 90 tablet 1    mesalamine (ASACOL) 800 MG EC tablet Take 1 tablet (800 mg total) by mouth 3 (three) times a day 270 tablet 3    Meth-Hyo-M Bl-Na Phos-Ph Sal (URO-MP PO) Take 1 capsule by mouth 4 (four) times a day      olopatadine HCl (PATADAY) 0 2 % opth drops Administer 1 drop to both eyes as needed       ondansetron (ZOFRAN) 8 mg tablet Take 1 tablet (8 mg total) by mouth every 8 (eight) hours as needed for nausea or vomiting 20 tablet 0    OSPHENA 60 MG TABS daily       pentosan polysulfate (ELMIRON) 100 mg capsule One pill 3 times a day 90 capsule 4    rosuvastatin (CRESTOR) 5 mg tablet Take 1 tablet (5 mg total) by mouth daily 90 tablet 3    trospium chloride (SANCTURA) 20 mg tablet Take 1 tablet (20 mg total) by mouth 2 (two) times a day 60 tablet 5    tamsulosin (FLOMAX) 0 4 mg Take 1 capsule (0 4 mg total) by mouth daily with dinner 7 capsule 0     No current facility-administered medications for this visit          Allergies  Allergies   Allergen Reactions    Iodine Anaphylaxis     Allergy to Iodinated and non iodinated dye    Other Anaphylaxis     APPLES    TAPE  Also rash at times    Seasonal Ic [Cholestatin] Allergic Rhinitis       Past Medical History, Social History, Family History, medications and allergies were reviewed  Review of Systems  Review of Systems   Constitutional: Negative  HENT: Negative  Eyes: Negative  Respiratory: Negative  Cardiovascular: Negative  Gastrointestinal: Negative  Endocrine: Negative  Genitourinary: Positive for flank pain  Negative for difficulty urinating, hematuria and urgency  Skin: Negative  Allergic/Immunologic: Negative  Neurological: Negative  Hematological: Negative  Psychiatric/Behavioral: Negative  Vitals  Vitals:    12/10/19 1300   BP: 120/64   BP Location: Right arm   Patient Position: Sitting   Cuff Size: Adult   Pulse: 98   Weight: 54 7 kg (120 lb 11 2 oz)   Height: 5' 7" (1 702 m)         Physical Exam    Physical Exam   Constitutional: She is oriented to person, place, and time  She appears well-developed and well-nourished  HENT:   Head: Normocephalic and atraumatic  Eyes: Pupils are equal, round, and reactive to light  Neck: Normal range of motion  Cardiovascular: Normal rate, regular rhythm and normal heart sounds  Pulmonary/Chest: Effort normal and breath sounds normal    Abdominal: Soft  Bowel sounds are normal  She exhibits no distension  There is no tenderness  There is no rebound and no guarding  Musculoskeletal: Normal range of motion  Neurological: She is alert and oriented to person, place, and time  Skin: Skin is warm, dry and intact  Psychiatric: She has a normal mood and affect  Nursing note and vitals reviewed        Results    Below listed labs, pathology results, and radiology images were personally reviewed:    No results found for: PSA  Lab Results   Component Value Date    GLUCOSE 89 08/07/2015    CALCIUM 9 0 10/28/2019     08/07/2015    K 3 9 10/28/2019    CO2 28 10/28/2019     10/28/2019    BUN 16 10/28/2019    CREATININE 0 96 10/28/2019 Lab Results   Component Value Date    WBC 3 85 (L) 10/28/2019    HGB 13 3 10/28/2019    HCT 42 0 10/28/2019    MCV 92 10/28/2019     (L) 10/28/2019       No results found for this or any previous visit (from the past 1 hour(s)) ]

## 2019-12-10 NOTE — PROGRESS NOTES
Ct shows multiple stones approximately 6 on the left side with hydronephrosis  Consultation with Urology is being arranged for the patient

## 2019-12-10 NOTE — TELEPHONE ENCOUNTER
This is a patient of Dr Alexander Giang seen in [place]  Dr Piedad Guy office called and said that his wife has 6 kidney stones and hydronephrosis and would like to have an appointment today or tomorrow  Please call Higinio Avitia at the office at [phone]  She would like an appointment at [facility] if possible

## 2019-12-13 ENCOUNTER — HOSPITAL ENCOUNTER (OUTPATIENT)
Dept: RADIOLOGY | Facility: HOSPITAL | Age: 59
Discharge: HOME | End: 2019-12-13
Attending: UROLOGY
Payer: COMMERCIAL

## 2019-12-13 DIAGNOSIS — N20.0 CALCULUS OF KIDNEY: ICD-10-CM

## 2019-12-13 PROCEDURE — 74018 RADEX ABDOMEN 1 VIEW: CPT

## 2019-12-16 NOTE — PROGRESS NOTES
Assessment and plan:       1  Nephrolithiasis  - CT scan performed 12/10/2019 was positive for a 6 mm left proximal ureteral stone  - Patient continues to be motivated for medical expulsive therapy  She did not tolerate the tamsulosin but will continue to hydrate aggressively  - Patient is agreeable to moving forward with ureteroscopy  Case request has been placed for cystoscopy, left-sided ureteroscopy with laser lithotripsy, retrograde pyelogram, and stent placement  - Pre, tylor, and postoperative course was discussed at length with the patient as well as surgical risks associated with this procedure  - She will return in the near future to have this procedure performed  Jeremy Capone PA-C      Chief Complaint     Chief Complaint   Patient presents with    Nephrolithiasis         History of Present Illness     Nan Wilson is a 61 y o  female patient known to Dr Eneida Junior for history of nephrolithiasis  She was seen in the office on 12/10/2019 after undergoing CT scan for intermittent flank pain without fever, chills, nausea, or vomiting  CT scan showed a 6 mm proximal left calculus  She continues to be primarily asymptomatic  She does have a history of irritable bowel syndrome, ulcerative colitis, and interstitial cystitis  She did not tolerate tamsulosin due to severe dizziness the morning following taking the medication  Follow-up KUB continues to be positive for a 6 mm proximal left ureteral stone  Patient has had previous procedures performed by Dr Yonny Dinh and performs vaginal dilation  Laboratory     Lab Results   Component Value Date    CREATININE 0 87 12/18/2019       No results found for: PSA    No results found for this or any previous visit (from the past 1 hour(s))  Review of Systems     Review of Systems   Constitutional: Negative for chills and fever  HENT: Negative  Eyes: Negative  Respiratory: Negative for shortness of breath      Cardiovascular: Negative for chest pain  Gastrointestinal: Negative for constipation, diarrhea, nausea and vomiting  Genitourinary: Negative for difficulty urinating, dyspareunia, dysuria, enuresis, flank pain, frequency, hematuria and urgency  Musculoskeletal: Negative  Allergies     Allergies   Allergen Reactions    Iodine Anaphylaxis     Allergy to Iodinated and non iodinated dye    Other Anaphylaxis     APPLES    TAPE  Also rash at times    Seasonal Ic [Cholestatin] Allergic Rhinitis       Physical Exam     Physical Exam   Constitutional: She is oriented to person, place, and time  She appears well-developed and well-nourished  No distress  HENT:   Head: Normocephalic and atraumatic  Right Ear: External ear normal    Left Ear: External ear normal    Nose: Nose normal    Eyes: Right eye exhibits no discharge  Left eye exhibits no discharge  No scleral icterus  Cardiovascular: Normal rate, regular rhythm, normal heart sounds and intact distal pulses  Exam reveals no gallop and no friction rub  No murmur heard  Pulmonary/Chest: Effort normal and breath sounds normal  No stridor  No respiratory distress  She has no wheezes  She has no rales  Abdominal: Soft  Musculoskeletal:   Ambulates independently   Neurological: She is alert and oriented to person, place, and time  Skin: Skin is warm and dry  She is not diaphoretic  Psychiatric: She has a normal mood and affect  Her behavior is normal  Judgment and thought content normal    Nursing note and vitals reviewed          Vital Signs     Vitals:    12/19/19 0804   BP: 114/68   BP Location: Left arm   Patient Position: Sitting   Cuff Size: Standard   Pulse: (!) 108   Weight: 54 kg (119 lb)   Height: 5' 7" (1 702 m)         Current Medications       Current Outpatient Medications:     Cholecalciferol (VITAMIN D PO), Take 3,000 Units by mouth daily, Disp: , Rfl:     Cranberry (ELLURA PO), Take 1 capsule by mouth daily, Disp: , Rfl:    cycloSPORINE (RESTASIS) 0 05 % ophthalmic emulsion, Administer 1 drop to both eyes 2 (two) times a day, Disp: , Rfl:     dicyclomine (BENTYL) 10 mg capsule, Take 1 capsule (10 mg total) by mouth 4 (four) times a day (before meals and at bedtime), Disp: 270 capsule, Rfl: 3    estradiol (ESTRACE) 0 1 mg/g vaginal cream, INSERT 1 APPLICATORFUL VAGINALLY NIGHTLY, Disp: 42 5 g, Rfl: 2    levothyroxine 50 mcg tablet, Take 1 tablet (50 mcg total) by mouth daily, Disp: 90 tablet, Rfl: 1    mesalamine (ASACOL) 800 MG EC tablet, Take 1 tablet (800 mg total) by mouth 3 (three) times a day, Disp: 270 tablet, Rfl: 3    Meth-Hyo-M Bl-Na Phos-Ph Sal (URO-MP PO), Take 1 capsule by mouth 4 (four) times a day, Disp: , Rfl:     olopatadine HCl (PATADAY) 0 2 % opth drops, Administer 1 drop to both eyes as needed , Disp: , Rfl:     ondansetron (ZOFRAN) 8 mg tablet, Take 1 tablet (8 mg total) by mouth every 8 (eight) hours as needed for nausea or vomiting, Disp: 20 tablet, Rfl: 0    OSPHENA 60 MG TABS, daily , Disp: , Rfl:     pentosan polysulfate (ELMIRON) 100 mg capsule, One pill 3 times a day, Disp: 90 capsule, Rfl: 4    rosuvastatin (CRESTOR) 5 mg tablet, Take 1 tablet (5 mg total) by mouth daily, Disp: 90 tablet, Rfl: 3    trospium chloride (SANCTURA) 20 mg tablet, Take 1 tablet (20 mg total) by mouth 2 (two) times a day, Disp: 60 tablet, Rfl: 5    tamsulosin (FLOMAX) 0 4 mg, Take 1 capsule (0 4 mg total) by mouth daily with dinner (Patient not taking: Reported on 12/19/2019), Disp: 7 capsule, Rfl: 0      Active Problems     Patient Active Problem List   Diagnosis    Atrophic vaginitis    Urinary urgency    Dense breasts    Irritable bowel syndrome    Kidney stone    Nontoxic single thyroid nodule    Osteoporosis    Thrombocytopenia (HCC)    Other ulcerative colitis with abscess (HCC)    De Quervain's tenosynovitis, right    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HCC)    Other specified hypothyroidism    Foot mass, right    Diarrhea    Pain of upper abdomen    Dysfunctional voiding of urine    Increased frequency of urination    Difficult or painful urination    Allergic conjunctivitis of both eyes    Allergic rhinitis    Intrinsic atopic dermatitis    Anxiety    Hashimoto's thyroiditis    Herpes simplex virus (HSV) infection    History of fracture    Hyperlipidemia    Infection due to fungus    Malaise and fatigue    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HCC)    Noninfectious gastroenteritis    Periodontal abscess    Urinary tract infection    Vitamin D deficiency    Pollen-food allergy    Allergy to iodinated contrast media         Past Medical History     Past Medical History:   Diagnosis Date    Atopic dermatitis     last assessed 13    Atrophic vaginitis     last assessed 9/2/15    Dermatitis     Dry eye     Frequency of urination     Fungal infection     last assessed 13    Herpes     last assessed 14    Hyperlipidemia     Hypothyroidism     Interstitial cystitis     Osteoporosis     Periodontal abscess     last assessed 13    PONV (postoperative nausea and vomiting)     Thyroid nodule     Ulcerative colitis ([address] )     Urinary frequency     resolved 17    UTI (urinary tract infection)     Vaginal atrophy     Vitamin D deficiency     last assessed 16    Voiding dysfunction     last assessed 10/7/15         Surgical History     Past Surgical History:   Procedure Laterality Date     SECTION       and      CYSTOSCOPY      diagnostic resolved 05    GANGLION CYST EXCISION Right 2019    Procedure: EXCISION GANGLION CYST RIGHT FIRST DORSAL EXTENSOR COMPARTMENT;  Surgeon: Lena Sandoval MD;  [facility];  Service: Orthopedics    KIDNEY STONE SURGERY      ORIF PROXIMAL ULNA FRACTURE      metal plates and screws removed    OTHER SURGICAL HISTORY      punch biospy     RI COLONOSCOPY FLX DX W/COLLJ SPEC WHEN PFRMD N/A 2017 Procedure: COLONOSCOPY;  Surgeon: Mychal Sevilla MD;  [facility]; Service: Colorectal    SC INCIS TENDON SHEATH,RADIAL STYLOID Right 2/19/2019    Procedure: Dona Rich;  Surgeon: Vincenzo Allen MD;  [facility];  Service: Orthopedics    TUBAL LIGATION      WISDOM TOOTH EXTRACTION  26    X2          Family History     Family History   Problem Relation Age of Onset    Gallbladder disease Mother     Thyroid disease Mother     Kidney disease Mother    King Breast cancer Mother     Diabetes Father     Hypertension Father     Nephrolithiasis Father     Lymphoma Maternal Grandfather     Diabetes Paternal Grandmother     Breast cancer Paternal Aunt     No Known Problems Son     No Known Problems Son          Social History     Social History       Radiology     ABDOMEN     INDICATION:   N20 0: Calculus of kidney      COMPARISON:  Outside CT abdomen/pelvis exam 12/10/2019     VIEWS:  AP supine        FINDINGS:     6 mm left proximal ureteral calculus identified  This is at the level of the L4 transverse process (patient appears to have 6 lumbar vertebral bodies for this purposes of this dictation)  This is likely not significantly changed in position from recent   CT exam   No definite intrarenal calculi      Nonobstructive bowel gas pattern      No acute osseous abnormality is seen  Slight curvature of the lumbar spine to the right      IMPRESSION:     1  6 mm left proximal ureteral calculus again seen at the level of the L4 transverse process, not significantly changed in position from recent prior CT

## 2019-12-17 DIAGNOSIS — N20.0 KIDNEY STONE: Primary | ICD-10-CM

## 2019-12-18 ENCOUNTER — APPOINTMENT (OUTPATIENT)
Dept: LAB | Facility: CLINIC | Age: 59
End: 2019-12-18
Payer: COMMERCIAL

## 2019-12-18 DIAGNOSIS — N20.0 KIDNEY STONE: ICD-10-CM

## 2019-12-18 LAB
ALBUMIN SERPL BCP-MCNC: 4 G/DL (ref 3.5–5)
ALP SERPL-CCNC: 39 U/L (ref 46–116)
ALT SERPL W P-5'-P-CCNC: 18 U/L (ref 12–78)
ANION GAP SERPL CALCULATED.3IONS-SCNC: 10 MMOL/L (ref 4–13)
AST SERPL W P-5'-P-CCNC: 33 U/L (ref 5–45)
BILIRUB SERPL-MCNC: 0.28 MG/DL (ref 0.2–1)
BUN SERPL-MCNC: 18 MG/DL (ref 5–25)
CALCIUM SERPL-MCNC: 8.9 MG/DL (ref 8.3–10.1)
CHLORIDE SERPL-SCNC: 104 MMOL/L (ref 100–108)
CO2 SERPL-SCNC: 26 MMOL/L (ref 21–32)
CREAT SERPL-MCNC: 0.87 MG/DL (ref 0.6–1.3)
GFR SERPL CREATININE-BSD FRML MDRD: 73 ML/MIN/1.73SQ M
GLUCOSE SERPL-MCNC: 78 MG/DL (ref 65–140)
POTASSIUM SERPL-SCNC: 4 MMOL/L (ref 3.5–5.3)
PROT SERPL-MCNC: 6.9 G/DL (ref 6.4–8.2)
SODIUM SERPL-SCNC: 140 MMOL/L (ref 136–145)

## 2019-12-18 PROCEDURE — 36415 COLL VENOUS BLD VENIPUNCTURE: CPT

## 2019-12-18 PROCEDURE — 80053 COMPREHEN METABOLIC PANEL: CPT

## 2019-12-19 ENCOUNTER — OFFICE VISIT (OUTPATIENT)
Dept: UROLOGY | Facility: CLINIC | Age: 59
End: 2019-12-19
Payer: COMMERCIAL

## 2019-12-19 VITALS
HEART RATE: 108 BPM | BODY MASS INDEX: 18.68 KG/M2 | WEIGHT: 119 LBS | HEIGHT: 67 IN | SYSTOLIC BLOOD PRESSURE: 114 MMHG | DIASTOLIC BLOOD PRESSURE: 68 MMHG

## 2019-12-19 DIAGNOSIS — N20.1 LEFT URETERAL STONE: Primary | ICD-10-CM

## 2019-12-19 PROCEDURE — 99213 OFFICE O/P EST LOW 20 MIN: CPT | Performed by: PHYSICIAN ASSISTANT

## 2019-12-20 ENCOUNTER — PREP FOR PROCEDURE (OUTPATIENT)
Dept: UROLOGY | Facility: CLINIC | Age: 59
End: 2019-12-20

## 2019-12-20 DIAGNOSIS — R39.15 URINARY URGENCY: ICD-10-CM

## 2019-12-20 DIAGNOSIS — N20.1 LEFT URETERAL STONE: Primary | ICD-10-CM

## 2019-12-20 DIAGNOSIS — Z01.818 PREOP EXAMINATION: ICD-10-CM

## 2019-12-27 ENCOUNTER — TRANSCRIBE ORDERS (OUTPATIENT)
Dept: LAB | Facility: CLINIC | Age: 59
End: 2019-12-27

## 2019-12-27 ENCOUNTER — APPOINTMENT (OUTPATIENT)
Dept: LAB | Facility: CLINIC | Age: 59
End: 2019-12-27
Payer: COMMERCIAL

## 2019-12-27 ENCOUNTER — HOSPITAL ENCOUNTER (OUTPATIENT)
Dept: RADIOLOGY | Facility: HOSPITAL | Age: 59
Discharge: HOME | End: 2019-12-27
Payer: COMMERCIAL

## 2019-12-27 DIAGNOSIS — N20.1 LEFT URETERAL STONE: ICD-10-CM

## 2019-12-27 DIAGNOSIS — R31.9 HEMATURIA, UNSPECIFIED TYPE: ICD-10-CM

## 2019-12-27 DIAGNOSIS — R30.0 DYSURIA: Primary | ICD-10-CM

## 2019-12-27 DIAGNOSIS — R30.0 DYSURIA: ICD-10-CM

## 2019-12-27 PROCEDURE — 87086 URINE CULTURE/COLONY COUNT: CPT

## 2019-12-27 PROCEDURE — 74018 RADEX ABDOMEN 1 VIEW: CPT

## 2019-12-28 LAB — BACTERIA UR CULT: NORMAL

## 2019-12-30 ENCOUNTER — TELEPHONE (OUTPATIENT)
Dept: UROLOGY | Facility: CLINIC | Age: 59
End: 2019-12-30

## 2019-12-30 NOTE — TELEPHONE ENCOUNTER
I spoke with pt this morning and confirmed that her KUB was read and Dr Mayte Pichardo will review it with her at her appt tomorrow morning

## 2019-12-30 NOTE — PRE-PROCEDURE INSTRUCTIONS
Pre-Surgery Instructions:   Medication Instructions    Cholecalciferol (VITAMIN D PO) Instructed patient per Anesthesia Guidelines   Cranberry (ELLURA PO) Instructed patient per Anesthesia Guidelines   cycloSPORINE (RESTASIS) 0 05 % ophthalmic emulsion Instructed patient per Anesthesia Guidelines   dicyclomine (BENTYL) 10 mg capsule Instructed patient per Anesthesia Guidelines   estradiol (ESTRACE) 0 1 mg/g vaginal cream Instructed patient per Anesthesia Guidelines   levothyroxine 50 mcg tablet Instructed patient per Anesthesia Guidelines   mesalamine (ASACOL) 800 MG EC tablet Instructed patient per Anesthesia Guidelines   Meth-Hyo-M Bl-Na Phos-Ph Sal (URO-MP PO) Instructed patient per Anesthesia Guidelines   olopatadine HCl (PATADAY) 0 2 % opth drops Instructed patient per Anesthesia Guidelines   OSPHENA 60 MG TABS Instructed patient per Anesthesia Guidelines   pentosan polysulfate (ELMIRON) 100 mg capsule Instructed patient per Anesthesia Guidelines   rosuvastatin (CRESTOR) 5 mg tablet Instructed patient per Anesthesia Guidelines   trospium chloride (SANCTURA) 20 mg tablet Instructed patient per Anesthesia Guidelines  Review with patient via phone medications and showering instructions  Advice stop vitamins,suplements,NSAID'S  Says she has had surgeries in the past and knows She will not take any medication in the morning day of surgery  Advice ASC call and [facility] phone number  Verbalized understanding  Herbal Med Class   Stop taking this herbal medications at least one week prior to surgery/procedure  HRT Med Class   Continue to take this medication on your normal schedule  If this is an oral medication and you take it in the morning, then you may take this medicine with a sip of water    This medication may need to be discontinued for a least 4 weeks prior to your surgery if the surgical procedure is associated with a high risk of blod clots as in hip or knee replacement for example  Please consult with your Surgeon to discuss discontinuing this medication before your surgery  Selective Estrogen Mod Med Class   Continue to take this medication on your normal schedule  If this is an oral medication and you take it in the morning, then you may take this medicine with a sip of water  This medication may need to be discontinued for a least 4 weeks prior to your surgery if the surgical procedure is associated with a high risk of blod clots as in hip or knee replacement for example  Please consult with your Surgeon to discuss discontinuing this medication before your surgery  Statin Med Class   Continue to take this medication on your normal schedule  If this is an oral medication and you take it in the morning, then you may take this medicine with a sip of water  Sulfasalazine/Azathioprine Med Class   Stop taking this medication 7 days prior to surgery/procedure       Thyroxine Med Class   Continue to take this medication on your normal schedule  If this is an oral medication and you take it in the morning, then you may take this medicine with a sip of water  Vitamin Med Class   You may continue to take any vitamin that your surgeon has prescribed to you up to the day before surgery  If your surgeon has not specifically prescribed this vitamin or instructed you to continue then stop taking 7 days prior to surgery      Urinary antispasmodics Med Class   Continue this medication up to the evening before surgery/procedure, but do not take the morning of the day of surgery

## 2019-12-31 ENCOUNTER — OFFICE VISIT (OUTPATIENT)
Dept: UROLOGY | Facility: AMBULATORY SURGERY CENTER | Age: 59
End: 2019-12-31
Payer: COMMERCIAL

## 2019-12-31 VITALS
DIASTOLIC BLOOD PRESSURE: 68 MMHG | SYSTOLIC BLOOD PRESSURE: 116 MMHG | HEART RATE: 66 BPM | BODY MASS INDEX: 18.48 KG/M2 | WEIGHT: 118 LBS

## 2019-12-31 DIAGNOSIS — N20.0 CALCULUS OF KIDNEY: Primary | ICD-10-CM

## 2019-12-31 PROCEDURE — 99244 OFF/OP CNSLTJ NEW/EST MOD 40: CPT | Performed by: UROLOGY

## 2019-12-31 NOTE — H&P (VIEW-ONLY)
12/31/2019    Mary Moore  1960  [id number]        Assessment  6 mm left mid ureteral calculus      Discussion  We discussed her recent KUB which shows minimal movement of the left ureteral calculus  Although she is relatively asymptomatic at this time I recommend proceeding with left ureteroscopy with holmium laser lithotripsy, left retrograde pyelography and left ureteral stent insertion  She understands that if I am unable to remove the stone at this time that I would simply place a left ureteral stent and that she would require interval ureteroscopy in the future after ureteral dilation  Risk and benefits of the procedure discussed and reviewed  Informed consent obtained  History of Present Illness  61 y o  female with a history of interstitial cystitis as well as a distant history of nephrolithiasis  More recently she has complained of intermittent left-sided flank pain  A KUB shows a 6 mm stone adjacent to the left transverse process of the L4 vertebral body  Her initial KUB was performed on December 13, 2019  A repeat KUB from December 27, 2019 shows minimal interval progression of the stone within the left ureter  Fortunately, she remains relatively asymptomatic at this time  AUA Symptom Score      Review of Systems  Review of Systems   Constitutional: Negative  HENT: Negative  Eyes: Negative  Respiratory: Negative  Cardiovascular: Negative  Gastrointestinal: Negative  Endocrine: Negative  Genitourinary:        Per HPI   Musculoskeletal: Negative  Skin: Negative  Allergic/Immunologic: Negative  Neurological: Negative  Hematological: Negative  Psychiatric/Behavioral: Negative            Past Medical History  Past Medical History:   Diagnosis Date    Atopic dermatitis     last assessed 12/2/13    Atrophic vaginitis     last assessed 9/2/15    Dermatitis     Dry eye     Frequency of urination     Fungal infection     last assessed 8/14/13    Herpes     last assessed 14    Hyperlipidemia     Hypothyroidism     Interstitial cystitis     Osteoporosis     Periodontal abscess     last assessed 13    PONV (postoperative nausea and vomiting)     Thyroid nodule     Ulcerative colitis ([address] )     Urinary frequency     resolved 17    UTI (urinary tract infection)     Vaginal atrophy     Vitamin D deficiency     last assessed 16    Voiding dysfunction     last assessed 10/7/15       Past Social History  Past Surgical History:   Procedure Laterality Date     SECTION       and      COLONOSCOPY      CYSTOSCOPY      diagnostic resolved 05    GANGLION CYST EXCISION Right 2019    Procedure: EXCISION GANGLION CYST RIGHT FIRST DORSAL EXTENSOR COMPARTMENT;  Surgeon: Juan Reeder MD;  [facility];  Service: Orthopedics    KIDNEY STONE SURGERY      ORIF PROXIMAL ULNA FRACTURE      metal plates and screws removed    OTHER SURGICAL HISTORY      punch biospy     GA COLONOSCOPY FLX DX W/COLLJ SPEC WHEN PFRMD N/A 2017    Procedure: COLONOSCOPY;  Surgeon: Edward Chavarria MD;  [facility];   Service: Colorectal    GA INCIS TENDON SHEATH,RADIAL STYLOID Right 2019    Procedure: Yina Alas;  Surgeon: Juan Reeder MD;  [facility];  Service: Orthopedics    TUBAL LIGATION      WISDOM TOOTH EXTRACTION  26    X2        Past Family History  Family History   Problem Relation Age of Onset    Gallbladder disease Mother     Thyroid disease Mother     Kidney disease Mother    [place] Breast cancer Mother     Diabetes Father     Hypertension Father     Nephrolithiasis Father     Lymphoma Maternal Grandfather     Diabetes Paternal Grandmother     Breast cancer Paternal Aunt     No Known Problems Son     No Known Problems Son        Past Social history  Social History     Socioeconomic History    Marital status: /Civil Union     Spouse name: Trevor Junior  Number of children: 2    Years of education: Not on file    Highest education level: Not on file   Occupational History    Not on file   Social Needs    Financial resource strain: Not on file    Food insecurity:     Worry: Not on file     Inability: Not on file    Transportation needs:     Medical: Not on file     Non-medical: Not on file   Tobacco Use    Smoking status: Never Smoker    Smokeless tobacco: Never Used   Substance and Sexual Activity    Alcohol use: Yes     Frequency: Monthly or less     Drinks per session: 1 or 2     Comment: socially - 1-3 drinks per month    Drug use: No    Sexual activity: Not Currently   Lifestyle    Physical activity:     Days per week: 4 days     Minutes per session: 60 min    Stress: Not on file   Relationships    Social connections:     Talks on phone: Not on file     Gets together: Not on file     Attends [employer] service: Not on file     Active member of club or organization: Not on file     Attends meetings of clubs or organizations: Not on file     Relationship status: Not on file    Intimate partner violence:     Fear of current or ex partner: Not on file     Emotionally abused: Not on file     Physically abused: Not on file     Forced sexual activity: Not on file   Other Topics Concern    Not on file   Social History Narrative    Currently         Patient lives in a home that was built in [address] [place] [address]    Gas/forced hot air     Carpet eloise in the bedroom     Finished basement-dry-no mold or musty smell     Dehumidifier in the basement     No air  or purifiers     Humidifier in the winter months     Central air     Home is smoke free     Patient does not live close to open fields or wooded area         No pets         Caffeine: seldom hot tea or soda     Chocolate consumed everyday         Patient lives with spouse and children        Current Medications  Current Outpatient Medications   Medication Sig Dispense Refill    Cholecalciferol (VITAMIN D PO) Take 3,000 Units by mouth daily      Cranberry (ELLURA PO) Take 1 capsule by mouth daily      cycloSPORINE (RESTASIS) 0 05 % ophthalmic emulsion Administer 1 drop to both eyes 2 (two) times a day      dicyclomine (BENTYL) 10 mg capsule Take 1 capsule (10 mg total) by mouth 4 (four) times a day (before meals and at bedtime) 270 capsule 3    estradiol (ESTRACE) 0 1 mg/g vaginal cream INSERT 1 APPLICATORFUL VAGINALLY NIGHTLY 42 5 g 2    levothyroxine 50 mcg tablet Take 1 tablet (50 mcg total) by mouth daily 90 tablet 1    mesalamine (ASACOL) 800 MG EC tablet Take 1 tablet (800 mg total) by mouth 3 (three) times a day 270 tablet 3    Meth-Hyo-M Bl-Na Phos-Ph Sal (URO-MP PO) Take 1 capsule by mouth 2 (two) times a day       olopatadine HCl (PATADAY) 0 2 % opth drops Administer 1 drop to both eyes as needed       OSPHENA 60 MG TABS daily       pentosan polysulfate (ELMIRON) 100 mg capsule One pill 3 times a day 90 capsule 4    rosuvastatin (CRESTOR) 5 mg tablet Take 1 tablet (5 mg total) by mouth daily (Patient taking differently: Take 5 mg by mouth every other day ) 90 tablet 3    trospium chloride (SANCTURA) 20 mg tablet Take 1 tablet (20 mg total) by mouth 2 (two) times a day 60 tablet 5     No current facility-administered medications for this visit  Allergies  Allergies   Allergen Reactions    Iodine Anaphylaxis     Allergy to Iodinated and non iodinated dye    Other Anaphylaxis     APPLES    TAPE  Also rash at times    Seasonal Ic [Cholestatin] Allergic Rhinitis       Past Medical History, Social History, Family History, medications and allergies were reviewed  Vitals  Vitals:    12/31/19 1054   BP: 116/68   Pulse: 66   Weight: 53 5 kg (118 lb)       Physical Exam  Physical Exam  On examination she is in no acute distress  Lungs are clear  Cardiac is regular  Abdomen is soft nontender nondistended   examination reveals no CVA tenderness  Skin is warm    Extremities without edema  Neurologic is grossly intact and nonfocal   Gait normal   Affect normal      Results  No results found for: PSA  Lab Results   Component Value Date    GLUCOSE 89 08/07/2015    CALCIUM 8 9 12/18/2019     08/07/2015    K 4 0 12/18/2019    CO2 26 12/18/2019     12/18/2019    BUN 18 12/18/2019    CREATININE 0 87 12/18/2019     Lab Results   Component Value Date    WBC 3 85 (L) 10/28/2019    HGB 13 3 10/28/2019    HCT 42 0 10/28/2019    MCV 92 10/28/2019     (L) 10/28/2019         Office Urine Dip  No results found for this or any previous visit (from the past 1 hour(s))  ]      Total visit time was 25 minutes of which over 50% was spent on counseling

## 2019-12-31 NOTE — PROGRESS NOTES
12/31/2019    Alisia Moore  1960  [id number]        Assessment  6 mm left mid ureteral calculus      Discussion  We discussed her recent KUB which shows minimal movement of the left ureteral calculus  Although she is relatively asymptomatic at this time I recommend proceeding with left ureteroscopy with holmium laser lithotripsy, left retrograde pyelography and left ureteral stent insertion  She understands that if I am unable to remove the stone at this time that I would simply place a left ureteral stent and that she would require interval ureteroscopy in the future after ureteral dilation  Risk and benefits of the procedure discussed and reviewed  Informed consent obtained  History of Present Illness  61 y o  female with a history of interstitial cystitis as well as a distant history of nephrolithiasis  More recently she has complained of intermittent left-sided flank pain  A KUB shows a 6 mm stone adjacent to the left transverse process of the L4 vertebral body  Her initial KUB was performed on December 13, 2019  A repeat KUB from December 27, 2019 shows minimal interval progression of the stone within the left ureter  Fortunately, she remains relatively asymptomatic at this time  AUA Symptom Score      Review of Systems  Review of Systems   Constitutional: Negative  HENT: Negative  Eyes: Negative  Respiratory: Negative  Cardiovascular: Negative  Gastrointestinal: Negative  Endocrine: Negative  Genitourinary:        Per HPI   Musculoskeletal: Negative  Skin: Negative  Allergic/Immunologic: Negative  Neurological: Negative  Hematological: Negative  Psychiatric/Behavioral: Negative            Past Medical History  Past Medical History:   Diagnosis Date    Atopic dermatitis     last assessed 12/2/13    Atrophic vaginitis     last assessed 9/2/15    Dermatitis     Dry eye     Frequency of urination     Fungal infection     last assessed 8/14/13    Herpes     last assessed 14    Hyperlipidemia     Hypothyroidism     Interstitial cystitis     Osteoporosis     Periodontal abscess     last assessed 13    PONV (postoperative nausea and vomiting)     Thyroid nodule     Ulcerative colitis ([address] )     Urinary frequency     resolved 17    UTI (urinary tract infection)     Vaginal atrophy     Vitamin D deficiency     last assessed 16    Voiding dysfunction     last assessed 10/7/15       Past Social History  Past Surgical History:   Procedure Laterality Date     SECTION       and      COLONOSCOPY      CYSTOSCOPY      diagnostic resolved 05    GANGLION CYST EXCISION Right 2019    Procedure: EXCISION GANGLION CYST RIGHT FIRST DORSAL EXTENSOR COMPARTMENT;  Surgeon: Joanne Nava MD;  [facility];  Service: Orthopedics    KIDNEY STONE SURGERY      ORIF PROXIMAL ULNA FRACTURE      metal plates and screws removed    OTHER SURGICAL HISTORY      punch biospy     HI COLONOSCOPY FLX DX W/COLLJ SPEC WHEN PFRMD N/A 2017    Procedure: COLONOSCOPY;  Surgeon: Trinidad Arzola MD;  [facility];   Service: Colorectal    HI INCIS TENDON SHEATH,RADIAL STYLOID Right 2019    Procedure: Kei Chase;  Surgeon: Joanne Nava MD;  [facility];  Service: Orthopedics    TUBAL LIGATION      WISDOM TOOTH EXTRACTION  26    X2        Past Family History  Family History   Problem Relation Age of Onset    Gallbladder disease Mother     Thyroid disease Mother     Kidney disease Mother    Elisa Mullet Breast cancer Mother     Diabetes Father     Hypertension Father     Nephrolithiasis Father     Lymphoma Maternal Grandfather     Diabetes Paternal Grandmother     Breast cancer Paternal Aunt     No Known Problems Son     No Known Problems Son        Past Social history  Social History     Socioeconomic History    Marital status: /Civil Union     Spouse name: Mark Anthony Cardoso  Number of children: 2    Years of education: Not on file    Highest education level: Not on file   Occupational History    Not on file   Social Needs    Financial resource strain: Not on file    Food insecurity:     Worry: Not on file     Inability: Not on file    Transportation needs:     Medical: Not on file     Non-medical: Not on file   Tobacco Use    Smoking status: Never Smoker    Smokeless tobacco: Never Used   Substance and Sexual Activity    Alcohol use: Yes     Frequency: Monthly or less     Drinks per session: 1 or 2     Comment: socially - 1-3 drinks per month    Drug use: No    Sexual activity: Not Currently   Lifestyle    Physical activity:     Days per week: 4 days     Minutes per session: 60 min    Stress: Not on file   Relationships    Social connections:     Talks on phone: Not on file     Gets together: Not on file     Attends Anglican service: Not on file     Active member of club or organization: Not on file     Attends meetings of clubs or organizations: Not on file     Relationship status: Not on file    Intimate partner violence:     Fear of current or ex partner: Not on file     Emotionally abused: Not on file     Physically abused: Not on file     Forced sexual activity: Not on file   Other Topics Concern    Not on file   Social History Narrative    Currently         Patient lives in a home that was built in [address]    Gas/forced hot air     Carpet eloise in the bedroom     Finished basement-dry-no mold or musty smell     Dehumidifier in the basement     No air  or purifiers     Humidifier in the winter months     Central air     Home is smoke free     Patient does not live close to open fields or wooded area         No pets         Caffeine: seldom hot tea or soda     Chocolate consumed everyday         Patient lives with spouse and children        Current Medications  Current Outpatient Medications   Medication Sig Dispense Refill    Cholecalciferol (VITAMIN D PO) Take 3,000 Units by mouth daily      Cranberry (ELLURA PO) Take 1 capsule by mouth daily      cycloSPORINE (RESTASIS) 0 05 % ophthalmic emulsion Administer 1 drop to both eyes 2 (two) times a day      dicyclomine (BENTYL) 10 mg capsule Take 1 capsule (10 mg total) by mouth 4 (four) times a day (before meals and at bedtime) 270 capsule 3    estradiol (ESTRACE) 0 1 mg/g vaginal cream INSERT 1 APPLICATORFUL VAGINALLY NIGHTLY 42 5 g 2    levothyroxine 50 mcg tablet Take 1 tablet (50 mcg total) by mouth daily 90 tablet 1    mesalamine (ASACOL) 800 MG EC tablet Take 1 tablet (800 mg total) by mouth 3 (three) times a day 270 tablet 3    Meth-Hyo-M Bl-Na Phos-Ph Sal (URO-MP PO) Take 1 capsule by mouth 2 (two) times a day       olopatadine HCl (PATADAY) 0 2 % opth drops Administer 1 drop to both eyes as needed       OSPHENA 60 MG TABS daily       pentosan polysulfate (ELMIRON) 100 mg capsule One pill 3 times a day 90 capsule 4    rosuvastatin (CRESTOR) 5 mg tablet Take 1 tablet (5 mg total) by mouth daily (Patient taking differently: Take 5 mg by mouth every other day ) 90 tablet 3    trospium chloride (SANCTURA) 20 mg tablet Take 1 tablet (20 mg total) by mouth 2 (two) times a day 60 tablet 5     No current facility-administered medications for this visit  Allergies  Allergies   Allergen Reactions    Iodine Anaphylaxis     Allergy to Iodinated and non iodinated dye    Other Anaphylaxis     APPLES    TAPE  Also rash at times    Seasonal Ic [Cholestatin] Allergic Rhinitis       Past Medical History, Social History, Family History, medications and allergies were reviewed  Vitals  Vitals:    12/31/19 1054   BP: 116/68   Pulse: 66   Weight: 53 5 kg (118 lb)       Physical Exam  Physical Exam  On examination she is in no acute distress  Lungs are clear  Cardiac is regular  Abdomen is soft nontender nondistended   examination reveals no CVA tenderness  Skin is warm    Extremities without edema  Neurologic is grossly intact and nonfocal   Gait normal   Affect normal      Results  No results found for: PSA  Lab Results   Component Value Date    GLUCOSE 89 08/07/2015    CALCIUM 8 9 12/18/2019     08/07/2015    K 4 0 12/18/2019    CO2 26 12/18/2019     12/18/2019    BUN 18 12/18/2019    CREATININE 0 87 12/18/2019     Lab Results   Component Value Date    WBC 3 85 (L) 10/28/2019    HGB 13 3 10/28/2019    HCT 42 0 10/28/2019    MCV 92 10/28/2019     (L) 10/28/2019         Office Urine Dip  No results found for this or any previous visit (from the past 1 hour(s))  ]      Total visit time was 25 minutes of which over 50% was spent on counseling

## 2020-01-07 ENCOUNTER — APPOINTMENT (OUTPATIENT)
Dept: RADIOLOGY | Facility: HOSPITAL | Age: 60
End: 2020-01-07
Payer: COMMERCIAL

## 2020-01-07 ENCOUNTER — ANESTHESIA EVENT (OUTPATIENT)
Dept: PERIOP | Facility: HOSPITAL | Age: 60
End: 2020-01-07
Payer: COMMERCIAL

## 2020-01-07 ENCOUNTER — ANESTHESIA (OUTPATIENT)
Dept: PERIOP | Facility: HOSPITAL | Age: 60
End: 2020-01-07
Payer: COMMERCIAL

## 2020-01-07 ENCOUNTER — APPOINTMENT (OUTPATIENT)
Dept: RADIOLOGY | Facility: HOSPITAL | Age: 60
End: 2020-01-07
Attending: UROLOGY
Payer: COMMERCIAL

## 2020-01-07 ENCOUNTER — HOSPITAL ENCOUNTER (OUTPATIENT)
Facility: HOSPITAL | Age: 60
Setting detail: OUTPATIENT SURGERY
Discharge: HOME | End: 2020-01-07
Attending: UROLOGY | Admitting: UROLOGY
Payer: COMMERCIAL

## 2020-01-07 ENCOUNTER — TELEPHONE (OUTPATIENT)
Dept: UROLOGY | Facility: AMBULATORY SURGERY CENTER | Age: 60
End: 2020-01-07

## 2020-01-07 VITALS
SYSTOLIC BLOOD PRESSURE: 128 MMHG | TEMPERATURE: 99.4 F | RESPIRATION RATE: 18 BRPM | HEART RATE: 70 BPM | HEIGHT: 67 IN | BODY MASS INDEX: 18.52 KG/M2 | OXYGEN SATURATION: 99 % | WEIGHT: 118 LBS | DIASTOLIC BLOOD PRESSURE: 71 MMHG

## 2020-01-07 DIAGNOSIS — N20.0 NEPHROLITHIASIS: Primary | ICD-10-CM

## 2020-01-07 DIAGNOSIS — N20.1 LEFT URETERAL STONE: ICD-10-CM

## 2020-01-07 PROCEDURE — 87086 URINE CULTURE/COLONY COUNT: CPT | Performed by: UROLOGY

## 2020-01-07 PROCEDURE — C2617 STENT, NON-COR, TEM W/O DEL: HCPCS | Performed by: UROLOGY

## 2020-01-07 PROCEDURE — C1769 GUIDE WIRE: HCPCS | Performed by: UROLOGY

## 2020-01-07 PROCEDURE — 74420 UROGRAPHY RTRGR +-KUB: CPT

## 2020-01-07 PROCEDURE — 82360 CALCULUS ASSAY QUANT: CPT | Performed by: UROLOGY

## 2020-01-07 PROCEDURE — 74018 RADEX ABDOMEN 1 VIEW: CPT

## 2020-01-07 PROCEDURE — 52356 CYSTO/URETERO W/LITHOTRIPSY: CPT | Performed by: UROLOGY

## 2020-01-07 DEVICE — STENT URETERAL 6FR 24CML INLAY OPTIMA: Type: IMPLANTABLE DEVICE | Site: URETER | Status: FUNCTIONAL

## 2020-01-07 RX ORDER — HYDROCODONE BITARTRATE AND ACETAMINOPHEN 5; 325 MG/1; MG/1
1 TABLET ORAL EVERY 6 HOURS PRN
Qty: 8 TABLET | Refills: 0 | Status: SHIPPED | OUTPATIENT
Start: 2020-01-07 | End: 2020-01-17

## 2020-01-07 RX ORDER — LIDOCAINE HYDROCHLORIDE 10 MG/ML
0.5 INJECTION, SOLUTION EPIDURAL; INFILTRATION; INTRACAUDAL; PERINEURAL ONCE AS NEEDED
Status: COMPLETED | OUTPATIENT
Start: 2020-01-07 | End: 2020-01-07

## 2020-01-07 RX ORDER — HYDROCODONE BITARTRATE AND ACETAMINOPHEN 5; 325 MG/1; MG/1
2 TABLET ORAL EVERY 4 HOURS PRN
Status: DISCONTINUED | OUTPATIENT
Start: 2020-01-07 | End: 2020-01-07 | Stop reason: HOSPADM

## 2020-01-07 RX ORDER — FENTANYL CITRATE 50 UG/ML
INJECTION, SOLUTION INTRAMUSCULAR; INTRAVENOUS AS NEEDED
Status: DISCONTINUED | OUTPATIENT
Start: 2020-01-07 | End: 2020-01-07 | Stop reason: SURG

## 2020-01-07 RX ORDER — ONDANSETRON 2 MG/ML
INJECTION INTRAMUSCULAR; INTRAVENOUS AS NEEDED
Status: DISCONTINUED | OUTPATIENT
Start: 2020-01-07 | End: 2020-01-07 | Stop reason: SURG

## 2020-01-07 RX ORDER — SODIUM CHLORIDE, SODIUM LACTATE, POTASSIUM CHLORIDE, CALCIUM CHLORIDE 600; 310; 30; 20 MG/100ML; MG/100ML; MG/100ML; MG/100ML
125 INJECTION, SOLUTION INTRAVENOUS CONTINUOUS
Status: DISCONTINUED | OUTPATIENT
Start: 2020-01-07 | End: 2020-01-07 | Stop reason: HOSPADM

## 2020-01-07 RX ORDER — LIDOCAINE HYDROCHLORIDE 10 MG/ML
0.5 INJECTION, SOLUTION EPIDURAL; INFILTRATION; INTRACAUDAL; PERINEURAL ONCE AS NEEDED
Status: DISCONTINUED | OUTPATIENT
Start: 2020-01-07 | End: 2020-01-07 | Stop reason: HOSPADM

## 2020-01-07 RX ORDER — PROPOFOL 10 MG/ML
INJECTION, EMULSION INTRAVENOUS CONTINUOUS PRN
Status: DISCONTINUED | OUTPATIENT
Start: 2020-01-07 | End: 2020-01-07 | Stop reason: SURG

## 2020-01-07 RX ORDER — MIDAZOLAM HYDROCHLORIDE 2 MG/2ML
INJECTION, SOLUTION INTRAMUSCULAR; INTRAVENOUS AS NEEDED
Status: DISCONTINUED | OUTPATIENT
Start: 2020-01-07 | End: 2020-01-07 | Stop reason: SURG

## 2020-01-07 RX ORDER — HYDROMORPHONE HCL/PF 1 MG/ML
0.2 SYRINGE (ML) INJECTION
Status: DISCONTINUED | OUTPATIENT
Start: 2020-01-07 | End: 2020-01-07 | Stop reason: HOSPADM

## 2020-01-07 RX ORDER — MAGNESIUM HYDROXIDE 1200 MG/15ML
LIQUID ORAL AS NEEDED
Status: DISCONTINUED | OUTPATIENT
Start: 2020-01-07 | End: 2020-01-07 | Stop reason: HOSPADM

## 2020-01-07 RX ORDER — LIDOCAINE HYDROCHLORIDE 10 MG/ML
INJECTION, SOLUTION EPIDURAL; INFILTRATION; INTRACAUDAL; PERINEURAL AS NEEDED
Status: DISCONTINUED | OUTPATIENT
Start: 2020-01-07 | End: 2020-01-07 | Stop reason: SURG

## 2020-01-07 RX ORDER — CEPHALEXIN 500 MG/1
500 CAPSULE ORAL 3 TIMES DAILY
Qty: 9 CAPSULE | Refills: 0 | Status: SHIPPED | OUTPATIENT
Start: 2020-01-07 | End: 2020-01-10

## 2020-01-07 RX ORDER — DEXAMETHASONE SODIUM PHOSPHATE 4 MG/ML
INJECTION, SOLUTION INTRA-ARTICULAR; INTRALESIONAL; INTRAMUSCULAR; INTRAVENOUS; SOFT TISSUE AS NEEDED
Status: DISCONTINUED | OUTPATIENT
Start: 2020-01-07 | End: 2020-01-07 | Stop reason: SURG

## 2020-01-07 RX ORDER — ONDANSETRON 2 MG/ML
4 INJECTION INTRAMUSCULAR; INTRAVENOUS ONCE AS NEEDED
Status: DISCONTINUED | OUTPATIENT
Start: 2020-01-07 | End: 2020-01-07 | Stop reason: HOSPADM

## 2020-01-07 RX ORDER — PROPOFOL 10 MG/ML
INJECTION, EMULSION INTRAVENOUS AS NEEDED
Status: DISCONTINUED | OUTPATIENT
Start: 2020-01-07 | End: 2020-01-07 | Stop reason: SURG

## 2020-01-07 RX ORDER — FENTANYL CITRATE/PF 50 MCG/ML
25 SYRINGE (ML) INJECTION
Status: DISCONTINUED | OUTPATIENT
Start: 2020-01-07 | End: 2020-01-07 | Stop reason: HOSPADM

## 2020-01-07 RX ORDER — CEFAZOLIN SODIUM 1 G/50ML
1000 SOLUTION INTRAVENOUS ONCE
Status: COMPLETED | OUTPATIENT
Start: 2020-01-07 | End: 2020-01-07

## 2020-01-07 RX ADMIN — FENTANYL CITRATE 25 MCG: 50 INJECTION, SOLUTION INTRAMUSCULAR; INTRAVENOUS at 17:21

## 2020-01-07 RX ADMIN — FENTANYL CITRATE 25 MCG: 50 INJECTION, SOLUTION INTRAMUSCULAR; INTRAVENOUS at 17:03

## 2020-01-07 RX ADMIN — PROPOFOL 170 MG: 10 INJECTION, EMULSION INTRAVENOUS at 16:36

## 2020-01-07 RX ADMIN — DEXAMETHASONE SODIUM PHOSPHATE 4 MG: 4 INJECTION, SOLUTION INTRAMUSCULAR; INTRAVENOUS at 16:36

## 2020-01-07 RX ADMIN — CEFAZOLIN SODIUM 1000 MG: 1 SOLUTION INTRAVENOUS at 16:36

## 2020-01-07 RX ADMIN — FENTANYL CITRATE 25 MCG: 50 INJECTION, SOLUTION INTRAMUSCULAR; INTRAVENOUS at 16:52

## 2020-01-07 RX ADMIN — FENTANYL CITRATE 25 MCG: 50 INJECTION, SOLUTION INTRAMUSCULAR; INTRAVENOUS at 16:48

## 2020-01-07 RX ADMIN — HYDROCODONE BITARTRATE AND ACETAMINOPHEN 1 TABLET: 5; 325 TABLET ORAL at 18:23

## 2020-01-07 RX ADMIN — SODIUM CHLORIDE, SODIUM LACTATE, POTASSIUM CHLORIDE, AND CALCIUM CHLORIDE 125 ML/HR: .6; .31; .03; .02 INJECTION, SOLUTION INTRAVENOUS at 17:53

## 2020-01-07 RX ADMIN — ONDANSETRON 4 MG: 2 INJECTION INTRAMUSCULAR; INTRAVENOUS at 16:36

## 2020-01-07 RX ADMIN — PROPOFOL 130 MCG/KG/MIN: 10 INJECTION, EMULSION INTRAVENOUS at 16:39

## 2020-01-07 RX ADMIN — LIDOCAINE HYDROCHLORIDE 0.5 ML: 10 INJECTION, SOLUTION EPIDURAL; INFILTRATION; INTRACAUDAL; PERINEURAL at 13:59

## 2020-01-07 RX ADMIN — SODIUM CHLORIDE, SODIUM LACTATE, POTASSIUM CHLORIDE, AND CALCIUM CHLORIDE 125 ML/HR: .6; .31; .03; .02 INJECTION, SOLUTION INTRAVENOUS at 13:59

## 2020-01-07 RX ADMIN — MIDAZOLAM 2 MG: 1 INJECTION INTRAMUSCULAR; INTRAVENOUS at 16:25

## 2020-01-07 RX ADMIN — LIDOCAINE HYDROCHLORIDE 50 MG: 10 INJECTION, SOLUTION EPIDURAL; INFILTRATION; INTRACAUDAL; PERINEURAL at 16:36

## 2020-01-07 NOTE — OP NOTE
OPERATIVE REPORT  PATIENT NAME: Taina Pyle    :  1960  MRN: [id number]  Pt Location: [facility]    SURGERY DATE: 2020    Surgeon(s) and Role:     * Juan Andersen MD - Primary    Preop Diagnosis:  Left ureteral stone [N20 1]    Post-Op Diagnosis Codes:     * Left ureteral stone [N20 1]    Procedure(s) (LRB):  CYSTOSCOPY URETEROSCOPY WITH LITHOTRIPSY HOLMIUM LASER, RETROGRADE PYELOGRAM AND INSERTION STENT URETERAL (Left)    Specimen(s):  Urine culture  , left ureteral calculus      Estimated Blood Loss:   Minimal    Drains:  * No LDAs found *    Anesthesia Type:   General    Operative Indications:  Left ureteral stone [N20 1]      Operative Findings:  Left ureteral calculus decimated with holmium laser lithotripsy  Left ureteral stent with string placed    Complications:   None    Procedure and Technique:      Procedure Description: Taina Pyle is a 61y o -year-old female  with a history of left flank pain secondary to a left mid ureteral calculus measuring up to 6 mm  The stone has persisted now in place for almost 1 month with minimal progression  The stone is visualized on the KUB prior to surgery today  Risk and benefits of ureteroscopy were discussed and reviewed  Informed consent was obtained  The patient was brought to the operating room on 2020  After the smooth induction of general LMA anesthesia, the patient was placed in the dorsal lithotomy position  Her genitalia was prepped and draped in a sterile fashion  Intravenous antibiotics were administered  A timeout was performed with all members of the operative team confirmed the patient's identity, procedure to be performed, and laterality of the case  A 22 [place]i rigid cystoscope with 30° lens was inserted  The bladder was thoroughly inspected  It was no evidence of mucosal abnormalities or lesions  There were no calculi identified  Attention was focused on the left ureteral orifice   A [address] [place] [address] open-ended catheter was inserted and a left retrograde pyelogram was performed  A filling defect in the left mid ureter was identified consistent with the stone  A wire was passed proximal to the stone and secured as a safety  A 12 [place]ian Irizarry catheter was inserted for continuous bladder drainage  A long semirigid ureteroscope was then inserted adjacent to the wire  The stone was identified and was engaged and decimated with a 400 µ holmium laser fiber  The fragments were removed with a dimension ZeroTip basket  The stone appeared to of decimated well and there was minimal stone removed with the basket  To be sure there were no fragments that had moved proximally, I passed a 2nd wire followed by an Olympus 5 3 [place] flexible ureteral scope  I thoroughly inspected all calices and the left renal pelvis  There were no stone fragments identified in the left collecting system  The ureteral scope was withdrawn slowly through the ureter and there were no stones remaining either  The ureteral scope was removed  A left 24-6 Mongolian double-J ureteral stent was then placed over top of the wire under fluoroscopic guidance  The proximal coil was appreciated an upper pole calyx and the distal coil was visualized within the bladder  A string was left in place  String was secured to the patient's inner thigh with Tegaderm  Overall the patient tolerated the procedure well and were no complications  The patient was extubated in the operating room and transferred to the PACU in stable condition at the conclusion of the case        Patient Disposition:  PACU stable and extubated    SIGNATURE: Josr Gay MD  DATE: January 7, 2020  TIME: 4:23 PM

## 2020-01-07 NOTE — TELEPHONE ENCOUNTER
Patient underwent left ureteroscopy  She has a stent in place with a string  Patient was instructed to pull her own stent on Friday  Follow up in the outpatient setting with advanced practitioner in 8-12 weeks with DEB[facility]

## 2020-01-07 NOTE — ANESTHESIA POSTPROCEDURE EVALUATION
Post-Op Assessment Note    CV Status:  Stable    Pain management: adequate     Mental Status:  Alert and awake   Hydration Status:  Euvolemic   PONV Controlled:  Controlled   Airway Patency:  Patent   Post Op Vitals Reviewed: Yes      Staff: Anesthesiologist, CRNA   Comments: VSS, reflexes intact, maintains own airway          /73 (01/07/20 1729)    Temp 97 8 °F (36 6 °C) (01/07/20 1729)    Pulse 69 (01/07/20 1729)   Resp 16 (01/07/20 1729)    SpO2 100 % (01/07/20 1729)

## 2020-01-07 NOTE — ANESTHESIA PREPROCEDURE EVALUATION
Review of Systems/Medical History  Patient summary reviewed    History of anesthetic complications PONV    Cardiovascular  Exercise tolerance (METS): >4,  Hyperlipidemia, No CAD , No cardiac stents     Pulmonary  Negative pulmonary ROS No asthma , No recent URI ,        GI/Hepatic  Negative GI/hepatic ROS     Comment: Ulcerative colitis     Kidney stones,        Endo/Other  History of thyroid disease (thyroid nodule) , hypothyroidism,      GYN  Negative gynecology ROS          Hematology  Negative hematology ROS      Musculoskeletal  Negative musculoskeletal ROS   Comment: dequervain's synovitis        Neurology  Negative neurology ROS      Psychology   Negative psychology ROS Anxiety,              Physical Exam    Airway    Mallampati score: II  TM Distance: >3 FB       Dental   No notable dental hx     Cardiovascular      Pulmonary      Other Findings  crown    Lab Results   Component Value Date    WBC 3 85 (L) 10/28/2019    HGB 13 3 10/28/2019     (L) 10/28/2019     Lab Results   Component Value Date    SODIUM 140 12/18/2019    K 4 0 12/18/2019    BUN 18 12/18/2019    CREATININE 0 87 12/18/2019    EGFR 73 12/18/2019    GLUCOSE 89 08/07/2015     No results found for: PTT   No results found for: INR    Blood type A    Lab Results   Component Value Date    HGBA1C 5 5 06/12/2018         Anesthesia Plan  ASA Score- 2     Anesthesia Type- general with ASA Monitors  Additional Monitors:   Airway Plan: ETT  Comment:  JANA Reed , have personally seen and evaluated the patient prior to anesthetic care  I have reviewed the pre-anesthetic record, and other medical records if appropriate to the anesthetic care  If a CRNA is involved in the case, I have reviewed the CRNA assessment, if present, and agree  Risks/benefits and alternatives discussed with patient including possible PONV, sore throat, and possibility of rare anesthetic and surgical emergencies          Plan Factors- Patient instructed to abstain from smoking on day of procedure  Patient did not smoke on day of surgery  Induction- intravenous  Postoperative Plan- Plan for postoperative opioid use  Planned trial extubation    Informed Consent- Anesthetic plan and risks discussed with patient  I personally reviewed this patient with the CRNA  Discussed and agreed on the Anesthesia Plan with the CRNA  Magdiel Boswell

## 2020-01-07 NOTE — INTERVAL H&P NOTE
H&P reviewed  After examining the patient I find no changes in the patients condition since the H&P had been written  Vitals:    01/07/20 1332   BP: 141/77   Pulse: 75   Resp: 16   Temp: 98 8 °F (37 1 °C)   SpO2: 100%       Eugenia Score presents to undergo left-sided ureteroscopy for 6 mm left mid ureteral calculus  KUB on admission today is unchanged from prior still showing the stone in the left mid ureter  Will plan for cystoscopy with left ureteroscopy, holmium laser lithotripsy, left retrograde pyelography and left ureteral stent insertion  Risk and benefits of the procedure discussed and reviewed  Informed consent obtained  Patient understands that I am unable to pass the ureteral scope into the ureter to the level of the stone that she may require stenting alone with interval ureteroscopy in the future

## 2020-01-07 NOTE — DISCHARGE INSTRUCTIONS
Today you underwent left-sided ureteroscopy  He has a stent in place on the left side with a string taped under inner thigh  On Friday morning, remove the tape, pull the string, and remove your stent  Call for follow-up with Dr Alyssa Aguilar in the next 8-12 weeks  [phone]    Ureteroscopy   WHAT YOU NEED TO KNOW:   A ureteroscopy is a procedure to examine in the inside of your urinary tract, which includes your urethra, bladder, ureters, and kidneys  A ureteroscope is a small, thin tube with a light and camera on the end  Ureteroscopy can help your healthcare provider diagnose and treat problems in your urinary tract, such as kidney stones  DISCHARGE INSTRUCTIONS:   Medicine:   · Antibiotics  may be given to treat or prevent an infection  · Take your medicine as directed  Contact your healthcare provider if you think your medicine is not helping or if you have side effects  Tell him or her if you are allergic to any medicine  Keep a list of the medicines, vitamins, and herbs you take  Include the amounts, and when and why you take them  Bring the list or the pill bottles to follow-up visits  Carry your medicine list with you in case of an emergency  Follow up with your healthcare provider as directed:  Write down your questions so you remember to ask them during your visits  Drink liquids as directed  Liquids can help prevent kidney stones and urinary tract infections  Drink water and limit the amount of caffeine you drink  Caffeine may be found in coffee, tea, soda, sports drinks, and foods  Ask your healthcare provider how much liquid to drink each day  Contact your healthcare provider if:   · You have a fever  · You cannot urinate  · You have blood in your urine  · You are vomiting  · You have pain in your abdomen or side  · You have questions or concerns about your condition or care    © 2017 [address] [employer] Information is for End User's use only and may not be sold, redistributed or otherwise used for commercial purposes  All illustrations and images included in CareNotes® are the copyrighted property of A D A M , Inc  or Edwin Alvarado  The above information is an  only  It is not intended as medical advice for individual conditions or treatments  Talk to your doctor, nurse or pharmacist before following any medical regimen to see if it is safe and effective for you

## 2020-01-08 NOTE — TELEPHONE ENCOUNTER
Post Op Note    Holli Hernandez is a 61 y o  female s/p CYSTOSCOPY URETEROSCOPY WITH LITHOTRIPSY HOLMIUM LASER, RETROGRADE PYELOGRAM AND INSERTION STENT URETERAL (Left)  performed 1/7/2020 by Dr Harriett Gifford  Holli Hernandez is seen at the [place] office  How would you rate your pain on a scale from 1 to 10, 10 being the worst pain ever? 5  Have you had a fever? No  Have your bowel movements been regular? Not since surgery  Do you have any difficulty urinating? No  Do you have any other questions or concerns that I can address at this time? No      Reminded patient that she is to remove stent on her own on Friday  Reviewed procedure for doing so, patient to call office after removal to let us know if she was successful  Reviewed post stent removal symptoms including flank pain, nausea, dysuria, and hematuria  Instructed to increase PO fluids  Take NSAIDS and other prescribed pain medication PRN  Encouraged to call with for uncontrolled pain and fever  Appointment scheduled for follow up, patient aware to have KUB ptv

## 2020-01-09 LAB — BACTERIA UR CULT: NORMAL

## 2020-01-09 NOTE — TELEPHONE ENCOUNTER
Call placed to patient  Reviewed post stent removal symptoms including flank pain, nausea, dysuria, and hematuria  Instructed to increase PO fluids  Take NSAIDS and other prescribed pain medication PRN  Encouraged to call with for uncontrolled pain and fever  Patient verbalized understanding

## 2020-01-10 NOTE — TELEPHONE ENCOUNTER
Pt removed stent with string this morning 10:15am has minimal discomfort and blood,will call office if symptoms worsen

## 2020-01-13 ENCOUNTER — APPOINTMENT (OUTPATIENT)
Dept: LAB | Facility: CLINIC | Age: 60
End: 2020-01-13
Payer: COMMERCIAL

## 2020-01-13 DIAGNOSIS — R31.9 HEMATURIA, UNSPECIFIED TYPE: ICD-10-CM

## 2020-01-13 DIAGNOSIS — R30.0 DYSURIA: ICD-10-CM

## 2020-01-13 PROCEDURE — 87086 URINE CULTURE/COLONY COUNT: CPT

## 2020-01-14 ENCOUNTER — TELEPHONE (OUTPATIENT)
Dept: UROLOGY | Facility: MEDICAL CENTER | Age: 60
End: 2020-01-14

## 2020-01-14 LAB — BACTERIA UR CULT: NORMAL

## 2020-01-14 NOTE — TELEPHONE ENCOUNTER
Spoke with patient, states although she is having frequency, pelvic pressure and flank pain, the pain is slowing improving since last Friday in which the pain was "unbearable"  Patient went to lab yesterday to provide urine sample and was negative for infection  Instructed patient to take Motrin for pain and drink plenty of water  Advised patient if pain becomes unbearable once again, she may present to the ED for further evaluation  Patient verbalized understanding, all questions answered

## 2020-01-16 PROBLEM — R42 DIZZINESS: Status: ACTIVE | Noted: 2020-01-16

## 2020-01-16 PROBLEM — H61.21 RIGHT EAR IMPACTED CERUMEN: Status: ACTIVE | Noted: 2020-01-16

## 2020-02-03 LAB
CALCIUM OXALATE DIHYDRATE MFR STONE IR: 20 %
COLOR STONE: NORMAL
COM MFR STONE: 80 %
COMMENT-STONE3: NORMAL
COMPOSITION: NORMAL
LABORATORY COMMENT REPORT: NORMAL
NIDUS STONE QL: NORMAL
PHOTO: NORMAL
SIZE STONE: NORMAL MM
STONE ANALYSIS-IMP: NORMAL
WT STONE: 11 MG

## 2020-02-24 PROBLEM — K51.90 ULCERATIVE COLITIS (HCC): Status: ACTIVE | Noted: 2020-02-24

## 2020-02-25 ENCOUNTER — OFFICE VISIT (OUTPATIENT)
Dept: GASTROENTEROLOGY | Facility: MEDICAL CENTER | Age: 60
End: 2020-02-25
Payer: COMMERCIAL

## 2020-02-25 VITALS
DIASTOLIC BLOOD PRESSURE: 62 MMHG | TEMPERATURE: 98 F | BODY MASS INDEX: 19.46 KG/M2 | HEIGHT: 67 IN | SYSTOLIC BLOOD PRESSURE: 118 MMHG | WEIGHT: 124 LBS | HEART RATE: 68 BPM

## 2020-02-25 DIAGNOSIS — K58.2 IRRITABLE BOWEL SYNDROME WITH BOTH CONSTIPATION AND DIARRHEA: ICD-10-CM

## 2020-02-25 DIAGNOSIS — K51.00 ULCERATIVE PANCOLITIS WITHOUT COMPLICATION (HCC): Primary | ICD-10-CM

## 2020-02-25 DIAGNOSIS — K52.9 COLITIS: ICD-10-CM

## 2020-02-25 PROCEDURE — 99244 OFF/OP CNSLTJ NEW/EST MOD 40: CPT | Performed by: INTERNAL MEDICINE

## 2020-02-25 RX ORDER — AMOXICILLIN 500 MG/1
500 CAPSULE ORAL EVERY 8 HOURS SCHEDULED
COMMUNITY
End: 2020-03-10 | Stop reason: ALTCHOICE

## 2020-02-25 RX ORDER — ZINC OXIDE 13 %
1 CREAM (GRAM) TOPICAL DAILY
Qty: 30 CAPSULE | Refills: 0 | Status: SHIPPED | OUTPATIENT
Start: 2020-02-25 | End: 2020-03-26

## 2020-02-25 NOTE — PATIENT INSTRUCTIONS
Symptoms are likely related to IBS but would like to rule out any inflammation :   1  Colonoscopy  2  Stool test    3  Blood test  4  FODMAP diet  5  Probiotic (Align)     6  Keep a food diary  7  If no improvement then consider a CT scan  If things do not improve we will focus on treatment of the IBS as long as there is no other inflammation seen  1  Rifaximin     2  Elavil

## 2020-02-25 NOTE — PROGRESS NOTES
Outpatient Consultation  [facility]  Ph : [phone]  Fax : [phone]  Mobile : [phone]  Email : [email or web address]  Also available on [employer]      Tamiko Draper 61 y o  female MRN: [id number]    PCP: Eagle Cantu MD  Referring: Eagle Cantu MD  [address]  [address], [address], [address]    Tamiko Draper was seen in consultation  My recommendations are included  Please do not hesitate to contact me with any questions you may have  ASSESSMENT AND PLAN:      1  Abdominal pain and diarrhea  Symptoms are likely related to IBS  Will however would like to rule out ulcerative colitis based on colonoscopy, stool test and check CRP  At the time of the colonoscopy will do random biopsies  2  If no significant inflammation is found and treatment with FODMAP diet, probiotics and dietary changes is not helping then would recommend doing a CT scan and if negative would consider doing Xifaxan or Elavil  Discussed with the patient  Diagnoses and all orders for this visit:    Ulcerative pancolitis without complication (HCC)  -     C-reactive protein; Future  -     Calprotectin,Fecal; Future    Irritable bowel syndrome with both constipation and diarrhea  -     Ambulatory referral to Gastroenterology  -     C-reactive protein; Future  -     Calprotectin,Fecal; Future  -     Probiotic Product (PROBIOTIC DAILY) CAPS; Take 1 capsule by mouth daily May use any over the counter brand - align, equate ([employer]), culturelle, lew or other store brands  Colitis  -     Ambulatory referral to Gastroenterology  -     C-reactive protein; Future  -     Calprotectin,Fecal; Future    Other orders  -     amoxicillin (AMOXIL) 500 mg capsule;  Take 500 mg by mouth every 8 (eight) hours    Patient instructions :  Symptoms are likely related to IBS but would like to rule out any inflammation :   1  Colonoscopy  2  Stool test    3  Blood test  4  FODMAP diet  5  Probiotic (Align)  6  Keep a food diary  7  If no improvement then consider a CT scan  If things do not improve we will focus on treatment of the IBS as long as there is no other inflammation seen  1  Rifaximin  2  Elavil    ______________________________________________________________________    HPI:  60 y/o lady with h/o intermittent abdominal pain in the lower abdomen  She then had a HIDA scan done which showed that the GB EF was 16%  She saw Dr Richar Jackson and was not recommended to have surgery at this time  She has had ? Ulcerative colitis  She had been seeing Dr Igor Menendez  Her last colonoscopy was approximately 2 years ago and was unremarkable  She also had a kidney stone recently on the left  The pain that she has is mostly lower abdomen, radiates to both sides at times and back  May not be with eating  At times associated with nausea  The pain may last for half hour or the whole night  May sometime lead to a BM but without relief  She has had problems for many years  No lactose intolerance that she can tell  She also stopped the sorbitol   She took Bentyl which eases it a bit  She takes twice daily  She has been on this for 4 - 5 years for IBS with diarrhea  She has had IBS for a long time  She has not had heartburn/ reflux  However, she has had some issue with dry foods (crackers, etc)  PRIOR ENDOSCOPIC EVALUATION :     Endoscopy : no    Colonoscopy : yes      REVIEW OF SYSTEMS:    CONSTITUTIONAL: Denies any fever, chills, rigors, and weight loss  HEENT: No earache or tinnitus  Denies hearing loss or visual disturbances  CARDIOVASCULAR: No chest pain or palpitations  RESPIRATORY: Denies any cough, hemoptysis, shortness of breath or dyspnea on exertion  GASTROINTESTINAL: As noted in the History of Present Illness  GENITOURINARY: No problems with urination   Denies any hematuria or dysuria  NEUROLOGIC: No dizziness or vertigo, denies headaches  MUSCULOSKELETAL: Denies any muscle or joint pain  SKIN: Denies skin rashes or itching  ENDOCRINE: Denies excessive thirst  Denies intolerance to heat or cold  PSYCHOSOCIAL: Denies depression or anxiety  Denies any recent memory loss  Historical Information   Past Medical History:   Diagnosis Date    Atopic dermatitis     last assessed 13    Atrophic vaginitis     last assessed 9/2/15    Dermatitis     Dry eye     Frequency of urination     Fungal infection     last assessed 13    Herpes     last assessed 14    Hyperlipidemia     Hypothyroidism     Interstitial cystitis     Osteoporosis     Periodontal abscess     last assessed 13    PONV (postoperative nausea and vomiting)     Thyroid nodule     Ulcerative colitis ([address] )     Urinary frequency     resolved 17    UTI (urinary tract infection)     Vaginal atrophy     Vitamin D deficiency     last assessed 16    Voiding dysfunction     last assessed 10/7/15     Past Surgical History:   Procedure Laterality Date     SECTION       and      COLONOSCOPY      CYSTOSCOPY      diagnostic resolved 05    FL RETROGRADE PYELOGRAM  2020    GANGLION CYST EXCISION Right 2019    Procedure: EXCISION GANGLION CYST RIGHT FIRST DORSAL EXTENSOR COMPARTMENT;  Surgeon: Stella Welch MD;  [facility];  Service: Orthopedics    KIDNEY STONE SURGERY      ORIF PROXIMAL ULNA FRACTURE      metal plates and screws removed    OTHER SURGICAL HISTORY      punch biospy     MN COLONOSCOPY FLX DX W/COLLJ SPEC WHEN PFRMD N/A 2017    Procedure: COLONOSCOPY;  Surgeon: Tati Fierro MD;  [facility];   Service: Colorectal    MN CYSTO/URETERO W/LITHOTRIPSY &INDWELL STENT INSRT Left 2020    Procedure: CYSTOSCOPY URETEROSCOPY WITH LITHOTRIPSY HOLMIUM LASER, RETROGRADE PYELOGRAM AND INSERTION STENT URETERAL;  Surgeon: Roe Oliveros MD;  [facility];  Service: Urology    LA INCIS TENDON SHEATH,RADIAL STYLOID Right 2/19/2019    Procedure: Aurelia Chu;  Surgeon: Rochelle Rodriguez MD;  [facility];  Service: Orthopedics    TUBAL LIGATION      WISDOM TOOTH EXTRACTION  26    X2      Social History   Social History     Substance and Sexual Activity   Alcohol Use Yes    Frequency: Monthly or less    Drinks per session: 1 or 2    Comment: socially - 1-3 drinks per month     Social History     Substance and Sexual Activity   Drug Use No     Social History     Tobacco Use   Smoking Status Never Smoker   Smokeless Tobacco Never Used     Family History   Problem Relation Age of Onset    Gallbladder disease Mother     Thyroid disease Mother     Kidney disease Mother     Breast cancer Mother     Diabetes Father     Hypertension Father     Nephrolithiasis Father     Lymphoma Maternal Grandfather     Diabetes Paternal Grandmother     Breast cancer Paternal Aunt     No Known Problems Son     No Known Problems Son        Meds/Allergies       Current Outpatient Medications:     amoxicillin (AMOXIL) 500 mg capsule    Cholecalciferol (VITAMIN D PO)    Cranberry (ELLURA PO)    cycloSPORINE (RESTASIS) 0 05 % ophthalmic emulsion    dicyclomine (BENTYL) 10 mg capsule    estradiol (ESTRACE) 0 1 mg/g vaginal cream    levothyroxine 50 mcg tablet    mesalamine (ASACOL) 800 MG EC tablet    Meth-Hyo-M Bl-Na Phos-Ph Sal (URO-MP PO)    olopatadine HCl (PATADAY) 0 2 % opth drops    OSPHENA 60 MG TABS    pentosan polysulfate (ELMIRON) 100 mg capsule    rosuvastatin (CRESTOR) 5 mg tablet    trospium chloride (SANCTURA) 20 mg tablet    Probiotic Product (PROBIOTIC DAILY) CAPS    Allergies   Allergen Reactions    Iodine Anaphylaxis     Allergy to Iodinated and non iodinated dye    Other Anaphylaxis     APPLES    TAPE  Also rash at times    Seasonal Ic [Cholestatin] Allergic Rhinitis           Objective     Blood pressure 118/62, pulse 68, temperature 98 °F (36 7 °C), temperature source Tympanic, height 5' 7" (1 702 m), weight 56 2 kg (124 lb)  Body mass index is 19 42 kg/m²  PHYSICAL EXAM:      Physical Exam   Constitutional: She is oriented to person, place, and time  Vital signs are normal  She appears well-developed and well-nourished  HENT:   Head: Normocephalic and atraumatic  Eyes: Pupils are equal, round, and reactive to light  Conjunctivae are normal  No scleral icterus  Neck: Normal range of motion  Cardiovascular: Normal rate, regular rhythm and normal heart sounds  Pulmonary/Chest: Effort normal and breath sounds normal  No respiratory distress  Abdominal: Soft  Normal appearance and bowel sounds are normal  She exhibits no distension, no ascites and no mass  There is no hepatosplenomegaly  There is no tenderness  No hernia  Musculoskeletal: Normal range of motion  Lymphadenopathy:     She has no cervical adenopathy  Neurological: She is alert and oriented to person, place, and time  Skin: Skin is warm  Psychiatric: She has a normal mood and affect  Her behavior is normal  Thought content normal            Lab Results:     Lab Results   Component Value Date    WBC 3 85 (L) 10/28/2019    HGB 13 3 10/28/2019    HCT 42 0 10/28/2019    MCV 92 10/28/2019     (L) 10/28/2019       Lab Results   Component Value Date     08/07/2015    K 4 0 12/18/2019     12/18/2019    CO2 26 12/18/2019    ANIONGAP 5 08/07/2015    BUN 18 12/18/2019    CREATININE 0 87 12/18/2019    GLUCOSE 89 08/07/2015    GLUF 95 10/28/2019    CALCIUM 8 9 12/18/2019    AST 33 12/18/2019    ALT 18 12/18/2019    ALKPHOS 39 (L) 12/18/2019    PROT 7 2 07/10/2015    BILITOT 0 60 07/10/2015    EGFR 73 12/18/2019       No results found for: INR, PROTIME      Radiology Results:   No results found

## 2020-02-28 ENCOUNTER — HOSPITAL ENCOUNTER (OUTPATIENT)
Dept: RADIOLOGY | Facility: HOSPITAL | Age: 60
Discharge: HOME | End: 2020-02-28
Payer: COMMERCIAL

## 2020-02-28 DIAGNOSIS — N20.0 NEPHROLITHIASIS: ICD-10-CM

## 2020-02-28 PROCEDURE — 74018 RADEX ABDOMEN 1 VIEW: CPT

## 2020-03-02 DIAGNOSIS — K58.0 IRRITABLE BOWEL SYNDROME WITH DIARRHEA: ICD-10-CM

## 2020-03-02 DIAGNOSIS — N95.2 VAGINAL ATROPHY: ICD-10-CM

## 2020-03-02 RX ORDER — DICYCLOMINE HYDROCHLORIDE 10 MG/1
10 CAPSULE ORAL
Qty: 270 CAPSULE | Refills: 3 | Status: SHIPPED | OUTPATIENT
Start: 2020-03-02 | End: 2020-10-28

## 2020-03-02 RX ORDER — ESTRADIOL 0.1 MG/G
CREAM VAGINAL
Qty: 42.5 G | Refills: 2 | Status: SHIPPED | OUTPATIENT
Start: 2020-03-02 | End: 2020-09-08 | Stop reason: SDUPTHER

## 2020-03-03 ENCOUNTER — TRANSCRIBE ORDERS (OUTPATIENT)
Dept: LAB | Facility: CLINIC | Age: 60
End: 2020-03-03

## 2020-03-03 ENCOUNTER — APPOINTMENT (OUTPATIENT)
Dept: LAB | Facility: CLINIC | Age: 60
End: 2020-03-03
Payer: COMMERCIAL

## 2020-03-03 DIAGNOSIS — D70.9 GRANULOCYTOPENIA (HCC): ICD-10-CM

## 2020-03-03 DIAGNOSIS — N30.00 ACUTE CYSTITIS WITHOUT HEMATURIA: Primary | ICD-10-CM

## 2020-03-03 DIAGNOSIS — K51.00 ULCERATIVE PANCOLITIS WITHOUT COMPLICATION (HCC): ICD-10-CM

## 2020-03-03 DIAGNOSIS — E03.9 HYPOTHYROIDISM, UNSPECIFIED TYPE: ICD-10-CM

## 2020-03-03 DIAGNOSIS — Z13.220 SCREENING FOR HYPERLIPIDEMIA: ICD-10-CM

## 2020-03-03 DIAGNOSIS — K52.9 COLITIS: ICD-10-CM

## 2020-03-03 DIAGNOSIS — D69.6 THROMBOCYTOPENIA (HCC): ICD-10-CM

## 2020-03-03 DIAGNOSIS — K58.2 IRRITABLE BOWEL SYNDROME WITH BOTH CONSTIPATION AND DIARRHEA: ICD-10-CM

## 2020-03-03 LAB
ALBUMIN SERPL BCP-MCNC: 3.6 G/DL (ref 3.5–5)
ALP SERPL-CCNC: 31 U/L (ref 46–116)
ALT SERPL W P-5'-P-CCNC: 24 U/L (ref 12–78)
ANION GAP SERPL CALCULATED.3IONS-SCNC: 10 MMOL/L (ref 4–13)
AST SERPL W P-5'-P-CCNC: 17 U/L (ref 5–45)
BACTERIA UR QL AUTO: ABNORMAL /HPF
BASOPHILS # BLD AUTO: 0.06 THOUSANDS/ΜL (ref 0–0.1)
BASOPHILS NFR BLD AUTO: 2 % (ref 0–1)
BILIRUB SERPL-MCNC: 0.28 MG/DL (ref 0.2–1)
BILIRUB UR QL STRIP: ABNORMAL
BUN SERPL-MCNC: 20 MG/DL (ref 5–25)
CALCIUM SERPL-MCNC: 8.8 MG/DL (ref 8.3–10.1)
CHLORIDE SERPL-SCNC: 105 MMOL/L (ref 100–108)
CHOLEST SERPL-MCNC: 202 MG/DL (ref 50–200)
CLARITY UR: CLEAR
CO2 SERPL-SCNC: 27 MMOL/L (ref 21–32)
COLOR UR: ABNORMAL
CREAT SERPL-MCNC: 1.06 MG/DL (ref 0.6–1.3)
CRP SERPL QL: <3 MG/L
EOSINOPHIL # BLD AUTO: 0.05 THOUSAND/ΜL (ref 0–0.61)
EOSINOPHIL NFR BLD AUTO: 1 % (ref 0–6)
ERYTHROCYTE [DISTWIDTH] IN BLOOD BY AUTOMATED COUNT: 13.2 % (ref 11.6–15.1)
GFR SERPL CREATININE-BSD FRML MDRD: 58 ML/MIN/1.73SQ M
GLUCOSE P FAST SERPL-MCNC: 90 MG/DL (ref 65–99)
GLUCOSE UR STRIP-MCNC: ABNORMAL MG/DL
HCT VFR BLD AUTO: 41.6 % (ref 34.8–46.1)
HDLC SERPL-MCNC: 69 MG/DL
HGB BLD-MCNC: 12.9 G/DL (ref 11.5–15.4)
HGB UR QL STRIP.AUTO: ABNORMAL
IMM GRANULOCYTES # BLD AUTO: 0.01 THOUSAND/UL (ref 0–0.2)
IMM GRANULOCYTES NFR BLD AUTO: 0 % (ref 0–2)
KETONES UR STRIP-MCNC: ABNORMAL MG/DL
LDLC SERPL CALC-MCNC: 123 MG/DL (ref 0–100)
LEUKOCYTE ESTERASE UR QL STRIP: ABNORMAL
LYMPHOCYTES # BLD AUTO: 0.88 THOUSANDS/ΜL (ref 0.6–4.47)
LYMPHOCYTES NFR BLD AUTO: 25 % (ref 14–44)
MCH RBC QN AUTO: 28.6 PG (ref 26.8–34.3)
MCHC RBC AUTO-ENTMCNC: 31 G/DL (ref 31.4–37.4)
MCV RBC AUTO: 92 FL (ref 82–98)
MONOCYTES # BLD AUTO: 0.34 THOUSAND/ΜL (ref 0.17–1.22)
MONOCYTES NFR BLD AUTO: 10 % (ref 4–12)
NEUTROPHILS # BLD AUTO: 2.23 THOUSANDS/ΜL (ref 1.85–7.62)
NEUTS SEG NFR BLD AUTO: 62 % (ref 43–75)
NITRITE UR QL STRIP: ABNORMAL
NON-SQ EPI CELLS URNS QL MICRO: ABNORMAL /HPF
NONHDLC SERPL-MCNC: 133 MG/DL
NRBC BLD AUTO-RTO: 0 /100 WBCS
PH UR STRIP.AUTO: ABNORMAL [PH]
PLATELET # BLD AUTO: 125 THOUSANDS/UL (ref 149–390)
PMV BLD AUTO: 11.5 FL (ref 8.9–12.7)
POTASSIUM SERPL-SCNC: 4 MMOL/L (ref 3.5–5.3)
PROT SERPL-MCNC: 7.2 G/DL (ref 6.4–8.2)
PROT UR STRIP-MCNC: ABNORMAL MG/DL
RBC # BLD AUTO: 4.51 MILLION/UL (ref 3.81–5.12)
RBC #/AREA URNS AUTO: ABNORMAL /HPF
SODIUM SERPL-SCNC: 142 MMOL/L (ref 136–145)
T4 FREE SERPL-MCNC: 1.01 NG/DL (ref 0.76–1.46)
TRIGL SERPL-MCNC: 51 MG/DL
TSH SERPL DL<=0.05 MIU/L-ACNC: 1.69 UIU/ML (ref 0.36–3.74)
UROBILINOGEN UR QL STRIP.AUTO: ABNORMAL E.U./DL
WBC # BLD AUTO: 3.57 THOUSAND/UL (ref 4.31–10.16)
WBC #/AREA URNS AUTO: ABNORMAL /HPF

## 2020-03-03 PROCEDURE — 80053 COMPREHEN METABOLIC PANEL: CPT

## 2020-03-03 PROCEDURE — 80061 LIPID PANEL: CPT

## 2020-03-03 PROCEDURE — 84439 ASSAY OF FREE THYROXINE: CPT

## 2020-03-03 PROCEDURE — 84443 ASSAY THYROID STIM HORMONE: CPT

## 2020-03-03 PROCEDURE — 86140 C-REACTIVE PROTEIN: CPT

## 2020-03-03 PROCEDURE — 85025 COMPLETE CBC W/AUTO DIFF WBC: CPT

## 2020-03-03 PROCEDURE — 36415 COLL VENOUS BLD VENIPUNCTURE: CPT

## 2020-03-03 PROCEDURE — 81001 URINALYSIS AUTO W/SCOPE: CPT | Performed by: INTERNAL MEDICINE

## 2020-03-09 NOTE — PROGRESS NOTES
Assessment and plan:       1  Nephrolithiasis status post ureteroscopy  - Patient required ureteroscopy for left proximal ureteral stone measuring 6 mm on 01/07/2020  - Stent was removed by the patient without difficulty on 01/10/2020  - Follow-up KUB performed 02/28/2020 was negative for any residual radiopaque urinary calculi  - Stone was 100% calcium oxalate  - She remains asymptomatic  - She will return in 1 year with ultrasound prior to visit  Yanira Mckeon PA-C      Chief Complaint     Chief Complaint   Patient presents with    Nephrolithiasis status post ureteroscopy     follow up          History of Present Illness     Zofia Biggs is a 61 y o  female patient returning for follow-up for her nephrolithiasis  CT scan performed 12/10/2019 was positive for a 6 mm left proximal ureteral stone  She was motivated for medical expulsive therapy but agreeable to moving forward with ureteroscopy should she not pass the stone on her own  She had follow-up imaging performed showing minimal movement of her stone and proceeded for ureteroscopy on 01/07/2020 with Dr Fabienne Romero  Patient removed her own stent without difficulty on 01/10/2020  She returns today for reassessment  KUB completed 02/28/2020 is negative for any radiopaque urinary tract calculi  She does report significant bladder spasms for approximately 24 hours after her stent removal   She also has a history of interstitial cystitis which has been worse since stent removal       Laboratory     Lab Results   Component Value Date    CREATININE 1 06 03/03/2020       No results found for: PSA    No results found for this or any previous visit (from the past 1 hour(s))  Review of Systems     Review of Systems   Constitutional: Negative for chills and fever  HENT: Negative  Eyes: Negative  Respiratory: Negative for shortness of breath  Cardiovascular: Negative for chest pain     Gastrointestinal: Negative for constipation, diarrhea, nausea and vomiting  Genitourinary: Negative for difficulty urinating, dyspareunia, dysuria, enuresis, flank pain, frequency, hematuria and urgency  Musculoskeletal: Negative  Allergies     Allergies   Allergen Reactions    Iodine Anaphylaxis     Allergy to Iodinated and non iodinated dye    Other Anaphylaxis     APPLES    TAPE  Also rash at times    Seasonal Ic [Cholestatin] Allergic Rhinitis       Physical Exam     Physical Exam   Constitutional: She is oriented to person, place, and time  She appears well-developed and well-nourished  No distress  HENT:   Head: Normocephalic and atraumatic  Right Ear: External ear normal    Left Ear: External ear normal    Nose: Nose normal    Eyes: Right eye exhibits no discharge  Left eye exhibits no discharge  No scleral icterus  Cardiovascular: Normal rate  Pulmonary/Chest: Effort normal    Musculoskeletal:   Ambulates independently   Neurological: She is alert and oriented to person, place, and time  Skin: Skin is warm and dry  She is not diaphoretic  Psychiatric: She has a normal mood and affect  Her behavior is normal  Judgment and thought content normal    Nursing note and vitals reviewed          Vital Signs     Vitals:    03/11/20 1456   BP: 138/70   BP Location: Left arm   Patient Position: Sitting   Cuff Size: Adult   Pulse: 76   Weight: 54 kg (119 lb)   Height: 5' 7" (1 702 m)         Current Medications       Current Outpatient Medications:     Cholecalciferol (VITAMIN D PO), Take 3,000 Units by mouth daily, Disp: , Rfl:     Cranberry (ELLURA PO), Take 1 capsule by mouth daily, Disp: , Rfl:     cycloSPORINE (RESTASIS) 0 05 % ophthalmic emulsion, Administer 1 drop to both eyes 2 (two) times a day, Disp: , Rfl:     dicyclomine (BENTYL) 10 mg capsule, Take 1 capsule (10 mg total) by mouth 4 (four) times a day (before meals and at bedtime), Disp: 270 capsule, Rfl: 3    estradiol (ESTRACE) 0 1 mg/g vaginal cream, INSERT 1 APPLICATORFUL VAGINALLY NIGHTLY, Disp: 42 5 g, Rfl: 2    levothyroxine 50 mcg tablet, Take 1 tablet (50 mcg total) by mouth daily, Disp: 90 tablet, Rfl: 1    mesalamine (ASACOL) 800 MG EC tablet, Take 1 tablet (800 mg total) by mouth 3 (three) times a day, Disp: 270 tablet, Rfl: 3    Meth-Hyo-M Bl-Na Phos-Ph Sal (URO-MP PO), Take 1 capsule by mouth 2 (two) times a day , Disp: , Rfl:     olopatadine HCl (PATADAY) 0 2 % opth drops, Administer 1 drop to both eyes as needed , Disp: , Rfl:     OSPHENA 60 MG TABS, daily , Disp: , Rfl:     pentosan polysulfate (ELMIRON) 100 mg capsule, One pill 3 times a day, Disp: 90 capsule, Rfl: 4    Probiotic Product (PROBIOTIC DAILY) CAPS, Take 1 capsule by mouth daily May use any over the counter brand - align, equate ([employer]), culturelle, [employer] or other store brands  , Disp: 30 capsule, Rfl: 0    rosuvastatin (CRESTOR) 5 mg tablet, Take 1 tablet (5 mg total) by mouth every other day, Disp: 45 tablet, Rfl: 2    trospium chloride (SANCTURA) 20 mg tablet, Take 1 tablet (20 mg total) by mouth 2 (two) times a day, Disp: 60 tablet, Rfl: 5      Active Problems     Patient Active Problem List   Diagnosis    Atrophic vaginitis    Urinary urgency    Dense breasts    Irritable bowel syndrome with diarrhea    Nontoxic single thyroid nodule    Osteoporosis    Thrombocytopenia (HCC)    Other ulcerative colitis with abscess (HCC)    De Quervain's tenosynovitis, right    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HCC)    Other specified hypothyroidism    Dysfunctional voiding of urine    Difficult or painful urination    Allergic conjunctivitis of both eyes    Allergic rhinitis    Intrinsic atopic dermatitis    Anxiety    Hashimoto's thyroiditis    Hyperlipidemia    Malaise and fatigue    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[address] )    Periodontal abscess    Vitamin D deficiency    Pollen-food allergy    Allergy to iodinated contrast media    [address]cerative colitis [address] [address]         Past Medical History Past Medical History:   Diagnosis Date    Atopic dermatitis     last assessed 13    Atrophic vaginitis     last assessed 9/2/15    Dermatitis     Dry eye     Frequency of urination     Fungal infection     last assessed 13    Herpes     last assessed 14    Hyperlipidemia     Hypothyroidism     Interstitial cystitis     Osteoporosis     Periodontal abscess     last assessed 13    PONV (postoperative nausea and vomiting)     Thyroid nodule     Ulcerative colitis ([address] )     Urinary frequency     resolved 17    UTI (urinary tract infection)     Vaginal atrophy     Vitamin D deficiency     last assessed 16    Voiding dysfunction     last assessed 10/7/15         Surgical History     Past Surgical History:   Procedure Laterality Date     SECTION       and      COLONOSCOPY      CYSTOSCOPY      diagnostic resolved 05    FL RETROGRADE PYELOGRAM  2020    GANGLION CYST EXCISION Right 2019    Procedure: EXCISION GANGLION CYST RIGHT FIRST DORSAL EXTENSOR COMPARTMENT;  Surgeon: Rochelle Rodriguez MD;  [facility];  Service: Orthopedics    KIDNEY STONE SURGERY      ORIF PROXIMAL ULNA FRACTURE      metal plates and screws removed    OTHER SURGICAL HISTORY      punch biospy     TX COLONOSCOPY FLX DX W/COLLJ SPEC WHEN PFRMD N/A 2017    Procedure: COLONOSCOPY;  Surgeon: Tino Jaquez MD;  [facility];   Service: Colorectal    TX CYSTO/URETERO W/LITHOTRIPSY &INDWELL STENT INSRT Left 2020    Procedure: CYSTOSCOPY URETEROSCOPY WITH LITHOTRIPSY HOLMIUM LASER, RETROGRADE PYELOGRAM AND INSERTION STENT URETERAL;  Surgeon: Roe Oliveros MD;  [facility];  Service: Urology    TX INCIS TENDON [facility] Right 2019    Procedure: Aurelia Chu;  Surgeon: Rochelle Rodriguez MD;  [facility];  Service: Orthopedics    TUBAL LIGATION      [address] [place] [address] Family History     Family History   Problem Relation Age of Onset    Gallbladder disease Mother     Thyroid disease Mother     Kidney disease Mother    Julieann Frankel Breast cancer Mother     Diabetes Father     Hypertension Father     Nephrolithiasis Father     Lymphoma Maternal Grandfather     Diabetes Paternal Grandmother     Breast cancer Paternal Aunt     No Known Problems Son     No Known Problems Son          Social History     Social History       Radiology     ABDOMEN     INDICATION:   N20 0: Calculus of kidney      COMPARISON:  None     VIEWS:  [facility] supine        FINDINGS:     No radiopaque renal calculi seen      No radiopaque ureteral calculi identified  Previously seen 6 mm left ureteral calculus is no longer identified      Pelvic calculi have the appearance of phleboliths      Nonobstructive bowel gas pattern      No acute osseous abnormality is seen      IMPRESSION:     No radiopaque urinary tract calculi

## 2020-03-10 ENCOUNTER — OFFICE VISIT (OUTPATIENT)
Dept: INTERNAL MEDICINE CLINIC | Facility: CLINIC | Age: 60
End: 2020-03-10
Payer: COMMERCIAL

## 2020-03-10 VITALS
WEIGHT: 121 LBS | DIASTOLIC BLOOD PRESSURE: 60 MMHG | BODY MASS INDEX: 18.99 KG/M2 | SYSTOLIC BLOOD PRESSURE: 102 MMHG | HEART RATE: 75 BPM | HEIGHT: 67 IN | TEMPERATURE: 97.9 F | OXYGEN SATURATION: 99 %

## 2020-03-10 DIAGNOSIS — N20.1 LEFT URETERAL STONE: ICD-10-CM

## 2020-03-10 DIAGNOSIS — E78.01 FAMILIAL HYPERCHOLESTEROLEMIA: ICD-10-CM

## 2020-03-10 DIAGNOSIS — E03.8 OTHER SPECIFIED HYPOTHYROIDISM: ICD-10-CM

## 2020-03-10 DIAGNOSIS — D69.6 THROMBOCYTOPENIA (HCC): Primary | ICD-10-CM

## 2020-03-10 DIAGNOSIS — K51.814 OTHER ULCERATIVE COLITIS WITH ABSCESS (HCC): ICD-10-CM

## 2020-03-10 DIAGNOSIS — D70.9 NEUTROPENIC DISORDER (HCC): ICD-10-CM

## 2020-03-10 DIAGNOSIS — K58.0 IRRITABLE BOWEL SYNDROME WITH DIARRHEA: ICD-10-CM

## 2020-03-10 PROBLEM — N39.0 URINARY TRACT INFECTION: Status: RESOLVED | Noted: 2019-12-05 | Resolved: 2020-03-10

## 2020-03-10 PROBLEM — H61.21 RIGHT EAR IMPACTED CERUMEN: Status: RESOLVED | Noted: 2020-01-16 | Resolved: 2020-03-10

## 2020-03-10 PROBLEM — B00.9 HERPES SIMPLEX VIRUS (HSV) INFECTION: Status: RESOLVED | Noted: 2019-12-05 | Resolved: 2020-03-10

## 2020-03-10 PROBLEM — R10.10 PAIN OF UPPER ABDOMEN: Status: RESOLVED | Noted: 2019-10-25 | Resolved: 2020-03-10

## 2020-03-10 PROBLEM — R42 DIZZINESS: Status: RESOLVED | Noted: 2020-01-16 | Resolved: 2020-03-10

## 2020-03-10 PROBLEM — R19.7 DIARRHEA: Status: RESOLVED | Noted: 2019-10-25 | Resolved: 2020-03-10

## 2020-03-10 PROBLEM — R22.41 FOOT MASS, RIGHT: Status: RESOLVED | Noted: 2019-04-09 | Resolved: 2020-03-10

## 2020-03-10 PROBLEM — B49 INFECTION DUE TO FUNGUS: Status: RESOLVED | Noted: 2019-12-05 | Resolved: 2020-03-10

## 2020-03-10 PROBLEM — R35.0 INCREASED FREQUENCY OF URINATION: Status: RESOLVED | Noted: 2019-11-14 | Resolved: 2020-03-10

## 2020-03-10 PROCEDURE — 93000 ELECTROCARDIOGRAM COMPLETE: CPT | Performed by: INTERNAL MEDICINE

## 2020-03-10 PROCEDURE — 99396 PREV VISIT EST AGE 40-64: CPT | Performed by: INTERNAL MEDICINE

## 2020-03-10 RX ORDER — ROSUVASTATIN CALCIUM 5 MG/1
5 TABLET, COATED ORAL EVERY OTHER DAY
Qty: 45 TABLET | Refills: 2
Start: 2020-03-10 | End: 2021-01-25 | Stop reason: SDUPTHER

## 2020-03-11 ENCOUNTER — OFFICE VISIT (OUTPATIENT)
Dept: UROLOGY | Facility: CLINIC | Age: 60
End: 2020-03-11
Payer: COMMERCIAL

## 2020-03-11 VITALS
SYSTOLIC BLOOD PRESSURE: 138 MMHG | WEIGHT: 119 LBS | DIASTOLIC BLOOD PRESSURE: 70 MMHG | HEART RATE: 76 BPM | BODY MASS INDEX: 18.68 KG/M2 | HEIGHT: 67 IN

## 2020-03-11 DIAGNOSIS — N20.0 NEPHROLITHIASIS: Primary | ICD-10-CM

## 2020-03-11 PROCEDURE — 99213 OFFICE O/P EST LOW 20 MIN: CPT | Performed by: PHYSICIAN ASSISTANT

## 2020-03-11 PROCEDURE — 1036F TOBACCO NON-USER: CPT | Performed by: PHYSICIAN ASSISTANT

## 2020-03-11 PROCEDURE — 3008F BODY MASS INDEX DOCD: CPT | Performed by: PHYSICIAN ASSISTANT

## 2020-03-11 NOTE — ASSESSMENT & PLAN NOTE
Gradually decreasing white blood cell count over the past several test   I believe this may be due to the patient's Elmiron medication  She is reluctant to discontinue the medication as it has had significant benefit for her bladder symptoms  Will repeat blood work in 6 months

## 2020-03-11 NOTE — PROGRESS NOTES
Assessment/Plan:    Other ulcerative colitis with abscess ([facility] )  History of ulcerative colitis with present treatment of Asacol  She has an upcoming colonoscopy scheduled for follow-up evaluation  She is presently having no active symptoms of abdominal pain  Recommend continuation of Asacol pending outcome of colonoscopy  Irritable bowel syndrome with diarrhea  Irritable bowel syndrome symptoms frequently triggered by increased stress levels  Symptoms presently are relatively stable I recommend the use of Bentyl 10 mg 4 times a day for control of symptoms  Other specified hypothyroidism  Hypothyroid condition she continues on 50 mcg of levothyroxine most recent free T4 TSH indicate adequate replacement  Clinically she is stable and we should continue on the present dosing of levothyroxine  Thrombocytopenia (HCC)  Chronic thrombocytopenia reviewed the patient's most recent CBC recommend continued observation she has no symptoms of abnormal bleeding at this time  Follow-up blood work has been requested in 6 months with an office visit at that time    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[facility] )  Gradually decreasing white blood cell count over the past several test   I believe this may be due to the patient's Elmiron medication  She is reluctant to discontinue the medication as it has had significant benefit for her bladder symptoms  Will repeat blood work in 6 months  Hyperlipidemia  Reviewed the patient's lipid profile with her today I recommend continuation of her rosuvastatin on an every-other-day basis with attempt to improve diet by decreasing saturated fats  Follow-up blood work as requested in 6 months  The patient understands benefit of reducing cholesterol values  Diagnoses and all orders for this visit:    Thrombocytopenia ([facility] )  -     CBC and differential; Future    Familial hypercholesterolemia  -     POCT ECG  -     rosuvastatin (CRESTOR) 5 mg tablet;  Take 1 tablet (5 mg total) by mouth every other day  -     Lipid panel; Future    Left ureteral stone  -     Comprehensive metabolic panel; Future    Irritable bowel syndrome with diarrhea    Other ulcerative colitis with abscess ([address] )    Other specified hypothyroidism    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HCC)        Subjective:      Patient ID: Anton Han is a 61 y o  female  This is an annual complete physical examination for this 72-year-old female patient  She has a medical history of hypothyroid condition, irritable bowel condition, ulcerative colitis, hypothyroid condition, thrombocytopenia, decreased white blood cell count, and hyperlipidemia  She has most recently experienced a kidney stone which required stent insertion and removal of stone  We reviewed the events of this stone removal   Present time she is doing well  We reminded during today's visit to maintain aggressive hydration to reduce the chances of future stone formation  She recently had some dental work and continues follow-up with the oral surgery Department at [facility]  She has met with her gastroenterologist and has an upcoming EGD along with colonoscopy examination  She has had symptoms of abdominal pain at times with a previous diagnosis of ulcerative colitis and irritable bowel syndrome  She continues on Asacol at the present time  The following portions of the patient's history were reviewed and updated as appropriate:   She  has a past medical history of Atopic dermatitis, Atrophic vaginitis, Dermatitis, Dry eye, Frequency of urination, Fungal infection, Herpes, Hyperlipidemia, Hypothyroidism, Interstitial cystitis, Osteoporosis, Periodontal abscess, PONV (postoperative nausea and vomiting), Thyroid nodule, Ulcerative colitis ([address] ), Urinary frequency, UTI (urinary tract infection), Vaginal atrophy, Vitamin D deficiency, and Voiding dysfunction    She   Patient Active Problem List    Diagnosis Date Noted    Ulcerative colitis ([address] ) 02/24/2020    Allergic conjunctivitis of both eyes 2019    Intrinsic atopic dermatitis 2019    Periodontal abscess 2019    Pollen-food allergy 2019    Allergy to iodinated contrast media 2019    Dysfunctional voiding of urine 2019    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[facility] ) 2019    Other specified hypothyroidism 2019    De Quervain's tenosynovitis, right 2019    Anxiety 2017    Nontoxic single thyroid nodule 2016    Urinary urgency 2016    Irritable bowel syndrome with diarrhea 2016    [facility] ) 2016    Hashimoto's thyroiditis 2015    Malaise and fatigue 2015    Vitamin D deficiency 2015    Other ulcerative colitis with abscess ([facility] ) 2015    Difficult or painful urination 2015    Hyperlipidemia 2015    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[facility] ) 2015    Dense breasts 10/28/2014    Osteoporosis 2013    Atrophic vaginitis 2013    Allergic rhinitis 2012     She  has a past surgical history that includes Kidney stone surgery; ORIF proximal ulna fracture; pr colonoscopy flx dx w/collj spec when pfrmd (N/A, 2017); Cystoscopy;  section; pr incis tendon sheath,radial styloid (Right, 2019); Ganglion cyst excision (Right, 2019); [place] tooth extraction (); Tubal ligation; Other surgical history; Colonoscopy; pr cysto/uretero w/lithotripsy &indwell stent insrt (Left, 2020); and [place] retrograde pyelogram (2020)  Her family history includes Breast cancer in her mother and paternal aunt; Diabetes in her father and paternal grandmother; Gallbladder disease in her mother; Hypertension in her father; Kidney disease in her mother; Lymphoma in her maternal grandfather; Nephrolithiasis in her father; No Known Problems in her son and son; Thyroid disease in her mother  She  reports that she has never smoked  She has never used smokeless tobacco  She reports that she drinks alcohol  She reports that she does not use drugs  Current Outpatient Medications   Medication Sig Dispense Refill    Cholecalciferol (VITAMIN D PO) Take 3,000 Units by mouth daily      Cranberry (ELLURA PO) Take 1 capsule by mouth daily      cycloSPORINE (RESTASIS) 0 05 % ophthalmic emulsion Administer 1 drop to both eyes 2 (two) times a day      dicyclomine (BENTYL) 10 mg capsule Take 1 capsule (10 mg total) by mouth 4 (four) times a day (before meals and at bedtime) 270 capsule 3    estradiol (ESTRACE) 0 1 mg/g vaginal cream INSERT 1 APPLICATORFUL VAGINALLY NIGHTLY 42 5 g 2    levothyroxine 50 mcg tablet Take 1 tablet (50 mcg total) by mouth daily 90 tablet 1    mesalamine (ASACOL) 800 MG EC tablet Take 1 tablet (800 mg total) by mouth 3 (three) times a day 270 tablet 3    Meth-Hyo-M Bl-Na Phos-Ph Sal (URO-MP PO) Take 1 capsule by mouth 2 (two) times a day       olopatadine HCl (PATADAY) 0 2 % opth drops Administer 1 drop to both eyes as needed       OSPHENA 60 MG TABS daily       pentosan polysulfate (ELMIRON) 100 mg capsule One pill 3 times a day 90 capsule 4    Probiotic Product (PROBIOTIC DAILY) CAPS Take 1 capsule by mouth daily May use any over the counter brand - align, equate ([employer]), culturelle, lew or other store brands  30 capsule 0    rosuvastatin (CRESTOR) 5 mg tablet Take 1 tablet (5 mg total) by mouth every other day 45 tablet 2    trospium chloride (SANCTURA) 20 mg tablet Take 1 tablet (20 mg total) by mouth 2 (two) times a day 60 tablet 5     No current facility-administered medications for this visit       Review of Systems   All other systems reviewed and are negative  Objective:      /60   Pulse 75   Temp 97 9 °F (36 6 °C)   Ht 5' 7" (1 702 m)   Wt 54 9 kg (121 lb)   LMP  (LMP Unknown)   SpO2 99%   BMI 18 95 kg/m²          Physical Exam   Constitutional: She is oriented to person, place, and time   Vital signs are normal  She appears well-developed and well-nourished  She is cooperative  No distress  HENT:   Right Ear: Hearing, tympanic membrane, external ear and ear canal normal    Left Ear: Hearing, tympanic membrane, external ear and ear canal normal    Nose: Nose normal  No mucosal edema  Mouth/Throat: Uvula is midline, oropharynx is clear and moist and mucous membranes are normal    Eyes: Pupils are equal, round, and reactive to light  Conjunctivae and lids are normal  Right eye exhibits no discharge  Left eye exhibits no discharge  No scleral icterus  Neck: No JVD present  Carotid bruit is not present  No thyromegaly present  Cardiovascular: Normal rate, regular rhythm, normal heart sounds and intact distal pulses  No murmur heard  Pulmonary/Chest: Effort normal and breath sounds normal  No stridor  No respiratory distress  She has no wheezes  She has no rales  Abdominal: Soft  Normal appearance and bowel sounds are normal  She exhibits no distension and no mass  There is no tenderness  There is no guarding  Musculoskeletal: Normal range of motion  She exhibits no edema, tenderness or deformity  Lymphadenopathy:     She has no cervical adenopathy  Neurological: She is alert and oriented to person, place, and time  She has normal reflexes  She displays normal reflexes  No cranial nerve deficit  She exhibits normal muscle tone  Coordination normal    Skin: Skin is warm, dry and intact  No rash noted  She is not diaphoretic  No erythema  No pallor  Psychiatric: She has a normal mood and affect  Her speech is normal and behavior is normal  Judgment and thought content normal  Cognition and memory are normal    Vitals reviewed

## 2020-03-11 NOTE — ASSESSMENT & PLAN NOTE
Reviewed the patient's lipid profile with her today I recommend continuation of her rosuvastatin on an every-other-day basis with attempt to improve diet by decreasing saturated fats  Follow-up blood work as requested in 6 months  The patient understands benefit of reducing cholesterol values

## 2020-03-11 NOTE — PATIENT INSTRUCTIONS
Dietary Management of Kidney Stone Disease    The dietary recommendations for most people who make kidney stones (especially the most common calcium oxalate stones) are uncomplicated and are not too tedious or bland  Most importantly, the following recommendations also promote better health for a variety of reasons  Fluids: The single most important change for the majority patients is the need to greatly increase fluid intake  You should at least produce two liters (about two quarts) of urine each day  Depending on the heat outdoors and your level of physical activity, this usually means consuming ten, 10 ounce glasses (100 ounces) of fluid per day  Water is always a good choice, but other drinks including tea, coffee, soda, and juice are also allowed as long as no one beverage becomes the sole source of fluid  CALCIUM:  There is excellent evidence that calcium should not be avoided, but instead moderated  A range of 600 to 1,100 mg of calcium per day, especially consumed at meals is probably a reasonable target  (i e  2-3 dairy servings per day) This might include small servings of yogurt, milk or ice cream   This amount helps avoid over-absorption of oxalate from the digestive tract and also allows for healthy bone maintenance  SODIUM (SALT): Too much salt in your diet (both from the shaker and in the prepared foods that we buy) is bad for your blood pressure, bad for your heart, and also increases the amount of calcium in your urine  A reasonable sodium restriction to 2,000-2,500mg/day (about the amount in one teaspoon) is an excellent target  You should get into the habit of reading the Nutrients labels on all the foods that you eat and watch out for the foods that have a high sodium content (snack foods, smoked or processed foods, caned foods)  Fresh and frozen foods usually have the least amount of sodium      PROTEIN:  High protein diets from animal meat (beef, chicken, pork, fish) also increases the rate of kidney stone formation and is equally unhealthy for your heart  All patients should moderate their meat intake to 3-7 ounces per day, and particularly stay away from red meat protein  OXALATE:  Most stone-formers should avoid heavy intake of oxalate-rich foods  These include green roughage (spinach, mustard, kale), strawberries, chocolate, tea, iced tea, and nuts  In addition, heavy, excess doses of Vitamin C can also produce surges in urinary oxalate levels and should be avoided  BARE-BONES RECOMMENDATIONS:  1  Fluids, fluids, fluids  2  Low salt diet (your primary care doctor will love you)  3  Moderate red meat intake  4  Moderate calcium (dairy products), especially with meals        Calcium citrate

## 2020-03-11 NOTE — ASSESSMENT & PLAN NOTE
Hypothyroid condition she continues on 50 mcg of levothyroxine most recent free T4 TSH indicate adequate replacement  Clinically she is stable and we should continue on the present dosing of levothyroxine

## 2020-03-11 NOTE — ASSESSMENT & PLAN NOTE
Irritable bowel syndrome symptoms frequently triggered by increased stress levels  Symptoms presently are relatively stable I recommend the use of Bentyl 10 mg 4 times a day for control of symptoms

## 2020-03-11 NOTE — ASSESSMENT & PLAN NOTE
Chronic thrombocytopenia reviewed the patient's most recent CBC recommend continued observation she has no symptoms of abnormal bleeding at this time    Follow-up blood work has been requested in 6 months with an office visit at that time

## 2020-03-11 NOTE — ASSESSMENT & PLAN NOTE
History of ulcerative colitis with present treatment of Asacol  She has an upcoming colonoscopy scheduled for follow-up evaluation  She is presently having no active symptoms of abdominal pain  Recommend continuation of Asacol pending outcome of colonoscopy

## 2020-03-30 DIAGNOSIS — B36.9 FUNGAL DERMATITIS: Primary | ICD-10-CM

## 2020-03-30 RX ORDER — NYSTATIN 100000 U/G
CREAM TOPICAL 2 TIMES DAILY
Qty: 30 G | Refills: 0 | Status: SHIPPED | OUTPATIENT
Start: 2020-03-30 | End: 2021-12-08 | Stop reason: HOSPADM

## 2020-03-30 RX ORDER — FLUCONAZOLE 100 MG/1
100 TABLET ORAL DAILY
Qty: 7 TABLET | Refills: 0 | Status: SHIPPED | OUTPATIENT
Start: 2020-03-30 | End: 2020-04-06

## 2020-05-04 DIAGNOSIS — E04.1 NONTOXIC UNINODULAR GOITER: ICD-10-CM

## 2020-05-04 RX ORDER — LEVOTHYROXINE SODIUM 0.05 MG/1
50 TABLET ORAL DAILY
Qty: 90 TABLET | Refills: 2 | Status: SHIPPED | OUTPATIENT
Start: 2020-05-04 | End: 2021-01-11 | Stop reason: SDUPTHER

## 2020-06-05 DIAGNOSIS — N30.10 INTERSTITIAL CYSTITIS: ICD-10-CM

## 2020-06-16 ENCOUNTER — TRANSCRIBE ORDERS (OUTPATIENT)
Dept: LAB | Facility: CLINIC | Age: 60
End: 2020-06-16

## 2020-06-16 ENCOUNTER — APPOINTMENT (OUTPATIENT)
Dept: LAB | Facility: CLINIC | Age: 60
End: 2020-06-16
Payer: COMMERCIAL

## 2020-06-16 DIAGNOSIS — K52.9 COLITIS: ICD-10-CM

## 2020-06-16 DIAGNOSIS — K58.2 IRRITABLE BOWEL SYNDROME WITH BOTH CONSTIPATION AND DIARRHEA: ICD-10-CM

## 2020-06-16 DIAGNOSIS — K51.00 ULCERATIVE PANCOLITIS WITHOUT COMPLICATION (HCC): ICD-10-CM

## 2020-06-16 DIAGNOSIS — R30.0 DIFFICULT OR PAINFUL URINATION: ICD-10-CM

## 2020-06-16 DIAGNOSIS — Z12.11 COLON CANCER SCREENING: ICD-10-CM

## 2020-06-16 LAB — HEMOCCULT STL QL IA: POSITIVE

## 2020-06-16 PROCEDURE — 83993 ASSAY FOR CALPROTECTIN FECAL: CPT

## 2020-06-16 PROCEDURE — G0328 FECAL BLOOD SCRN IMMUNOASSAY: HCPCS

## 2020-06-20 LAB — CALPROTECTIN STL-MCNT: 52 UG/G (ref 0–120)

## 2020-06-23 ENCOUNTER — TELEPHONE (OUTPATIENT)
Dept: GASTROENTEROLOGY | Facility: AMBULARY SURGERY CENTER | Age: 60
End: 2020-06-23

## 2020-07-27 DIAGNOSIS — K52.9 COLITIS WITHOUT COMPLICATION: ICD-10-CM

## 2020-07-27 RX ORDER — MESALAMINE 800 MG/1
800 TABLET, DELAYED RELEASE ORAL 3 TIMES DAILY
Qty: 270 TABLET | Refills: 3 | Status: SHIPPED | OUTPATIENT
Start: 2020-07-27 | End: 2020-11-02 | Stop reason: SDUPTHER

## 2020-08-16 ENCOUNTER — APPOINTMENT (OUTPATIENT)
Dept: LAB | Facility: HOSPITAL | Age: 60
End: 2020-08-16
Payer: COMMERCIAL

## 2020-08-16 PROCEDURE — 87086 URINE CULTURE/COLONY COUNT: CPT

## 2020-08-17 DIAGNOSIS — R30.0 DIFFICULT OR PAINFUL URINATION: Primary | ICD-10-CM

## 2020-08-19 LAB — BACTERIA UR CULT: NORMAL

## 2020-09-02 ENCOUNTER — APPOINTMENT (OUTPATIENT)
Dept: LAB | Facility: CLINIC | Age: 60
End: 2020-09-02
Payer: COMMERCIAL

## 2020-09-02 ENCOUNTER — TRANSCRIBE ORDERS (OUTPATIENT)
Dept: LAB | Facility: CLINIC | Age: 60
End: 2020-09-02

## 2020-09-02 DIAGNOSIS — N20.1 CALCULUS OF URETER: ICD-10-CM

## 2020-09-02 DIAGNOSIS — E78.01 FAMILIAL HYPERCHOLESTEROLEMIA: ICD-10-CM

## 2020-09-02 DIAGNOSIS — D69.6 THROMBOCYTOPENIA (HCC): ICD-10-CM

## 2020-09-02 DIAGNOSIS — N20.1 CALCULUS OF URETER: Primary | ICD-10-CM

## 2020-09-02 LAB
ALBUMIN SERPL BCP-MCNC: 3.7 G/DL (ref 3.5–5)
ALP SERPL-CCNC: 33 U/L (ref 46–116)
ALT SERPL W P-5'-P-CCNC: 20 U/L (ref 12–78)
ANION GAP SERPL CALCULATED.3IONS-SCNC: 9 MMOL/L (ref 4–13)
AST SERPL W P-5'-P-CCNC: 16 U/L (ref 5–45)
BASOPHILS # BLD AUTO: 0.03 THOUSANDS/ΜL (ref 0–0.1)
BASOPHILS NFR BLD AUTO: 1 % (ref 0–1)
BILIRUB SERPL-MCNC: 0.43 MG/DL (ref 0.2–1)
BUN SERPL-MCNC: 18 MG/DL (ref 5–25)
CALCIUM SERPL-MCNC: 9 MG/DL (ref 8.3–10.1)
CHLORIDE SERPL-SCNC: 103 MMOL/L (ref 100–108)
CHOLEST SERPL-MCNC: 206 MG/DL (ref 50–200)
CO2 SERPL-SCNC: 28 MMOL/L (ref 21–32)
CREAT SERPL-MCNC: 0.92 MG/DL (ref 0.6–1.3)
EOSINOPHIL # BLD AUTO: 0.06 THOUSAND/ΜL (ref 0–0.61)
EOSINOPHIL NFR BLD AUTO: 2 % (ref 0–6)
ERYTHROCYTE [DISTWIDTH] IN BLOOD BY AUTOMATED COUNT: 12.9 % (ref 11.6–15.1)
GFR SERPL CREATININE-BSD FRML MDRD: 68 ML/MIN/1.73SQ M
GLUCOSE P FAST SERPL-MCNC: 91 MG/DL (ref 65–99)
HCT VFR BLD AUTO: 42.3 % (ref 34.8–46.1)
HDLC SERPL-MCNC: 77 MG/DL
HGB BLD-MCNC: 13.2 G/DL (ref 11.5–15.4)
IMM GRANULOCYTES # BLD AUTO: 0 THOUSAND/UL (ref 0–0.2)
IMM GRANULOCYTES NFR BLD AUTO: 0 % (ref 0–2)
LDLC SERPL CALC-MCNC: 110 MG/DL (ref 0–100)
LYMPHOCYTES # BLD AUTO: 1.04 THOUSANDS/ΜL (ref 0.6–4.47)
LYMPHOCYTES NFR BLD AUTO: 33 % (ref 14–44)
MCH RBC QN AUTO: 28.6 PG (ref 26.8–34.3)
MCHC RBC AUTO-ENTMCNC: 31.2 G/DL (ref 31.4–37.4)
MCV RBC AUTO: 92 FL (ref 82–98)
MONOCYTES # BLD AUTO: 0.35 THOUSAND/ΜL (ref 0.17–1.22)
MONOCYTES NFR BLD AUTO: 11 % (ref 4–12)
NEUTROPHILS # BLD AUTO: 1.66 THOUSANDS/ΜL (ref 1.85–7.62)
NEUTS SEG NFR BLD AUTO: 53 % (ref 43–75)
NONHDLC SERPL-MCNC: 129 MG/DL
NRBC BLD AUTO-RTO: 0 /100 WBCS
PLATELET # BLD AUTO: 120 THOUSANDS/UL (ref 149–390)
PMV BLD AUTO: 11.6 FL (ref 8.9–12.7)
POTASSIUM SERPL-SCNC: 3.6 MMOL/L (ref 3.5–5.3)
PROT SERPL-MCNC: 7.4 G/DL (ref 6.4–8.2)
RBC # BLD AUTO: 4.61 MILLION/UL (ref 3.81–5.12)
SODIUM SERPL-SCNC: 140 MMOL/L (ref 136–145)
TRIGL SERPL-MCNC: 93 MG/DL
WBC # BLD AUTO: 3.14 THOUSAND/UL (ref 4.31–10.16)

## 2020-09-02 PROCEDURE — 80061 LIPID PANEL: CPT

## 2020-09-02 PROCEDURE — 80053 COMPREHEN METABOLIC PANEL: CPT

## 2020-09-02 PROCEDURE — 36415 COLL VENOUS BLD VENIPUNCTURE: CPT

## 2020-09-02 PROCEDURE — 85025 COMPLETE CBC W/AUTO DIFF WBC: CPT

## 2020-09-08 ENCOUNTER — OFFICE VISIT (OUTPATIENT)
Dept: INTERNAL MEDICINE CLINIC | Facility: CLINIC | Age: 60
End: 2020-09-08
Payer: COMMERCIAL

## 2020-09-08 VITALS
BODY MASS INDEX: 19.62 KG/M2 | HEIGHT: 67 IN | HEART RATE: 85 BPM | WEIGHT: 125 LBS | SYSTOLIC BLOOD PRESSURE: 118 MMHG | TEMPERATURE: 98.8 F | OXYGEN SATURATION: 97 % | DIASTOLIC BLOOD PRESSURE: 68 MMHG

## 2020-09-08 DIAGNOSIS — E03.8 OTHER SPECIFIED HYPOTHYROIDISM: ICD-10-CM

## 2020-09-08 DIAGNOSIS — D69.6 THROMBOCYTOPENIA (HCC): ICD-10-CM

## 2020-09-08 DIAGNOSIS — D70.9 NEUTROPENIC DISORDER (HCC): ICD-10-CM

## 2020-09-08 DIAGNOSIS — K52.9 COLITIS: Primary | ICD-10-CM

## 2020-09-08 DIAGNOSIS — N20.0 KIDNEY STONE: ICD-10-CM

## 2020-09-08 DIAGNOSIS — N95.2 VAGINAL ATROPHY: ICD-10-CM

## 2020-09-08 DIAGNOSIS — R10.9 ABDOMINAL PAIN, UNSPECIFIED ABDOMINAL LOCATION: ICD-10-CM

## 2020-09-08 DIAGNOSIS — E78.01 FAMILIAL HYPERCHOLESTEROLEMIA: ICD-10-CM

## 2020-09-08 PROBLEM — H10.13 ALLERGIC CONJUNCTIVITIS OF BOTH EYES: Status: RESOLVED | Noted: 2019-12-05 | Resolved: 2020-09-08

## 2020-09-08 PROBLEM — L20.84 INTRINSIC ATOPIC DERMATITIS: Status: RESOLVED | Noted: 2019-12-05 | Resolved: 2020-09-08

## 2020-09-08 PROBLEM — M65.4 DE QUERVAIN'S TENOSYNOVITIS, RIGHT: Status: RESOLVED | Noted: 2019-01-09 | Resolved: 2020-09-08

## 2020-09-08 PROBLEM — K05.219 PERIODONTAL ABSCESS: Status: RESOLVED | Noted: 2019-12-05 | Resolved: 2020-09-08

## 2020-09-08 PROBLEM — B36.9 FUNGAL DERMATITIS: Status: RESOLVED | Noted: 2020-03-30 | Resolved: 2020-09-08

## 2020-09-08 PROCEDURE — 99214 OFFICE O/P EST MOD 30 MIN: CPT | Performed by: INTERNAL MEDICINE

## 2020-09-08 RX ORDER — ESTRADIOL 0.1 MG/G
CREAM VAGINAL
Qty: 42.5 G | Refills: 2 | Status: SHIPPED | OUTPATIENT
Start: 2020-09-08 | End: 2021-10-11

## 2020-09-08 RX ORDER — METHENAMINE, PHENYL SALICYLATE, SODIUM PHOSPHATE, MONOBASIC, ANHYDROUS, METHYLENE BLUE ANHYDROUS, AND HYOSCYAMINE SULFATE 118; 36; 40.8; 10; .12 MG/1; MG/1; MG/1; MG/1; MG/1
CAPSULE ORAL
COMMUNITY
Start: 2020-07-05 | End: 2021-12-08 | Stop reason: HOSPADM

## 2020-09-08 NOTE — ASSESSMENT & PLAN NOTE
A review of the patient's most recent CBC shows a mild chronic thrombocytopenia  Discussed this with the patient of believe this may be a manifestation of the Elmiron that the patient is currently on for her interstitial cystitis    She has no evidence of any abnormal bleeding or bruising at this time follow-up blood work requested in 6 months

## 2020-09-08 NOTE — ASSESSMENT & PLAN NOTE
Mild neutropenic blood count noted on most recent CBC of reviewed this with the patient    Suspect this is most likely a side effect of her Elmiron medication follow-up in 6 months recommended no abnormal incidence of infections noted

## 2020-09-08 NOTE — ASSESSMENT & PLAN NOTE
Intermittent symptoms of abdominal discomfort with a differential diagnosis of colitis of unspecified etiology verses irritable bowel syndrome  Patient has a colonoscopy scheduled for this Friday  I have reviewed her most recent visit with her gastro urologist as well as colorectal surgeon  She continues on Asacol 800 mg 3 times a day which has been beneficial at relieving her intestinal based symptoms

## 2020-09-08 NOTE — H&P (VIEW-ONLY)
Assessment/Plan:    Colitis  Intermittent symptoms of abdominal discomfort with a differential diagnosis of colitis of unspecified etiology verses irritable bowel syndrome  Patient has a colonoscopy scheduled for this Friday  I have reviewed her most recent visit with her gastro urologist as well as colorectal surgeon  She continues on Asacol 800 mg 3 times a day which has been beneficial at relieving her intestinal based symptoms  Other specified hypothyroidism  Reviewed with the patient her most recent endocrinology visit  She continues on levothyroxine at 50 mcg daily  Continue routine follow-up with endocrinology  Thrombocytopenia ([facility] )  A review of the patient's most recent CBC shows a mild chronic thrombocytopenia  Discussed this with the patient of believe this may be a manifestation of the Elmiron that the patient is currently on for her interstitial cystitis  She has no evidence of any abnormal bleeding or bruising at this time follow-up blood work requested in 6 months    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[facility] )  Mild neutropenic blood count noted on most recent CBC of reviewed this with the patient  Suspect this is most likely a side effect of her Elmiron medication follow-up in 6 months recommended no abnormal incidence of infections noted    Hyperlipidemia  Lipid profile reviewed with the patient does show mild elevation in her total cholesterol and LDL  She is currently on rosuvastatin 5 mg every other day but does admit to dietary indiscretions especially a fairly regular consumption of ice cream   Reviewed how diet impacts cholesterol values in asked the patient to decrease her consumption of saturated fats follow-up testing is requested in 6 months  Diagnoses and all orders for this visit:    Familial hypercholesterolemia  -     Lipid panel;  Future    Vaginal atrophy  -     estradiol (ESTRACE) 0 1 mg/g vaginal cream; INSERT 1 APPLICATORFUL VAGINALLY NIGHTLY    Abdominal pain, unspecified abdominal location  -     Comprehensive metabolic panel; Future    Kidney stone  -     UA w Reflex to Microscopic w Reflex to Culture -Lab Collect    Thrombocytopenia (HCC)  -     CBC and differential; Future    Other specified hypothyroidism    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HCC)    Colitis    Other orders  -     Meth-Hyo-M Bl-Na Phos-Ph Sal (Vilamit MB) 118 MG CAPS        Subjective:      Patient ID: Chad Darden is a 61 y o  female  This 80-year-old female patient with a history of interstitial cystitis as well as renal calculi and intermittent symptoms of abdominal crampy nature with previous diagnosis of colitis  Presents today for a routine follow-up visit  Currently she reports no significant GI symptoms  She has a colonoscopy and EGD scheduled for this Friday with her gastroenterologist and colorectal specialist   A suspicion of possible colitis versus irritable bowel syndrome is entertained by her gastroenterologist   Fortunately the patient has had no symptoms of recurrent renal calculi  The following portions of the patient's history were reviewed and updated as appropriate:   She  has a past medical history of Atopic dermatitis, Atrophic vaginitis, Dermatitis, Dry eye, Frequency of urination, Fungal infection, Herpes, Hyperlipidemia, Hypothyroidism, Interstitial cystitis, Osteoporosis, Periodontal abscess, PONV (postoperative nausea and vomiting), Thyroid nodule, Ulcerative colitis ([address] ), Urinary frequency, UTI (urinary tract infection), Vaginal atrophy, Vitamin D deficiency, and Voiding dysfunction    She   Patient Active Problem List    Diagnosis Date Noted    Colitis 09/08/2020    Ulcerative colitis ([address] ) 02/24/2020    Pollen-food allergy 12/05/2019    Allergy to iodinated contrast media 12/05/2019    Dysfunctional voiding of urine 11/14/2019    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    Other specified hypothyroidism 03/05/2019    Anxiety 06/01/2017    Nontoxic single thyroid nodule 12/09/2016  Urinary urgency 2016    Irritable bowel syndrome with diarrhea 2016    Thrombocytopenia ([facility] ) 2016    Hashimoto's thyroiditis 2015    Vitamin D deficiency 2015    Other ulcerative colitis with abscess ([facility] ) 2015    Difficult or painful urination 2015    Hyperlipidemia 2015    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[facility] ) 2015    Dense breasts 10/28/2014    Osteoporosis 2013    Atrophic vaginitis 2013     She  has a past surgical history that includes Kidney stone surgery; ORIF proximal ulna fracture; pr colonoscopy flx dx w/collj spec when pfrmd (N/A, 2017); Cystoscopy;  section; pr incis tendon sheath,radial styloid (Right, 2019); Ganglion cyst excision (Right, 2019); [place] tooth extraction (); Tubal ligation; Other surgical history; Colonoscopy; pr cysto/uretero w/lithotripsy &indwell stent insrt (Left, 2020); and [place] retrograde pyelogram (2020)  Her family history includes Breast cancer in her mother and paternal aunt; Diabetes in her father and paternal grandmother; Gallbladder disease in her mother; Hypertension in her father; Kidney disease in her mother; Lymphoma in her maternal grandfather; Nephrolithiasis in her father; No Known Problems in her son and son; Thyroid disease in her mother  She  reports that she has never smoked  She has never used smokeless tobacco  She reports current alcohol use  She reports that she does not use drugs    Current Outpatient Medications   Medication Sig Dispense Refill    Cholecalciferol (VITAMIN D PO) Take 3,000 Units by mouth daily      Cranberry (ELLURA PO) Take 1 capsule by mouth daily      cycloSPORINE (RESTASIS) 0 05 % ophthalmic emulsion Administer 1 drop to both eyes 2 (two) times a day      dicyclomine (BENTYL) 10 mg capsule Take 1 capsule (10 mg total) by mouth 4 (four) times a day (before meals and at bedtime) 270 capsule 3    estradiol (ESTRACE) 0 1 mg/g vaginal cream INSERT 1 APPLICATORFUL VAGINALLY NIGHTLY 42 5 g 2    levothyroxine 50 mcg tablet Take 1 tablet (50 mcg total) by mouth daily 90 tablet 2    mesalamine (ASACOL) 800 MG EC tablet Take 1 tablet (800 mg total) by mouth 3 (three) times a day 270 tablet 3    Meth-Hyo-M Bl-Na Phos-Ph Sal (URO-MP PO) Take 1 capsule by mouth 2 (two) times a day       Meth-Hyo-M Bl-Na Phos-Ph Sal (Vilamit MB) 118 MG CAPS       nystatin (MYCOSTATIN) cream Apply topically 2 (two) times a day 30 g 0    olopatadine HCl (PATADAY) 0 2 % opth drops Administer 1 drop to both eyes as needed       OSPHENA 60 MG TABS daily       pentosan polysulfate (Elmiron) 100 mg capsule One pill 3 times a day 90 capsule 4    rosuvastatin (CRESTOR) 5 mg tablet Take 1 tablet (5 mg total) by mouth every other day 45 tablet 2    trospium chloride (SANCTURA) 20 mg tablet Take 1 tablet (20 mg total) by mouth 2 (two) times a day 60 tablet 5     No current facility-administered medications for this visit       Review of Systems   All other systems reviewed and are negative  Objective:      /68   Pulse 85   Temp 98 8 °F (37 1 °C)   Ht 5' 7" (1 702 m)   Wt 56 7 kg (125 lb)   LMP  (LMP Unknown)   SpO2 97%   BMI 19 58 kg/m²          Physical Exam  Vitals signs reviewed  Constitutional:       Appearance: Normal appearance  She is well-developed  HENT:      Right Ear: Hearing, tympanic membrane, ear canal and external ear normal       Left Ear: Hearing, tympanic membrane, ear canal and external ear normal       Nose: Nose normal  No mucosal edema  Mouth/Throat:      Pharynx: Uvula midline  Eyes:      General: Lids are normal       Conjunctiva/sclera: Conjunctivae normal       Pupils: Pupils are equal, round, and reactive to light  Neck:      Thyroid: No thyromegaly  Vascular: No carotid bruit or JVD  Cardiovascular:      Rate and Rhythm: Normal rate and regular rhythm  Heart sounds: Normal heart sounds  No murmur  Pulmonary:      Effort: Pulmonary effort is normal  No respiratory distress  Breath sounds: Normal breath sounds  Abdominal:      General: Bowel sounds are normal       Palpations: Abdomen is soft  Musculoskeletal: Normal range of motion  Lymphadenopathy:      Cervical: No cervical adenopathy  Skin:     General: Skin is warm and dry  Neurological:      Mental Status: She is alert and oriented to person, place, and time  Deep Tendon Reflexes: Reflexes are normal and symmetric  Reflexes normal    Psychiatric:         Speech: Speech normal          Behavior: Behavior normal  Behavior is cooperative  Thought Content:  Thought content normal          Judgment: Judgment normal

## 2020-09-08 NOTE — ASSESSMENT & PLAN NOTE
Reviewed with the patient her most recent endocrinology visit  She continues on levothyroxine at 50 mcg daily  Continue routine follow-up with endocrinology

## 2020-09-08 NOTE — ASSESSMENT & PLAN NOTE
Lipid profile reviewed with the patient does show mild elevation in her total cholesterol and LDL  She is currently on rosuvastatin 5 mg every other day but does admit to dietary indiscretions especially a fairly regular consumption of ice cream   Reviewed how diet impacts cholesterol values in asked the patient to decrease her consumption of saturated fats follow-up testing is requested in 6 months

## 2020-09-10 ENCOUNTER — ANESTHESIA EVENT (OUTPATIENT)
Dept: GASTROENTEROLOGY | Facility: HOSPITAL | Age: 60
End: 2020-09-10

## 2020-09-10 NOTE — ANESTHESIA PREPROCEDURE EVALUATION
Procedure:  COLONOSCOPY  EGD    Relevant Problems   CARDIO   (+) Hyperlipidemia      ENDO   (+) Other specified hypothyroidism      HEMATOLOGY   (+) Thrombocytopenia (HCC)      NEURO/PSYCH   (+) Anxiety      Other   (+) Colitis   (+) Irritable bowel syndrome with diarrhea   (+) 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HCC)   (+) Other ulcerative colitis with abscess (HCC)   (+) Ulcerative colitis ([facility] [address] )        Physical Exam    Airway    Mallampati score: II  TM Distance: >3 FB  Neck ROM: full     Dental       Cardiovascular  Rhythm: regular, Rate: normal, Cardiovascular exam normal    Pulmonary  Pulmonary exam normal Breath sounds clear to auscultation,     Other Findings        Anesthesia Plan  ASA Score- 3     Anesthesia Type- IV sedation with anesthesia with ASA Monitors  Additional Monitors:   Airway Plan:     Comment: Requesting PONV prophy: scop, decadron, zofran    Plan for IV Sedation with GA backup  All risks/benefits and alternatives were discussed with the patient including the possibility of escalation of care in terms of anesthesia to GA and the possibility of rare anesthetic and surgical emergencies  Patient agreed and had no further questions prior to procedure  Kathie Alfaro MD, have personally seen and evaluated the patient prior to anesthetic care  I have reviewed the pre-anesthetic record, and other medical records if appropriate to the anesthetic care  If a CRNA is involved in the case, I have reviewed the CRNA assessment, if present, and agree          Plan Factors-Exercise tolerance (METS): >4 METS  Chart reviewed  Existing labs reviewed  Patient summary reviewed  Patient is not a current smoker  Patient did not smoke on day of surgery  Induction- intravenous  Postoperative Plan-     Informed Consent- Anesthetic plan and risks discussed with patient  I personally reviewed this patient with the CRNA  Discussed and agreed on the Anesthesia Plan with the CRNA  Gerhardt Sep

## 2020-09-11 ENCOUNTER — ANESTHESIA (OUTPATIENT)
Dept: GASTROENTEROLOGY | Facility: HOSPITAL | Age: 60
End: 2020-09-11

## 2020-09-11 ENCOUNTER — HOSPITAL ENCOUNTER (OUTPATIENT)
Dept: GASTROENTEROLOGY | Facility: HOSPITAL | Age: 60
Setting detail: OUTPATIENT SURGERY
Discharge: HOME | End: 2020-09-11
Attending: COLON & RECTAL SURGERY
Payer: COMMERCIAL

## 2020-09-11 VITALS
TEMPERATURE: 98.1 F | HEART RATE: 69 BPM | RESPIRATION RATE: 16 BRPM | SYSTOLIC BLOOD PRESSURE: 105 MMHG | WEIGHT: 125 LBS | BODY MASS INDEX: 19.62 KG/M2 | HEIGHT: 67 IN | DIASTOLIC BLOOD PRESSURE: 72 MMHG | OXYGEN SATURATION: 98 %

## 2020-09-11 DIAGNOSIS — K51.814 OTHER ULCERATIVE COLITIS WITH ABSCESS (HCC): ICD-10-CM

## 2020-09-11 PROCEDURE — 88305 TISSUE EXAM BY PATHOLOGIST: CPT | Performed by: PATHOLOGY

## 2020-09-11 PROCEDURE — 45380 COLONOSCOPY AND BIOPSY: CPT | Performed by: COLON & RECTAL SURGERY

## 2020-09-11 PROCEDURE — 43239 EGD BIOPSY SINGLE/MULTIPLE: CPT | Performed by: INTERNAL MEDICINE

## 2020-09-11 RX ORDER — SCOLOPAMINE TRANSDERMAL SYSTEM 1 MG/1
1 PATCH, EXTENDED RELEASE TRANSDERMAL
Status: DISCONTINUED | OUTPATIENT
Start: 2020-09-11 | End: 2020-09-15 | Stop reason: HOSPADM

## 2020-09-11 RX ORDER — SODIUM CHLORIDE 9 MG/ML
INJECTION, SOLUTION INTRAVENOUS CONTINUOUS PRN
Status: DISCONTINUED | OUTPATIENT
Start: 2020-09-11 | End: 2020-09-11

## 2020-09-11 RX ORDER — ONDANSETRON 2 MG/ML
INJECTION INTRAMUSCULAR; INTRAVENOUS AS NEEDED
Status: DISCONTINUED | OUTPATIENT
Start: 2020-09-11 | End: 2020-09-11

## 2020-09-11 RX ORDER — PROPOFOL 10 MG/ML
INJECTION, EMULSION INTRAVENOUS AS NEEDED
Status: DISCONTINUED | OUTPATIENT
Start: 2020-09-11 | End: 2020-09-11

## 2020-09-11 RX ORDER — LIDOCAINE HYDROCHLORIDE 10 MG/ML
INJECTION, SOLUTION EPIDURAL; INFILTRATION; INTRACAUDAL; PERINEURAL AS NEEDED
Status: DISCONTINUED | OUTPATIENT
Start: 2020-09-11 | End: 2020-09-11

## 2020-09-11 RX ORDER — DEXAMETHASONE SODIUM PHOSPHATE 10 MG/ML
INJECTION, SOLUTION INTRAMUSCULAR; INTRAVENOUS AS NEEDED
Status: DISCONTINUED | OUTPATIENT
Start: 2020-09-11 | End: 2020-09-11

## 2020-09-11 RX ORDER — PROPOFOL 10 MG/ML
INJECTION, EMULSION INTRAVENOUS CONTINUOUS PRN
Status: DISCONTINUED | OUTPATIENT
Start: 2020-09-11 | End: 2020-09-11

## 2020-09-11 RX ADMIN — PROPOFOL 120 MG: 10 INJECTION, EMULSION INTRAVENOUS at 11:53

## 2020-09-11 RX ADMIN — SCOPALAMINE 1 PATCH: 1 PATCH, EXTENDED RELEASE TRANSDERMAL at 11:43

## 2020-09-11 RX ADMIN — SODIUM CHLORIDE: 0.9 INJECTION, SOLUTION INTRAVENOUS at 11:50

## 2020-09-11 RX ADMIN — PROPOFOL 130 MCG/KG/MIN: 10 INJECTION, EMULSION INTRAVENOUS at 11:53

## 2020-09-11 RX ADMIN — DEXAMETHASONE SODIUM PHOSPHATE 4 MG: 10 INJECTION, SOLUTION INTRAMUSCULAR; INTRAVENOUS at 12:05

## 2020-09-11 RX ADMIN — ONDANSETRON 4 MG: 2 INJECTION INTRAMUSCULAR; INTRAVENOUS at 12:05

## 2020-09-11 RX ADMIN — PROPOFOL 50 MG: 10 INJECTION, EMULSION INTRAVENOUS at 11:59

## 2020-09-11 RX ADMIN — LIDOCAINE HYDROCHLORIDE 30 MG: 10 INJECTION, SOLUTION EPIDURAL; INFILTRATION; INTRACAUDAL; PERINEURAL at 11:53

## 2020-09-11 NOTE — INTERVAL H&P NOTE
ASSESSMENT:   Previously indeterminate colitis, confirmed by biopsy 2013 colonoscopy reviewed(Scanned in media) with pancolitis chronic/active, controlled on mesalamine with interval normal scopes/biopsies       Anal excoriation/pruritis ani     Some elements of increased pelvic floor tone/proctalgia on exam at office      PLAN:  -EGD/Colonoscopy with myself and Dr Bailey KOHLI  Refugio Conde is a 61 y o  female is here today for evaluation of rectal bleeding and anal pain  Her last office visit was on 1/28/20  She reports she has been having anal pain as well as rectal bleeding for at least 2 weeks  She notices bright red blood on the toilet tissue  She used OTC hemorrhoidal cream which seems to have reduced the size of her prolapsed hemorrhoids  She has significant pain and burning at the anus and discomfort hours after having a bowel movement or with physical activity       She has a history of indeterminate colitis with pancolitis chronic/active, controlled on mesalamine with interval normal scopes/biopsies      Her last colonoscopy was 7/28/17 with Dr aRchael Edmond within normal limits  She was scheduled for a EGD/colonoscopy on 4/24/20 canceled due to [address] concerns  H&P reviewed  After examining the patient I find no changes in the patients condition since the H&P had been written  There were no vitals filed for this visit

## 2020-09-11 NOTE — ANESTHESIA POSTPROCEDURE EVALUATION
Post-Op Assessment Note    CV Status:  Stable    Pain management: adequate     Mental Status:  Alert and awake   Hydration Status:  Euvolemic   PONV Controlled:  Controlled   Airway Patency:  Patent      Post Op Vitals Reviewed: Yes      Staff: CRNA   Comments: Pt awake, able to maintain own airway, VSS, report to recovery RN        No complications documented      /65 (09/11/20 1239)    Temp      Pulse 73 (09/11/20 1239)   Resp 16 (09/11/20 1239)    SpO2 99 % (09/11/20 1239)

## 2020-09-18 ENCOUNTER — TELEPHONE (OUTPATIENT)
Dept: GASTROENTEROLOGY | Facility: MEDICAL CENTER | Age: 60
End: 2020-09-18

## 2020-09-18 NOTE — TELEPHONE ENCOUNTER
----- Message from Guerrero Tamez MD sent at 9/18/2020  2:21 PM EDT -----  Please call patient :  Small-bowel biopsies were benign  Stomach biopsies did not show any evidence of infection  The esophagus biopsies showed inflammation suggestive of gastroesophageal reflux  If there are no symptoms I would continue to monitor with diet  Follow up in the office in the next few months

## 2020-10-05 ENCOUNTER — ANNUAL EXAM (OUTPATIENT)
Dept: OBGYN CLINIC | Facility: CLINIC | Age: 60
End: 2020-10-05
Payer: COMMERCIAL

## 2020-10-05 ENCOUNTER — ULTRASOUND (OUTPATIENT)
Dept: OBGYN CLINIC | Facility: CLINIC | Age: 60
End: 2020-10-05
Payer: COMMERCIAL

## 2020-10-05 VITALS
DIASTOLIC BLOOD PRESSURE: 60 MMHG | HEIGHT: 67 IN | SYSTOLIC BLOOD PRESSURE: 100 MMHG | TEMPERATURE: 98 F | WEIGHT: 125 LBS | BODY MASS INDEX: 19.62 KG/M2

## 2020-10-05 DIAGNOSIS — R10.2 PELVIC PAIN: Primary | ICD-10-CM

## 2020-10-05 DIAGNOSIS — Z01.419 PAP SMEAR, AS PART OF ROUTINE GYNECOLOGICAL EXAMINATION: ICD-10-CM

## 2020-10-05 DIAGNOSIS — R10.2 PELVIC PAIN: ICD-10-CM

## 2020-10-05 DIAGNOSIS — T78.40XA ALLERGY, INITIAL ENCOUNTER: Primary | ICD-10-CM

## 2020-10-05 DIAGNOSIS — Z12.31 ENCOUNTER FOR SCREENING MAMMOGRAM FOR MALIGNANT NEOPLASM OF BREAST: ICD-10-CM

## 2020-10-05 DIAGNOSIS — Z01.419 ENCNTR FOR GYN EXAM (GENERAL) (ROUTINE) W/O ABN FINDINGS: Primary | ICD-10-CM

## 2020-10-05 PROCEDURE — 76856 US EXAM PELVIC COMPLETE: CPT | Performed by: OBSTETRICS & GYNECOLOGY

## 2020-10-05 PROCEDURE — 99396 PREV VISIT EST AGE 40-64: CPT | Performed by: OBSTETRICS & GYNECOLOGY

## 2020-10-05 RX ORDER — FLUTICASONE PROPIONATE 50 MCG
1 SPRAY, SUSPENSION (ML) NASAL 2 TIMES DAILY
Qty: 3 BOTTLE | Refills: 2 | Status: SHIPPED | OUTPATIENT
Start: 2020-10-05 | End: 2021-12-08 | Stop reason: HOSPADM

## 2020-10-05 RX ORDER — OLOPATADINE HYDROCHLORIDE 2 MG/ML
1 SOLUTION/ DROPS OPHTHALMIC DAILY
Qty: 7.5 ML | Refills: 2 | Status: SHIPPED | OUTPATIENT
Start: 2020-10-05 | End: 2021-09-21 | Stop reason: ALTCHOICE

## 2020-10-08 ENCOUNTER — OFFICE VISIT (OUTPATIENT)
Dept: OBGYN CLINIC | Facility: CLINIC | Age: 60
End: 2020-10-08
Payer: COMMERCIAL

## 2020-10-08 VITALS
DIASTOLIC BLOOD PRESSURE: 78 MMHG | TEMPERATURE: 97 F | HEIGHT: 67 IN | SYSTOLIC BLOOD PRESSURE: 110 MMHG | WEIGHT: 125 LBS | BODY MASS INDEX: 19.62 KG/M2

## 2020-10-08 DIAGNOSIS — R93.89 ENDOMETRIAL THICKENING ON ULTRASOUND: Primary | ICD-10-CM

## 2020-10-08 PROCEDURE — 99214 OFFICE O/P EST MOD 30 MIN: CPT | Performed by: OBSTETRICS & GYNECOLOGY

## 2020-10-13 ENCOUNTER — TELEPHONE (OUTPATIENT)
Dept: OBGYN CLINIC | Facility: CLINIC | Age: 60
End: 2020-10-13

## 2020-10-22 ENCOUNTER — TELEPHONE (OUTPATIENT)
Dept: ENDOCRINOLOGY | Facility: CLINIC | Age: 60
End: 2020-10-22

## 2020-10-26 ENCOUNTER — PREP FOR PROCEDURE (OUTPATIENT)
Dept: OBGYN CLINIC | Facility: CLINIC | Age: 60
End: 2020-10-26

## 2020-10-26 ENCOUNTER — HOSPITAL ENCOUNTER (OUTPATIENT)
Dept: ULTRASOUND IMAGING | Facility: HOSPITAL | Age: 60
Discharge: HOME | End: 2020-10-26
Attending: INTERNAL MEDICINE
Payer: COMMERCIAL

## 2020-10-26 DIAGNOSIS — R10.31 RIGHT LOWER QUADRANT ABDOMINAL PAIN: ICD-10-CM

## 2020-10-26 DIAGNOSIS — R93.89 THICKENED ENDOMETRIUM: Primary | ICD-10-CM

## 2020-10-26 DIAGNOSIS — R10.31 RIGHT LOWER QUADRANT ABDOMINAL PAIN: Primary | ICD-10-CM

## 2020-10-26 DIAGNOSIS — Z01.818 PREOP TESTING: ICD-10-CM

## 2020-10-26 PROCEDURE — 76700 US EXAM ABDOM COMPLETE: CPT

## 2020-10-28 ENCOUNTER — TELEPHONE (OUTPATIENT)
Dept: INTERNAL MEDICINE CLINIC | Facility: CLINIC | Age: 60
End: 2020-10-28

## 2020-10-28 ENCOUNTER — HOSPITAL ENCOUNTER (OUTPATIENT)
Dept: CT IMAGING | Facility: HOSPITAL | Age: 60
Discharge: HOME | End: 2020-10-28
Attending: INTERNAL MEDICINE
Payer: COMMERCIAL

## 2020-10-28 DIAGNOSIS — R10.31 RIGHT LOWER QUADRANT ABDOMINAL PAIN: ICD-10-CM

## 2020-10-28 DIAGNOSIS — K58.0 IRRITABLE BOWEL SYNDROME WITH DIARRHEA: ICD-10-CM

## 2020-10-28 DIAGNOSIS — R10.31 RIGHT LOWER QUADRANT ABDOMINAL PAIN: Primary | ICD-10-CM

## 2020-10-28 PROCEDURE — 74176 CT ABD & PELVIS W/O CONTRAST: CPT

## 2020-10-28 PROCEDURE — G1004 CDSM NDSC: HCPCS

## 2020-10-28 RX ORDER — DICYCLOMINE HYDROCHLORIDE 10 MG/1
20 CAPSULE ORAL
Qty: 270 CAPSULE | Refills: 3
Start: 2020-10-28 | End: 2021-03-22 | Stop reason: SDUPTHER

## 2020-11-02 ENCOUNTER — OFFICE VISIT (OUTPATIENT)
Dept: GASTROENTEROLOGY | Facility: MEDICAL CENTER | Age: 60
End: 2020-11-02
Payer: COMMERCIAL

## 2020-11-02 VITALS
TEMPERATURE: 98.4 F | WEIGHT: 121 LBS | DIASTOLIC BLOOD PRESSURE: 76 MMHG | SYSTOLIC BLOOD PRESSURE: 122 MMHG | BODY MASS INDEX: 18.99 KG/M2 | HEART RATE: 84 BPM | HEIGHT: 67 IN

## 2020-11-02 DIAGNOSIS — K52.9 COLITIS: Primary | ICD-10-CM

## 2020-11-02 DIAGNOSIS — K52.9 COLITIS WITHOUT COMPLICATION: ICD-10-CM

## 2020-11-02 PROCEDURE — 99214 OFFICE O/P EST MOD 30 MIN: CPT | Performed by: INTERNAL MEDICINE

## 2020-11-02 RX ORDER — MESALAMINE 800 MG/1
800 TABLET, DELAYED RELEASE ORAL 4 TIMES DAILY
Qty: 270 TABLET | Refills: 3 | Status: SHIPPED | OUTPATIENT
Start: 2020-11-02 | End: 2021-11-04

## 2020-11-03 ENCOUNTER — TELEPHONE (OUTPATIENT)
Dept: INTERNAL MEDICINE CLINIC | Facility: CLINIC | Age: 60
End: 2020-11-03

## 2020-11-04 ENCOUNTER — LAB (OUTPATIENT)
Dept: LAB | Facility: CLINIC | Age: 60
End: 2020-11-04
Payer: COMMERCIAL

## 2020-11-04 ENCOUNTER — TRANSCRIBE ORDERS (OUTPATIENT)
Dept: LAB | Facility: CLINIC | Age: 60
End: 2020-11-04

## 2020-11-04 DIAGNOSIS — K52.9 COLITIS: ICD-10-CM

## 2020-11-04 LAB — CRP SERPL QL: <3 MG/L

## 2020-11-04 PROCEDURE — 83631 LACTOFERRIN FECAL (QUANT): CPT

## 2020-11-04 PROCEDURE — 86140 C-REACTIVE PROTEIN: CPT

## 2020-11-04 PROCEDURE — 83993 ASSAY FOR CALPROTECTIN FECAL: CPT

## 2020-11-04 PROCEDURE — 36415 COLL VENOUS BLD VENIPUNCTURE: CPT

## 2020-11-05 LAB — CALPROTECTIN STL-MCNT: 19 UG/G (ref 0–120)

## 2020-11-09 ENCOUNTER — ULTRASOUND (OUTPATIENT)
Dept: OBGYN CLINIC | Facility: CLINIC | Age: 60
End: 2020-11-09
Payer: COMMERCIAL

## 2020-11-09 ENCOUNTER — TELEPHONE (OUTPATIENT)
Dept: UROLOGY | Facility: AMBULATORY SURGERY CENTER | Age: 60
End: 2020-11-09

## 2020-11-09 DIAGNOSIS — N85.00 ENDOMETRIAL HYPERPLASIA: Primary | ICD-10-CM

## 2020-11-09 DIAGNOSIS — N20.0 NEPHROLITHIASIS: Primary | ICD-10-CM

## 2020-11-09 PROCEDURE — 76856 US EXAM PELVIC COMPLETE: CPT | Performed by: OBSTETRICS & GYNECOLOGY

## 2020-11-11 LAB — LACTOFERRIN SER-MCNC: 1.63 UG/ML(G) (ref 0–7.24)

## 2020-11-19 ENCOUNTER — TELEPHONE (OUTPATIENT)
Dept: INTERNAL MEDICINE CLINIC | Facility: CLINIC | Age: 60
End: 2020-11-19

## 2020-12-07 ENCOUNTER — TELEPHONE (OUTPATIENT)
Dept: GASTROENTEROLOGY | Facility: MEDICAL CENTER | Age: 60
End: 2020-12-07

## 2020-12-07 DIAGNOSIS — Z12.31 ENCOUNTER FOR SCREENING MAMMOGRAM FOR MALIGNANT NEOPLASM OF BREAST: ICD-10-CM

## 2020-12-15 ENCOUNTER — TELEPHONE (OUTPATIENT)
Dept: OBGYN CLINIC | Facility: CLINIC | Age: 60
End: 2020-12-15

## 2020-12-22 ENCOUNTER — OFFICE VISIT (OUTPATIENT)
Dept: ENDOCRINOLOGY | Facility: CLINIC | Age: 60
End: 2020-12-22
Payer: COMMERCIAL

## 2020-12-22 VITALS
HEART RATE: 88 BPM | WEIGHT: 126 LBS | DIASTOLIC BLOOD PRESSURE: 60 MMHG | SYSTOLIC BLOOD PRESSURE: 108 MMHG | BODY MASS INDEX: 19.78 KG/M2 | HEIGHT: 67 IN

## 2020-12-22 DIAGNOSIS — E78.2 MIXED HYPERLIPIDEMIA: ICD-10-CM

## 2020-12-22 DIAGNOSIS — M81.0 OSTEOPOROSIS WITHOUT CURRENT PATHOLOGICAL FRACTURE, UNSPECIFIED OSTEOPOROSIS TYPE: ICD-10-CM

## 2020-12-22 DIAGNOSIS — E06.3 HASHIMOTO'S THYROIDITIS: ICD-10-CM

## 2020-12-22 DIAGNOSIS — E55.9 VITAMIN D DEFICIENCY: ICD-10-CM

## 2020-12-22 DIAGNOSIS — E03.8 OTHER SPECIFIED HYPOTHYROIDISM: Primary | ICD-10-CM

## 2020-12-22 PROCEDURE — 99214 OFFICE O/P EST MOD 30 MIN: CPT | Performed by: INTERNAL MEDICINE

## 2020-12-22 RX ORDER — TRIAMCINOLONE ACETONIDE 55 UG/1
SPRAY, METERED NASAL DAILY
COMMUNITY
End: 2022-05-31 | Stop reason: SDUPTHER

## 2021-01-06 ENCOUNTER — LAB (OUTPATIENT)
Dept: LAB | Facility: CLINIC | Age: 61
End: 2021-01-06
Payer: COMMERCIAL

## 2021-01-06 DIAGNOSIS — E55.9 VITAMIN D DEFICIENCY: ICD-10-CM

## 2021-01-06 DIAGNOSIS — M81.0 OSTEOPOROSIS WITHOUT CURRENT PATHOLOGICAL FRACTURE, UNSPECIFIED OSTEOPOROSIS TYPE: ICD-10-CM

## 2021-01-06 LAB
25(OH)D3 SERPL-MCNC: 46.3 NG/ML (ref 30–100)
ALBUMIN SERPL BCP-MCNC: 3.8 G/DL (ref 3.5–5)
CALCIUM SERPL-MCNC: 8.9 MG/DL (ref 8.3–10.1)
PTH-INTACT SERPL-MCNC: 44.3 PG/ML (ref 18.4–80.1)

## 2021-01-06 PROCEDURE — 82040 ASSAY OF SERUM ALBUMIN: CPT

## 2021-01-06 PROCEDURE — 83970 ASSAY OF PARATHORMONE: CPT

## 2021-01-06 PROCEDURE — 36415 COLL VENOUS BLD VENIPUNCTURE: CPT

## 2021-01-06 PROCEDURE — 82310 ASSAY OF CALCIUM: CPT

## 2021-01-06 PROCEDURE — 82306 VITAMIN D 25 HYDROXY: CPT

## 2021-01-08 ENCOUNTER — IMMUNIZATIONS (OUTPATIENT)
Dept: FAMILY MEDICINE CLINIC | Facility: HOSPITAL | Age: 61
End: 2021-01-08

## 2021-01-08 ENCOUNTER — APPOINTMENT (OUTPATIENT)
Dept: LAB | Facility: CLINIC | Age: 61
End: 2021-01-08
Payer: COMMERCIAL

## 2021-01-08 ENCOUNTER — TELEPHONE (OUTPATIENT)
Dept: ENDOCRINOLOGY | Facility: CLINIC | Age: 61
End: 2021-01-08

## 2021-01-08 DIAGNOSIS — Z23 ENCOUNTER FOR IMMUNIZATION: ICD-10-CM

## 2021-01-08 PROCEDURE — 82507 ASSAY OF CITRATE: CPT | Performed by: INTERNAL MEDICINE

## 2021-01-08 PROCEDURE — 83935 ASSAY OF URINE OSMOLALITY: CPT | Performed by: INTERNAL MEDICINE

## 2021-01-08 PROCEDURE — 82340 ASSAY OF CALCIUM IN URINE: CPT | Performed by: INTERNAL MEDICINE

## 2021-01-08 PROCEDURE — 82131 AMINO ACIDS SINGLE QUANT: CPT | Performed by: INTERNAL MEDICINE

## 2021-01-08 PROCEDURE — 81003 URINALYSIS AUTO W/O SCOPE: CPT | Performed by: INTERNAL MEDICINE

## 2021-01-08 PROCEDURE — 84392 ASSAY OF URINE SULFATE: CPT | Performed by: INTERNAL MEDICINE

## 2021-01-08 PROCEDURE — 91300 SARS-COV-2 / COVID-19 MRNA VACCINE (PFIZER-BIONTECH) 30 MCG: CPT

## 2021-01-08 PROCEDURE — 84300 ASSAY OF URINE SODIUM: CPT | Performed by: INTERNAL MEDICINE

## 2021-01-08 PROCEDURE — 83945 ASSAY OF OXALATE: CPT | Performed by: INTERNAL MEDICINE

## 2021-01-08 PROCEDURE — 82140 ASSAY OF AMMONIA: CPT | Performed by: INTERNAL MEDICINE

## 2021-01-08 PROCEDURE — 83735 ASSAY OF MAGNESIUM: CPT | Performed by: INTERNAL MEDICINE

## 2021-01-08 PROCEDURE — 0001A SARS-COV-2 / COVID-19 MRNA VACCINE (PFIZER-BIONTECH) 30 MCG: CPT

## 2021-01-08 PROCEDURE — 82570 ASSAY OF URINE CREATININE: CPT | Performed by: INTERNAL MEDICINE

## 2021-01-08 PROCEDURE — 84560 ASSAY OF URINE/URIC ACID: CPT | Performed by: INTERNAL MEDICINE

## 2021-01-08 PROCEDURE — 82436 ASSAY OF URINE CHLORIDE: CPT | Performed by: INTERNAL MEDICINE

## 2021-01-08 PROCEDURE — 84105 ASSAY OF URINE PHOSPHORUS: CPT | Performed by: INTERNAL MEDICINE

## 2021-01-08 PROCEDURE — 84133 ASSAY OF URINE POTASSIUM: CPT | Performed by: INTERNAL MEDICINE

## 2021-01-08 NOTE — TELEPHONE ENCOUNTER
Fe called from [facility] lab questioning 24hour urine order- written as stone risk profile  Checked with dr Shaw Gonzalez and he stated in order to get all the components tested he needs (in Dr note) stone risk profile   Advised Fe the same

## 2021-01-11 ENCOUNTER — TELEPHONE (OUTPATIENT)
Dept: GASTROENTEROLOGY | Facility: AMBULARY SURGERY CENTER | Age: 61
End: 2021-01-11

## 2021-01-11 DIAGNOSIS — E04.1 NONTOXIC UNINODULAR GOITER: ICD-10-CM

## 2021-01-11 RX ORDER — LEVOTHYROXINE SODIUM 0.05 MG/1
50 TABLET ORAL DAILY
Qty: 90 TABLET | Refills: 2 | Status: SHIPPED | OUTPATIENT
Start: 2021-01-11 | End: 2021-11-16 | Stop reason: SDUPTHER

## 2021-01-11 NOTE — TELEPHONE ENCOUNTER
----- Message from Demetrio Chaudhary sent at 1/11/2021  2:14 PM EST -----  Patient calling to speak with Dr Silvestre Mendoza in regards to a medication change update  Please call patient to discuss       Thank you

## 2021-01-12 NOTE — TELEPHONE ENCOUNTER
Dr Mendieta Life- please see attached  Patient states she does not want to speak to nursing  She was told to speak with you directly    Thank you

## 2021-01-13 NOTE — TELEPHONE ENCOUNTER
Discussed with the patient  She will try to avoid Gluten  She will come down on the Asacol to three times/ day  She will continue Bentyl

## 2021-01-19 LAB
AMMONIA 24H UR-MRATE: 41 MEQ/24 HR
AMMONIA UR-SCNC: ABNORMAL UG/DL
CA H2 PHOS DIHYD CRY URNS QL MICRO: 0.8 RATIO (ref 0–3)
CALCIUM 24H UR-MCNC: 14.5 MG/DL
CALCIUM 24H UR-MRATE: 224.8 MG/24 HR (ref 100–300)
CHLORIDE 24H UR-SCNC: 63 MMOL/L
CHLORIDE 24H UR-SRATE: 98 MMOL/24 HR (ref 110–250)
CITRATE 24H UR-MCNC: 153 MG/L
CITRATE 24H UR-MRATE: 237 MG/24 HR (ref 320–1240)
COM CRY STONE QL IR: 4.78 RATIO (ref 0–6)
CREAT 24H UR-MCNC: 63.7 MG/DL
CREAT 24H UR-MRATE: 987.4 MG/24 HR (ref 800–1800)
CYSTINE 24H UR-MCNC: 3.14 MG/L
CYSTINE 24H UR-MRATE: 4.87 MG/24 HR (ref 10–100)
MAGNESIUM 24H UR-MRATE: 71 MG/24 HR (ref 12–293)
MAGNESIUM UR-MCNC: 4.6 MG/DL
NA URATE CRY STONE QL IR: 1.34 RATIO (ref 0–4)
OSMOLALITY UR: 430 MOSMOL/KG (ref 300–900)
OXALATE 24H UR-MRATE: 16 MG/24 HR (ref 4–31)
OXALATE UR-MCNC: 10 MG/L
PH 24H UR: 5.6 [PH]
PHOSPHATE 24H UR-MCNC: 43 MG/DL
PHOSPHATE 24H UR-MRATE: 666.5 MG/24 HR (ref 400–1300)
PLEASE NOTE (STONE RISK): ABNORMAL
POTASSIUM 24H UR-SCNC: 24.5 MMOL/24 HR (ref 25–125)
POTASSIUM UR-SCNC: 15.8 MMOL/L
PRESERVED URINE: 1550 ML/24 HR (ref 600–1600)
SODIUM 24H UR-SCNC: 65 MMOL/L
SODIUM 24H UR-SRATE: 101 MMOL/24 HR (ref 39–258)
SPECIMEN VOL 24H UR: 1550 ML/24 HR (ref 600–1600)
SULFATE 24H UR-MCNC: 23 MEQ/24 HR (ref 0–30)
SULFATE UR-MCNC: 15 MEQ/L
TRI-PHOS CRY STONE MICRO: 0.01 RATIO (ref 0–1)
URATE 24H UR-MCNC: 25.3 MG/DL
URATE 24H UR-MRATE: 392 MG/24 HR (ref 250–750)
URATE DIHYD CRY STONE QL IR: 2.04 RATIO (ref 0–1.2)

## 2021-01-25 ENCOUNTER — ULTRASOUND (OUTPATIENT)
Dept: OBGYN CLINIC | Facility: CLINIC | Age: 61
End: 2021-01-25
Payer: COMMERCIAL

## 2021-01-25 DIAGNOSIS — N85.00 ENDOMETRIAL HYPERPLASIA: Primary | ICD-10-CM

## 2021-01-25 DIAGNOSIS — E78.01 FAMILIAL HYPERCHOLESTEROLEMIA: ICD-10-CM

## 2021-01-25 PROCEDURE — 76856 US EXAM PELVIC COMPLETE: CPT | Performed by: OBSTETRICS & GYNECOLOGY

## 2021-01-25 RX ORDER — ROSUVASTATIN CALCIUM 5 MG/1
5 TABLET, COATED ORAL EVERY OTHER DAY
Qty: 90 TABLET | Refills: 3
Start: 2021-01-25 | End: 2021-01-27

## 2021-01-25 NOTE — PROGRESS NOTES
Results discussed in detail with the patient    Will follow-up with scan in several months to ensure stability    Patient to call for any abnormal bleeding which would develop

## 2021-01-25 NOTE — PROGRESS NOTES
AMB US Pelvic Non OB    Date/Time: 1/25/2021 9:05 AM  Performed by: Myrtle Paez  Authorized by: Coby Ward MD     Procedure details:     Indications: endometrial hyperplasia      Technique:  US Pelvic, Non-OB with complete exam  Uterine findings:     Length (cm): 5 2    Height (cm):  5 4    Width (cm):  4 4    Uterine adhesions: not identified      Adnexal mass: not identified      Polyps: not identified      Myomas: not identified      Endometrial stripe: identified      Endometrial hyperplasia: not identified      Endometrium thickness (mm):  5  Left ovary findings:     Left ovary:  Visualized    Cysts: not identified      Length (cm): 2 2    Height (cm): 2 1    Width (cm): 2 2  Right ovary findings:     Right ovary:  Visualized    Cysts: not identified      Length (cm): 2 5    Height (cm): 1 8    Width (cm): 1 6  Other findings:     Free pelvic fluid: not identified      Free peritoneal fluid: not identified    Post-Procedure Details:     Impression:  Endo=5mm    Tolerance:   Tolerated well, no immediate complications

## 2021-01-27 DIAGNOSIS — E78.01 FAMILIAL HYPERCHOLESTEROLEMIA: Primary | ICD-10-CM

## 2021-01-27 RX ORDER — ROSUVASTATIN CALCIUM 5 MG/1
5 TABLET, COATED ORAL DAILY
Qty: 90 TABLET | Refills: 3 | Status: SHIPPED | OUTPATIENT
Start: 2021-01-27 | End: 2022-01-31 | Stop reason: SDUPTHER

## 2021-01-29 ENCOUNTER — IMMUNIZATIONS (OUTPATIENT)
Dept: FAMILY MEDICINE CLINIC | Facility: HOSPITAL | Age: 61
End: 2021-01-29

## 2021-01-29 ENCOUNTER — TELEPHONE (OUTPATIENT)
Dept: ENDOCRINOLOGY | Facility: CLINIC | Age: 61
End: 2021-01-29

## 2021-01-29 DIAGNOSIS — Z23 ENCOUNTER FOR IMMUNIZATION: Primary | ICD-10-CM

## 2021-01-29 PROCEDURE — 0002A SARS-COV-2 / COVID-19 MRNA VACCINE (PFIZER-BIONTECH) 30 MCG: CPT

## 2021-01-29 PROCEDURE — 91300 SARS-COV-2 / COVID-19 MRNA VACCINE (PFIZER-BIONTECH) 30 MCG: CPT

## 2021-01-31 DIAGNOSIS — N30.10 INTERSTITIAL CYSTITIS: ICD-10-CM

## 2021-01-31 RX ORDER — PENTOSAN POLYSULFATE SODIUM 100 MG/1
CAPSULE, GELATIN COATED ORAL
Qty: 90 CAPSULE | Refills: 4 | Status: SHIPPED | OUTPATIENT
Start: 2021-01-31 | End: 2022-04-11 | Stop reason: SDUPTHER

## 2021-02-03 NOTE — PROGRESS NOTES
Results of pelvic ultrasound discussed with patient     Will follow up with visit and scan in several months to confirm stability of the endometrial stripe     Patient will call with any abnormal bleeding which would develop during the interim

## 2021-02-03 NOTE — TELEPHONE ENCOUNTER
Pt is returning you call, please call pt at [phone] , pt will make sure that she answer her phone this time

## 2021-02-08 ENCOUNTER — OFFICE VISIT (OUTPATIENT)
Dept: GASTROENTEROLOGY | Facility: MEDICAL CENTER | Age: 61
End: 2021-02-08
Payer: COMMERCIAL

## 2021-02-08 VITALS
WEIGHT: 125 LBS | DIASTOLIC BLOOD PRESSURE: 80 MMHG | HEIGHT: 67 IN | SYSTOLIC BLOOD PRESSURE: 132 MMHG | BODY MASS INDEX: 19.62 KG/M2 | HEART RATE: 86 BPM

## 2021-02-08 DIAGNOSIS — K51.00 ULCERATIVE PANCOLITIS WITHOUT COMPLICATION (HCC): ICD-10-CM

## 2021-02-08 DIAGNOSIS — Z00.00 HEALTH CARE MAINTENANCE: ICD-10-CM

## 2021-02-08 DIAGNOSIS — K58.0 IRRITABLE BOWEL SYNDROME WITH DIARRHEA: Primary | ICD-10-CM

## 2021-02-08 PROCEDURE — 99243 OFF/OP CNSLTJ NEW/EST LOW 30: CPT | Performed by: INTERNAL MEDICINE

## 2021-02-08 NOTE — PROGRESS NOTES
Outpatient Follow up  [address] [place] [address]  Tamy Walters MD  Ph : [phone]  Fax : [phone]  Mobile : [phone]  Email : [email or web address]  Also available on [employer]    Ilene Yanes 61 y o  female MRN: [id number]    PCP: Diamante Castillo MD  Referring: No referring provider defined for this encounter  Ilene Yanes presented for a follow up visit  My recommendations are included  Please do not hesitate to contact me with any questions you may have  ASSESSMENT AND PLAN:      No problem-specific Assessment & Plan notes found for this encounter  Diagnoses and all orders for this visit:    Irritable bowel syndrome with diarrhea    Ulcerative pancolitis without complication ([facility] )  -     Calprotectin,Fecal; Future  -     C-reactive protein; Future  -     Comprehensive metabolic panel; Future  -     CBC and differential; Future    Health care maintenance  -     ABO/Rh; Future         77-year-old lady who presents to us for evaluation of chronic inactive colitis which is being treated with Asacol  She has symptoms of IBS with diarrhea as well  I would recommend the following    IB Guard  Peppermint tea  Align  Continue Asacol  Imodium for diarrhea  Miralax for constipation  Blood work and stool tests in July  CT (possible enterography) in October  She is unable to get an MRI enterography       ______________________________________________________________________    SUBJECTIVE:  62 y/o lady with chronic inactive colitis and on Asacol  She was stressed recently especially with her father being in the hospital with an infection  She has abdominal pain/ cramps and has diarrhea or constipation depending on that  She has cut down on her gluten as well  Asacol 3 times a day     Her last stool test for calprotectin in 11/20 was normal and CRP was normal    Last EGD and Colonoscopy was in   She had chronic colitis on biopsies but no endoscopic inflammation  She had her COVID vaccination  REVIEW OF SYSTEMS IS OTHERWISE NEGATIVE  Historical Information   Past Medical History:   Diagnosis Date    Atopic dermatitis     last assessed 13    Atrophic vaginitis     last assessed 9/2/15    Dermatitis     Dry eye     Frequency of urination     Fungal infection     last assessed 13    Herpes     last assessed 14    Hyperlipidemia     Hypothyroidism     Interstitial cystitis     Osteoporosis     Periodontal abscess     last assessed 13    PONV (postoperative nausea and vomiting)     Thyroid nodule     Ulcerative colitis ([address] )     Urinary frequency     resolved 17    UTI (urinary tract infection)     Vaginal atrophy     Vitamin D deficiency     last assessed 16    Voiding dysfunction     last assessed 10/7/15     Past Surgical History:   Procedure Laterality Date     SECTION       and      COLONOSCOPY  2020    CYSTOSCOPY      diagnostic resolved 05    FL RETROGRADE PYELOGRAM  2020    GANGLION CYST EXCISION Right 2019    Procedure: EXCISION GANGLION CYST RIGHT FIRST DORSAL EXTENSOR COMPARTMENT;  Surgeon: Lena Sandoval MD;  [facility];  Service: Orthopedics    KIDNEY STONE SURGERY      ORIF PROXIMAL ULNA FRACTURE      metal plates and screws removed    OTHER SURGICAL HISTORY      punch biospy     NE COLONOSCOPY FLX DX W/COLLJ SPEC WHEN PFRMD N/A 2017    Procedure: COLONOSCOPY;  Surgeon: Tiffani Suarez MD;  [facility];   Service: Colorectal    NE CYSTO/URETERO W/LITHOTRIPSY &INDWELL STENT INSRT Left 2020    Procedure: CYSTOSCOPY URETEROSCOPY WITH LITHOTRIPSY HOLMIUM LASER, RETROGRADE PYELOGRAM AND INSERTION STENT URETERAL;  Surgeon: Javier Greene MD;  [facility];  Service: Urology    NE INCIS TENDON SHEATH,RADIAL STYLOID Right 2/19/2019    Procedure: Lashaun Chavez;  Surgeon: Mary Mata MD;  [facility];  Service: Orthopedics    TUBAL LIGATION      UPPER GASTROINTESTINAL ENDOSCOPY  09/11/2020    WISDOM TOOTH EXTRACTION  1983    X2      Social History   Social History     Substance and Sexual Activity   Alcohol Use Yes    Frequency: Monthly or less    Drinks per session: 1 or 2    Comment: socially - 1-3 drinks per month     Social History     Substance and Sexual Activity   Drug Use No     Social History     Tobacco Use   Smoking Status Never Smoker   Smokeless Tobacco Never Used     Family History   Problem Relation Age of Onset    Gallbladder disease Mother     Thyroid disease Mother     Kidney disease Mother     Breast cancer Mother     Diabetes Father     Hypertension Father     Nephrolithiasis Father     Lymphoma Maternal Grandfather     Diabetes Paternal Grandmother     Breast cancer Paternal Aunt     No Known Problems Son     No Known Problems Son        Meds/Allergies       Current Outpatient Medications:     Cholecalciferol (VITAMIN D PO)    Cranberry (ELLURA PO)    cycloSPORINE (RESTASIS) 0 05 % ophthalmic emulsion    dicyclomine (BENTYL) 10 mg capsule    estradiol (ESTRACE) 0 1 mg/g vaginal cream    fluticasone (FLONASE) 50 mcg/act nasal spray    levothyroxine 50 mcg tablet    Meth-Hyo-M Bl-Na Phos-Ph Sal (URO-MP PO)    Meth-Hyo-M Bl-Na Phos-Ph Sal (Vilamit MB) 118 MG CAPS    nystatin (MYCOSTATIN) cream    olopatadine HCl (Pataday) 0 2 % opth drops    OSPHENA 60 MG TABS    pentosan polysulfate (Elmiron) 100 mg capsule    Probiotic Product (ALIGN EXTRA STRENGTH PO)    rosuvastatin (CRESTOR) 5 mg tablet    Triamcinolone Acetonide (Nasacort Allergy 24HR) 55 MCG/ACT AERO    trospium chloride (SANCTURA) 20 mg tablet    mesalamine (ASACOL) 800 MG EC tablet    Allergies   Allergen Reactions    Iodine Anaphylaxis     Allergy to Iodinated and non iodinated dye    Other Anaphylaxis     APPLES    TAPE  Also rash at times    Seasonal Ic [Cholestatin] Allergic Rhinitis    Latex            Objective     Blood pressure 132/80, pulse 86, height 5' 7" (1 702 m), weight 56 7 kg (125 lb)  Body mass index is 19 58 kg/m²  PHYSICAL EXAM:      Physical Exam  Constitutional:       Appearance: Normal appearance  She is well-developed  HENT:      Head: Normocephalic and atraumatic  Eyes:      General: No scleral icterus  Conjunctiva/sclera: Conjunctivae normal       Pupils: Pupils are equal, round, and reactive to light  Neck:      Musculoskeletal: Normal range of motion  Cardiovascular:      Rate and Rhythm: Normal rate and regular rhythm  Heart sounds: Normal heart sounds  Pulmonary:      Effort: Pulmonary effort is normal  No respiratory distress  Breath sounds: Normal breath sounds  Abdominal:      General: Bowel sounds are normal  There is no distension  Palpations: Abdomen is soft  There is no mass  Tenderness: There is no abdominal tenderness  Hernia: No hernia is present  Musculoskeletal: Normal range of motion  Lymphadenopathy:      Cervical: No cervical adenopathy  Skin:     General: Skin is warm  Neurological:      Mental Status: She is alert and oriented to person, place, and time  Psychiatric:         Behavior: Behavior normal          Thought Content: Thought content normal          Lab Results:   No visits with results within 1 Day(s) from this visit  Latest known visit with results is:   Lab on 01/06/2021   Component Date Value    PTH 01/06/2021 44 3     Vit D, 25-Hydroxy 01/06/2021 46 3     Albumin 01/06/2021 3 8     Calcium 01/06/2021 8 9          Radiology Results:   No results found

## 2021-02-08 NOTE — PATIENT INSTRUCTIONS
IB Guard  Peppermint tea  Align  Continue Asacol  Imodium for diarrhea  Miralax for constipation    Lab work and stool tests in July / August

## 2021-02-15 NOTE — TELEPHONE ENCOUNTER
Patient left a message on the machine that she was waiting to get a phone call from Dr Se Coleman to discuss a 24 hour urine      [phone]

## 2021-02-16 ENCOUNTER — TELEPHONE (OUTPATIENT)
Dept: ENDOCRINOLOGY | Facility: CLINIC | Age: 61
End: 2021-02-16

## 2021-02-16 DIAGNOSIS — E87.6 LOW POTASSIUM SYNDROME: Primary | ICD-10-CM

## 2021-02-16 NOTE — TELEPHONE ENCOUNTER
Patient returned you call  She wanted to apologize that she missed your last call, she was at her eye doctor appointment  She will be available for the rest of today and tomorrow  On Thursday, she will be available all day EXCEPT from 9:30-10:30 (she has another doctor appointment)  She is working from home on Friday and will be available all day  Her number is [phone]  Thank you

## 2021-02-17 NOTE — TELEPHONE ENCOUNTER
Spoke with patient  Went over the stone risk profile with her which showed low potassium and low citrate and the urine  I recommended supplementation with potassium citrate but due to her interstitial cystitis, there was a concern that the supplementation may exacerbate this condition  She will discuss balancing the risk of stone formation with the risk of interstitial cystitis with Dr Lizzette Crump at her next visit with him

## 2021-02-19 NOTE — TELEPHONE ENCOUNTER
Spoke with patient about taking potassium citrate which was okay with Urology  She will obtain this over-the-counter if available  If not, she will contact us to put in a prescription

## 2021-02-24 RX ORDER — POTASSIUM CITRATE 5 MEQ/1
TABLET, EXTENDED RELEASE ORAL
Qty: 90 TABLET | Refills: 3 | Status: CANCELLED | OUTPATIENT
Start: 2021-02-24

## 2021-02-24 NOTE — TELEPHONE ENCOUNTER
Pt called back to state Potassium citrate does need to be a prescription  What dose do you want and the directions do you to include?  Will send to [facility]

## 2021-02-25 ENCOUNTER — TELEPHONE (OUTPATIENT)
Dept: ENDOCRINOLOGY | Facility: CLINIC | Age: 61
End: 2021-02-25

## 2021-02-25 DIAGNOSIS — N20.0 NEPHROLITHIASIS: Primary | ICD-10-CM

## 2021-02-25 RX ORDER — POTASSIUM CITRATE 5 MEQ/1
5 TABLET, EXTENDED RELEASE ORAL DAILY
Qty: 90 TABLET | Refills: 1 | Status: SHIPPED | OUTPATIENT
Start: 2021-02-25 | End: 2021-12-08 | Stop reason: HOSPADM

## 2021-03-12 ENCOUNTER — TRANSCRIBE ORDERS (OUTPATIENT)
Dept: LAB | Facility: CLINIC | Age: 61
End: 2021-03-12

## 2021-03-12 ENCOUNTER — HOSPITAL ENCOUNTER (OUTPATIENT)
Dept: ULTRASOUND IMAGING | Facility: HOSPITAL | Age: 61
Discharge: HOME | End: 2021-03-12
Payer: COMMERCIAL

## 2021-03-12 ENCOUNTER — HOSPITAL ENCOUNTER (OUTPATIENT)
Dept: RADIOLOGY | Facility: HOSPITAL | Age: 61
Discharge: HOME | End: 2021-03-12
Attending: UROLOGY
Payer: COMMERCIAL

## 2021-03-12 ENCOUNTER — APPOINTMENT (OUTPATIENT)
Dept: LAB | Facility: CLINIC | Age: 61
End: 2021-03-12
Payer: COMMERCIAL

## 2021-03-12 DIAGNOSIS — N20.0 NEPHROLITHIASIS: ICD-10-CM

## 2021-03-12 DIAGNOSIS — E78.01 FAMILIAL HYPERCHOLESTEROLEMIA: ICD-10-CM

## 2021-03-12 DIAGNOSIS — R10.9 STOMACH ACHE: Primary | ICD-10-CM

## 2021-03-12 DIAGNOSIS — D69.6 THROMBOCYTOPENIA (HCC): ICD-10-CM

## 2021-03-12 DIAGNOSIS — Z00.00 HEALTH CARE MAINTENANCE: ICD-10-CM

## 2021-03-12 DIAGNOSIS — R10.9 STOMACH ACHE: ICD-10-CM

## 2021-03-12 LAB
ABO GROUP BLD: NORMAL
ALBUMIN SERPL BCP-MCNC: 3.7 G/DL (ref 3.5–5)
ALP SERPL-CCNC: 38 U/L (ref 46–116)
ALT SERPL W P-5'-P-CCNC: 19 U/L (ref 12–78)
ANION GAP SERPL CALCULATED.3IONS-SCNC: 9 MMOL/L (ref 4–13)
AST SERPL W P-5'-P-CCNC: 15 U/L (ref 5–45)
BASOPHILS # BLD AUTO: 0.03 THOUSANDS/ΜL (ref 0–0.1)
BASOPHILS NFR BLD AUTO: 1 % (ref 0–1)
BILIRUB SERPL-MCNC: 0.39 MG/DL (ref 0.2–1)
BILIRUB UR QL STRIP: NEGATIVE
BUN SERPL-MCNC: 21 MG/DL (ref 5–25)
CALCIUM SERPL-MCNC: 9 MG/DL (ref 8.3–10.1)
CHLORIDE SERPL-SCNC: 105 MMOL/L (ref 100–108)
CHOLEST SERPL-MCNC: 214 MG/DL (ref 50–200)
CLARITY UR: CLEAR
CO2 SERPL-SCNC: 27 MMOL/L (ref 21–32)
COLOR UR: NORMAL
CREAT SERPL-MCNC: 0.99 MG/DL (ref 0.6–1.3)
EOSINOPHIL # BLD AUTO: 0.04 THOUSAND/ΜL (ref 0–0.61)
EOSINOPHIL NFR BLD AUTO: 1 % (ref 0–6)
ERYTHROCYTE [DISTWIDTH] IN BLOOD BY AUTOMATED COUNT: 13.2 % (ref 11.6–15.1)
GFR SERPL CREATININE-BSD FRML MDRD: 62 ML/MIN/1.73SQ M
GLUCOSE P FAST SERPL-MCNC: 97 MG/DL (ref 65–99)
GLUCOSE UR STRIP-MCNC: NEGATIVE MG/DL
HCT VFR BLD AUTO: 41.8 % (ref 34.8–46.1)
HDLC SERPL-MCNC: 87 MG/DL
HGB BLD-MCNC: 12.9 G/DL (ref 11.5–15.4)
HGB UR QL STRIP.AUTO: NEGATIVE
IMM GRANULOCYTES # BLD AUTO: 0.01 THOUSAND/UL (ref 0–0.2)
IMM GRANULOCYTES NFR BLD AUTO: 0 % (ref 0–2)
KETONES UR STRIP-MCNC: NEGATIVE MG/DL
LDLC SERPL CALC-MCNC: 117 MG/DL (ref 0–100)
LEUKOCYTE ESTERASE UR QL STRIP: NEGATIVE
LYMPHOCYTES # BLD AUTO: 0.8 THOUSANDS/ΜL (ref 0.6–4.47)
LYMPHOCYTES NFR BLD AUTO: 23 % (ref 14–44)
MCH RBC QN AUTO: 28 PG (ref 26.8–34.3)
MCHC RBC AUTO-ENTMCNC: 30.9 G/DL (ref 31.4–37.4)
MCV RBC AUTO: 91 FL (ref 82–98)
MONOCYTES # BLD AUTO: 0.36 THOUSAND/ΜL (ref 0.17–1.22)
MONOCYTES NFR BLD AUTO: 10 % (ref 4–12)
NEUTROPHILS # BLD AUTO: 2.22 THOUSANDS/ΜL (ref 1.85–7.62)
NEUTS SEG NFR BLD AUTO: 65 % (ref 43–75)
NITRITE UR QL STRIP: NEGATIVE
NONHDLC SERPL-MCNC: 127 MG/DL
NRBC BLD AUTO-RTO: 0 /100 WBCS
PH UR STRIP.AUTO: 6.5 [PH]
PLATELET # BLD AUTO: 119 THOUSANDS/UL (ref 149–390)
PMV BLD AUTO: 11.7 FL (ref 8.9–12.7)
POTASSIUM SERPL-SCNC: 4 MMOL/L (ref 3.5–5.3)
PROT SERPL-MCNC: 7.2 G/DL (ref 6.4–8.2)
PROT UR STRIP-MCNC: NEGATIVE MG/DL
RBC # BLD AUTO: 4.61 MILLION/UL (ref 3.81–5.12)
RH BLD: POSITIVE
SODIUM SERPL-SCNC: 141 MMOL/L (ref 136–145)
SP GR UR STRIP.AUTO: 1.01 (ref 1–1.03)
TRIGL SERPL-MCNC: 50 MG/DL
UROBILINOGEN UR QL STRIP.AUTO: 0.2 E.U./DL
WBC # BLD AUTO: 3.46 THOUSAND/UL (ref 4.31–10.16)

## 2021-03-12 PROCEDURE — 81003 URINALYSIS AUTO W/O SCOPE: CPT | Performed by: INTERNAL MEDICINE

## 2021-03-12 PROCEDURE — 80053 COMPREHEN METABOLIC PANEL: CPT

## 2021-03-12 PROCEDURE — 85025 COMPLETE CBC W/AUTO DIFF WBC: CPT

## 2021-03-12 PROCEDURE — 80061 LIPID PANEL: CPT

## 2021-03-12 PROCEDURE — 86900 BLOOD TYPING SEROLOGIC ABO: CPT

## 2021-03-12 PROCEDURE — 36415 COLL VENOUS BLD VENIPUNCTURE: CPT

## 2021-03-12 PROCEDURE — 76770 US EXAM ABDO BACK WALL COMP: CPT

## 2021-03-12 PROCEDURE — 74018 RADEX ABDOMEN 1 VIEW: CPT

## 2021-03-12 PROCEDURE — 86901 BLOOD TYPING SEROLOGIC RH(D): CPT

## 2021-03-16 ENCOUNTER — OFFICE VISIT (OUTPATIENT)
Dept: INTERNAL MEDICINE CLINIC | Facility: CLINIC | Age: 61
End: 2021-03-16
Payer: COMMERCIAL

## 2021-03-16 VITALS
WEIGHT: 123.8 LBS | DIASTOLIC BLOOD PRESSURE: 70 MMHG | TEMPERATURE: 98.3 F | OXYGEN SATURATION: 98 % | HEIGHT: 67 IN | BODY MASS INDEX: 19.43 KG/M2 | HEART RATE: 87 BPM | SYSTOLIC BLOOD PRESSURE: 110 MMHG

## 2021-03-16 DIAGNOSIS — D70.9 GRANULOCYTOPENIA (HCC): ICD-10-CM

## 2021-03-16 DIAGNOSIS — Z00.00 HEALTH CARE MAINTENANCE: Primary | ICD-10-CM

## 2021-03-16 DIAGNOSIS — E74.39 GLUCOSE INTOLERANCE: ICD-10-CM

## 2021-03-16 DIAGNOSIS — E03.8 OTHER SPECIFIED HYPOTHYROIDISM: ICD-10-CM

## 2021-03-16 DIAGNOSIS — E78.2 MIXED HYPERLIPIDEMIA: ICD-10-CM

## 2021-03-16 DIAGNOSIS — D69.6 THROMBOCYTOPENIA (HCC): ICD-10-CM

## 2021-03-16 DIAGNOSIS — K58.0 IRRITABLE BOWEL SYNDROME WITH DIARRHEA: ICD-10-CM

## 2021-03-16 DIAGNOSIS — N39.0 URINARY TRACT INFECTION WITHOUT HEMATURIA, SITE UNSPECIFIED: ICD-10-CM

## 2021-03-16 DIAGNOSIS — K52.9 COLITIS: ICD-10-CM

## 2021-03-16 PROCEDURE — 99396 PREV VISIT EST AGE 40-64: CPT | Performed by: INTERNAL MEDICINE

## 2021-03-16 NOTE — PROGRESS NOTES
Assessment/Plan:    Irritable bowel syndrome with diarrhea   Interval bowel symptoms reviewed with the patient as well as her most recent visit with her gastroenterologist I agree with his recommendations in management of her irritable bowel including the use of  Probiotic as well as dicyclomine  Colitis    I have reviewed the patient's most recent visit with her  Gastroenterologist and agree with his recommendation for current dosing of Asacol medication for control of her colitis  Regular follow-up visits with Gastroenterology are recommended  Other specified hypothyroidism    I have reviewed the patient's most recent free T4 TSH values and agree with continuation of levothyroxine at 50 mcg daily proper dosing medication reviewed with patient  Thrombocytopenia ([facility] )    Most recent CBC reviewed with the patient confirms  Presence of ongoing thrombocytopenia recommend continued observation  No abnormal bleeding or bruising noted    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HCC)   Chronic granulocytopenia also noted no abnormal infection problems have been noted recommend continued observation and surveillance patient has received her COVID-19 vaccine    Hyperlipidemia   Review of the patient's lipid profile with her today indicates the mild elevation in both total cholesterol and LDL have recommended dietary adjustments to address this and recommend also continued exercise and continuation of weight control follow-up in 1 year       Diagnoses and all orders for this visit:    Mixed hyperlipidemia  -     Lipid panel; Future    Glucose intolerance  -     Comprehensive metabolic panel; Future    Urinary tract infection without hematuria, site unspecified  -     Urine culture; Future    Irritable bowel syndrome with diarrhea    Colitis    Other specified hypothyroidism    Thrombocytopenia (HCC)    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[facility] )    Health care maintenance        Subjective:      Patient ID: Juju Stewart is a 61 y o  female       This 80-year-old female patient presents today for an annual physical examination review blood work  She  Is noted to have a history of irritable bowel and colitis as well as interstitial bladder disease along with history of renal calculi  She has chronic thrombocytopenia and granulocytopenia  These have been stable over the past several years  Patient continues to have in general good health and indicates that she has been exercising regularly and tries to maintain a healthy diet although perhaps consumes too much ice cream and sweets  She does have some stress of caring for elderly  Parents were not always compliant  She has had no signs or symptoms of COVID-19  And has recently received the 2 injections of COVID vaccine  The following portions of the patient's history were reviewed and updated as appropriate:   She  has a past medical history of Atopic dermatitis, Atrophic vaginitis, Dermatitis, Dry eye, Frequency of urination, Fungal infection, Herpes, Hyperlipidemia, Hypothyroidism, Interstitial cystitis, Osteoporosis, Periodontal abscess, PONV (postoperative nausea and vomiting), Thyroid nodule, Ulcerative colitis ([address] ), Urinary frequency, UTI (urinary tract infection), Vaginal atrophy, Vitamin D deficiency, and Voiding dysfunction    She   Patient Active Problem List    Diagnosis Date Noted    Health care maintenance 02/08/2021    Colitis 09/08/2020    Ulcerative colitis ([address] ) 02/24/2020    Pollen-food allergy 12/05/2019    Allergy to iodinated contrast media 12/05/2019    Dysfunctional voiding of urine 11/14/2019    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    Other specified hypothyroidism 03/05/2019    Anxiety 06/01/2017    Nontoxic single thyroid nodule 12/09/2016    Urinary urgency 07/05/2016    Irritable bowel syndrome with diarrhea 02/19/2016    Thrombocytopenia ([address] ) 02/09/2016    Hashimoto's thyroiditis 03/03/2015    Vitamin D deficiency 03/03/2015    Other ulcerative colitis with abscess ([facility] ) 2015    Difficult or painful urination 2015    Hyperlipidemia 2015    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[facility] ) 2015    Dense breasts 10/28/2014    Osteoporosis 2013    Atrophic vaginitis 2013     She  has a past surgical history that includes Kidney stone surgery; ORIF proximal ulna fracture; pr colonoscopy flx dx w/collj spec when pfrmd (N/A, 2017); Cystoscopy;  section; pr incis tendon sheath,radial styloid (Right, 2019); Ganglion cyst excision (Right, 2019); [place] tooth extraction (); Tubal ligation; Other surgical history; pr cysto/uretero w/lithotripsy &indwell stent insrt (Left, 2020); FL retrograde pyelogram (2020); Colonoscopy (2020); and Upper gastrointestinal endoscopy (2020)  Her family history includes Breast cancer in her mother and paternal aunt; Diabetes in her father and paternal grandmother; Gallbladder disease in her mother; Hypertension in her father; Kidney disease in her mother; Lymphoma in her maternal grandfather; Nephrolithiasis in her father; No Known Problems in her son and son; Thyroid disease in her mother  She  reports that she has never smoked  She has never used smokeless tobacco  She reports current alcohol use  She reports that she does not use drugs    Current Outpatient Medications   Medication Sig Dispense Refill    Cholecalciferol (VITAMIN D PO) Take 3,000 Units by mouth daily      Cranberry (ELLURA PO) Take 1 capsule by mouth daily      cycloSPORINE (RESTASIS) 0 05 % ophthalmic emulsion Administer 1 drop to both eyes 2 (two) times a day      dicyclomine (BENTYL) 10 mg capsule Take 2 capsules (20 mg total) by mouth 3 (three) times a day before meals 270 capsule 3    estradiol (ESTRACE) 0 1 mg/g vaginal cream INSERT 1 APPLICATORFUL VAGINALLY NIGHTLY 42 5 g 2    fluticasone (FLONASE) 50 mcg/act nasal spray 1 spray into each nostril 2 (two) times a day 3 Bottle 2    levothyroxine 50 mcg tablet Take 1 tablet (50 mcg total) by mouth daily 90 tablet 2    mesalamine (ASACOL) 800 MG EC tablet Take 1 tablet (800 mg total) by mouth 4 (four) times a day 270 tablet 3    Meth-Hyo-M Bl-Na Phos-Ph Sal (URO-MP PO) Take 1 capsule by mouth 2 (two) times a day       Meth-Hyo-M Bl-Na Phos-Ph Sal (Vilamit MB) 118 MG CAPS       nystatin (MYCOSTATIN) cream Apply topically 2 (two) times a day 30 g 0    olopatadine HCl (Pataday) 0 2 % opth drops Administer 1 drop to both eyes daily 7 5 mL 2    OSPHENA 60 MG TABS daily       pentosan polysulfate (Elmiron) 100 mg capsule One pill 3 times a day 90 capsule 4    potassium citrate (Urocit-K 5) 5 MEQ (540 MG) Take 1 tablet (5 mEq total) by mouth daily 90 tablet 1    Probiotic Product (ALIGN EXTRA STRENGTH PO) Take by mouth      rosuvastatin (CRESTOR) 5 mg tablet Take 1 tablet (5 mg total) by mouth daily 90 tablet 3    Triamcinolone Acetonide (Nasacort Allergy 24HR) 55 MCG/ACT AERO into each nostril daily      trospium chloride (SANCTURA) 20 mg tablet Take 1 tablet (20 mg total) by mouth 2 (two) times a day 60 tablet 5     No current facility-administered medications for this visit       Review of Systems   Gastrointestinal:          Occasional bowel irritability   All other systems reviewed and are negative  Objective:      /70   Pulse 87   Temp 98 3 °F (36 8 °C) (Tympanic)   Ht 5' 7" (1 702 m)   Wt 56 2 kg (123 lb 12 8 oz)   LMP  (LMP Unknown)   SpO2 98%   BMI 19 39 kg/m²          Physical Exam  Vitals signs reviewed  Constitutional:       General: She is not in acute distress  Appearance: Normal appearance  She is well-developed  She is not ill-appearing  HENT:      Head: Normocephalic  Right Ear: Hearing, tympanic membrane, ear canal and external ear normal  There is no impacted cerumen  Left Ear: Hearing, tympanic membrane, ear canal and external ear normal  There is no impacted cerumen        Nose: Nose normal  No mucosal edema, congestion or rhinorrhea  Mouth/Throat:      Mouth: Mucous membranes are moist       Pharynx: Oropharynx is clear  Uvula midline  No oropharyngeal exudate or posterior oropharyngeal erythema  Eyes:      General: Lids are normal  No scleral icterus  Right eye: No discharge  Left eye: No discharge  Extraocular Movements: Extraocular movements intact  Conjunctiva/sclera: Conjunctivae normal       Pupils: Pupils are equal, round, and reactive to light  Neck:      Musculoskeletal: Normal range of motion and neck supple  No neck rigidity or muscular tenderness  Thyroid: No thyromegaly  Vascular: No carotid bruit or JVD  Cardiovascular:      Rate and Rhythm: Normal rate and regular rhythm  Heart sounds: Normal heart sounds  No murmur  Pulmonary:      Effort: Pulmonary effort is normal  No respiratory distress  Breath sounds: Normal breath sounds  No wheezing, rhonchi or rales  Abdominal:      General: Bowel sounds are normal  There is no distension  Palpations: Abdomen is soft  There is no mass  Tenderness: There is no abdominal tenderness  There is no guarding  Musculoskeletal: Normal range of motion  General: No swelling or tenderness  Lymphadenopathy:      Cervical: No cervical adenopathy  Skin:     General: Skin is warm and dry  Coloration: Skin is not jaundiced or pale  Neurological:      Mental Status: She is alert and oriented to person, place, and time  Mental status is at baseline  Deep Tendon Reflexes: Reflexes are normal and symmetric  Reflexes normal    Psychiatric:         Mood and Affect: Mood normal          Speech: Speech normal          Behavior: Behavior normal  Behavior is cooperative  Thought Content:  Thought content normal          Judgment: Judgment normal

## 2021-03-16 NOTE — ASSESSMENT & PLAN NOTE
Chronic granulocytopenia also noted no abnormal infection problems have been noted recommend continued observation and surveillance patient has received her COVID-19 vaccine

## 2021-03-16 NOTE — ASSESSMENT & PLAN NOTE
Review of the patient's lipid profile with her today indicates the mild elevation in both total cholesterol and LDL have recommended dietary adjustments to address this and recommend also continued exercise and continuation of weight control follow-up in 1 year

## 2021-03-16 NOTE — ASSESSMENT & PLAN NOTE
I have reviewed the patient's most recent free T4 TSH values and agree with continuation of levothyroxine at 50 mcg daily proper dosing medication reviewed with patient

## 2021-03-16 NOTE — ASSESSMENT & PLAN NOTE
I have reviewed the patient's most recent visit with her  Gastroenterologist and agree with his recommendation for current dosing of Asacol medication for control of her colitis  Regular follow-up visits with Gastroenterology are recommended

## 2021-03-16 NOTE — ASSESSMENT & PLAN NOTE
Most recent CBC reviewed with the patient confirms  Presence of ongoing thrombocytopenia recommend continued observation    No abnormal bleeding or bruising noted

## 2021-03-16 NOTE — ASSESSMENT & PLAN NOTE
Interval bowel symptoms reviewed with the patient as well as her most recent visit with her gastroenterologist I agree with his recommendations in management of her irritable bowel including the use of  Probiotic as well as dicyclomine

## 2021-03-22 DIAGNOSIS — K58.0 IRRITABLE BOWEL SYNDROME WITH DIARRHEA: ICD-10-CM

## 2021-03-22 RX ORDER — DICYCLOMINE HYDROCHLORIDE 10 MG/1
20 CAPSULE ORAL
Qty: 270 CAPSULE | Refills: 3 | Status: SHIPPED | OUTPATIENT
Start: 2021-03-22 | End: 2021-12-13 | Stop reason: SDUPTHER

## 2021-04-08 ENCOUNTER — OFFICE VISIT (OUTPATIENT)
Dept: UROLOGY | Facility: AMBULATORY SURGERY CENTER | Age: 61
End: 2021-04-08
Payer: COMMERCIAL

## 2021-04-08 VITALS
DIASTOLIC BLOOD PRESSURE: 72 MMHG | WEIGHT: 123 LBS | HEIGHT: 67 IN | HEART RATE: 76 BPM | BODY MASS INDEX: 19.3 KG/M2 | SYSTOLIC BLOOD PRESSURE: 102 MMHG

## 2021-04-08 DIAGNOSIS — N20.0 CALCULUS OF KIDNEY: Primary | ICD-10-CM

## 2021-04-08 PROCEDURE — 99213 OFFICE O/P EST LOW 20 MIN: CPT | Performed by: UROLOGY

## 2021-04-08 NOTE — PROGRESS NOTES
4/8/2021    Akhil Jessa  1960  [id number]        Assessment    History of nephrolithiasis status post left ureteroscopy      Discussion   we discussed her KUB and ultrasound which showed no recurrent stones  The patient inquired about potassium citrate  Risk and benefits of this medication were discussed and reviewed  The patient wishes to hold at this time  We also discussed augmenting calcium intake in her diet to reduce calcium oxalate stones  I stressed the importance of hydration  She will return in 1 year with a repeat KUB as well as a renal and bladder ultrasound  The patient was instructed to call sooner if she were to have recurrent flank pain  History of Present Illness  61 y o  female with a history of  Nephrolithiasis  In January 2020 she had a 6 mm left mid ureteral calculus requiring left-sided ureteroscopy with holmium laser lithotripsy  She had of stent placed with a string and this was subsequently removed  She returns for routine follow-up today  Follow-up imaging with a KUB shows no evidence of nephrolithiasis  A retroperitoneal ultrasound shows prominent extrarenal pelvises right greater than left but no evidence of hydronephrosis and no calculi identified  She was recommended to have potassium citrate by endocrinology secondary to recent 24 hour urine analysis which did reveal a low citrate level  We discussed the risk and benefits of potassium citrate in the office at length today  We reviewed her stone analysis from 2020 which reveals calcium oxalate  She is asymptomatic at this time and imaging study showed no recurrent stones  AUA Symptom Score      Review of Systems  Review of Systems   Constitutional: Negative  HENT: Negative  Eyes: Negative  Respiratory: Negative  Cardiovascular: Negative  Gastrointestinal: Negative  Endocrine: Negative  Genitourinary:        Per HPI   Musculoskeletal: Negative  Skin: Negative  Allergic/Immunologic: Negative  Neurological: Negative  Hematological: Negative  Psychiatric/Behavioral: Negative  Past Medical History  Past Medical History:   Diagnosis Date    Atopic dermatitis     last assessed 13    Atrophic vaginitis     last assessed 9/2/15    Dermatitis     Dry eye     Frequency of urination     Fungal infection     last assessed 13    Herpes     last assessed 14    Hyperlipidemia     Hypothyroidism     Interstitial cystitis     Osteoporosis     Periodontal abscess     last assessed 13    PONV (postoperative nausea and vomiting)     Thyroid nodule     Ulcerative colitis ([address] )     Urinary frequency     resolved 17    UTI (urinary tract infection)     Vaginal atrophy     Vitamin D deficiency     last assessed 16    Voiding dysfunction     last assessed 10/7/15       Past Social History  Past Surgical History:   Procedure Laterality Date     SECTION       and      COLONOSCOPY  2020    CYSTOSCOPY      diagnostic resolved 05    FL RETROGRADE PYELOGRAM  2020    GANGLION CYST EXCISION Right 2019    Procedure: EXCISION GANGLION CYST RIGHT FIRST DORSAL EXTENSOR COMPARTMENT;  Surgeon: Elvin Santos MD;  [facility];  Service: Orthopedics    KIDNEY STONE SURGERY      ORIF PROXIMAL ULNA FRACTURE      metal plates and screws removed    OTHER SURGICAL HISTORY      punch biospy     MN COLONOSCOPY FLX DX W/COLLJ SPEC WHEN PFRMD N/A 2017    Procedure: COLONOSCOPY;  Surgeon: Landrum Kayser, MD;  [facility];   Service: Colorectal    MN CYSTO/URETERO W/LITHOTRIPSY &INDWELL STENT INSRT Left 2020    Procedure: CYSTOSCOPY URETEROSCOPY WITH LITHOTRIPSY HOLMIUM LASER, RETROGRADE PYELOGRAM AND INSERTION STENT URETERAL;  Surgeon: Frances Slaughter MD;  [facility];  Service: Urology    MN INCIS TENDON SHEATH,RADIAL STYLOID Right 2019    Procedure: Fidelina Hardwick;  Surgeon: Sharon Alvarado MD;  Location: [facility];  Service: Orthopedics    TUBAL LIGATION      UPPER GASTROINTESTINAL ENDOSCOPY  09/11/2020    WISDOM TOOTH EXTRACTION  1983    X2        Past Family History  Family History   Problem Relation Age of Onset    Gallbladder disease Mother     Thyroid disease Mother     Kidney disease Mother    [facility] Breast cancer Mother     Diabetes Father     Hypertension Father     Nephrolithiasis Father     Lymphoma Maternal Grandfather     Diabetes Paternal Grandmother     Breast cancer Paternal Aunt     No Known Problems Son     No Known Problems Son        Past Social history  Social History     Socioeconomic History    Marital status: /Civil Union     Spouse name: Nisha Maxwell Number of children: 2    Years of education: Not on file    Highest education level: Not on file   Occupational History    Not on file   Social Needs    Financial resource strain: Not on file    Food insecurity     Worry: Not on file     Inability: Not on file    Transportation needs     Medical: Not on file     Non-medical: Not on file   Tobacco Use    Smoking status: Never Smoker    Smokeless tobacco: Never Used   Substance and Sexual Activity    Alcohol use: Yes     Frequency: Monthly or less     Drinks per session: 1 or 2     Comment: socially - 1-3 drinks per month    Drug use: No    Sexual activity: Not Currently   Lifestyle    Physical activity     Days per week: 4 days     Minutes per session: 60 min    Stress: Not on file   Relationships    Social connections     Talks on phone: Not on file     Gets together: Not on file     Attends Jain service: Not on file     Active member of club or organization: Not on file     Attends meetings of clubs or organizations: Not on file     Relationship status: Not on file    Intimate partner violence     Fear of current or ex partner: Not on file     Emotionally abused: Not on file     Physically abused: Not on file     Forced sexual activity: Not on file   Other Topics Concern    Not on file   Social History Narrative    Currently         Patient lives in a home that was built in 0    Gas/forced hot air     [address] [place] in the bedroom     Finished basement-dry-no mold or musty smell     Dehumidifier in the basement     No air  or purifiers     Humidifier in the winter months     Central air     Home is smoke free     Patient does not live close to open fields or wooded area         No pets         Caffeine: seldom hot tea or soda     Chocolate consumed everyday         Patient lives with spouse and children        Current Medications  Current Outpatient Medications   Medication Sig Dispense Refill    Cholecalciferol (VITAMIN D PO) Take 3,000 Units by mouth daily      Cranberry (ELLURA PO) Take 1 capsule by mouth daily      cycloSPORINE (RESTASIS) 0 05 % ophthalmic emulsion Administer 1 drop to both eyes 2 (two) times a day      dicyclomine (BENTYL) 10 mg capsule Take 2 capsules (20 mg total) by mouth 3 (three) times a day before meals 270 capsule 3    estradiol (ESTRACE) 0 1 mg/g vaginal cream INSERT 1 APPLICATORFUL VAGINALLY NIGHTLY 42 5 g 2    fluticasone (FLONASE) 50 mcg/act nasal spray 1 spray into each nostril 2 (two) times a day 3 Bottle 2    levothyroxine 50 mcg tablet Take 1 tablet (50 mcg total) by mouth daily 90 tablet 2    Meth-Hyo-M Bl-Na Phos-Ph Sal (URO-MP PO) Take 1 capsule by mouth 2 (two) times a day       Meth-Hyo-M Bl-Na Phos-Ph Sal (Vilamit MB) 118 MG CAPS       nystatin (MYCOSTATIN) cream Apply topically 2 (two) times a day 30 g 0    olopatadine HCl (Pataday) 0 2 % opth drops Administer 1 drop to both eyes daily 7 5 mL 2    OSPHENA 60 MG TABS daily       pentosan polysulfate (Elmiron) 100 mg capsule One pill 3 times a day 90 capsule 4    potassium citrate (Urocit-K 5) 5 MEQ (540 MG) Take 1 tablet (5 mEq total) by mouth daily 90 tablet 1    Probiotic Product (ALIGN EXTRA STRENGTH PO) Take by mouth      rosuvastatin (CRESTOR) 5 mg tablet Take 1 tablet (5 mg total) by mouth daily 90 tablet 3    Triamcinolone Acetonide (Nasacort Allergy 24HR) 55 MCG/ACT AERO into each nostril daily      trospium chloride (SANCTURA) 20 mg tablet Take 1 tablet (20 mg total) by mouth 2 (two) times a day 60 tablet 5    mesalamine (ASACOL) 800 MG EC tablet Take 1 tablet (800 mg total) by mouth 4 (four) times a day 270 tablet 3     No current facility-administered medications for this visit  Allergies  Allergies   Allergen Reactions    Iodine - Food Allergy Anaphylaxis     Allergy to Iodinated and non iodinated dye    Other Anaphylaxis     APPLES    TAPE  Also rash at times    Seasonal Ic [Cholestatin] Allergic Rhinitis    Latex - Food Allergy        Past Medical History, Social History, Family History, medications and allergies were reviewed  Vitals  Vitals:    04/08/21 0753   BP: 102/72   BP Location: Left arm   Patient Position: Sitting   Cuff Size: Adult   Pulse: 76   Weight: 55 8 kg (123 lb)   Height: 5' 7" (1 702 m)       Physical Exam  Physical Exam    On examination she is in no acute distress  Her abdomen is soft nontender nondistended   examination reveals no CVA tenderness  Skin is warm  Extremities without edema  Neurologic is grossly intact and nonfocal   Gait normal   Affect normal      Results  No results found for: PSA  Lab Results   Component Value Date    GLUCOSE 89 08/07/2015    CALCIUM 9 0 03/12/2021     08/07/2015    K 4 0 03/12/2021    CO2 27 03/12/2021     03/12/2021    BUN 21 03/12/2021    CREATININE 0 99 03/12/2021     Lab Results   Component Value Date    WBC 3 46 (L) 03/12/2021    HGB 12 9 03/12/2021    HCT 41 8 03/12/2021    MCV 91 03/12/2021     (L) 03/12/2021         Office Urine Dip  No results found for this or any previous visit (from the past 1 hour(s))  ]      Total visit time was 15 minutes of which over 50% was spent on counseling

## 2021-04-19 ENCOUNTER — TRANSCRIBE ORDERS (OUTPATIENT)
Dept: LAB | Facility: CLINIC | Age: 61
End: 2021-04-19

## 2021-04-19 ENCOUNTER — APPOINTMENT (OUTPATIENT)
Dept: LAB | Facility: CLINIC | Age: 61
End: 2021-04-19

## 2021-04-19 DIAGNOSIS — Z00.8 HEALTH EXAMINATION IN POPULATION SURVEY: Primary | ICD-10-CM

## 2021-04-19 DIAGNOSIS — Z00.8 HEALTH EXAMINATION IN POPULATION SURVEY: ICD-10-CM

## 2021-04-19 LAB
CHOLEST SERPL-MCNC: 198 MG/DL (ref 50–200)
EST. AVERAGE GLUCOSE BLD GHB EST-MCNC: 100 MG/DL
HBA1C MFR BLD: 5.1 %
HDLC SERPL-MCNC: 81 MG/DL
LDLC SERPL CALC-MCNC: 100 MG/DL (ref 0–100)
NONHDLC SERPL-MCNC: 117 MG/DL
TRIGL SERPL-MCNC: 86 MG/DL

## 2021-04-19 PROCEDURE — 36415 COLL VENOUS BLD VENIPUNCTURE: CPT

## 2021-04-19 PROCEDURE — 83036 HEMOGLOBIN GLYCOSYLATED A1C: CPT

## 2021-04-19 PROCEDURE — 80061 LIPID PANEL: CPT

## 2021-04-26 ENCOUNTER — OFFICE VISIT (OUTPATIENT)
Dept: OBGYN CLINIC | Facility: CLINIC | Age: 61
End: 2021-04-26
Payer: COMMERCIAL

## 2021-04-26 ENCOUNTER — ULTRASOUND (OUTPATIENT)
Dept: OBGYN CLINIC | Facility: CLINIC | Age: 61
End: 2021-04-26
Payer: COMMERCIAL

## 2021-04-26 VITALS
BODY MASS INDEX: 19.87 KG/M2 | SYSTOLIC BLOOD PRESSURE: 110 MMHG | DIASTOLIC BLOOD PRESSURE: 78 MMHG | WEIGHT: 126.6 LBS | HEIGHT: 67 IN

## 2021-04-26 DIAGNOSIS — N85.00 ENDOMETRIAL HYPERPLASIA: Primary | ICD-10-CM

## 2021-04-26 DIAGNOSIS — Z01.419 ENCOUNTER FOR CERVICAL PAP SMEAR WITH PELVIC EXAM: Primary | ICD-10-CM

## 2021-04-26 PROCEDURE — G0145 SCR C/V CYTO,THINLAYER,RESCR: HCPCS | Performed by: OBSTETRICS & GYNECOLOGY

## 2021-04-26 PROCEDURE — 87624 HPV HI-RISK TYP POOLED RSLT: CPT | Performed by: OBSTETRICS & GYNECOLOGY

## 2021-04-26 PROCEDURE — 99213 OFFICE O/P EST LOW 20 MIN: CPT | Performed by: OBSTETRICS & GYNECOLOGY

## 2021-04-26 PROCEDURE — 76856 US EXAM PELVIC COMPLETE: CPT | Performed by: OBSTETRICS & GYNECOLOGY

## 2021-04-26 NOTE — PROGRESS NOTES
Endometrial stripe as remained normal at 6 millimeters     Patient not experiencing any vaginal bleeding at all    Will repeat study in 3 months to check for stability     Patient to call for any problems including bleeding which would arise during the interim    Results reviewed with patient today

## 2021-04-26 NOTE — PROGRESS NOTES
AMB US Pelvic Non OB    Date/Time: 4/26/2021 9:17 AM  Performed by: Luc Danielle  Authorized by: Jason Arango MD     Procedure details:     Indications: endometrial hyperplasia      Technique:  US Pelvic, Non-OB with complete exam  Uterine findings:     Length (cm): 5 6    Height (cm):  4 7    Width (cm):  4 2    Uterine adhesions: not identified      Adnexal mass: not identified      Polyps: not identified      Myomas: not identified      Endometrial stripe: identified      Endometrial hyperplasia: not identified      Endometrium thickness (mm):  6  Left ovary findings:     Left ovary:  Visualized    Cysts: not identified      Length (cm): 2 6    Height (cm): 2    Width (cm): 1 8  Right ovary findings:     Right ovary:  Visualized    Cysts: not identified      Length (cm): 2 4    Height (cm): 2    Width (cm): 1 7  Other findings:     Free pelvic fluid: not identified      Free peritoneal fluid: not identified    Post-Procedure Details:     Impression:  Endo=6mm,stable    Tolerance:   Tolerated well, no immediate complications

## 2021-05-02 NOTE — PROGRESS NOTES
Assessment/Plan:    No problem-specific Assessment & Plan notes found for this encounter  Diagnoses and all orders for this visit:    Encounter for cervical Pap smear with pelvic exam  -     Liquid-based pap, screening  -     HPV High Risk        Subjective:      Patient ID: Arias Rubi is a 61 y o  female  Patient is a 61-year-old female who presents today for follow-up ultrasound secondary to isolated episode of postmenopausal bleeding in slightly thickened endometrial stripe  Pelvic ultrasound performed today showed endometrial stripe which is remains stable at 6 mm  Patient denies any episodes of vaginal bleeding at all since her last study     We will continue to follow status carefully and patient was told to notify the office if any further bleeding episodes occur  Patient denies any pelvic or abdominal pain      Pap smear was also completed at today's visit     She also reports normal bowel and bladder habits     She is followed medically for cholesterol and hypothyroidism     Patient was told to call the office for any problems, questions, issues or concerns which may arise for her       Total time of today's visit was 20 minutes of which greater than 50% was spent face-to-face counseling the patient as well as coordination of care, review of charts and ultrasound as well as computer entry into the epic medical record system  The following portions of the patient's history were reviewed and updated as appropriate: allergies, current medications, past family history, past medical history, past social history, past surgical history and problem list     Review of Systems   Constitutional: Negative  Negative for appetite change, diaphoresis, fatigue, fever and unexpected weight change  HENT: Negative  Eyes: Negative  Respiratory: Negative  Cardiovascular: Negative  Followed for cholesterol   Gastrointestinal: Negative    Negative for abdominal pain, blood in stool, constipation, diarrhea, nausea and vomiting  Endocrine: Negative  Negative for cold intolerance and heat intolerance  Followed for thyroid   Genitourinary: Negative  Negative for dysuria, frequency, hematuria, urgency, vaginal bleeding, vaginal discharge and vaginal pain  Musculoskeletal: Negative  Skin: Negative  Allergic/Immunologic: Negative  Neurological: Negative  Hematological: Negative  Negative for adenopathy  Psychiatric/Behavioral: Negative  Objective:      /78   Ht 5' 7" (1 702 m)   Wt 57 4 kg (126 lb 9 6 oz)   LMP  (LMP Unknown)   BMI 19 83 kg/m²          Physical Exam  Constitutional:       Appearance: She is well-developed  HENT:      Head: Normocephalic  Eyes:      Pupils: Pupils are equal, round, and reactive to light  Neck:      Musculoskeletal: Normal range of motion and neck supple  Cardiovascular:      Rate and Rhythm: Normal rate  Pulmonary:      Effort: Pulmonary effort is normal    Abdominal:      Palpations: Abdomen is soft  Musculoskeletal: Normal range of motion  Skin:     General: Skin is warm and dry  Neurological:      Mental Status: She is alert and oriented to person, place, and time  Psychiatric:         Mood and Affect: Mood normal          Behavior: Behavior normal          Thought Content:  Thought content normal          Judgment: Judgment normal

## 2021-05-02 NOTE — PATIENT INSTRUCTIONS
Topic:  History of thickened endometrial stripe and encounter for cervical Pap smear     Pap smear was completed without difficulty     Endometrial stripe remains stable at 6 mm     Patient has had no further bleeding episodes    Patient will be seen in the near future for careful follow-up     She was told to call for any bleeding or for any problems, questions, issues or concerns which may arise for her

## 2021-05-03 LAB
LAB AP GYN PRIMARY INTERPRETATION: NORMAL
Lab: NORMAL

## 2021-07-09 ENCOUNTER — APPOINTMENT (OUTPATIENT)
Dept: LAB | Facility: CLINIC | Age: 61
End: 2021-07-09
Payer: COMMERCIAL

## 2021-07-09 ENCOUNTER — TELEPHONE (OUTPATIENT)
Dept: GASTROENTEROLOGY | Facility: MEDICAL CENTER | Age: 61
End: 2021-07-09

## 2021-07-09 DIAGNOSIS — K51.00 ULCERATIVE PANCOLITIS WITHOUT COMPLICATION (HCC): ICD-10-CM

## 2021-07-09 LAB
ALBUMIN SERPL BCP-MCNC: 3.8 G/DL (ref 3.5–5)
ALP SERPL-CCNC: 46 U/L (ref 46–116)
ALT SERPL W P-5'-P-CCNC: 40 U/L (ref 12–78)
ANION GAP SERPL CALCULATED.3IONS-SCNC: 7 MMOL/L (ref 4–13)
AST SERPL W P-5'-P-CCNC: 28 U/L (ref 5–45)
BASOPHILS # BLD AUTO: 0.03 THOUSANDS/ΜL (ref 0–0.1)
BASOPHILS NFR BLD AUTO: 1 % (ref 0–1)
BILIRUB SERPL-MCNC: 0.49 MG/DL (ref 0.2–1)
BUN SERPL-MCNC: 16 MG/DL (ref 5–25)
CALCIUM SERPL-MCNC: 8.9 MG/DL (ref 8.3–10.1)
CHLORIDE SERPL-SCNC: 107 MMOL/L (ref 100–108)
CO2 SERPL-SCNC: 29 MMOL/L (ref 21–32)
CREAT SERPL-MCNC: 0.94 MG/DL (ref 0.6–1.3)
CRP SERPL QL: <3 MG/L
EOSINOPHIL # BLD AUTO: 0.04 THOUSAND/ΜL (ref 0–0.61)
EOSINOPHIL NFR BLD AUTO: 1 % (ref 0–6)
ERYTHROCYTE [DISTWIDTH] IN BLOOD BY AUTOMATED COUNT: 13.2 % (ref 11.6–15.1)
GFR SERPL CREATININE-BSD FRML MDRD: 66 ML/MIN/1.73SQ M
GLUCOSE P FAST SERPL-MCNC: 100 MG/DL (ref 65–99)
HCT VFR BLD AUTO: 42.8 % (ref 34.8–46.1)
HGB BLD-MCNC: 13.3 G/DL (ref 11.5–15.4)
IMM GRANULOCYTES # BLD AUTO: 0 THOUSAND/UL (ref 0–0.2)
IMM GRANULOCYTES NFR BLD AUTO: 0 % (ref 0–2)
LYMPHOCYTES # BLD AUTO: 0.88 THOUSANDS/ΜL (ref 0.6–4.47)
LYMPHOCYTES NFR BLD AUTO: 28 % (ref 14–44)
MCH RBC QN AUTO: 28.2 PG (ref 26.8–34.3)
MCHC RBC AUTO-ENTMCNC: 31.1 G/DL (ref 31.4–37.4)
MCV RBC AUTO: 91 FL (ref 82–98)
MONOCYTES # BLD AUTO: 0.39 THOUSAND/ΜL (ref 0.17–1.22)
MONOCYTES NFR BLD AUTO: 12 % (ref 4–12)
NEUTROPHILS # BLD AUTO: 1.82 THOUSANDS/ΜL (ref 1.85–7.62)
NEUTS SEG NFR BLD AUTO: 58 % (ref 43–75)
NRBC BLD AUTO-RTO: 0 /100 WBCS
PLATELET # BLD AUTO: 126 THOUSANDS/UL (ref 149–390)
PMV BLD AUTO: 11.2 FL (ref 8.9–12.7)
POTASSIUM SERPL-SCNC: 4 MMOL/L (ref 3.5–5.3)
PROT SERPL-MCNC: 7.2 G/DL (ref 6.4–8.2)
RBC # BLD AUTO: 4.72 MILLION/UL (ref 3.81–5.12)
SODIUM SERPL-SCNC: 143 MMOL/L (ref 136–145)
WBC # BLD AUTO: 3.16 THOUSAND/UL (ref 4.31–10.16)

## 2021-07-09 PROCEDURE — 36415 COLL VENOUS BLD VENIPUNCTURE: CPT

## 2021-07-09 PROCEDURE — 85025 COMPLETE CBC W/AUTO DIFF WBC: CPT

## 2021-07-09 PROCEDURE — 86140 C-REACTIVE PROTEIN: CPT

## 2021-07-09 PROCEDURE — 80053 COMPREHEN METABOLIC PANEL: CPT

## 2021-07-09 PROCEDURE — 83993 ASSAY FOR CALPROTECTIN FECAL: CPT

## 2021-07-09 NOTE — TELEPHONE ENCOUNTER
----- Message from Christina Goel MD sent at 7/9/2021 12:55 PM EDT -----  Please call patient :   Inflammation marker was normal   Liver enzymes were normal   White count was slightly low which has been low in the past as well  Platelet levels have been low again which have been low in the past as well

## 2021-07-13 LAB — CALPROTECTIN STL-MCNT: 34 UG/G (ref 0–120)

## 2021-07-25 ENCOUNTER — APPOINTMENT (OUTPATIENT)
Dept: LAB | Facility: CLINIC | Age: 61
End: 2021-07-25
Payer: COMMERCIAL

## 2021-07-25 DIAGNOSIS — R30.0 DYSURIA: ICD-10-CM

## 2021-07-25 PROCEDURE — 87086 URINE CULTURE/COLONY COUNT: CPT

## 2021-07-25 PROCEDURE — 87186 SC STD MICRODIL/AGAR DIL: CPT

## 2021-07-25 PROCEDURE — 87077 CULTURE AEROBIC IDENTIFY: CPT

## 2021-07-26 ENCOUNTER — ULTRASOUND (OUTPATIENT)
Dept: OBGYN CLINIC | Facility: CLINIC | Age: 61
End: 2021-07-26
Payer: COMMERCIAL

## 2021-07-26 ENCOUNTER — OFFICE VISIT (OUTPATIENT)
Dept: OBGYN CLINIC | Facility: CLINIC | Age: 61
End: 2021-07-26
Payer: COMMERCIAL

## 2021-07-26 VITALS
WEIGHT: 126 LBS | HEIGHT: 67 IN | SYSTOLIC BLOOD PRESSURE: 110 MMHG | BODY MASS INDEX: 19.78 KG/M2 | DIASTOLIC BLOOD PRESSURE: 74 MMHG

## 2021-07-26 DIAGNOSIS — N85.00 ENDOMETRIAL HYPERPLASIA: ICD-10-CM

## 2021-07-26 DIAGNOSIS — R93.89 INCREASED ENDOMETRIAL STRIPE: Primary | ICD-10-CM

## 2021-07-26 PROCEDURE — 99213 OFFICE O/P EST LOW 20 MIN: CPT | Performed by: OBSTETRICS & GYNECOLOGY

## 2021-07-26 PROCEDURE — 76856 US EXAM PELVIC COMPLETE: CPT | Performed by: OBSTETRICS & GYNECOLOGY

## 2021-07-26 NOTE — PROGRESS NOTES
Assessment/Plan:    No problem-specific Assessment & Plan notes found for this encounter  Diagnoses and all orders for this visit:    Increased endometrial stripe        Subjective:      Patient ID: Agustin Siegel is a 64 y o  female  Patient is a 59-year-old postmenopausal female here today for follow-up ultrasound for evaluation of the endometrial stripe  Patient reports that she is not experiencing any vaginal bleeding whatsoever     Pelvic ultrasound performed today revealed that her endometrial stripe remained stable at 6 mm  No other suspicious changes were seen on the study  We discussed the results and I reassured the patient of stability of the findings  She knows to report any vaginal bleeding immediately should it occur her     She does state that she had an experience of significant rectal bleeding yesterday after straining for a bowel movement  During today's visit she was able to contact the colon and rectal surgeons office and she will be seen tomorrow for follow-up evaluation  All questions were answered for her during today's visit     She will be seen in December for follow-up and ultrasound at that time to confirm stability of the stripe  In the meantime she will notify us of any changes particularly of any vaginal bleeding should it occur  Patient will call for any problems, questions, issues or concerns which may arise for her       Total time of today's visit was 20 minutes of which greater than 50% was spent face-to-face counseling the patient as well as coordination of care, review of ultrasound study and computer entry into the epic medical record system  The following portions of the patient's history were reviewed and updated as appropriate: allergies, current medications, past family history, past medical history, past social history, past surgical history and problem list     Review of Systems   Constitutional: Negative    Negative for appetite change, diaphoresis, fatigue, fever and unexpected weight change  HENT: Negative  Eyes: Negative  Respiratory: Negative  Cardiovascular: Negative  Followed for cholesterol   Gastrointestinal: Negative  Negative for abdominal pain, blood in stool, constipation, diarrhea, nausea and vomiting  Endocrine: Negative  Negative for cold intolerance and heat intolerance  Genitourinary: Negative  Negative for dysuria, frequency, hematuria, urgency, vaginal bleeding, vaginal discharge and vaginal pain  Musculoskeletal: Negative  Followed for osteoporosis   Skin: Negative  Allergic/Immunologic: Negative  Neurological: Negative  Hematological: Negative  Negative for adenopathy  Psychiatric/Behavioral: Negative  Objective:      /74   Ht 5' 7" (1 702 m)   Wt 57 2 kg (126 lb)   LMP  (LMP Unknown)   BMI 19 73 kg/m²          Physical Exam  Constitutional:       Appearance: She is well-developed  HENT:      Head: Normocephalic  Eyes:      Pupils: Pupils are equal, round, and reactive to light  Cardiovascular:      Rate and Rhythm: Normal rate  Pulmonary:      Effort: Pulmonary effort is normal    Musculoskeletal:         General: Normal range of motion  Cervical back: Normal range of motion and neck supple  Skin:     General: Skin is warm and dry  Neurological:      Mental Status: She is alert and oriented to person, place, and time  Psychiatric:         Mood and Affect: Mood normal          Behavior: Behavior normal          Thought Content:  Thought content normal          Judgment: Judgment normal

## 2021-07-26 NOTE — PROGRESS NOTES
AMB US Pelvic Non OB    Date/Time: 7/26/2021 9:05 AM  Performed by: Megan Lainez  Authorized by: Viridiana Coronel MD     Procedure details:     Indications: endometrial hyperplasia      Technique:  US Pelvic, Non-OB with complete exam  Uterine findings:     Length (cm): 7 7    Height (cm):  6 2    Width (cm):  4 5    Uterine adhesions: not identified      Adnexal mass: not identified      Polyps: not identified      Myomas: not identified      Endometrial stripe: identified      Endometrial hyperplasia: not identified      Endometrium thickness (mm):  6  Left ovary findings:     Left ovary:  Visualized    Cysts: not identified      Length (cm): 2 7    Height (cm): 2 4    Width (cm): 2 2  Right ovary findings:     Right ovary:  Visualized    Cysts: not identified      Length (cm): 2 6    Height (cm): 2 4    Width (cm): 2 3  Other findings:     Free pelvic fluid: not identified      Free peritoneal fluid: not identified    Post-Procedure Details:     Impression:  Endo-6mm,WNL    Tolerance:   Tolerated well, no immediate complications

## 2021-07-26 NOTE — PATIENT INSTRUCTIONS
Topic:  Discussion of pelvic ultrasound results for follow-up of thickened endometrial stripe    Discussed the results of the pelvic ultrasound with the patient and stated that her stripe remains stable at 6 mm        Patient denies any vaginal bleeding at this time     I will see her back in approximately 6 months for follow-up visit and pelvic ultrasound to confirm stability of the stripe     Patient knows to contact me should any vaginal bleeding occur during the interim    Patient will call for any problems, questions, issues or concerns which also arise for her

## 2021-07-27 LAB — BACTERIA UR CULT: ABNORMAL

## 2021-07-31 NOTE — PROGRESS NOTES
Pelvic ultrasound results discussed with patient     Endometrial stripe remains stable at 6 mm     Will  repeat study in 4-6 months    Patient will call for any bleeding, problems, questions or concerns which may arise for her

## 2021-08-20 ENCOUNTER — TELEPHONE (OUTPATIENT)
Dept: GASTROENTEROLOGY | Facility: CLINIC | Age: 61
End: 2021-08-20

## 2021-08-20 NOTE — TELEPHONE ENCOUNTER
Patients GI provider:  Dr Yuko Gauthier    Number to return call: [phone]     Reason for call: Pt calling wanting a sooner appt to see Dr Yuko Gauthier only  Pt states she received a letter to sched her FU visit in August but nothing is avail  Pt states she is currently have some symptoms but did not go into what they were      Scheduled procedure/appointment date if applicable: Appt - 79/72/07

## 2021-09-09 PROCEDURE — 81003 URINALYSIS AUTO W/O SCOPE: CPT | Performed by: INTERNAL MEDICINE

## 2021-09-16 ENCOUNTER — APPOINTMENT (OUTPATIENT)
Dept: LAB | Facility: AMBULARY SURGERY CENTER | Age: 61
End: 2021-09-16
Payer: COMMERCIAL

## 2021-09-16 DIAGNOSIS — E78.2 MIXED HYPERLIPIDEMIA: ICD-10-CM

## 2021-09-16 DIAGNOSIS — N39.0 URINARY TRACT INFECTION WITHOUT HEMATURIA, SITE UNSPECIFIED: ICD-10-CM

## 2021-09-16 DIAGNOSIS — R30.0 DIFFICULT OR PAINFUL URINATION: ICD-10-CM

## 2021-09-16 LAB
CHOLEST SERPL-MCNC: 194 MG/DL (ref 50–200)
HDLC SERPL-MCNC: 85 MG/DL
LDLC SERPL CALC-MCNC: 100 MG/DL (ref 0–100)
NONHDLC SERPL-MCNC: 109 MG/DL
TRIGL SERPL-MCNC: 44 MG/DL

## 2021-09-16 PROCEDURE — 36415 COLL VENOUS BLD VENIPUNCTURE: CPT

## 2021-09-16 PROCEDURE — 87086 URINE CULTURE/COLONY COUNT: CPT

## 2021-09-16 PROCEDURE — 80061 LIPID PANEL: CPT

## 2021-09-17 ENCOUNTER — OFFICE VISIT (OUTPATIENT)
Dept: GASTROENTEROLOGY | Facility: MEDICAL CENTER | Age: 61
End: 2021-09-17
Payer: COMMERCIAL

## 2021-09-17 VITALS
WEIGHT: 120.4 LBS | DIASTOLIC BLOOD PRESSURE: 64 MMHG | TEMPERATURE: 98.7 F | SYSTOLIC BLOOD PRESSURE: 110 MMHG | HEART RATE: 84 BPM | BODY MASS INDEX: 18.86 KG/M2

## 2021-09-17 DIAGNOSIS — K58.0 IRRITABLE BOWEL SYNDROME WITH DIARRHEA: Primary | ICD-10-CM

## 2021-09-17 DIAGNOSIS — K51.30 ULCERATIVE RECTOSIGMOIDITIS WITHOUT COMPLICATION (HCC): ICD-10-CM

## 2021-09-17 DIAGNOSIS — F41.9 ANXIETY: ICD-10-CM

## 2021-09-17 LAB — BACTERIA UR CULT: NORMAL

## 2021-09-17 PROCEDURE — 99213 OFFICE O/P EST LOW 20 MIN: CPT | Performed by: INTERNAL MEDICINE

## 2021-09-17 NOTE — PATIENT INSTRUCTIONS
1  IB Guard  2  Align  3  Bentyl as needed  4  Yoga  5  Miralax for constipation  6  Imodium for diarrhea  7  Follow up with endoscopy

## 2021-09-17 NOTE — PROGRESS NOTES
Outpatient Follow up  Lillian Robbins  [address]    [facility] [address]  Nicky Braun MD  Ph : [phone]  Fax : [phone]  Mobile : [phone]  Email : [email or web address]  Also available on [employer] Text    Denice Vasquez 64 y o  female MRN: [id number]    PCP: Antwan Rodríguez MD  Referring: No referring provider defined for this encounter  Denice Vasquez presented for a follow up visit  My recommendations are included  Please do not hesitate to contact me with any questions you may have  ASSESSMENT AND PLAN:      No problem-specific Assessment & Plan notes found for this encounter  There are no diagnoses linked to this encounter  26-year-old lady with history of IBS with diarrhea/constipation and chronic inactive/indeterminate colitis  She recently had an episode of constipation and rectal bleeding secondary to fissures  She is doing well at this time  She does get IBS symptoms from time to time  Recommend the following  Bentyl for pain  IB Estuardo   FODMAP diet  miralax for constipation  Imodium for diarrhea  Follow up in 6 months    ______________________________________________________________________    SUBJECTIVE:  63 y/o with h/o indeterminate colitis/chronic inactive colitis  She also has had a history of IBS with diarrhea  She has  osteoporosis and had been on calcium supplements recently  This led to constipation  She had rectal bleeding as well  She had a history of fissures but had a recent anoscopy which did not show any significant findings  She has stopped calcium and is going more regular  She is taking Bentyl  She is on Fleets enema as needed  She is on Asacol as well  She is taking three 800 tablets three times daily  EGD last year  This showed mild nodularity in the distal esophagus for which biopsies were done  These were unremarkable    Otherwise the stomach and the duodenum were normal   Colonoscopy last year  This showed chronic inactive colitis/ indeterminate colitis  REVIEW OF SYSTEMS IS OTHERWISE NEGATIVE  Historical Information   Past Medical History:   Diagnosis Date    Atopic dermatitis     last assessed 13    Atrophic vaginitis     last assessed 9/2/15    Dermatitis     Dry eye     Frequency of urination     Fungal infection     last assessed 13    Herpes     last assessed 14    Hyperlipidemia     Hypothyroidism     Interstitial cystitis     Osteoporosis     Periodontal abscess     last assessed 13    PONV (postoperative nausea and vomiting)     Thyroid nodule     Ulcerative colitis ([address] )     Urinary frequency     resolved 17    UTI (urinary tract infection)     Vaginal atrophy     Vitamin D deficiency     last assessed 16    Voiding dysfunction     last assessed 10/7/15     Past Surgical History:   Procedure Laterality Date     SECTION       and      COLONOSCOPY  2020    CYSTOSCOPY      diagnostic resolved 05    FL RETROGRADE PYELOGRAM  2020    GANGLION CYST EXCISION Right 2019    Procedure: EXCISION GANGLION CYST RIGHT FIRST DORSAL EXTENSOR COMPARTMENT;  Surgeon: Nicole Anderson MD;  [facility];  Service: Orthopedics    KIDNEY STONE SURGERY      ORIF PROXIMAL ULNA FRACTURE      metal plates and screws removed    OTHER SURGICAL HISTORY      punch biospy     AK COLONOSCOPY FLX DX W/COLLJ SPEC WHEN PFRMD N/A 2017    Procedure: COLONOSCOPY;  Surgeon: Webb Cogan, MD;  [facility];   Service: Colorectal    AK CYSTO/URETERO W/LITHOTRIPSY &INDWELL STENT INSRT Left 2020    Procedure: CYSTOSCOPY URETEROSCOPY WITH LITHOTRIPSY HOLMIUM LASER, RETROGRADE PYELOGRAM AND INSERTION STENT URETERAL;  Surgeon: Vanda Marie MD;  [facility];  Service: Urology    AK INCIS TENDON SHEATH,RADIAL STYLOID Right 2019    Procedure: RELEASE DEQUERVAINS;  Surgeon: Yajaira Garcia MD;  [facility];  Service: Orthopedics    TUBAL LIGATION      UPPER GASTROINTESTINAL ENDOSCOPY  09/11/2020    WISDOM TOOTH EXTRACTION  1983    X2      Social History   Social History     Substance and Sexual Activity   Alcohol Use Yes    Comment: socially - 1-3 drinks per month     Social History     Substance and Sexual Activity   Drug Use No     Social History     Tobacco Use   Smoking Status Never Smoker   Smokeless Tobacco Never Used     Family History   Problem Relation Age of Onset    Gallbladder disease Mother     Thyroid disease Mother     Kidney disease Mother     Breast cancer Mother     Diabetes Father     Hypertension Father     Nephrolithiasis Father     Lymphoma Maternal Grandfather     Diabetes Paternal Grandmother     Breast cancer Paternal Aunt     No Known Problems Son     No Known Problems Son        Meds/Allergies       Current Outpatient Medications:     Cholecalciferol (VITAMIN D PO)    Cranberry (ELLURA PO)    cycloSPORINE (RESTASIS) 0 05 % ophthalmic emulsion    dicyclomine (BENTYL) 10 mg capsule    estradiol (ESTRACE) 0 1 mg/g vaginal cream    fluticasone (FLONASE) 50 mcg/act nasal spray    hydrocortisone-pramoxine (PROCTOFOAM-HC) 1-1 % FOAM rectal foam    levothyroxine 50 mcg tablet    Meth-Hyo-M Bl-Na Phos-Ph Sal (URO-MP PO)    Meth-Hyo-M Bl-Na Phos-Ph Sal (Vilamit MB) 118 MG CAPS    nystatin (MYCOSTATIN) cream    pentosan polysulfate (Elmiron) 100 mg capsule    potassium citrate (Urocit-K 5) 5 MEQ (540 MG)    Probiotic Product (ALIGN EXTRA STRENGTH PO)    rosuvastatin (CRESTOR) 5 mg tablet    Triamcinolone Acetonide (Nasacort Allergy 24HR) 55 MCG/ACT AERO    trospium chloride (SANCTURA) 20 mg tablet    mesalamine (ASACOL) 800 MG EC tablet    olopatadine HCl (Pataday) 0 2 % opth drops    OSPHENA 60 MG TABS    Allergies   Allergen Reactions    Iodine - Food Allergy Anaphylaxis     Allergy to Iodinated and non iodinated dye    Other Anaphylaxis     APPLES    TAPE  Also rash at times    Seasonal Ic [Cholestatin] Allergic Rhinitis    Latex            Objective     Blood pressure 110/64, pulse 84, temperature 98 7 °F (37 1 °C), weight 54 6 kg (120 lb 6 4 oz)  Body mass index is 18 86 kg/m²  PHYSICAL EXAM:      Physical Exam  Constitutional:       Appearance: Normal appearance  She is well-developed  HENT:      Head: Normocephalic and atraumatic  Eyes:      General: No scleral icterus  Conjunctiva/sclera: Conjunctivae normal       Pupils: Pupils are equal, round, and reactive to light  Cardiovascular:      Rate and Rhythm: Normal rate and regular rhythm  Heart sounds: Normal heart sounds  Pulmonary:      Effort: Pulmonary effort is normal  No respiratory distress  Breath sounds: Normal breath sounds  Abdominal:      General: Bowel sounds are normal  There is no distension  Palpations: Abdomen is soft  There is no mass  Tenderness: There is no abdominal tenderness  Hernia: No hernia is present  Musculoskeletal:         General: Normal range of motion  Cervical back: Normal range of motion  Lymphadenopathy:      Cervical: No cervical adenopathy  Skin:     General: Skin is warm  Neurological:      Mental Status: She is alert and oriented to person, place, and time  Psychiatric:         Behavior: Behavior normal          Thought Content: Thought content normal          Lab Results:   No visits with results within 1 Day(s) from this visit  Latest known visit with results is:   Appointment on 09/16/2021   Component Date Value    Cholesterol 09/16/2021 194     Triglycerides 09/16/2021 44     HDL, Direct 09/16/2021 85     LDL Calculated 09/16/2021 100     Non-HDL-Chol (CHOL-HDL) 09/16/2021 109          Radiology Results:   No results found

## 2021-09-21 ENCOUNTER — OFFICE VISIT (OUTPATIENT)
Dept: INTERNAL MEDICINE CLINIC | Facility: CLINIC | Age: 61
End: 2021-09-21
Payer: COMMERCIAL

## 2021-09-21 VITALS
WEIGHT: 123.2 LBS | HEIGHT: 67 IN | SYSTOLIC BLOOD PRESSURE: 110 MMHG | TEMPERATURE: 97.5 F | BODY MASS INDEX: 19.34 KG/M2 | DIASTOLIC BLOOD PRESSURE: 64 MMHG | HEART RATE: 92 BPM | OXYGEN SATURATION: 100 %

## 2021-09-21 DIAGNOSIS — R39.9 UTI SYMPTOMS: ICD-10-CM

## 2021-09-21 DIAGNOSIS — Z11.59 NEED FOR HEPATITIS C SCREENING TEST: ICD-10-CM

## 2021-09-21 DIAGNOSIS — K58.0 IRRITABLE BOWEL SYNDROME WITH DIARRHEA: Primary | ICD-10-CM

## 2021-09-21 DIAGNOSIS — K51.814 OTHER ULCERATIVE COLITIS WITH ABSCESS (HCC): ICD-10-CM

## 2021-09-21 DIAGNOSIS — Z11.4 SCREENING FOR HIV (HUMAN IMMUNODEFICIENCY VIRUS): ICD-10-CM

## 2021-09-21 DIAGNOSIS — D70.9 GRANULOCYTOPENIA (HCC): ICD-10-CM

## 2021-09-21 DIAGNOSIS — E03.8 OTHER SPECIFIED HYPOTHYROIDISM: ICD-10-CM

## 2021-09-21 DIAGNOSIS — Z12.11 COLON CANCER SCREENING: ICD-10-CM

## 2021-09-21 DIAGNOSIS — E78.01 FAMILIAL HYPERCHOLESTEROLEMIA: ICD-10-CM

## 2021-09-21 DIAGNOSIS — E74.39 GLUCOSE INTOLERANCE: ICD-10-CM

## 2021-09-21 PROCEDURE — 99214 OFFICE O/P EST MOD 30 MIN: CPT | Performed by: INTERNAL MEDICINE

## 2021-09-21 RX ORDER — CEPHALEXIN 500 MG/1
CAPSULE ORAL
COMMUNITY
Start: 2021-07-25 | End: 2021-09-21 | Stop reason: ALTCHOICE

## 2021-09-21 NOTE — ASSESSMENT & PLAN NOTE
We reviewed the patient's colitis condition during today's visit also reviewed her most recent consultation with her gastroenterologist   Symptoms presently remain very quiet and stable  Occasional flare-ups are noted recommend continuation of Asacol therapy and regular follow-up with GI services

## 2021-09-21 NOTE — PROGRESS NOTES
Assessment/Plan:    Other ulcerative colitis with abscess ([facility] [address] )    We reviewed the patient's colitis condition during today's visit also reviewed her most recent consultation with her gastroenterologist   Symptoms presently remain very quiet and stable  Occasional flare-ups are noted recommend continuation of Asacol therapy and regular follow-up with GI services  Irritable bowel syndrome with diarrhea    Recent episode of irritable bowel syndrome with diarrhea reviewed recommend continued use of Bentyl medication for symptomatic relief  Suspect irritable bowel syndrome trigger is stress related    Other specified hypothyroidism   Clinical assessment of hypothyroid condition indicates stability recommend continued use of levothyroxine at 50 mcg daily  Free T4 TSH requested for next office visit  Diagnoses and all orders for this visit:    Need for hepatitis C screening test  -     Hepatitis C Antibody ([facility]); Future    Screening for HIV (human immunodeficiency virus)  -     HIV 1/2 Antigen/Antibody (4th Generation) w Reflex [facility]; Future    Glucose intolerance  -     Comprehensive metabolic panel; Future    Familial hypercholesterolemia  -     Lipid panel; Future    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HCC)  -     CBC and differential; Future    UTI symptoms  -     UA w Reflex to Microscopic w Reflex to Culture -Lab Collect; Future    Colon cancer screening  -     Occult Blood, Fecal Immunochemical; Future    Other specified hypothyroidism  -     T4, free; Future  -     TSH, 3rd generation; Future    Other orders  -     Discontinue: cephalexin (KEFLEX) 500 mg capsule        Subjective:      Patient ID: Siddhartha Barriga is a 64 y o  female  This very pleasant 60-year-old female patient returns today for a follow-up visit  During today's assessment of the patient we have reviewed her  Irritable bowel syndrome along with cystitis and colitis history    She reports occasional flare ups of the bowel symptoms cystitis has remained relatively stable  Patient reports no signs or symptoms of COVID-19 infection is recommended that she obtain a annual flu vaccine in preparation for this winters flu season  She should continue to practice care and masking when she leaves home to minimize potential exposure to COVID-19  Assessment of thyroid condition clinically indicates stability  The following portions of the patient's history were reviewed and updated as appropriate:   She  has a past medical history of Atopic dermatitis, Atrophic vaginitis, Dermatitis, Dry eye, Frequency of urination, Fungal infection, Herpes, Hyperlipidemia, Hypothyroidism, Interstitial cystitis, Osteoporosis, Periodontal abscess, PONV (postoperative nausea and vomiting), Thyroid nodule, Ulcerative colitis ([facility] [address] ), Urinary frequency, UTI (urinary tract infection), Vaginal atrophy, Vitamin D deficiency, and Voiding dysfunction    She   Patient Active Problem List    Diagnosis Date Noted    Health care maintenance 02/08/2021    Colitis 09/08/2020    Ulcerative colitis ([facility] ) 02/24/2020    Pollen-food allergy 12/05/2019    Allergy to iodinated contrast media 12/05/2019    Dysfunctional voiding of urine 11/14/2019    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[facility] [address] ) 03/05/2019    Other specified hypothyroidism 03/05/2019    Anxiety 06/01/2017    Nontoxic single thyroid nodule 12/09/2016    Urinary urgency 07/05/2016    Irritable bowel syndrome with diarrhea 02/19/2016    Thrombocytopenia ([facility] [address] ) 02/09/2016    Hashimoto's thyroiditis 03/03/2015    Vitamin D deficiency 03/03/2015    Other ulcerative colitis with abscess ([facility] ) 02/11/2015    Difficult or painful urination 02/11/2015    Hyperlipidemia 02/11/2015    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[facility] ) 02/11/2015    Dense breasts 10/28/2014    Osteoporosis 09/11/2013    Atrophic vaginitis 06/17/2013     She  has a past surgical history that includes Kidney stone surgery; ORIF proximal ulna fracture; pr colonoscopy flx dx w/collj spec when pfrmd (N/A, 2017); Cystoscopy;  section; pr incis tendon sheath,radial styloid (Right, 2019); Ganglion cyst excision (Right, 2019); [place] tooth extraction (); Tubal ligation; Other surgical history; pr cysto/uretero w/lithotripsy &indwell stent insrt (Left, 2020); [place] retrograde pyelogram (2020); Colonoscopy (2020); and Upper gastrointestinal endoscopy (2020)  Her family history includes Breast cancer in her mother and paternal aunt; Diabetes in her father and paternal grandmother; Gallbladder disease in her mother; Hypertension in her father; Kidney disease in her mother; Lymphoma in her maternal grandfather; Nephrolithiasis in her father; No Known Problems in her son and son; Thyroid disease in her mother  She  reports that she has never smoked  She has never used smokeless tobacco  She reports current alcohol use  She reports that she does not use drugs    Current Outpatient Medications   Medication Sig Dispense Refill    Cholecalciferol (VITAMIN D PO) Take 3,000 Units by mouth daily      Cranberry (ELLURA PO) Take 1 capsule by mouth daily      cycloSPORINE (RESTASIS) 0 05 % ophthalmic emulsion Administer 1 drop to both eyes 2 (two) times a day      dicyclomine (BENTYL) 10 mg capsule Take 2 capsules (20 mg total) by mouth 3 (three) times a day before meals 270 capsule 3    estradiol (ESTRACE) 0 1 mg/g vaginal cream INSERT 1 APPLICATORFUL VAGINALLY NIGHTLY 42 5 g 2    fluticasone (FLONASE) 50 mcg/act nasal spray 1 spray into each nostril 2 (two) times a day 3 Bottle 2    hydrocortisone-pramoxine (PROCTOFOAM-HC) 1-1 % FOAM rectal foam Insert 1 applicator into the rectum 2 (two) times a day 10 g 5    levothyroxine 50 mcg tablet Take 1 tablet (50 mcg total) by mouth daily 90 tablet 2    Meth-Hyo-M Bl-Na Phos-Ph Sal (URO-MP PO) Take 1 capsule by mouth 2 (two) times a day       Meth-Hyo-M Bl-Na Phos-Ph Sal (Vilamit MB) 118 MG CAPS       nystatin (MYCOSTATIN) cream Apply topically 2 (two) times a day 30 g 0    pentosan polysulfate (Elmiron) 100 mg capsule One pill 3 times a day 90 capsule 4    potassium citrate (Urocit-K 5) 5 MEQ (540 MG) Take 1 tablet (5 mEq total) by mouth daily 90 tablet 1    Probiotic Product (ALIGN EXTRA STRENGTH PO) Take by mouth      rosuvastatin (CRESTOR) 5 mg tablet Take 1 tablet (5 mg total) by mouth daily 90 tablet 3    Triamcinolone Acetonide (Nasacort Allergy 24HR) 55 MCG/ACT AERO into each nostril daily      trospium chloride (SANCTURA) 20 mg tablet Take 1 tablet (20 mg total) by mouth 2 (two) times a day 60 tablet 5    mesalamine (ASACOL) 800 MG EC tablet Take 1 tablet (800 mg total) by mouth 4 (four) times a day 270 tablet 3     No current facility-administered medications for this visit       Review of Systems   Gastrointestinal: Positive for abdominal pain  All other systems reviewed and are negative  Objective:      /64   Pulse 92   Temp 97 5 °F (36 4 °C) (Skin)   Ht 5' 7" (1 702 m)   Wt 55 9 kg (123 lb 3 2 oz)   LMP  (LMP Unknown)   SpO2 100%   BMI 19 30 kg/m²          Physical Exam  Vitals reviewed  Constitutional:       General: She is not in acute distress  Appearance: Normal appearance  She is well-developed  She is not ill-appearing  HENT:      Head: Normocephalic  Right Ear: Hearing and external ear normal       Left Ear: Hearing and external ear normal       Nose: Nose normal  No mucosal edema  Mouth/Throat:      Pharynx: Uvula midline  Eyes:      General: Lids are normal       Conjunctiva/sclera: Conjunctivae normal       Pupils: Pupils are equal, round, and reactive to light  Neck:      Thyroid: No thyromegaly  Vascular: No carotid bruit or JVD  Cardiovascular:      Rate and Rhythm: Normal rate and regular rhythm  Heart sounds: Normal heart sounds  No murmur heard       Pulmonary:      Effort: Pulmonary effort is normal  No respiratory distress  Breath sounds: Normal breath sounds  No wheezing, rhonchi or rales  Abdominal:      General: Bowel sounds are normal  There is no distension  Palpations: Abdomen is soft  There is no mass  Tenderness: There is no abdominal tenderness  There is no guarding  Musculoskeletal:         General: Normal range of motion  Lymphadenopathy:      Cervical: No cervical adenopathy  Skin:     General: Skin is warm and dry  Neurological:      Mental Status: She is alert and oriented to person, place, and time  Mental status is at baseline  Deep Tendon Reflexes: Reflexes are normal and symmetric  Reflexes normal    Psychiatric:         Speech: Speech normal          Behavior: Behavior normal  Behavior is cooperative  Thought Content:  Thought content normal          Judgment: Judgment normal

## 2021-09-21 NOTE — ASSESSMENT & PLAN NOTE
Clinical assessment of hypothyroid condition indicates stability recommend continued use of levothyroxine at 50 mcg daily  Free T4 TSH requested for next office visit

## 2021-10-11 DIAGNOSIS — N95.2 VAGINAL ATROPHY: ICD-10-CM

## 2021-10-11 RX ORDER — ESTRADIOL 0.1 MG/G
CREAM VAGINAL
Qty: 42.5 G | Refills: 0 | Status: SHIPPED | OUTPATIENT
Start: 2021-10-11 | End: 2021-10-13 | Stop reason: SDUPTHER

## 2021-10-11 RX ORDER — ESTRADIOL 0.1 MG/G
CREAM VAGINAL
Qty: 42.5 G | Refills: 0 | Status: SHIPPED | OUTPATIENT
Start: 2021-10-11 | End: 2021-10-11

## 2021-10-13 DIAGNOSIS — N95.2 VAGINAL ATROPHY: ICD-10-CM

## 2021-10-13 RX ORDER — ESTRADIOL 0.1 MG/G
CREAM VAGINAL
Qty: 42.5 G | Refills: 5 | Status: SHIPPED | OUTPATIENT
Start: 2021-10-13 | End: 2022-01-31 | Stop reason: SDUPTHER

## 2021-10-17 DIAGNOSIS — U07.1 COVID-19: Primary | ICD-10-CM

## 2021-10-18 PROCEDURE — U0003 INFECTIOUS AGENT DETECTION BY NUCLEIC ACID (DNA OR RNA); SEVERE ACUTE RESPIRATORY SYNDROME CORONAVIRUS 2 (SARS-COV-2) (CORONAVIRUS DISEASE [COVID-19]), AMPLIFIED PROBE TECHNIQUE, MAKING USE OF HIGH THROUGHPUT TECHNOLOGIES AS DESCRIBED BY CMS-2020-01-R: HCPCS | Performed by: INTERNAL MEDICINE

## 2021-10-18 PROCEDURE — U0005 INFEC AGEN DETEC AMPLI PROBE: HCPCS | Performed by: INTERNAL MEDICINE

## 2021-10-25 ENCOUNTER — OFFICE VISIT (OUTPATIENT)
Dept: OBGYN CLINIC | Facility: HOSPITAL | Age: 61
End: 2021-10-25
Payer: COMMERCIAL

## 2021-10-25 ENCOUNTER — TELEPHONE (OUTPATIENT)
Dept: OBGYN CLINIC | Facility: HOSPITAL | Age: 61
End: 2021-10-25

## 2021-10-25 VITALS
BODY MASS INDEX: 19.37 KG/M2 | HEIGHT: 67 IN | HEART RATE: 79 BPM | WEIGHT: 123.4 LBS | DIASTOLIC BLOOD PRESSURE: 90 MMHG | SYSTOLIC BLOOD PRESSURE: 138 MMHG

## 2021-10-25 DIAGNOSIS — M79.642 PAIN OF LEFT HAND: Primary | ICD-10-CM

## 2021-10-25 DIAGNOSIS — S62.337A CLOSED DISPLACED FRACTURE OF NECK OF FIFTH METACARPAL BONE OF LEFT HAND, INITIAL ENCOUNTER: ICD-10-CM

## 2021-10-25 PROCEDURE — 29075 APPL CST ELBW FNGR SHORT ARM: CPT | Performed by: PHYSICIAN ASSISTANT

## 2021-10-25 PROCEDURE — 99213 OFFICE O/P EST LOW 20 MIN: CPT | Performed by: PHYSICIAN ASSISTANT

## 2021-10-27 ENCOUNTER — ANNUAL EXAM (OUTPATIENT)
Dept: OBGYN CLINIC | Facility: CLINIC | Age: 61
End: 2021-10-27
Payer: COMMERCIAL

## 2021-10-27 VITALS
HEIGHT: 67 IN | WEIGHT: 120 LBS | SYSTOLIC BLOOD PRESSURE: 118 MMHG | DIASTOLIC BLOOD PRESSURE: 70 MMHG | BODY MASS INDEX: 18.83 KG/M2

## 2021-10-27 DIAGNOSIS — Z01.419 ENCNTR FOR GYN EXAM (GENERAL) (ROUTINE) W/O ABN FINDINGS: Primary | ICD-10-CM

## 2021-10-27 DIAGNOSIS — N95.2 ATROPHIC VAGINITIS: ICD-10-CM

## 2021-10-27 DIAGNOSIS — Z01.419 PAP SMEAR, AS PART OF ROUTINE GYNECOLOGICAL EXAMINATION: ICD-10-CM

## 2021-10-27 DIAGNOSIS — Z12.31 ENCOUNTER FOR SCREENING MAMMOGRAM FOR MALIGNANT NEOPLASM OF BREAST: ICD-10-CM

## 2021-10-27 PROCEDURE — G0145 SCR C/V CYTO,THINLAYER,RESCR: HCPCS | Performed by: OBSTETRICS & GYNECOLOGY

## 2021-10-27 PROCEDURE — 99396 PREV VISIT EST AGE 40-64: CPT | Performed by: OBSTETRICS & GYNECOLOGY

## 2021-10-27 RX ORDER — CLOBETASOL PROPIONATE 0.5 MG/G
CREAM TOPICAL
COMMUNITY
Start: 2021-09-28 | End: 2021-12-08 | Stop reason: HOSPADM

## 2021-10-29 ENCOUNTER — OFFICE VISIT (OUTPATIENT)
Dept: OCCUPATIONAL THERAPY | Facility: HOSPITAL | Age: 61
End: 2021-10-29
Payer: COMMERCIAL

## 2021-10-29 ENCOUNTER — OFFICE VISIT (OUTPATIENT)
Dept: OBGYN CLINIC | Facility: HOSPITAL | Age: 61
End: 2021-10-29
Payer: COMMERCIAL

## 2021-10-29 VITALS
WEIGHT: 125 LBS | HEIGHT: 67 IN | DIASTOLIC BLOOD PRESSURE: 75 MMHG | BODY MASS INDEX: 19.62 KG/M2 | SYSTOLIC BLOOD PRESSURE: 135 MMHG | HEART RATE: 82 BPM

## 2021-10-29 DIAGNOSIS — S62.337A CLOSED DISPLACED FRACTURE OF NECK OF FIFTH METACARPAL BONE OF LEFT HAND, INITIAL ENCOUNTER: ICD-10-CM

## 2021-10-29 DIAGNOSIS — S62.337S CLOSED DISPLACED FRACTURE OF NECK OF FIFTH METACARPAL BONE OF LEFT HAND, SEQUELA: Primary | ICD-10-CM

## 2021-10-29 PROCEDURE — L3906 WHO W/O JOINTS CF: HCPCS | Performed by: OCCUPATIONAL THERAPIST

## 2021-10-29 PROCEDURE — 99213 OFFICE O/P EST LOW 20 MIN: CPT | Performed by: ORTHOPAEDIC SURGERY

## 2021-10-29 PROCEDURE — 26600 TREAT METACARPAL FRACTURE: CPT | Performed by: ORTHOPAEDIC SURGERY

## 2021-11-02 DIAGNOSIS — K52.9 COLITIS WITHOUT COMPLICATION: ICD-10-CM

## 2021-11-04 LAB
LAB AP GYN PRIMARY INTERPRETATION: NORMAL
Lab: NORMAL

## 2021-11-04 RX ORDER — MESALAMINE 800 MG/1
TABLET, DELAYED RELEASE ORAL
Qty: 360 TABLET | Refills: 0 | Status: SHIPPED | OUTPATIENT
Start: 2021-11-04

## 2021-11-13 ENCOUNTER — IMMUNIZATIONS (OUTPATIENT)
Dept: FAMILY MEDICINE CLINIC | Facility: HOSPITAL | Age: 61
End: 2021-11-13

## 2021-11-13 DIAGNOSIS — Z23 ENCOUNTER FOR IMMUNIZATION: Primary | ICD-10-CM

## 2021-11-13 PROCEDURE — 0001A COVID-19 PFIZER VACC 0.3 ML: CPT

## 2021-11-13 PROCEDURE — 91300 COVID-19 PFIZER VACC 0.3 ML: CPT

## 2021-11-16 ENCOUNTER — OFFICE VISIT (OUTPATIENT)
Dept: ENDOCRINOLOGY | Facility: CLINIC | Age: 61
End: 2021-11-16
Payer: COMMERCIAL

## 2021-11-16 VITALS
SYSTOLIC BLOOD PRESSURE: 110 MMHG | WEIGHT: 126.13 LBS | BODY MASS INDEX: 19.8 KG/M2 | HEIGHT: 67 IN | DIASTOLIC BLOOD PRESSURE: 72 MMHG | HEART RATE: 82 BPM

## 2021-11-16 DIAGNOSIS — E04.1 NONTOXIC UNINODULAR GOITER: ICD-10-CM

## 2021-11-16 DIAGNOSIS — E03.8 OTHER SPECIFIED HYPOTHYROIDISM: Primary | ICD-10-CM

## 2021-11-16 DIAGNOSIS — E04.1 NONTOXIC SINGLE THYROID NODULE: ICD-10-CM

## 2021-11-16 DIAGNOSIS — M80.00XA AGE-RELATED OSTEOPOROSIS WITH CURRENT PATHOLOGICAL FRACTURE, INITIAL ENCOUNTER: ICD-10-CM

## 2021-11-16 DIAGNOSIS — E55.9 VITAMIN D DEFICIENCY: ICD-10-CM

## 2021-11-16 PROCEDURE — 99214 OFFICE O/P EST MOD 30 MIN: CPT | Performed by: INTERNAL MEDICINE

## 2021-11-16 RX ORDER — LEVOTHYROXINE SODIUM 0.05 MG/1
50 TABLET ORAL DAILY
Qty: 90 TABLET | Refills: 2 | Status: SHIPPED | OUTPATIENT
Start: 2021-11-16 | End: 2022-08-03 | Stop reason: SDUPTHER

## 2021-11-19 ENCOUNTER — HOSPITAL ENCOUNTER (OUTPATIENT)
Dept: RADIOLOGY | Facility: HOSPITAL | Age: 61
Discharge: HOME | End: 2021-11-19
Payer: COMMERCIAL

## 2021-11-19 ENCOUNTER — OFFICE VISIT (OUTPATIENT)
Dept: OBGYN CLINIC | Facility: HOSPITAL | Age: 61
End: 2021-11-19

## 2021-11-19 VITALS
DIASTOLIC BLOOD PRESSURE: 72 MMHG | HEART RATE: 79 BPM | HEIGHT: 67 IN | WEIGHT: 124.6 LBS | SYSTOLIC BLOOD PRESSURE: 122 MMHG | BODY MASS INDEX: 19.56 KG/M2

## 2021-11-19 DIAGNOSIS — S62.337A CLOSED DISPLACED FRACTURE OF NECK OF FIFTH METACARPAL BONE OF LEFT HAND, INITIAL ENCOUNTER: Primary | ICD-10-CM

## 2021-11-19 DIAGNOSIS — S62.337A CLOSED DISPLACED FRACTURE OF NECK OF FIFTH METACARPAL BONE OF LEFT HAND, INITIAL ENCOUNTER: ICD-10-CM

## 2021-11-19 PROCEDURE — 73130 X-RAY EXAM OF HAND: CPT

## 2021-11-19 PROCEDURE — 99024 POSTOP FOLLOW-UP VISIT: CPT | Performed by: PHYSICIAN ASSISTANT

## 2021-11-22 ENCOUNTER — TELEPHONE (OUTPATIENT)
Dept: OBGYN CLINIC | Facility: HOSPITAL | Age: 61
End: 2021-11-22

## 2021-12-06 ENCOUNTER — ULTRASOUND (OUTPATIENT)
Dept: OBGYN CLINIC | Facility: CLINIC | Age: 61
End: 2021-12-06
Payer: COMMERCIAL

## 2021-12-06 DIAGNOSIS — N85.00 ENDOMETRIAL HYPERPLASIA: ICD-10-CM

## 2021-12-06 PROCEDURE — 76856 US EXAM PELVIC COMPLETE: CPT | Performed by: OBSTETRICS & GYNECOLOGY

## 2021-12-08 ENCOUNTER — HOSPITAL ENCOUNTER (OUTPATIENT)
Dept: RADIOLOGY | Facility: HOSPITAL | Age: 61
Discharge: HOME | End: 2021-12-08
Attending: ORTHOPAEDIC SURGERY
Payer: COMMERCIAL

## 2021-12-08 ENCOUNTER — OFFICE VISIT (OUTPATIENT)
Dept: OBGYN CLINIC | Facility: HOSPITAL | Age: 61
End: 2021-12-08

## 2021-12-08 ENCOUNTER — TELEPHONE (OUTPATIENT)
Dept: OBGYN CLINIC | Facility: HOSPITAL | Age: 61
End: 2021-12-08

## 2021-12-08 VITALS
BODY MASS INDEX: 19.62 KG/M2 | DIASTOLIC BLOOD PRESSURE: 70 MMHG | SYSTOLIC BLOOD PRESSURE: 110 MMHG | HEIGHT: 67 IN | WEIGHT: 125 LBS

## 2021-12-08 DIAGNOSIS — S62.337A CLOSED DISPLACED FRACTURE OF NECK OF FIFTH METACARPAL BONE OF LEFT HAND, INITIAL ENCOUNTER: ICD-10-CM

## 2021-12-08 DIAGNOSIS — M79.642 PAIN OF LEFT HAND: Primary | ICD-10-CM

## 2021-12-08 DIAGNOSIS — M79.642 PAIN OF LEFT HAND: ICD-10-CM

## 2021-12-08 PROCEDURE — 99024 POSTOP FOLLOW-UP VISIT: CPT | Performed by: ORTHOPAEDIC SURGERY

## 2021-12-08 PROCEDURE — 73130 X-RAY EXAM OF HAND: CPT

## 2021-12-20 ENCOUNTER — EVALUATION (OUTPATIENT)
Dept: OCCUPATIONAL THERAPY | Age: 61
End: 2021-12-20
Payer: COMMERCIAL

## 2021-12-20 DIAGNOSIS — M79.642 LEFT HAND PAIN: Primary | ICD-10-CM

## 2021-12-20 PROCEDURE — 97165 OT EVAL LOW COMPLEX 30 MIN: CPT

## 2021-12-20 PROCEDURE — 97140 MANUAL THERAPY 1/> REGIONS: CPT

## 2021-12-21 ENCOUNTER — APPOINTMENT (OUTPATIENT)
Dept: OCCUPATIONAL THERAPY | Age: 61
End: 2021-12-21
Payer: COMMERCIAL

## 2021-12-27 ENCOUNTER — OFFICE VISIT (OUTPATIENT)
Dept: OCCUPATIONAL THERAPY | Age: 61
End: 2021-12-27
Payer: COMMERCIAL

## 2021-12-27 DIAGNOSIS — M79.642 LEFT HAND PAIN: Primary | ICD-10-CM

## 2021-12-27 PROCEDURE — 97110 THERAPEUTIC EXERCISES: CPT

## 2021-12-27 PROCEDURE — 97140 MANUAL THERAPY 1/> REGIONS: CPT

## 2021-12-29 ENCOUNTER — OFFICE VISIT (OUTPATIENT)
Dept: OCCUPATIONAL THERAPY | Age: 61
End: 2021-12-29
Payer: COMMERCIAL

## 2021-12-29 DIAGNOSIS — M79.642 LEFT HAND PAIN: Primary | ICD-10-CM

## 2021-12-29 PROCEDURE — 97140 MANUAL THERAPY 1/> REGIONS: CPT

## 2021-12-29 PROCEDURE — 97110 THERAPEUTIC EXERCISES: CPT

## 2022-01-03 ENCOUNTER — OFFICE VISIT (OUTPATIENT)
Dept: OCCUPATIONAL THERAPY | Age: 62
End: 2022-01-03
Payer: COMMERCIAL

## 2022-01-03 DIAGNOSIS — M79.642 LEFT HAND PAIN: Primary | ICD-10-CM

## 2022-01-03 PROCEDURE — 97140 MANUAL THERAPY 1/> REGIONS: CPT

## 2022-01-03 PROCEDURE — 97110 THERAPEUTIC EXERCISES: CPT

## 2022-01-03 NOTE — PROGRESS NOTES
Daily Note     Today's date: 1/3/2022  Patient name: Magalis Gonzalez  : 1960  MRN: [id number]  Referring provider: Issa Lee MD  Dx:   Encounter Diagnosis     ICD-10-CM    1  Left hand pain  M79 642                   Subjective: It was killing me that I had to take an Aleve after last session  Objective: See treatment diary below      Assessment: Tolerated treatment well  Patient would benefit from continued OT  Muscle knot continues in lumbricals, good tolerance to ROM continues, less stiffness noted today  Plan: Continue per plan of care        Precautions: [place]  LATEX ALLERGY      Manuals  12/27 12/29 1/3         MEM 8'            STM  10' 10' 10                                   Neuro Re-Ed                                                                                                        Ther Ex             PROM 5' 10' 10' 8'         Pencils  1x 1x 1x         Progressive Grasp   Sm iso into blue foam dome          Ab/Ad   Marbles 30x Marbles 30x Marbles 30x         EDC Roll    RFB 10x hold 5 sec                                   HEP: MCP PROM, Tendon glides, Isotoner for edema  EDC individual           Ther Activity             Translation  Marbles 30x  Marbles 30x                      Gait Training                                       Modalities

## 2022-01-07 ENCOUNTER — OFFICE VISIT (OUTPATIENT)
Dept: OCCUPATIONAL THERAPY | Age: 62
End: 2022-01-07
Payer: COMMERCIAL

## 2022-01-07 DIAGNOSIS — M79.642 LEFT HAND PAIN: Primary | ICD-10-CM

## 2022-01-07 PROCEDURE — 97110 THERAPEUTIC EXERCISES: CPT

## 2022-01-07 PROCEDURE — 97140 MANUAL THERAPY 1/> REGIONS: CPT

## 2022-01-10 ENCOUNTER — OFFICE VISIT (OUTPATIENT)
Dept: OCCUPATIONAL THERAPY | Age: 62
End: 2022-01-10
Payer: COMMERCIAL

## 2022-01-10 DIAGNOSIS — M79.642 LEFT HAND PAIN: Primary | ICD-10-CM

## 2022-01-10 PROCEDURE — 97110 THERAPEUTIC EXERCISES: CPT

## 2022-01-10 PROCEDURE — 97140 MANUAL THERAPY 1/> REGIONS: CPT

## 2022-01-10 NOTE — PROGRESS NOTES
Daily Note     Today's date: 1/10/2022  Patient name: René Menjivar  : 1960  MRN: [id number]  Referring provider: Panchito Templeton MD  Dx:   Encounter Diagnosis     ICD-10-CM    1  Left hand pain  M79 642                   Subjective: It's really sore today      Objective: See treatment diary below      Assessment: Tolerated treatment well  Patient would benefit from continued OT  ROM increasing, Trialed cupping over lumbrical knot with good tolerance and decreased tightness post      Plan: Continue per plan of care        Precautions: [place]  LATEX ALLERGY      Manuals  12/27 12/29 1/3 1/7 1/10       MEM 8'            STM  10' 10' 10 10 10                                 Neuro Re-Ed                                                                                                        Ther Ex             PROM 5' 10' 10' 8' 10' 10'       Pencils  1x 1x 1x         Progressive Grasp   Sm iso into blue foam dome          Ab/Ad   Marbles 30x Marbles 30x Marbles 30x Marbesl 30x Marbles 30x       EDC Roll    RFB 10x hold 5 sec BFB 10x hold 10 sec BFB 10x hold 10 sec                                 HEP: MCP PROM, Tendon glides, Isotoner for edema  EDC individual           Ther Activity             Translation  Marbles 30x  Marbles 30x Coins 30x Colore pegs 30x                    Gait Training                                       Modalities             Paraffin

## 2022-01-14 ENCOUNTER — OFFICE VISIT (OUTPATIENT)
Dept: OCCUPATIONAL THERAPY | Age: 62
End: 2022-01-14
Payer: COMMERCIAL

## 2022-01-14 DIAGNOSIS — M79.642 LEFT HAND PAIN: Primary | ICD-10-CM

## 2022-01-14 PROCEDURE — 97110 THERAPEUTIC EXERCISES: CPT

## 2022-01-14 PROCEDURE — 97140 MANUAL THERAPY 1/> REGIONS: CPT

## 2022-01-14 NOTE — PROGRESS NOTES
Daily Note     Today's date: 2022  Patient name: Kelton Gutierrez  : 1960  MRN: [id number]  Referring provider: Floridalma Lind MD  Dx:   Encounter Diagnosis     ICD-10-CM    1  Left hand pain  M79 642                   Subjective: I'm just getting frustrated with it    Objective: See treatment diary below      Assessment: Tolerated treatment well  Patient would benefit from continued OT  Added CMC mobs and focusing on lumbrical pull with good tolerance  Continue as tolerated    Plan: Continue per plan of care        Precautions: [place]  LATEX ALLERGY      Manuals  12/27 12/29 1/3 1/7 1/10       MEM 8'            STM  10' 10' 10 10 10       Joint Mob      5' [address] [facility]                    Neuro Re-Ed                                                                                                        Ther Ex             PROM 5' 10' 10' 8' 10' 10'       Pencils  1x 1x 1x         Progressive Grasp   Sm iso into blue foam dome          Ab/Ad   Marbles 30x Marbles 30x Marbles 30x Marbesl 30x Marbles 30x       EDC Roll    RFB 10x hold 5 sec BFB 10x hold 10 sec        Lumbrical Block      x15       Fist in Foam      Yellow, Red x10       HEP: MCP PROM, Tendon glides, Isotoner for edema  EDC individual           Ther Activity             Translation  Marbles 30x  Marbles 30x Coins 30x Colore pegs 30x                    Gait Training                                       Modalities             Paraffin

## 2022-01-17 ENCOUNTER — OFFICE VISIT (OUTPATIENT)
Dept: OCCUPATIONAL THERAPY | Age: 62
End: 2022-01-17
Payer: COMMERCIAL

## 2022-01-17 DIAGNOSIS — M79.642 LEFT HAND PAIN: Primary | ICD-10-CM

## 2022-01-17 PROCEDURE — 97110 THERAPEUTIC EXERCISES: CPT

## 2022-01-17 PROCEDURE — 97018 PARAFFIN BATH THERAPY: CPT

## 2022-01-17 PROCEDURE — 97140 MANUAL THERAPY 1/> REGIONS: CPT

## 2022-01-17 NOTE — PROGRESS NOTES
Daily Note     Today's date: 2022  Patient name: Karli Cisneros  : 1960  MRN: [id number]  Referring provider: Uday Villeda MD  Dx:   Encounter Diagnosis     ICD-10-CM    1  Left hand pain  M79 642                   Subjective: It's just so stiff      Objective: See treatment diary below      Assessment: Tolerated treatment well  Patient would benefit from continued OT  Doing well, motion greatly improving with VC for MCP flexion      Plan: Continue per plan of care        Precautions: [place]  LATEX ALLERGY      Manuals  12/27 12/29 1/3 1/7 1/10 1/17      MEM 8'            STM  10' 10' 10 10 10 10      Joint Mob      5' [address] [facility]                   Neuro Re-Ed                                                                                                        Ther Ex             PROM 5' 10' 10' 8' 10' 10' 10      Pencils  1x 1x 1x         Progressive Grasp   Sm iso into blue foam dome          Ab/Ad   Marbles 30x Marbles 30x Marbles 30x Marbesl 30x Marbles 30x lrg pegs x3  0      EDC Roll    RFB 10x hold 5 sec BFB 10x hold 10 sec        Lumbrical Block      x15 x15      Fist in Foam      Yellow, Red x10 Y 3x10      HEP: MCP PROM, Tendon glides, Isotoner for edema  EDC individual           Ther Activity             Translation  Marbles 30x  Marbles 30x Coins 30x Colore pegs 30x                    Gait Training                                       Modalities             Paraffin

## 2022-01-21 ENCOUNTER — OFFICE VISIT (OUTPATIENT)
Dept: OCCUPATIONAL THERAPY | Age: 62
End: 2022-01-21
Payer: COMMERCIAL

## 2022-01-21 DIAGNOSIS — M79.642 LEFT HAND PAIN: Primary | ICD-10-CM

## 2022-01-21 PROCEDURE — 97022 WHIRLPOOL THERAPY: CPT

## 2022-01-21 PROCEDURE — 97140 MANUAL THERAPY 1/> REGIONS: CPT

## 2022-01-21 PROCEDURE — 97110 THERAPEUTIC EXERCISES: CPT

## 2022-01-21 NOTE — PROGRESS NOTES
Daily Note     Today's date: 2022  Patient name: Christa Cope  : 1960  MRN: [id number]  Referring provider: Bella Cuello MD  Dx:   Encounter Diagnosis     ICD-10-CM    1  Left hand pain  M79 642                   Subjective: I fell on the ice      Objective: See treatment diary below      Assessment: Tolerated treatment well  Patient would benefit from continued OT  Edema throughout the ulnar aspect of hand secondary to [facility] on her L hand onto ice  No noticeable ecchymosis, trialed fluido today for dynamic heat  Plan: Continue per plan of care        Precautions: [place]  LATEX ALLERGY      Manuals  12/27 12/29 1/3 1/7 1/10 1/17 1/21     MEM 8'            STM  10' 10' 10 10 10 10 10     Joint Mob      5' MCMJ 5 5' MCPJ                  Neuro Re-Ed                                                                                                        Ther Ex             PROM 5' 10' 10' 8' 10' 10' 10 10     Pencils  1x 1x 1x         Progressive Grasp   Sm iso into blue foam dome          Ab/Ad   Marbles 30x Marbles 30x Marbles 30x Marbesl 30x Marbles 30x lrg pegs x3  0 Marbles 30x     EDC Roll    RFB 10x hold 5 sec BFB 10x hold 10 sec        Lumbrical Block      x15 x15 x15     Fist in Foam      Yellow, Red x10 Y 3x10      Hook Roll        Pencil 15x                  HEP: MCP PROM, Tendon glides, Isotoner for edema  EDC individual           Ther Activity             Translation  Marbles 30x  Marbles 30x Coins 30x Colore pegs 30x  Keypegs 1/2 board                  Gait Training                                       Modalities             Paraffin             Fluido        10'

## 2022-01-24 ENCOUNTER — OFFICE VISIT (OUTPATIENT)
Dept: OCCUPATIONAL THERAPY | Age: 62
End: 2022-01-24
Payer: COMMERCIAL

## 2022-01-24 DIAGNOSIS — M79.642 LEFT HAND PAIN: Primary | ICD-10-CM

## 2022-01-24 PROCEDURE — 97110 THERAPEUTIC EXERCISES: CPT

## 2022-01-24 PROCEDURE — 97140 MANUAL THERAPY 1/> REGIONS: CPT

## 2022-01-24 NOTE — PROGRESS NOTES
Daily Note     Today's date: 2022  Patient name: René Menjivar  : 1960  MRN: [id number]  Referring provider: Panchito Templeton MD  Dx:   Encounter Diagnosis     ICD-10-CM    1  Left hand pain  M79 642                   Subjective: It's not too bad      Objective: See treatment diary below      Assessment: Tolerated treatment well  Patient would benefit from continued OT  Great motion today, began EDC gentle strengthening with soreness noted  If motion continues to be near full, plan on upgrading to gentle strengthening NV  Plan: Continue per plan of care        Precautions: [place]  LATEX ALLERGY      Manuals  12/27 12/29 1/3 1/7 1/10 1/17 1/21 1/24    MEM 8'            STM  10' 10' 10 10 10 10 10 10    Joint Mob      5' MCMJ 5 5' MCPJ 5' MCPJ                 Neuro Re-Ed                                                                                                        Ther Ex             PROM 5' 10' 10' 8' 10' 10' 10 10 10    Pencils  1x 1x 1x         Progressive Grasp   Sm iso into blue foam dome          Ab/Ad   Marbles 30x Marbles 30x Marbles 30x Marbesl 30x Marbles 30x lrg pegs x3  0 Marbles 30x     EDC Roll    RFB 10x hold 5 sec BFB 10x hold 10 sec        Lumbrical Block      x15 x15 x15 x15    Fist in Foam      Yellow, Red x10 Y 3x10      Hook Roll        Pencil 15x Pencil x15    EDC         Sm RB x10    HEP: MCP PROM, Tendon glides, Isotoner for edema  EDC individual           Ther Activity             Translation  Marbles 30x  Marbles 30x Coins 30x Colore pegs 30x  Keypegs 1/2 board Keypegs 1x                 Gait Training                                       Modalities             Paraffin             Fluido        10'

## 2022-01-25 ENCOUNTER — OFFICE VISIT (OUTPATIENT)
Dept: OCCUPATIONAL THERAPY | Age: 62
End: 2022-01-25
Payer: COMMERCIAL

## 2022-01-25 DIAGNOSIS — M79.642 LEFT HAND PAIN: Primary | ICD-10-CM

## 2022-01-25 PROCEDURE — 97140 MANUAL THERAPY 1/> REGIONS: CPT

## 2022-01-25 PROCEDURE — 97110 THERAPEUTIC EXERCISES: CPT

## 2022-01-25 NOTE — PROGRESS NOTES
Daily Note     Today's date: 2022  Patient name: Thee Gould  : 1960  MRN: [id number]  Referring provider: Gaby Zurita MD  Dx:   Encounter Diagnosis     ICD-10-CM    1  Left hand pain  M79 642                   Subjective: It's not too bad      Objective: See treatment diary below      Assessment: Tolerated treatment well  Patient would benefit from continued OT  Continued good AROM, added gentle strengthening with fatigue noted post    Plan: Continue per plan of care        Precautions: [place]  LATEX ALLERGY      Manuals  12/27 12/29 1/3 1/7 1/10 1/17 1/21 1/24 1/25   MEM 8'            STM  10' 10' 10 10 10 10 10 10 10   Joint Mob      5' MCMJ 5 5' MCPJ 5' MCPJ                 Neuro Re-Ed                                                                                                        Ther Ex             PROM 5' 10' 10' 8' 10' 10' 10 10 10 10   Pencils  1x 1x 1x         Progressive Grasp   Sm iso into blue foam dome          Ab/Ad   Marbles 30x Marbles 30x Marbles 30x Marbesl 30x Marbles 30x lrg pegs x3  0 Marbles 30x     EDC Roll    RFB 10x hold 5 sec BFB 10x hold 10 sec     BFB x15   Lumbrical Block      x15 x15 x15 x15    Fist in Foam      Yellow, Red x10 Y 3x10      Hook Roll        Pencil 15x Pencil x15    EDC         Sm RB x10 SM RB x10   Gross Grasp          YPW x20    Pinch Ring          Y/Y x2                             HEP: MCP PROM, Tendon glides, Isotoner for edema  EDC individual           Ther Activity             Translation  Marbles 30x  Marbles 30x Coins 30x Colore pegs 30x  Keypegs 1/2 board Keypegs 1x                 Gait Training                                       Modalities             Paraffin             Fluido        10'

## 2022-01-31 ENCOUNTER — OFFICE VISIT (OUTPATIENT)
Dept: OCCUPATIONAL THERAPY | Age: 62
End: 2022-01-31
Payer: COMMERCIAL

## 2022-01-31 DIAGNOSIS — M79.642 LEFT HAND PAIN: Primary | ICD-10-CM

## 2022-01-31 PROCEDURE — 97140 MANUAL THERAPY 1/> REGIONS: CPT

## 2022-01-31 PROCEDURE — 97110 THERAPEUTIC EXERCISES: CPT

## 2022-01-31 NOTE — PROGRESS NOTES
Daily Note     Today's date: 2022  Patient name: Jess Barrios  : 1960  MRN: [id number]  Referring provider: Kimi Connell MD  Dx:   Encounter Diagnosis     ICD-10-CM    1  Left hand pain  M79 642                   Subjective: It's not too bad      Objective: See treatment diary below      Assessment: Tolerated treatment well  Patient would benefit from continued OT  Added gentle strengthening to HEP, RE NV   Plan: Continue per plan of care        Precautions: [place]  LATEX ALLERGY      Manuals  1/31 12/29 1/3 1/7 1/10 1/17 1/21 1/24 1/25   MEM 8'            STM  10 10' 10 10 10 10 10 10 10   Joint Mob      5' MCMJ 5 5' MCPJ 5' MCPJ                 Neuro Re-Ed                                                                                                        Ther Ex             PROM 5' 5' 10' 8' 10' 10' 10 10 10 10   Pencils   1x 1x         Progressive Grasp   Sm iso into blue foam dome          Ab/Ad    Marbles 30x Marbles 30x Marbesl 30x Marbles 30x lrg pegs x3  0 Marbles 30x     EDC Roll    RFB 10x hold 5 sec BFB 10x hold 10 sec     BFB x15   Lumbrical Block      x15 x15 x15 x15    Fist in Foam      Yellow, Red x10 Y 3x10      Hook Roll        Pencil 15x Pencil x15    EDC  SM RB x10       Sm RB x10 SM RB x10   Gross Grasp  YPW x20        YPW x20    Pinch Ring  Y/R 2x        Y/Y x2                             HEP: MCP PROM, Tendon glides, Isotoner for edema  Yellow Putty           Ther Activity             Translation    Marbles 30x Coins 30x Colore pegs 30x  Keypegs 1/2 board Keypegs 1x                 Gait Training                                       Modalities             Paraffin             Fluido        10'

## 2022-02-04 ENCOUNTER — OFFICE VISIT (OUTPATIENT)
Dept: OCCUPATIONAL THERAPY | Age: 62
End: 2022-02-04
Payer: COMMERCIAL

## 2022-02-04 DIAGNOSIS — M79.642 LEFT HAND PAIN: Primary | ICD-10-CM

## 2022-02-04 PROCEDURE — 97110 THERAPEUTIC EXERCISES: CPT | Performed by: OCCUPATIONAL THERAPIST

## 2022-02-04 NOTE — PROGRESS NOTES
Daily Note     Today's date: 2022  Patient name: Jose Miguel Nelson  : 1960  MRN: [id number]  Referring provider: Mi Hobbs MD  Dx:   Encounter Diagnosis     ICD-10-CM    1  Left hand pain  M79 642                   Subjective: "It's still tight"      Objective: See treatment diary below      Assessment: Tolerated well, however still shows signs of crossover and MP stiffness, improved with MWM  Plan: Continue per plan of care        Precautions: [place]  LATEX ALLERGY      Manuals 2/4 1/31 12/29 1/3 1/7 1/10 1/17 1/21 1/24 1/25   MEM             STM 10 10 10' 10 10 10 10 10 10 10   Joint Mob      5' MCMJ 5 5' MCPJ 5' MCPJ                 Neuro Re-Ed                                                                                                        Ther Ex             PROM 10 5' 10' 8' 10' 10' 10 10 10 10   Pencils   1x 1x         Progressive Grasp   Sm iso into blue foam dome          Ab/Ad    Marbles 30x Marbles 30x Marbesl 30x Marbles 30x lrg pegs x3  0 Marbles 30x     EDC Roll BFB x15   RFB 10x hold 5 sec BFB 10x hold 10 sec     BFB x15   Lumbrical Block      x15 x15 x15 x15    Fist in Foam      Yellow, Red x10 Y 3x10      Hook Roll        Pencil 15x Pencil x15    EDC sm rb x20 SM RB x10       Sm RB x10 SM RB x10   Gross Grasp ypw x 30 YPW x20        YPW x20    Pinch Ring yy x2 Y/R 2x        Y/Y x2   MWM          MC adduction w/ flexion                HEP: MCP PROM, Tendon glides, Isotoner for edema  Yellow Putty           Ther Activity             Translation    Marbles 30x Coins 30x Colore pegs 30x  Keypegs 1/2 board Keypegs 1x                 Gait Training                                       Modalities             Paraffin 10            Fluido        10'

## 2022-02-07 ENCOUNTER — OFFICE VISIT (OUTPATIENT)
Dept: OCCUPATIONAL THERAPY | Age: 62
End: 2022-02-07
Payer: COMMERCIAL

## 2022-02-07 DIAGNOSIS — M79.642 LEFT HAND PAIN: Primary | ICD-10-CM

## 2022-02-07 PROCEDURE — 97110 THERAPEUTIC EXERCISES: CPT

## 2022-02-07 PROCEDURE — 97140 MANUAL THERAPY 1/> REGIONS: CPT

## 2022-02-07 NOTE — PROGRESS NOTES
OT Evaluation     Today's date: 2022  Patient name: Magalis Gonzalez  : 1960  MRN: [id number]  Referring provider: Issa Lee MD  Dx:   Encounter Diagnosis     ICD-10-CM    1  Left hand pain  M79 642                   Assessment  Assessment details: Paolo Hartmann presents s/p 5th MC fracture s/p FOOSH  She was casted for a week and then transitioned to a splint for 6  She presents with decreased MCP motion, increased scar, edema, and decreased overall function  Recommend therapy 2x per week for 4-6 weeks to improve ROM, pain, and overall function  : Paolo Hartmann presents with improved ROM and strength since IE  She reports that she has had improved function since beginning therapy  Recommend continued therapy at this time to improve strength and function for 4 for weeks  Impairments: abnormal muscle tone, abnormal or restricted ROM, impaired physical strength, lacks appropriate home exercise program and pain with function    Goals  STG 1: Increase Digit AROM 25% in 4-6 weeks  MET  STG 2: Decrease overall pain to 3/10 in 4-6 weeks  PROGRESSING  STG 3: Increase overall strength by 25% in 4-6 weeks  MET  STG 4: Compliant with HEP in 2 weeks  LTG 1: Complete all ADL/IADLs improved to prior level of function within 6-8 weeks  LTG 2: Leisure/social skills improved within 6-8 weeks  LTG 3: Work skills improved to prior level of function within 6-8 weeks    Patient Goal: To get my hand better    Goals, plan of care and treatment condition discussed with patient  Patient expresses their understanding and questions regarding these isues were addressed        Plan  Patient would benefit from: custom splinting, skilled occupational therapy and OT eval  Planned modality interventions: thermotherapy: hydrocollator packs, thermotherapy: paraffin bath and fluidotherapy  Planned therapy interventions: home exercise program, graded activity, graded exercise, functional ROM exercises, therapeutic activities, therapeutic exercise, joint mobilization, manual therapy and Egan taping        Subjective Evaluation    History of Present Illness  Date of onset: 10/25/2021  Mechanism of injury: Veronika Aburto fell when she slipped in the parking lot  She fxed her 5th MCP  She was casted for a few days and transferred to a splint for 6 weeks    Pain  Current pain ratin  At best pain ratin  At worst pain ratin  Quality: dull ache and sharp (Shooting)    Hand dominance: right          Objective     Active Range of Motion     Left Wrist   Wrist flexion: 45 degrees   Wrist extension: 75 degrees     Left Digits    Flexion   Ring     MCP: 70  Little     MCP: 60    Strength/Myotome Testing     Left Wrist/Hand      (2nd hand position)     Trial 1: 16 1    Trial 2: 26 9    Right Wrist/Hand      (2nd hand position)     Trial 1: 43 3    Tests     Additional Tests Details  Denies n/t             Precautions: [place]  LATEX ALLERGY      Manuals             MEM 8'            STM  8'                                     Neuro Re-Ed                                                                                                        Ther Ex             PROM 5'            Pencils             Progressive Grasp             Gross Grasp  RPW           Pinch Ring  R/R 2x                                     HEP: MCP PROM, Tendon glides, Isotoner for edema             Ther Activity             Translation                          Gait Training                                       Modalities

## 2022-02-09 ENCOUNTER — HOSPITAL ENCOUNTER (OUTPATIENT)
Dept: RADIOLOGY | Facility: HOSPITAL | Age: 62
Discharge: HOME | End: 2022-02-09
Attending: ORTHOPAEDIC SURGERY
Payer: COMMERCIAL

## 2022-02-09 ENCOUNTER — OFFICE VISIT (OUTPATIENT)
Dept: OBGYN CLINIC | Facility: HOSPITAL | Age: 62
End: 2022-02-09
Payer: COMMERCIAL

## 2022-02-09 VITALS
SYSTOLIC BLOOD PRESSURE: 108 MMHG | HEIGHT: 67 IN | BODY MASS INDEX: 19.62 KG/M2 | WEIGHT: 125 LBS | DIASTOLIC BLOOD PRESSURE: 75 MMHG | HEART RATE: 89 BPM

## 2022-02-09 DIAGNOSIS — S62.337A CLOSED DISPLACED FRACTURE OF NECK OF FIFTH METACARPAL BONE OF LEFT HAND, INITIAL ENCOUNTER: ICD-10-CM

## 2022-02-09 DIAGNOSIS — S62.337A CLOSED DISPLACED FRACTURE OF NECK OF FIFTH METACARPAL BONE OF LEFT HAND, INITIAL ENCOUNTER: Primary | ICD-10-CM

## 2022-02-09 PROCEDURE — 99214 OFFICE O/P EST MOD 30 MIN: CPT | Performed by: ORTHOPAEDIC SURGERY

## 2022-02-09 PROCEDURE — 73130 X-RAY EXAM OF HAND: CPT

## 2022-02-09 NOTE — PROGRESS NOTES
ASSESSMENT/PLAN:    Assessment:   Left small finger metacarpal neck fracture sustained on 10/25/21    Plan:   Patient was instructed to go back into her splint full times for the next 3 weeks     Follow Up:  4  week(s)    To Do Next Visit:  X-rays of the  left  hand    General Discussions:     Fracture - Nonoperative Care: The physiology of a fractured bone was discussed with the patient today  With nondisplaced or minimally displaced fractures, conservative treatment often results in a functional recovery  Typically, these fractures are immobilized in either a cast or splint depending on the pattern  Radiographs are typically taken at intervals throughout the fracture healing to ensure that muscular forces do not cause loss of reduction or alignment  If the fracture loses its alignment, surgical intervention may be required to stabilize it  Medical conditions such as diabetes, osteoporosis, vitamin D deficiency, and a history of or exposure to smoking may delay or prevent fracture healing       _____________________________________________________  CHIEF COMPLAINT:  Chief Complaint   Patient presents with    Left Hand - Pain         SUBJECTIVE:  Dennys Glover is a 64 y o  female who presents   follow up regarding Left small finger metacarpal neck fracture sustained on 10/25/21 Since last visit, Dennys Glover has tried therapy with relief  Patient reports she fell again recently and has had some increased pain       PAST MEDICAL HISTORY:  Past Medical History:   Diagnosis Date    Atopic dermatitis     last assessed 12/2/13    Atrophic vaginitis     last assessed 9/2/15    Dermatitis     Dry eye     Frequency of urination     Fungal infection     last assessed 8/14/13    Herpes     last assessed 6/27/14    Hyperlipidemia     Hypothyroidism     Interstitial cystitis     Osteoporosis     Periodontal abscess     last assessed 9/6/13    PONV (postoperative nausea and vomiting)     Thyroid nodule  Ulcerative colitis ([address] )     Urinary frequency     resolved 17    UTI (urinary tract infection)     Vaginal atrophy     Vitamin D deficiency     last assessed 16    Voiding dysfunction     last assessed 10/7/15       PAST SURGICAL HISTORY:  Past Surgical History:   Procedure Laterality Date     SECTION       and      COLONOSCOPY  2020    CYSTOSCOPY      diagnostic resolved 05    FL RETROGRADE PYELOGRAM  2020    GANGLION CYST EXCISION Right 2019    Procedure: EXCISION GANGLION CYST RIGHT FIRST DORSAL EXTENSOR COMPARTMENT;  Surgeon: Cathy Jara MD;  [facility];  Service: Orthopedics    KIDNEY STONE SURGERY      ORIF PROXIMAL ULNA FRACTURE      metal plates and screws removed    OTHER SURGICAL HISTORY      punch biospy     NC COLONOSCOPY FLX DX W/COLLJ SPEC WHEN PFRMD N/A 2017    Procedure: COLONOSCOPY;  Surgeon: Vidal Mays MD;  [facility];   Service: Colorectal    NC CYSTO/URETERO W/LITHOTRIPSY &INDWELL STENT INSRT Left 2020    Procedure: CYSTOSCOPY URETEROSCOPY WITH LITHOTRIPSY HOLMIUM LASER, RETROGRADE PYELOGRAM AND INSERTION STENT URETERAL;  Surgeon: Rad Farnco MD;  [facility];  Service: Urology    NC [address] STYLOID Right 2019    Procedure: John Nunn;  Surgeon: Cathy Jara MD;  [facility];  Service: Orthopedics    TUBAL LIGATION      UPPER GASTROINTESTINAL ENDOSCOPY  2020    WISDOM TOOTH EXTRACTION  1983    X2        FAMILY HISTORY:  Family History   Problem Relation Age of Onset    Gallbladder disease Mother     Thyroid disease Mother     Kidney disease Mother    [de-identified] Breast cancer Mother     Diabetes Father     Hypertension Father     Nephrolithiasis Father     Lymphoma Maternal Grandfather     Diabetes Paternal Grandmother     Breast cancer Paternal Aunt     No Known Problems Son     No Known Problems Son SOCIAL HISTORY:  Social History     Tobacco Use    Smoking status: Never Smoker    Smokeless tobacco: Never Used   Vaping Use    Vaping Use: Never used   Substance Use Topics    Alcohol use: Yes     Comment: socially - 1-3 drinks per month    Drug use: No       MEDICATIONS:    Current Outpatient Medications:     Cholecalciferol (VITAMIN D PO), Take 3,000 Units by mouth daily, Disp: , Rfl:     Cranberry (ELLURA PO), Take 1 capsule by mouth daily, Disp: , Rfl:     cycloSPORINE (RESTASIS) 0 05 % ophthalmic emulsion, Administer 1 drop to both eyes 2 (two) times a day, Disp: , Rfl:     dicyclomine (BENTYL) 10 mg capsule, Take 2 capsules (20 mg total) by mouth 3 (three) times a day before meals, Disp: 270 capsule, Rfl: 3    estradiol (ESTRACE) 0 1 mg/g vaginal cream, Insert 1 applicator full vaginally nightly, Disp: 42 5 g, Rfl: 5    levothyroxine 50 mcg tablet, Take 1 tablet (50 mcg total) by mouth daily, Disp: 90 tablet, Rfl: 2    mesalamine (ASACOL) 800 MG EC tablet, TAKE ONE TABLET BY MOUTH FOUR TIMES A DAY, Disp: 360 tablet, Rfl: 0    Meth-Hyo-M Bl-Na Phos-Ph Sal (URO-MP PO), Take 1 capsule by mouth 2 (two) times a day , Disp: , Rfl:     pentosan polysulfate (Elmiron) 100 mg capsule, One pill 3 times a day, Disp: 90 capsule, Rfl: 4    Probiotic Product (ALIGN EXTRA STRENGTH PO), Take by mouth, Disp: , Rfl:     rosuvastatin (CRESTOR) 5 mg tablet, Take 1 tablet (5 mg total) by mouth daily, Disp: 90 tablet, Rfl: 3    Triamcinolone Acetonide (Nasacort Allergy 24HR) 55 MCG/ACT AERO, into each nostril daily, Disp: , Rfl:     trospium chloride (SANCTURA) 20 mg tablet, Take 1 tablet (20 mg total) by mouth 2 (two) times a day, Disp: 60 tablet, Rfl: 5    ALLERGIES:  Allergies   Allergen Reactions    Iodine - Food Allergy Anaphylaxis     Allergy to Iodinated and non iodinated dye    Other Anaphylaxis     APPLES    TAPE  Also rash at times    Seasonal Ic [Cholestatin] Allergic Rhinitis    Latex REVIEW OF SYSTEMS:  Pertinent items are noted in HPI  A comprehensive review of systems was negative  LABS:  HgA1c:   Lab Results   Component Value Date    HGBA1C 5 1 04/19/2021     BMP:   Lab Results   Component Value Date    GLUCOSE 89 08/07/2015    CALCIUM 8 9 07/09/2021     08/07/2015    K 4 0 07/09/2021    CO2 29 07/09/2021     07/09/2021    BUN 16 07/09/2021    CREATININE 0 94 07/09/2021           _____________________________________________________  PHYSICAL EXAMINATION:  Vital signs: /75   Pulse 89   Ht 5' 7" (1 702 m)   Wt 56 7 kg (125 lb)   LMP  (LMP Unknown)   BMI 19 58 kg/m²   General: well developed and well nourished, alert, oriented times 3 and appears comfortable  Psychiatric: Normal  HEENT: Trachea Midline, No torticollis  Cardiovascular: No discernable arrhythmia  Pulmonary: No wheezing or stridor  Abdomen: No rebound or guarding  Extremities: No peripheral edema  Skin: No masses, erythema, lacerations, fluctation, ulcerations  Neurovascular: Sensation Intact to the Median, Ulnar, Radial Nerve, Motor Intact to the Median, Ulnar, Radial Nerve and Pulses Intact    MUSCULOSKELETAL EXAMINATION:  LEFT SIDE:  hand   Tenderness over small finger metacarpal neck fracture  Near composite fist, no malrotation, no underlap or overlap  Sensation intact distally  Brisk capillary refill    _____________________________________________________  STUDIES REVIEWED:  Images were reviewed in PACS by Dr Anamika Guevara and demonstrate: left hand increased lucency of fracture as compared to the last visit         PROCEDURES PERFORMED:  Procedures  No Procedures performed today

## 2022-02-10 ENCOUNTER — APPOINTMENT (OUTPATIENT)
Dept: OCCUPATIONAL THERAPY | Age: 62
End: 2022-02-10
Payer: COMMERCIAL

## 2022-02-11 ENCOUNTER — OFFICE VISIT (OUTPATIENT)
Dept: OCCUPATIONAL THERAPY | Age: 62
End: 2022-02-11
Payer: COMMERCIAL

## 2022-02-11 DIAGNOSIS — M79.642 LEFT HAND PAIN: Primary | ICD-10-CM

## 2022-02-11 PROCEDURE — L3808 WHFO, RIGID W/O JOINTS: HCPCS

## 2022-02-11 NOTE — PROGRESS NOTES
Orthosis    Diagnosis:   1  Left hand pain       Indication: Fracture    Location: Left  wrist, hand, ring finger and small finger  Supplies: Custom Fit Orthotic  Orthosis type: Ulnar Gutter Hand-Forearm based  Wearing Schedule: Remove for hygiene only  Describe Position: Wrist in slight extension, MCPs in 70 degrees flexion, Ips in full extension    Precautions: Universal (skin contact/breakdown)    Patient or Caregiver expresses understanding of wearing Schedule and Precautions? Yes  Patient or Caregiver able to don/doff orthotic independently? Yes    Written orders provided to patient?  No  Orders Obtained: Verbal  Orders Obtained from: Guerda Trevino    Return for evaluation and treatment No not at this time

## 2022-02-14 ENCOUNTER — APPOINTMENT (OUTPATIENT)
Dept: OCCUPATIONAL THERAPY | Age: 62
End: 2022-02-14
Payer: COMMERCIAL

## 2022-02-15 ENCOUNTER — COMPLETE EYE EXAM (OUTPATIENT)
Dept: URBAN - METROPOLITAN AREA CLINIC 6 | Facility: CLINIC | Age: 62
End: 2022-02-15

## 2022-02-15 DIAGNOSIS — H25.13: ICD-10-CM

## 2022-02-15 DIAGNOSIS — H35.30: ICD-10-CM

## 2022-02-15 PROCEDURE — 92014 COMPRE OPH EXAM EST PT 1/>: CPT

## 2022-02-15 PROCEDURE — 92250 FUNDUS PHOTOGRAPHY W/I&R: CPT

## 2022-02-15 ASSESSMENT — VISUAL ACUITY
OD_CC: 20/30+2
OU_SC: J1+
OS_CC: 20/30-1

## 2022-02-15 ASSESSMENT — TONOMETRY
OD_IOP_MMHG: 16
OS_IOP_MMHG: 14

## 2022-02-17 ENCOUNTER — APPOINTMENT (OUTPATIENT)
Dept: OCCUPATIONAL THERAPY | Age: 62
End: 2022-02-17
Payer: COMMERCIAL

## 2022-02-18 ENCOUNTER — APPOINTMENT (OUTPATIENT)
Dept: OCCUPATIONAL THERAPY | Age: 62
End: 2022-02-18
Payer: COMMERCIAL

## 2022-02-21 ENCOUNTER — APPOINTMENT (OUTPATIENT)
Dept: OCCUPATIONAL THERAPY | Age: 62
End: 2022-02-21
Payer: COMMERCIAL

## 2022-02-24 ENCOUNTER — APPOINTMENT (OUTPATIENT)
Dept: OCCUPATIONAL THERAPY | Age: 62
End: 2022-02-24
Payer: COMMERCIAL

## 2022-02-25 ENCOUNTER — HOSPITAL ENCOUNTER (OUTPATIENT)
Dept: RADIOLOGY | Facility: HOSPITAL | Age: 62
Discharge: HOME | End: 2022-02-25
Attending: ORTHOPAEDIC SURGERY
Payer: COMMERCIAL

## 2022-02-25 ENCOUNTER — OFFICE VISIT (OUTPATIENT)
Dept: OCCUPATIONAL THERAPY | Facility: HOSPITAL | Age: 62
End: 2022-02-25
Payer: COMMERCIAL

## 2022-02-25 ENCOUNTER — APPOINTMENT (OUTPATIENT)
Dept: OCCUPATIONAL THERAPY | Age: 62
End: 2022-02-25
Payer: COMMERCIAL

## 2022-02-25 ENCOUNTER — TELEPHONE (OUTPATIENT)
Dept: OBGYN CLINIC | Facility: HOSPITAL | Age: 62
End: 2022-02-25

## 2022-02-25 ENCOUNTER — OFFICE VISIT (OUTPATIENT)
Dept: OBGYN CLINIC | Facility: HOSPITAL | Age: 62
End: 2022-02-25
Payer: COMMERCIAL

## 2022-02-25 VITALS
HEIGHT: 67 IN | DIASTOLIC BLOOD PRESSURE: 89 MMHG | HEART RATE: 73 BPM | BODY MASS INDEX: 19.62 KG/M2 | SYSTOLIC BLOOD PRESSURE: 136 MMHG | WEIGHT: 125 LBS

## 2022-02-25 DIAGNOSIS — S62.337A CLOSED DISPLACED FRACTURE OF NECK OF FIFTH METACARPAL BONE OF LEFT HAND, INITIAL ENCOUNTER: ICD-10-CM

## 2022-02-25 DIAGNOSIS — M79.642 LEFT HAND PAIN: Primary | ICD-10-CM

## 2022-02-25 DIAGNOSIS — S62.337A CLOSED DISPLACED FRACTURE OF NECK OF FIFTH METACARPAL BONE OF LEFT HAND, INITIAL ENCOUNTER: Primary | ICD-10-CM

## 2022-02-25 PROCEDURE — 99213 OFFICE O/P EST LOW 20 MIN: CPT | Performed by: ORTHOPAEDIC SURGERY

## 2022-02-25 PROCEDURE — L3933 FO W/O JOINTS CF: HCPCS | Performed by: OCCUPATIONAL THERAPIST

## 2022-02-25 PROCEDURE — 73130 X-RAY EXAM OF HAND: CPT

## 2022-02-25 NOTE — PROGRESS NOTES
Orthosis    Diagnosis:   1  Left hand pain       Indication: Fracture    Location: Left  hand  Supplies: Custom Fit Orthotic  Orthosis type: MC cuff  Wearing Schedule: Remove for hygiene only  Describe Position: neutral    Precautions: Fracture    Patient or Caregiver expresses understanding of wearing Schedule and Precautions? Yes  Patient or Caregiver able to don/doff orthotic independently? Yes    Written orders provided to patient?  Yes  Orders Obtained: Written  Orders Obtained from: Dr Maria Del Rosario Andrade    Return for evaluation and treatment Yes

## 2022-02-25 NOTE — PROGRESS NOTES
ASSESSMENT/PLAN:    Assessment:   Left small finger metacarpal neck fracture sustained on 10/25/21    Plan:   Transition to metacarpal cuff splint and resume formal therapy  Patient advised to do light activity with the hand   Patient advised 5 lbs or less with her LUE      Follow Up:  6  week(s)    To Do Next Visit:         _____________________________________________________  CHIEF COMPLAINT:  Chief Complaint   Patient presents with    Left Hand - Fracture, Follow-up         SUBJECTIVE:  Andreia Knott is a 64y o  year old female who presents for follow up 4 months status post closed treatment for a left small finger metacarpal neck fracture sustained on 10/25/21  Patient was returned to full time splinting at her last visit due to a fall  Patient notes increased pain to the ulnar aspect of her hand when she is out of the splint  She states she does still have pain in the splint however, this is well controlled  She describes the pain has a burning pain      PAST MEDICAL HISTORY:  Past Medical History:   Diagnosis Date    Atopic dermatitis     last assessed 13    Atrophic vaginitis     last assessed 9/2/15    Dermatitis     Dry eye     Frequency of urination     Fungal infection     last assessed 13    Herpes     last assessed 14    Hyperlipidemia     Hypothyroidism     Interstitial cystitis     Osteoporosis     Periodontal abscess     last assessed 13    PONV (postoperative nausea and vomiting)     Thyroid nodule     Ulcerative colitis ([address] )     Urinary frequency     resolved 17    UTI (urinary tract infection)     Vaginal atrophy     Vitamin D deficiency     last assessed 16    Voiding dysfunction     last assessed 10/7/15       PAST SURGICAL HISTORY:  Past Surgical History:   Procedure Laterality Date     SECTION       and      COLONOSCOPY  2020    CYSTOSCOPY      diagnostic resolved 05    FL RETROGRADE PYELOGRAM  2020  GANGLION CYST EXCISION Right 2/19/2019    Procedure: EXCISION GANGLION CYST RIGHT FIRST DORSAL EXTENSOR COMPARTMENT;  Surgeon: Gisela Grimes MD;  [facility];  Service: Orthopedics    KIDNEY STONE SURGERY      ORIF PROXIMAL ULNA FRACTURE      metal plates and screws removed    OTHER SURGICAL HISTORY      punch biospy     FL COLONOSCOPY FLX DX W/COLLJ SPEC WHEN PFRMD N/A 7/28/2017    Procedure: COLONOSCOPY;  Surgeon: Pietro Quan MD;  [facility];   Service: Colorectal    FL CYSTO/URETERO W/LITHOTRIPSY &INDWELL STENT INSRT Left 1/7/2020    Procedure: CYSTOSCOPY URETEROSCOPY WITH LITHOTRIPSY HOLMIUM LASER, RETROGRADE PYELOGRAM AND INSERTION STENT URETERAL;  Surgeon: Bo Franks MD;  [facility];  Service: Urology    FL [address] STYLOID Right 2/19/2019    Procedure: Coral Reges;  Surgeon: Gisela Grimes MD;  [facility];  Service: Orthopedics    TUBAL LIGATION      UPPER GASTROINTESTINAL ENDOSCOPY  09/11/2020    WISDOM TOOTH EXTRACTION  1983    X2        FAMILY HISTORY:  Family History   Problem Relation Age of Onset    Gallbladder disease Mother     Thyroid disease Mother     Kidney disease Mother     Breast cancer Mother     Diabetes Father     Hypertension Father     Nephrolithiasis Father     Lymphoma Maternal Grandfather     Diabetes Paternal Grandmother     Breast cancer Paternal Aunt     No Known Problems Son     No Known Problems Son        SOCIAL HISTORY:  Social History     Tobacco Use    Smoking status: Never Smoker    Smokeless tobacco: Never Used   Vaping Use    Vaping Use: Never used   Substance Use Topics    Alcohol use: Yes     Comment: socially - 1-3 drinks per month    Drug use: No       MEDICATIONS:    Current Outpatient Medications:     Cholecalciferol (VITAMIN D PO), Take 3,000 Units by mouth daily, Disp: , Rfl:     Cranberry (ELLURA PO), Take 1 capsule by mouth daily, Disp: , Rfl:    cycloSPORINE (RESTASIS) 0 05 % ophthalmic emulsion, Administer 1 drop to both eyes 2 (two) times a day, Disp: , Rfl:     dicyclomine (BENTYL) 10 mg capsule, Take 2 capsules (20 mg total) by mouth 3 (three) times a day before meals, Disp: 270 capsule, Rfl: 3    estradiol (ESTRACE) 0 1 mg/g vaginal cream, Insert 1 applicator full vaginally nightly, Disp: 42 5 g, Rfl: 5    levothyroxine 50 mcg tablet, Take 1 tablet (50 mcg total) by mouth daily, Disp: 90 tablet, Rfl: 2    mesalamine (ASACOL) 800 MG EC tablet, TAKE ONE TABLET BY MOUTH FOUR TIMES A DAY, Disp: 360 tablet, Rfl: 0    Meth-Hyo-M Bl-Na Phos-Ph Sal (URO-MP PO), Take 1 capsule by mouth 2 (two) times a day , Disp: , Rfl:     pentosan polysulfate (Elmiron) 100 mg capsule, One pill 3 times a day, Disp: 90 capsule, Rfl: 4    Probiotic Product (ALIGN EXTRA STRENGTH PO), Take by mouth, Disp: , Rfl:     rosuvastatin (CRESTOR) 5 mg tablet, Take 1 tablet (5 mg total) by mouth daily, Disp: 90 tablet, Rfl: 3    Triamcinolone Acetonide (Nasacort Allergy 24HR) 55 MCG/ACT AERO, into each nostril daily, Disp: , Rfl:     trospium chloride (SANCTURA) 20 mg tablet, Take 1 tablet (20 mg total) by mouth 2 (two) times a day, Disp: 60 tablet, Rfl: 5    ALLERGIES:  Allergies   Allergen Reactions    Iodine - Food Allergy Anaphylaxis     Allergy to Iodinated and non iodinated dye    Other Anaphylaxis     APPLES    TAPE  Also rash at times    Seasonal Ic [Cholestatin] Allergic Rhinitis    Latex        REVIEW OF SYSTEMS:  Pertinent items are noted in HPI  A comprehensive review of systems was negative      LABS:  HgA1c:   Lab Results   Component Value Date    HGBA1C 5 1 04/19/2021     BMP:   Lab Results   Component Value Date    GLUCOSE 89 08/07/2015    CALCIUM 8 9 07/09/2021     08/07/2015    K 4 0 07/09/2021    CO2 29 07/09/2021     07/09/2021    BUN 16 07/09/2021    CREATININE 0 94 07/09/2021 _____________________________________________________  PHYSICAL EXAMINATION:  General: well developed and well nourished, alert, oriented times 3 and appears comfortable  Psychiatric: Normal  HEENT: Trachea Midline, No torticollis  Cardiovascular: No discernable arrhythmia  Pulmonary: No wheezing or stridor  Skin: No masses, erythema, lacerations, fluctation, ulcerations  Neurovascular: Sensation Intact to the Median, Ulnar, Radial Nerve, Motor Intact to the Median, Ulnar, Radial Nerve and Pulses Intact    MUSCULOSKELETAL EXAMINATION:  Left hand   NTTP fx site  TTP joint  _____________________________________________________  STUDIES REVIEWED:  Images were reviewed in PACS by Dr Shanelle Conti and demonstrate: healing left fifth metacarpal fracture         PROCEDURES PERFORMED:  Procedures  No Procedures performed today   Scribe Attestation    I,:  Estephania Domingo MA am acting as a scribe while in the presence of the attending physician :       I,:  Lynne Noriega MD personally performed the services described in this documentation    as scribed in my presence :

## 2022-02-25 NOTE — TELEPHONE ENCOUNTER
Patient sees Dr Maria Del Rosario Andrade  Patient is calling in stating that when she takes the splint off to shower she is having a lot of pain and it is really swollen  Her fingers are bent due to the splint and is asking what further she should do she said something just does not feel right  Please advise          Call back# [phone]

## 2022-02-28 ENCOUNTER — OFFICE VISIT (OUTPATIENT)
Dept: OCCUPATIONAL THERAPY | Age: 62
End: 2022-02-28
Payer: COMMERCIAL

## 2022-02-28 DIAGNOSIS — M79.642 LEFT HAND PAIN: ICD-10-CM

## 2022-02-28 DIAGNOSIS — S62.337A CLOSED DISPLACED FRACTURE OF NECK OF FIFTH METACARPAL BONE OF LEFT HAND, INITIAL ENCOUNTER: Primary | ICD-10-CM

## 2022-02-28 PROCEDURE — 97110 THERAPEUTIC EXERCISES: CPT

## 2022-02-28 PROCEDURE — 97530 THERAPEUTIC ACTIVITIES: CPT

## 2022-02-28 PROCEDURE — 97140 MANUAL THERAPY 1/> REGIONS: CPT

## 2022-02-28 NOTE — PROGRESS NOTES
OT Re-Evaluation     Today's date: 2022  Patient name: Thee Gould  : 1960  MRN: [id number]  Referring provider: Gaby Zurita MD  Dx:   Encounter Diagnosis     ICD-10-CM    1  Closed displaced fracture of neck of fifth metacarpal bone of left hand, initial encounter  S62 337A    2  Left hand pain  M79 642                   Assessment  Assessment details: Lluvia Cortes presents s/p 5th MC fracture s/p FOOSH  She was casted for a week and then transitioned to a splint for 6  She presents with decreased MCP motion, increased scar, edema, and decreased overall function  Recommend therapy 2x per week for 4-6 weeks to improve ROM, pain, and overall function  : Opal Maxin presents with improved ROM and strength since IE  She reports that she has had improved function since beginning therapy  Recommend continued therapy at this time to improve strength and function for 4 for weeks  : Opal Maxin presents s/p recurrent fall and splint 2 total weeks  She has transitioned into an [facility] Cuff splint to be worn AAT except hygiene  She continues to have decreased ROM secondary to immobilization  Impairments: abnormal muscle tone, abnormal or restricted ROM, impaired physical strength, lacks appropriate home exercise program and pain with function    Goals  STG 1: Increase Digit AROM 25% in 4-6 weeks  MET  STG 2: Decrease overall pain to 3/10 in 4-6 weeks  PROGRESSING  STG 3: Increase overall strength by 25% in 4-6 weeks  MET  STG 4: Compliant with HEP in 2 weeks  LTG 1: Complete all ADL/IADLs improved to prior level of function within 6-8 weeks  LTG 2: Leisure/social skills improved within 6-8 weeks  LTG 3: Work skills improved to prior level of function within 6-8 weeks    Patient Goal: To get my hand better    Goals, plan of care and treatment condition discussed with patient  Patient expresses their understanding and questions regarding these isues were addressed        Plan  Patient would benefit from: custom splinting, skilled occupational therapy and OT eval  Planned modality interventions: thermotherapy: hydrocollator packs, thermotherapy: paraffin bath and fluidotherapy  Planned therapy interventions: home exercise program, graded activity, graded exercise, functional ROM exercises, therapeutic activities, therapeutic exercise, joint mobilization, manual therapy and Egan taping        Subjective Evaluation    History of Present Illness  Date of onset: 10/25/2021  Mechanism of injury: Jeremi Rodriguez fell when she slipped in the parking lot  She fxed her 5th MCP  She was casted for a few days and transferred to a splint for 6 weeks    Pain  Current pain ratin  At best pain ratin  At worst pain ratin  Quality: dull ache and sharp (Shooting)    Hand dominance: right          Objective     Active Range of Motion     Left Wrist   Wrist flexion: 25 degrees   Wrist extension: 75 degrees     Left Digits    Flexion   Ring     MCP: 46  Little     MCP: 38    Tests     Additional Tests Details  Denies n/t             Precautions: Likely  LATEX ALLERGY      Manuals            MEM 8'            STM  8' 8'                                    Neuro Re-Ed                                                                                                        Ther Ex             PROM 5'  5'          Pencils   1x          Progressive Grasp             Gross Grasp  RPW           Pinch Ring  R/R 2x           Hook Roll   2x10          Wrist Maze   x4          Lumbrical Block    x10          HEP: MCP PROM, Tendon glides, Isotoner for edema   Foam Fist          Ther Activity             Translation                          Gait Training                                       Modalities

## 2022-03-01 ENCOUNTER — LAB (OUTPATIENT)
Dept: LAB | Facility: AMBULARY SURGERY CENTER | Age: 62
End: 2022-03-01
Attending: INTERNAL MEDICINE
Payer: COMMERCIAL

## 2022-03-01 DIAGNOSIS — Z11.59 NEED FOR HEPATITIS C SCREENING TEST: ICD-10-CM

## 2022-03-01 DIAGNOSIS — Z11.4 SCREENING FOR HIV (HUMAN IMMUNODEFICIENCY VIRUS): ICD-10-CM

## 2022-03-01 DIAGNOSIS — E78.01 FAMILIAL HYPERCHOLESTEROLEMIA: ICD-10-CM

## 2022-03-01 DIAGNOSIS — Z12.11 COLON CANCER SCREENING: ICD-10-CM

## 2022-03-01 DIAGNOSIS — R39.9 UTI SYMPTOMS: ICD-10-CM

## 2022-03-01 DIAGNOSIS — E03.8 OTHER SPECIFIED HYPOTHYROIDISM: ICD-10-CM

## 2022-03-01 DIAGNOSIS — K51.30 ULCERATIVE RECTOSIGMOIDITIS WITHOUT COMPLICATION (HCC): ICD-10-CM

## 2022-03-01 DIAGNOSIS — D70.9 GRANULOCYTOPENIA (HCC): ICD-10-CM

## 2022-03-01 LAB
ANION GAP SERPL CALCULATED.3IONS-SCNC: 6 MMOL/L (ref 4–13)
BACTERIA UR QL AUTO: NORMAL /HPF
BASOPHILS # BLD AUTO: 0.02 THOUSANDS/ΜL (ref 0–0.1)
BASOPHILS NFR BLD AUTO: 0 % (ref 0–1)
BILIRUB UR QL STRIP: NEGATIVE
BUN SERPL-MCNC: 19 MG/DL (ref 5–25)
CALCIUM SERPL-MCNC: 9.5 MG/DL (ref 8.3–10.1)
CHLORIDE SERPL-SCNC: 108 MMOL/L (ref 100–108)
CHOLEST SERPL-MCNC: 200 MG/DL
CLARITY UR: CLEAR
CO2 SERPL-SCNC: 26 MMOL/L (ref 21–32)
COLOR UR: ABNORMAL
CREAT SERPL-MCNC: 0.87 MG/DL (ref 0.6–1.3)
CRP SERPL QL: <3 MG/L
EOSINOPHIL # BLD AUTO: 0.02 THOUSAND/ΜL (ref 0–0.61)
EOSINOPHIL NFR BLD AUTO: 0 % (ref 0–6)
ERYTHROCYTE [DISTWIDTH] IN BLOOD BY AUTOMATED COUNT: 12.8 % (ref 11.6–15.1)
GFR SERPL CREATININE-BSD FRML MDRD: 72 ML/MIN/1.73SQ M
GLUCOSE P FAST SERPL-MCNC: 91 MG/DL (ref 65–99)
GLUCOSE UR STRIP-MCNC: NEGATIVE MG/DL
HCT VFR BLD AUTO: 42.6 % (ref 34.8–46.1)
HCV AB SER QL: NORMAL
HDLC SERPL-MCNC: 73 MG/DL
HEMOCCULT STL QL IA: POSITIVE
HGB BLD-MCNC: 13.2 G/DL (ref 11.5–15.4)
HGB UR QL STRIP.AUTO: ABNORMAL
IMM GRANULOCYTES # BLD AUTO: 0.01 THOUSAND/UL (ref 0–0.2)
IMM GRANULOCYTES NFR BLD AUTO: 0 % (ref 0–2)
KETONES UR STRIP-MCNC: NEGATIVE MG/DL
LDLC SERPL CALC-MCNC: 118 MG/DL (ref 0–100)
LEUKOCYTE ESTERASE UR QL STRIP: NEGATIVE
LYMPHOCYTES # BLD AUTO: 0.74 THOUSANDS/ΜL (ref 0.6–4.47)
LYMPHOCYTES NFR BLD AUTO: 16 % (ref 14–44)
MCH RBC QN AUTO: 28.3 PG (ref 26.8–34.3)
MCHC RBC AUTO-ENTMCNC: 31 G/DL (ref 31.4–37.4)
MCV RBC AUTO: 91 FL (ref 82–98)
MONOCYTES # BLD AUTO: 0.34 THOUSAND/ΜL (ref 0.17–1.22)
MONOCYTES NFR BLD AUTO: 7 % (ref 4–12)
NEUTROPHILS # BLD AUTO: 3.44 THOUSANDS/ΜL (ref 1.85–7.62)
NEUTS SEG NFR BLD AUTO: 77 % (ref 43–75)
NITRITE UR QL STRIP: NEGATIVE
NON-SQ EPI CELLS URNS QL MICRO: NORMAL /HPF
NONHDLC SERPL-MCNC: 127 MG/DL
NRBC BLD AUTO-RTO: 0 /100 WBCS
PH UR STRIP.AUTO: 7 [PH]
PLATELET # BLD AUTO: 116 THOUSANDS/UL (ref 149–390)
PMV BLD AUTO: 12 FL (ref 8.9–12.7)
POTASSIUM SERPL-SCNC: 3.9 MMOL/L (ref 3.5–5.3)
PROT UR STRIP-MCNC: ABNORMAL MG/DL
RBC # BLD AUTO: 4.67 MILLION/UL (ref 3.81–5.12)
RBC #/AREA URNS AUTO: NORMAL /HPF
SODIUM SERPL-SCNC: 140 MMOL/L (ref 136–145)
SP GR UR STRIP.AUTO: 1.02 (ref 1–1.03)
T4 FREE SERPL-MCNC: 1.06 NG/DL (ref 0.76–1.46)
TRIGL SERPL-MCNC: 46 MG/DL
TSH SERPL DL<=0.05 MIU/L-ACNC: 1.42 UIU/ML (ref 0.36–3.74)
UROBILINOGEN UR QL STRIP.AUTO: 0.2 E.U./DL
WBC # BLD AUTO: 4.57 THOUSAND/UL (ref 4.31–10.16)
WBC #/AREA URNS AUTO: NORMAL /HPF

## 2022-03-01 PROCEDURE — 36415 COLL VENOUS BLD VENIPUNCTURE: CPT

## 2022-03-01 PROCEDURE — 84443 ASSAY THYROID STIM HORMONE: CPT

## 2022-03-01 PROCEDURE — 84439 ASSAY OF FREE THYROXINE: CPT

## 2022-03-01 PROCEDURE — 80048 BASIC METABOLIC PNL TOTAL CA: CPT

## 2022-03-01 PROCEDURE — G0328 FECAL BLOOD SCRN IMMUNOASSAY: HCPCS

## 2022-03-01 PROCEDURE — 86803 HEPATITIS C AB TEST: CPT

## 2022-03-01 PROCEDURE — 83993 ASSAY FOR CALPROTECTIN FECAL: CPT

## 2022-03-01 PROCEDURE — 81001 URINALYSIS AUTO W/SCOPE: CPT

## 2022-03-01 PROCEDURE — 87389 HIV-1 AG W/HIV-1&-2 AB AG IA: CPT

## 2022-03-01 PROCEDURE — 85025 COMPLETE CBC W/AUTO DIFF WBC: CPT

## 2022-03-01 PROCEDURE — 86140 C-REACTIVE PROTEIN: CPT

## 2022-03-01 PROCEDURE — 80061 LIPID PANEL: CPT

## 2022-03-02 LAB — HIV 1+2 AB+HIV1 P24 AG SERPL QL IA: NORMAL

## 2022-03-03 LAB — CALPROTECTIN STL-MCNT: 27 UG/G (ref 0–120)

## 2022-03-07 ENCOUNTER — OFFICE VISIT (OUTPATIENT)
Dept: OCCUPATIONAL THERAPY | Age: 62
End: 2022-03-07
Payer: COMMERCIAL

## 2022-03-07 DIAGNOSIS — M79.642 LEFT HAND PAIN: ICD-10-CM

## 2022-03-07 DIAGNOSIS — S62.337A CLOSED DISPLACED FRACTURE OF NECK OF FIFTH METACARPAL BONE OF LEFT HAND, INITIAL ENCOUNTER: Primary | ICD-10-CM

## 2022-03-07 PROCEDURE — 97140 MANUAL THERAPY 1/> REGIONS: CPT

## 2022-03-07 PROCEDURE — 97110 THERAPEUTIC EXERCISES: CPT

## 2022-03-07 PROCEDURE — 97018 PARAFFIN BATH THERAPY: CPT

## 2022-03-07 NOTE — PROGRESS NOTES
Daily Note     Today's date: 3/7/2022  Patient name: Jess Barrios  : 1960  MRN: [id number]  Referring provider: Kimi Connell MD  Dx:   Encounter Diagnosis     ICD-10-CM    1  Closed displaced fracture of neck of fifth metacarpal bone of left hand, initial encounter  S62 337A    2  Left hand pain  M79 642                   Subjective: It just hurts again like it did last time      Objective: See treatment diary below      Assessment: Tolerated treatment well  Patient would benefit from continued OT Tightness continues along interossi with increased crepitus  Improved flexion, continues to report pain and discomfort with MCP ext stretching  Plan: Continue per plan of care        Precautions: [place]  LATEX ALLERGY      Manuals  2/7 2/28 3/7         MEM 8'            STM  8' 8' 8'                                   Neuro Re-Ed                                                                                                        Ther Ex             PROM 5'  5' 5'         Pencils   1x 1x         Progressive Grasp             Gross Grasp  RPW           Pinch Ring  R/R 2x           Hook Roll   2x10 2x10         Wrist Maze   x4 x6         Lumbrical Block    x10 x10         HEP: MCP PROM, Tendon glides, Isotoner for edema   Foam Fist          Ther Activity             Translation                          Gait Training                                       Modalities             Paraffin    8'

## 2022-03-09 ENCOUNTER — OFFICE VISIT (OUTPATIENT)
Dept: GASTROENTEROLOGY | Facility: MEDICAL CENTER | Age: 62
End: 2022-03-09
Payer: COMMERCIAL

## 2022-03-09 VITALS
SYSTOLIC BLOOD PRESSURE: 91 MMHG | WEIGHT: 120.1 LBS | TEMPERATURE: 98.5 F | HEART RATE: 97 BPM | DIASTOLIC BLOOD PRESSURE: 53 MMHG | BODY MASS INDEX: 18.81 KG/M2

## 2022-03-09 DIAGNOSIS — K58.0 IRRITABLE BOWEL SYNDROME WITH DIARRHEA: Primary | ICD-10-CM

## 2022-03-09 DIAGNOSIS — K52.9 COLITIS: ICD-10-CM

## 2022-03-09 PROCEDURE — 99214 OFFICE O/P EST MOD 30 MIN: CPT | Performed by: INTERNAL MEDICINE

## 2022-03-09 NOTE — PROGRESS NOTES
Outpatient Follow up  [address]    [facility] [address]  Erin Eldridge MD  Ph : [phone]  Fax : [phone]  Mobile : [phone]  Email : [email or web address]  Also available on [employer]    Magalis Gonzalez 64 y o  female MRN: [id number]    PCP: Isac Leventhal, MD  Referring: No referring provider defined for this encounter  Magalis Gonzalez presented for a follow up visit  My recommendations are included  Please do not hesitate to contact me with any questions you may have  ASSESSMENT AND PLAN:      No problem-specific Assessment & Plan notes found for this encounter  Diagnoses and all orders for this visit:    Irritable bowel syndrome with diarrhea    Colitis         80-year-old lady with history of IBS with diarrhea/constipation and chronic inactive/indeterminate colitis  She is doing well at this time  Does have interstitial cystitis and h/o endometriosis  Some thickening on a recent uterine US  She does get IBS symptoms from time to time but the symptoms are concerning and debilitating to her  Recommend the following  Bentyl for pain - continue as needed  Consider Elavil  If you start this then we will decrease the Bentyl  The Elavil may also help with the interstitial cystitis  She will think about this and get back to us  Will need EKG  Follow up in 6 months  We have discussed with 1 of our IBD specialists in the past and I have recommended for her to see them in the past as well  We will follow up with her in 6 months and discuss that again   ______________________________________________________________________    SUBJECTIVE:  63 y/o with h/o indeterminate colitis/chronic inactive colitis  She also has had a history of IBS with diarrhea  She has  osteoporosis and had been on calcium supplements recently  This led to constipation  She had rectal bleeding as well   She had a history of fissures but had a recent anoscopy which did not show any significant findings  She has not been taking the calcium supplements  She has stopped calcium and is going more regular  She is taking Bentyl  She is on Fleets enema as needed  She is on Asacol as well  She is taking three 800 tablets three times daily  EGD last year  This showed mild nodularity in the distal esophagus for which biopsies were done  These were unremarkable  Otherwise the stomach and the duodenum were normal   Colonoscopy last year  This showed chronic inactive colitis/ indeterminate colitis  Mostly regular but has a flare every so often  Has pain in the left side, lower abdomen, right side, nausea, pain usually lower abdomen, comes in waves, at times constant, about 10 episodes during the past 6 months  Take bentyl during the episode and that may help, heating pad  No diarrhea  She has a UTI right now - had keflex  REVIEW OF SYSTEMS IS OTHERWISE NEGATIVE        Historical Information   Past Medical History:   Diagnosis Date    Atopic dermatitis     last assessed 13    Atrophic vaginitis     last assessed 9/2/15    Dermatitis     Dry eye     Frequency of urination     Fungal infection     last assessed 13    Herpes     last assessed 14    Hyperlipidemia     Hypothyroidism     Interstitial cystitis     Osteoporosis     Periodontal abscess     last assessed 13    PONV (postoperative nausea and vomiting)     Thyroid nodule     Ulcerative colitis ([address] )     Urinary frequency     resolved 17    UTI (urinary tract infection)     Vaginal atrophy     Vitamin D deficiency     last assessed 16    Voiding dysfunction     last assessed 10/7/15     Past Surgical History:   Procedure Laterality Date     SECTION       and      COLONOSCOPY  2020    CYSTOSCOPY      diagnostic resolved 05    FL RETROGRADE PYELOGRAM  2020    GANGLION CYST EXCISION Right 2/19/2019    Procedure: EXCISION GANGLION CYST RIGHT FIRST DORSAL EXTENSOR COMPARTMENT;  Surgeon: Gianluca Siegel MD;  [facility];  Service: Orthopedics    KIDNEY STONE SURGERY      ORIF PROXIMAL ULNA FRACTURE      metal plates and screws removed    OTHER SURGICAL HISTORY      punch biospy     VT COLONOSCOPY FLX DX W/COLLJ SPEC WHEN PFRMD N/A 7/28/2017    Procedure: COLONOSCOPY;  Surgeon: Brian Wiley MD;  [facility];   Service: Colorectal    VT CYSTO/URETERO W/LITHOTRIPSY &INDWELL STENT INSRT Left 1/7/2020    Procedure: CYSTOSCOPY URETEROSCOPY WITH LITHOTRIPSY HOLMIUM LASER, RETROGRADE PYELOGRAM AND INSERTION STENT URETERAL;  Surgeon: Paawn Ventura MD;  [facility];  Service: Urology    [address] Right 2/19/2019    Procedure: [place];  Surgeon: Gianluca Siegel MD;  [facility];  Service: Orthopedics    TUBAL LIGATION      UPPER GASTROINTESTINAL ENDOSCOPY  09/11/2020    WISDOM TOOTH EXTRACTION  1983    X2      Social History   Social History     Substance and Sexual Activity   Alcohol Use Yes    Comment: socially - 1-3 drinks per month     Social History     Substance and Sexual Activity   Drug Use No     Social History     Tobacco Use   Smoking Status Never Smoker   Smokeless Tobacco Never Used     Family History   Problem Relation Age of Onset    Gallbladder disease Mother     Thyroid disease Mother     Kidney disease Mother     Breast cancer Mother     Diabetes Father     Hypertension Father     Nephrolithiasis Father     Lymphoma Maternal Grandfather     Diabetes Paternal Grandmother     Breast cancer Paternal Aunt     No Known Problems Son     No Known Problems Son        Meds/Allergies       Current Outpatient Medications:     cephalexin (KEFLEX) 500 mg capsule    Cholecalciferol (VITAMIN D PO)    Cranberry (ELLURA PO)    cycloSPORINE (RESTASIS) 0 05 % ophthalmic emulsion    dicyclomine (BENTYL) 10 mg capsule    estradiol (ESTRACE) 0 1 mg/g vaginal cream    levothyroxine 50 mcg tablet    mesalamine (ASACOL) 800 MG EC tablet    Meth-Hyo-M Bl-Na Phos-Ph Sal (URO-MP PO)    pentosan polysulfate (Elmiron) 100 mg capsule    rosuvastatin (CRESTOR) 5 mg tablet    Triamcinolone Acetonide (Nasacort Allergy 24HR) 55 MCG/ACT AERO    trospium chloride (SANCTURA) 20 mg tablet    Allergies   Allergen Reactions    Iodine - Food Allergy Anaphylaxis     Allergy to Iodinated and non iodinated dye    Other Anaphylaxis     APPLES    TAPE  Also rash at times    Seasonal Ic [Cholestatin] Allergic Rhinitis    Latex            Objective     Blood pressure 91/53, pulse 97, temperature 98 5 °F (36 9 °C), temperature source Probe, weight 54 5 kg (120 lb 1 6 oz)  Body mass index is 18 81 kg/m²  PHYSICAL EXAM:      Physical Exam  Constitutional:       Appearance: Normal appearance  She is well-developed  HENT:      Head: Normocephalic and atraumatic  Eyes:      General: No scleral icterus  Conjunctiva/sclera: Conjunctivae normal       Pupils: Pupils are equal, round, and reactive to light  Cardiovascular:      Rate and Rhythm: Normal rate and regular rhythm  Heart sounds: Normal heart sounds  Pulmonary:      Effort: Pulmonary effort is normal  No respiratory distress  Breath sounds: Normal breath sounds  Abdominal:      General: Bowel sounds are normal  There is no distension  Palpations: Abdomen is soft  There is no mass  Tenderness: There is no abdominal tenderness  Hernia: No hernia is present  Musculoskeletal:         General: Normal range of motion  Cervical back: Normal range of motion  Lymphadenopathy:      Cervical: No cervical adenopathy  Skin:     General: Skin is warm  Neurological:      Mental Status: She is alert and oriented to person, place, and time  Psychiatric:         Behavior: Behavior normal          Thought Content:  Thought content normal          Lab Results:   No visits with results within 1 Day(s) from this visit     Latest known visit with results is:   Lab on 03/01/2022   Component Date Value    CRP 03/01/2022 <3 0     Sodium 03/01/2022 140     Potassium 03/01/2022 3 9     Chloride 03/01/2022 108     CO2 03/01/2022 26     ANION GAP 03/01/2022 6     BUN 03/01/2022 19     Creatinine 03/01/2022 0 87     Glucose, Fasting 03/01/2022 91     Calcium 03/01/2022 9 5     eGFR 03/01/2022 72     Hepatitis C Ab 03/01/2022 Non-reactive     HIV-1/HIV-2 Ab 03/01/2022 Non-Reactive     WBC 03/01/2022 4 57     RBC 03/01/2022 4 67     Hemoglobin 03/01/2022 13 2     Hematocrit 03/01/2022 42 6     MCV 03/01/2022 91     MCH 03/01/2022 28 3     MCHC 03/01/2022 31 0*    RDW 03/01/2022 12 8     MPV 03/01/2022 12 0     Platelets 91/88/6476 116*    nRBC 03/01/2022 0     Neutrophils Relative 03/01/2022 77*    Immat GRANS % 03/01/2022 0     Lymphocytes Relative 03/01/2022 16     Monocytes Relative 03/01/2022 7     Eosinophils Relative 03/01/2022 0     Basophils Relative 03/01/2022 0     Neutrophils Absolute 03/01/2022 3 44     Immature Grans Absolute 03/01/2022 0 01     Lymphocytes Absolute 03/01/2022 0 74     Monocytes Absolute 03/01/2022 0 34     Eosinophils Absolute 03/01/2022 0 02     Basophils Absolute 03/01/2022 0 02     Cholesterol 03/01/2022 200     Triglycerides 03/01/2022 46     HDL, Direct 03/01/2022 73     LDL Calculated 03/01/2022 118*    Non-HDL-Chol (CHOL-HDL) 03/01/2022 127     Color, UA 03/01/2022 Blue     Clarity, UA 03/01/2022 Clear     Specific Gravity, UA 03/01/2022 1 025     pH, UA 03/01/2022 7 0     Leukocytes, UA 03/01/2022 Negative     Nitrite, UA 03/01/2022 Negative     Protein, UA 03/01/2022 Trace*    Glucose, UA 03/01/2022 Negative     Ketones, UA 03/01/2022 Negative     Urobilinogen, UA 03/01/2022 0 2     Bilirubin, UA 03/01/2022 Negative     Blood, UA 03/01/2022 Trace*    Free T4 03/01/2022 1 06     TSH 3RD GENERATON 03/01/2022 1 420     RBC, UA 03/01/2022 None Seen     WBC, UA 03/01/2022 2-4     Epithelial Cells 03/01/2022 Occasional     Bacteria, UA 03/01/2022 None Seen     Calprotectin 03/01/2022 27     OCCULT BLD, FECAL IMMUNO* 03/01/2022 Positive*         Radiology Results:   XR hand 3+ vw left    Result Date: 2/27/2022  Narrative: LEFT HAND INDICATION:   C96 505Y: Displaced fracture of neck of fifth metacarpal bone, left hand, initial encounter for closed fracture  COMPARISON:  2/9/2022 VIEWS:  XR HAND 3+ VW LEFT For the purposes of institution wide universal language the following terms will apply: (thumb=1st digit/finger, index finger=2nd digit/finger, long finger=3rd digit/finger, ring=4th digit/finger and small finger=5th digit/finger) FINDINGS: Left fifth metacarpal neck fracture demonstrates stable and anatomic alignment with decreased conspicuity of the fracture line indicating healing  No significant degenerative changes  No lytic or blastic osseous lesion  Soft tissues are unremarkable  Impression: Stable and anatomic alignment of healing fifth metacarpal neck fracture  Workstation performed: [id number]     XR hand 3+ vw left    Result Date: 2/15/2022  Narrative: LEFT HAND INDICATION:   S62 337A: Displaced fracture of neck of fifth metacarpal bone, left hand, initial encounter for closed fracture  COMPARISON:  12/8/2021 VIEWS:  XR HAND 3+ VW LEFT For the purposes of institution wide universal language the following terms will apply: (thumb=1st digit/finger, index finger=2nd digit/finger, long finger=3rd digit/finger, ring=4th digit/finger and small finger=5th digit/finger) FINDINGS: Stable alignment of the healing left 5th metacarpal neck fracture  Persistent visualization of lucency  Surrounding sclerosis noted  No significant degenerative changes  No lytic or blastic osseous lesion  Soft tissues are unremarkable       Impression: Stable alignment of the healing left 5th metacarpal neck fracture   Workstation performed: [id number]

## 2022-03-09 NOTE — PATIENT INSTRUCTIONS
Bentyl for pain - continue as needed  Consider Elavil in stead of Bentyl  Will need EKG now  Follow up in 6 months

## 2022-03-11 ENCOUNTER — OFFICE VISIT (OUTPATIENT)
Dept: OCCUPATIONAL THERAPY | Age: 62
End: 2022-03-11
Payer: COMMERCIAL

## 2022-03-11 DIAGNOSIS — M79.642 LEFT HAND PAIN: ICD-10-CM

## 2022-03-11 DIAGNOSIS — S62.337A CLOSED DISPLACED FRACTURE OF NECK OF FIFTH METACARPAL BONE OF LEFT HAND, INITIAL ENCOUNTER: Primary | ICD-10-CM

## 2022-03-11 PROCEDURE — 97140 MANUAL THERAPY 1/> REGIONS: CPT

## 2022-03-11 PROCEDURE — 97110 THERAPEUTIC EXERCISES: CPT

## 2022-03-11 NOTE — PROGRESS NOTES
Daily Note     Today's date: 3/11/2022  Patient name: Arias Rubi  : 1960  MRN: [id number]  Referring provider: Fernie Sauceda MD  Dx:   Encounter Diagnosis     ICD-10-CM    1  Closed displaced fracture of neck of fifth metacarpal bone of left hand, initial encounter  S62 337A    2  Left hand pain  M79 642                   Subjective: I just don't have any patience      Objective: See treatment diary below      Assessment: Tolerated treatment well  Patient would benefit from continued OT Noted increased pain with manuals today however she had greatly improved motion post  Upgraded foam fist to assist in decreasing cross over  Plan: Continue per plan of care        Precautions: [place]  LATEX ALLERGY      Manuals  2/7 2/28 3/7 3/11        MEM 8'            STM  8' 8' 8' 8'                                  Neuro Re-Ed                                                                                                        Ther Ex             PROM 5'  5' 5' 15'         Pencils   1x 1x         Progressive Grasp             Gross Grasp  RPW           Pinch Ring  R/R 2x           Hook Roll   2x10 2x10 2x10        Wrist Maze   x4 x6         Lumbrical Block    x10 x10 x10        HEP: MCP PROM, Tendon glides, Isotoner for edema   Foam Fist  Green foam fist        Ther Activity             Translation                          Gait Training                                       Modalities             Paraffin    8' 8'

## 2022-03-14 ENCOUNTER — OFFICE VISIT (OUTPATIENT)
Dept: OCCUPATIONAL THERAPY | Age: 62
End: 2022-03-14
Payer: COMMERCIAL

## 2022-03-14 DIAGNOSIS — M79.642 LEFT HAND PAIN: Primary | ICD-10-CM

## 2022-03-14 DIAGNOSIS — S62.337A CLOSED DISPLACED FRACTURE OF NECK OF FIFTH METACARPAL BONE OF LEFT HAND, INITIAL ENCOUNTER: ICD-10-CM

## 2022-03-14 PROCEDURE — 97140 MANUAL THERAPY 1/> REGIONS: CPT

## 2022-03-14 PROCEDURE — 97110 THERAPEUTIC EXERCISES: CPT

## 2022-03-14 PROCEDURE — 97018 PARAFFIN BATH THERAPY: CPT

## 2022-03-14 NOTE — PROGRESS NOTES
Daily Note     Today's date: 3/14/2022  Patient name: Benji López  : 1960  MRN: [id number]  Referring provider: Mary Wu MD  Dx:   Encounter Diagnosis     ICD-10-CM    1  Left hand pain  M79 642    2  Closed displaced fracture of neck of fifth metacarpal bone of left hand, initial encounter  S65 621A                   Subjective: It was really sore on Saturday but it feels pretty good      Objective: See treatment diary below      Assessment: Tolerated treatment well  Patient would benefit from continued OT Great motion today pre and post manuals  RE next week with possible upgrade to gentle strengthening    Plan: Continue per plan of care        Precautions: [place]  LATEX ALLERGY      Manuals  2/7 2/28 3/7 3/11 3/14       MEM 8'            STM  8' 8' 8' 8' 8'                                 Neuro Re-Ed                                                                                                        Ther Ex             PROM 5'  5' 5' 15'  10'       Pencils   1x 1x         Progressive Grasp             Gross Grasp  RPW           Pinch Ring  R/R 2x           Hook Roll   2x10 2x10 2x10 2x10       Wrist Maze   x4 x6         Lumbrical Block    x10 x10 x10 x10       HEP: MCP PROM, Tendon glides, Isotoner for edema   Foam Fist  Green foam fist        Ther Activity             Translation                          Gait Training                                       Modalities             Paraffin    8' 8' 8'

## 2022-03-18 ENCOUNTER — OFFICE VISIT (OUTPATIENT)
Dept: OCCUPATIONAL THERAPY | Age: 62
End: 2022-03-18
Payer: COMMERCIAL

## 2022-03-18 DIAGNOSIS — S62.337A CLOSED DISPLACED FRACTURE OF NECK OF FIFTH METACARPAL BONE OF LEFT HAND, INITIAL ENCOUNTER: Primary | ICD-10-CM

## 2022-03-18 DIAGNOSIS — M79.642 LEFT HAND PAIN: ICD-10-CM

## 2022-03-18 PROCEDURE — 97140 MANUAL THERAPY 1/> REGIONS: CPT

## 2022-03-18 PROCEDURE — 97110 THERAPEUTIC EXERCISES: CPT

## 2022-03-18 PROCEDURE — 97530 THERAPEUTIC ACTIVITIES: CPT

## 2022-03-18 NOTE — PROGRESS NOTES
Daily Note     Today's date: 3/18/2022  Patient name: Benji López  : 1960  MRN: [id number]  Referring provider: Mary Wu MD  Dx:   Encounter Diagnosis     ICD-10-CM    1  Closed displaced fracture of neck of fifth metacarpal bone of left hand, initial encounter  S62 337A    2  Left hand pain  M79 642                   Subjective: Look at it! Objective: See treatment diary below      Assessment: Tolerated treatment well  Patient would benefit from continued OT Continued improvement in motion  RE NV and upgrade to strengthening    Plan: Continue per plan of care        Precautions: [place]  LATEX ALLERGY      Manuals  2/7 2/28 3/7 3/11 3/14 3/18      MEM 8'            STM  8' 8' 8' 8' 8' 10'                                Neuro Re-Ed                                                                                                        Ther Ex             PROM 5'  5' 5' 15'  10' 15'      Pencils   1x 1x         Progressive Grasp             Gross Grasp  RPW           Pinch Ring  R/R 2x           Hook Roll   2x10 2x10 2x10 2x10       Wrist Maze   x4 x6         EDC Roll       GFB hold 5 sec 15x      Lumbrical Block    x10 x10 x10 x10       HEP: MCP PROM, Tendon glides, Isotoner for edema   Foam Fist  Green foam fist        Ther Activity             Translation       [place] with 1 marble                   Gait Training                                       Modalities             Paraffin    8' 8' 8'

## 2022-03-21 ENCOUNTER — EVALUATION (OUTPATIENT)
Dept: OCCUPATIONAL THERAPY | Age: 62
End: 2022-03-21
Payer: COMMERCIAL

## 2022-03-21 DIAGNOSIS — M79.642 LEFT HAND PAIN: ICD-10-CM

## 2022-03-21 DIAGNOSIS — S62.337A CLOSED DISPLACED FRACTURE OF NECK OF FIFTH METACARPAL BONE OF LEFT HAND, INITIAL ENCOUNTER: Primary | ICD-10-CM

## 2022-03-21 PROCEDURE — 97110 THERAPEUTIC EXERCISES: CPT

## 2022-03-21 PROCEDURE — 97140 MANUAL THERAPY 1/> REGIONS: CPT

## 2022-03-21 PROCEDURE — 97530 THERAPEUTIC ACTIVITIES: CPT

## 2022-03-21 NOTE — PROGRESS NOTES
OT Re-Evaluation     Today's date: 3/21/2022  Patient name: Kenny Biggs  : 1960  MRN: [id number]  Referring provider: Justyna Smith MD  Dx:   Encounter Diagnosis     ICD-10-CM    1  Closed displaced fracture of neck of fifth metacarpal bone of left hand, initial encounter  S62 337A    2  Left hand pain  M79 642                   Assessment  Assessment details: Veronika Aburto presents s/p 5th MC fracture s/p FOOSH  She was casted for a week and then transitioned to a splint for 6  She presents with decreased MCP motion, increased scar, edema, and decreased overall function  Recommend therapy 2x per week for 4-6 weeks to improve ROM, pain, and overall function  : Veronika Aburto presents with improved ROM and strength since IE  She reports that she has had improved function since beginning therapy  Recommend continued therapy at this time to improve strength and function for 4 for weeks  : Veronika Aburto presents s/p recurrent fall and splint 2 total weeks  She has transitioned into an [facility] Cuff splint to be worn AAT except hygiene  She continues to have decreased ROM secondary to immobilization  3/21: Veronika Aburto presents with WFL ROM and decreased strength since RE  She has began gentle strengthening today with great tolerance but fatigue noted  Recommend 2-4 weeks to continue to upgrade strengthening and allow her to meet her goals  Impairments: abnormal muscle tone, abnormal or restricted ROM, impaired physical strength, lacks appropriate home exercise program and pain with function    Goals  STG 1: Increase Digit AROM 25% in 4-6 weeks  MET  STG 2: Decrease overall pain to 3/10 in 4-6 weeks  PROGRESSING  STG 3: Increase overall strength by 25% in 4-6 weeks  MET  STG 4: Compliant with HEP in 2 weeks      LTG 1: Complete all ADL/IADLs improved to prior level of function within 6-8 weeks  LTG 2: Leisure/social skills improved within 6-8 weeks  LTG 3: Work skills improved to prior level of function within 6-8 weeks    Patient Goal: To get my hand better    Goals, plan of care and treatment condition discussed with patient  Patient expresses their understanding and questions regarding these isues were addressed  Plan  Patient would benefit from: custom splinting, skilled occupational therapy and OT eval  Planned modality interventions: thermotherapy: hydrocollator packs, thermotherapy: paraffin bath and fluidotherapy  Planned therapy interventions: home exercise program, graded activity, graded exercise, functional ROM exercises, therapeutic activities, therapeutic exercise, joint mobilization, manual therapy and Egan taping        Subjective Evaluation    History of Present Illness  Date of onset: 10/25/2021  Mechanism of injury: Celeste March fell when she slipped in the parking lot  She fxed her 5th MCP  She was casted for a few days and transferred to a splint for 6 weeks    Pain  Current pain ratin  At best pain ratin  At worst pain ratin  Quality: dull ache and sharp (Shooting)    Hand dominance: right          Objective     Active Range of Motion     Left Wrist   Wrist flexion: 60 degrees   Wrist extension: 80 degrees     Left Digits    Flexion   Little     MCP: 72    Strength/Myotome Testing     Left Wrist/Hand   Wrist extension: 4+  Wrist flexion: 5     (2nd hand position)     Trial 1: 28    Trial 2: 28 4    Right Wrist/Hand      (2nd hand position)     Trial 1: 52 1    Tests     Additional Tests Details  Denies n/t             Precautions: [place]  LATEX ALLERGY      Manuals  2/7 2/28 3/21         MEM 8'            STM  8' 8' 8'                                   Neuro Re-Ed                                                                                                        Ther Ex             PROM 5'  5' 10'         Pencils   1x          Progressive Grasp             Gross Grasp  RPW  RPW 3x10         Pinch Ring  R/R 2x           Hook Roll   2x10          Wrist Maze   x4 Lumbrical Block    x10          EDC    Sm RB x15                                   HEP: MCP PROM, Tendon glides, Isotoner for edema   Foam Fist          Ther Activity             Translation             Pinch Pins    R/R 2x         Gait Training                                       Modalities

## 2022-03-22 ENCOUNTER — OFFICE VISIT (OUTPATIENT)
Dept: INTERNAL MEDICINE CLINIC | Facility: CLINIC | Age: 62
End: 2022-03-22
Payer: COMMERCIAL

## 2022-03-22 VITALS
SYSTOLIC BLOOD PRESSURE: 92 MMHG | HEART RATE: 93 BPM | HEIGHT: 67 IN | DIASTOLIC BLOOD PRESSURE: 68 MMHG | BODY MASS INDEX: 19.06 KG/M2 | OXYGEN SATURATION: 99 % | WEIGHT: 121.4 LBS | TEMPERATURE: 98.2 F

## 2022-03-22 DIAGNOSIS — M80.00XA AGE-RELATED OSTEOPOROSIS WITH CURRENT PATHOLOGICAL FRACTURE, INITIAL ENCOUNTER: ICD-10-CM

## 2022-03-22 DIAGNOSIS — D69.6 THROMBOCYTOPENIA (HCC): ICD-10-CM

## 2022-03-22 DIAGNOSIS — D70.9 GRANULOCYTOPENIA (HCC): ICD-10-CM

## 2022-03-22 DIAGNOSIS — K58.0 IRRITABLE BOWEL SYNDROME WITH DIARRHEA: ICD-10-CM

## 2022-03-22 DIAGNOSIS — T88.7XXA MEDICATION SIDE EFFECTS: ICD-10-CM

## 2022-03-22 DIAGNOSIS — K51.30 ULCERATIVE RECTOSIGMOIDITIS WITHOUT COMPLICATION (HCC): ICD-10-CM

## 2022-03-22 DIAGNOSIS — E78.01 FAMILIAL HYPERCHOLESTEROLEMIA: Primary | ICD-10-CM

## 2022-03-22 PROBLEM — U07.1 COVID-19: Status: RESOLVED | Noted: 2021-10-17 | Resolved: 2022-03-22

## 2022-03-22 PROBLEM — N39.0 UTI (URINARY TRACT INFECTION): Status: RESOLVED | Noted: 2019-12-05 | Resolved: 2022-03-22

## 2022-03-22 PROCEDURE — 99396 PREV VISIT EST AGE 40-64: CPT | Performed by: INTERNAL MEDICINE

## 2022-03-22 PROCEDURE — 93000 ELECTROCARDIOGRAM COMPLETE: CPT | Performed by: INTERNAL MEDICINE

## 2022-03-22 RX ORDER — ROSUVASTATIN CALCIUM 5 MG/1
5 TABLET, COATED ORAL DAILY
Qty: 90 TABLET | Refills: 3 | Status: SHIPPED | OUTPATIENT
Start: 2022-03-22

## 2022-03-22 RX ORDER — METHENAMINE, SODIUM PHOSPHATE, MONOBASIC, ANHYDROUS, PHENYL SALICYLATE, METHYLENE BLUE AND HYOSCYAMINE SULFATE 118; 40.8; 36; 10; .12 MG/1; MG/1; MG/1; MG/1; MG/1
CAPSULE ORAL
COMMUNITY
Start: 2022-02-28

## 2022-03-22 NOTE — ASSESSMENT & PLAN NOTE
I have reviewed the patient's most recent consultation notes with her gastroenterologist and his suggestion of a trial with amitriptyline  Present time patient is not sure if she wants to proceed with this medication  She continues to experience intermittent pain in the abdomen which may be related to her irritable bowel and or interstitial cystitis    For now recommend continuation of Bentyl 20 mg 3 times a day

## 2022-03-22 NOTE — PROGRESS NOTES
Assessment/Plan:    Irritable bowel syndrome with diarrhea  I have reviewed the patient's most recent consultation notes with her gastroenterologist and his suggestion of a trial with amitriptyline  Present time patient is not sure if she wants to proceed with this medication  She continues to experience intermittent pain in the abdomen which may be related to her irritable bowel and or interstitial cystitis  For now recommend continuation of Bentyl 20 mg 3 times a day    Other ulcerative colitis with abscess ([facility] )  Recommend continuation of Asacol symptoms currently stable  Ulcerative colitis ([facility] )  Currently patient is asymptomatic no localizing tenderness on examination of the abdomen today recommend continued follow-up with GI services and continuation of Asacol 800 mg 4 times a day  Heme-positive stool continues likely related to the patient's ulcerative colitis  Nontoxic single thyroid nodule  Thyroid nodule followed through endocrinology continue current 50 mcg of levothyroxine daily  Osteoporosis  Most recent DEXA scan reviewed with the patient she does have a T-score of -3 in the forearm region  She has sustained 2 fractures of her left hand  Recommend consideration for Prolia which she will discuss with her rheumatologist on her next visit  Thrombocytopenia ([facility] )  CBC reviewed today shows continued mild thrombocytopenia patient remains asymptomatic with no abnormal bleeding or bruising noted  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[facility] )  Updated CBC reviewed with the patient actually shows normal white blood cell count at the present time no recent infection issues continue surveillance    Hyperlipidemia  Review of the patient's lipid profile today indicates upward trend on her LDL value reviewed with the patient diet and recommend changes to diet to address this upward trend    Follow-up testing 1 year       Diagnoses and all orders for this visit:    Medication side effects  -     POCT ECG    Familial hypercholesterolemia  -     rosuvastatin (CRESTOR) 5 mg tablet; Take 1 tablet (5 mg total) by mouth daily    Other orders  -     Meth-Hyo-M Bl-Na Phos-Ph Sal (Uro-MP) 118 MG CAPS; TAKE 1 CAPSULE BY MOUTH FOUR TIMES A DAY FOR 28 DAYS        Subjective:      Patient ID: Elsy Cowan is a 64 y o  female  This 60-year-old female patient returns today for an annual complete physical examination review of her recent blood work  I have reviewed her recent consultation notes with her gastroenterologist regarding her ongoing IBS symptoms  Over the past several months the patient seems to have experience several episodes of abdominal pain likely related to either her interstitial cystitis and or IBS  The patient's gastroenterologist had suggested a possible trial on amitriptyline to see if it would improve her symptoms  Patient is not sure she will proceed with this recommendation  We did perform an electrocardiogram today to confirm that she has normal baseline tracing prior to any trial of amitriptyline  I reviewed with the patient today the fracture of her left hand neck of the 5th meta carpal   She is actually sustained 2 fractures of this bone  The initial 1 was healing nicely when she sustained a fall resulting in a refracture of this area  She continues with physical therapy  We did discuss the potential for Prolia therapy given the fact that she does have a osteoporosis reading on her most recent DEXA scan in the forearm region        The following portions of the patient's history were reviewed and updated as appropriate:   She  has a past medical history of Atopic dermatitis, Atrophic vaginitis, Dermatitis, Dry eye, Frequency of urination, Fungal infection, Herpes, Hyperlipidemia, Hypothyroidism, Interstitial cystitis, Osteoporosis, Periodontal abscess, PONV (postoperative nausea and vomiting), Thyroid nodule, Ulcerative colitis ([address] ), Urinary frequency, UTI (urinary tract infection), Vaginal atrophy, Vitamin D deficiency, and Voiding dysfunction  She   Patient Active Problem List    Diagnosis Date Noted    Health care maintenance 2021    Colitis 2020    Ulcerative colitis ([facility] ) 2020    Pollen-food allergy 2019    Allergy to iodinated contrast media 2019    Dysfunctional voiding of urine 2019    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[facility] ) 2019    Other specified hypothyroidism 2019    Anxiety 2017    Nontoxic single thyroid nodule 2016    Urinary urgency 2016    Irritable bowel syndrome with diarrhea 2016    Thrombocytopenia ([facility] ) 2016    Hashimoto's thyroiditis 2015    Vitamin D deficiency 2015    Difficult or painful urination 2015    Hyperlipidemia 2015    Dense breasts 10/28/2014    Osteoporosis 2013    Atrophic vaginitis 2013     She  has a past surgical history that includes Kidney stone surgery; ORIF proximal ulna fracture; pr colonoscopy flx dx w/collj spec when pfrmd (N/A, 2017); Cystoscopy;  section; pr incis tendon sheath,radial styloid (Right, 2019); Ganglion cyst excision (Right, 2019); [place] tooth extraction (); Tubal ligation; Other surgical history; pr cysto/uretero w/lithotripsy &indwell stent insrt (Left, 2020); [place] retrograde pyelogram (2020); Colonoscopy (2020); and Upper gastrointestinal endoscopy (2020)  Her family history includes Breast cancer in her mother and paternal aunt; Diabetes in her father and paternal grandmother; Gallbladder disease in her mother; Hypertension in her father; Kidney disease in her mother; Lymphoma in her maternal grandfather; Nephrolithiasis in her father; No Known Problems in her son and son; Thyroid disease in her mother  She  reports that she has never smoked  She has never used smokeless tobacco  She reports current alcohol use  She reports that she does not use drugs    Current Outpatient Medications   Medication Sig Dispense Refill    Cholecalciferol (VITAMIN D PO) Take 3,000 Units by mouth daily      Cranberry (ELLURA PO) Take 1 capsule by mouth daily      cycloSPORINE (RESTASIS) 0 05 % ophthalmic emulsion Administer 1 drop to both eyes 2 (two) times a day      dicyclomine (BENTYL) 10 mg capsule Take 2 capsules (20 mg total) by mouth 3 (three) times a day before meals 270 capsule 3    estradiol (ESTRACE) 0 1 mg/g vaginal cream Insert 1 applicator full vaginally nightly 42 5 g 5    levothyroxine 50 mcg tablet Take 1 tablet (50 mcg total) by mouth daily 90 tablet 2    mesalamine (ASACOL) 800 MG EC tablet TAKE ONE TABLET BY MOUTH FOUR TIMES A  tablet 0    Meth-Hyo-M Bl-Na Phos-Ph Sal (URO-MP PO) Take 1 capsule by mouth 2 (two) times a day       Meth-Hyo-M Bl-Na Phos-Ph Sal (Uro-MP) 118 MG CAPS TAKE 1 CAPSULE BY MOUTH FOUR TIMES A DAY FOR 28 DAYS      pentosan polysulfate (Elmiron) 100 mg capsule One pill 3 times a day 90 capsule 4    rosuvastatin (CRESTOR) 5 mg tablet Take 1 tablet (5 mg total) by mouth daily 90 tablet 3    Triamcinolone Acetonide (Nasacort Allergy 24HR) 55 MCG/ACT AERO into each nostril daily      trospium chloride (SANCTURA) 20 mg tablet Take 1 tablet (20 mg total) by mouth 2 (two) times a day 60 tablet 5     No current facility-administered medications for this visit       Review of Systems   Gastrointestinal: Positive for abdominal distention  Musculoskeletal: Positive for arthralgias and joint swelling  Pain over the 5th metacarpal left hand   All other systems reviewed and are negative  Objective:      BP 92/68   Pulse 93   Temp 98 2 °F (36 8 °C)   Ht 5' 7" (1 702 m)   Wt 55 1 kg (121 lb 6 4 oz)   LMP  (LMP Unknown)   SpO2 99%   BMI 19 01 kg/m²          Physical Exam  Vitals reviewed  Constitutional:       General: She is not in acute distress  Appearance: Normal appearance  She is well-developed  She is not ill-appearing  HENT:      Head: Normocephalic  Right Ear: Hearing, tympanic membrane, ear canal and external ear normal       Left Ear: Hearing, tympanic membrane, ear canal and external ear normal       Nose: Nose normal  No mucosal edema  Mouth/Throat:      Mouth: Mucous membranes are moist       Pharynx: Oropharynx is clear  Uvula midline  Eyes:      General: Lids are normal          Right eye: No discharge  Left eye: No discharge  Extraocular Movements: Extraocular movements intact  Conjunctiva/sclera: Conjunctivae normal       Pupils: Pupils are equal, round, and reactive to light  Neck:      Thyroid: No thyromegaly  Vascular: No carotid bruit or JVD  Cardiovascular:      Rate and Rhythm: Normal rate and regular rhythm  Heart sounds: Normal heart sounds  No murmur heard  Pulmonary:      Effort: Pulmonary effort is normal  No respiratory distress  Breath sounds: Normal breath sounds  No wheezing, rhonchi or rales  Abdominal:      General: Bowel sounds are normal  There is no distension  Palpations: Abdomen is soft  There is no mass  Tenderness: There is no abdominal tenderness  There is no guarding  Musculoskeletal:         General: Tenderness and signs of injury present  No swelling  Normal range of motion  Cervical back: Normal range of motion and neck supple  No rigidity or tenderness  Right lower leg: No edema  Left lower leg: No edema  Comments: Tenderness over the 5th meta carpal left hand   Lymphadenopathy:      Cervical: No cervical adenopathy  Skin:     General: Skin is warm and dry  Coloration: Skin is not jaundiced or pale  Neurological:      General: No focal deficit present  Mental Status: She is alert and oriented to person, place, and time  Psychiatric:         Mood and Affect: Mood normal          Speech: Speech normal          Behavior: Behavior normal  Behavior is cooperative           Thought Content: Thought content normal          Judgment: Judgment normal

## 2022-03-22 NOTE — ASSESSMENT & PLAN NOTE
Most recent DEXA scan reviewed with the patient she does have a T-score of -3 in the forearm region  She has sustained 2 fractures of her left hand  Recommend consideration for Prolia which she will discuss with her rheumatologist on her next visit

## 2022-03-22 NOTE — ASSESSMENT & PLAN NOTE
Updated CBC reviewed with the patient actually shows normal white blood cell count at the present time no recent infection issues continue surveillance

## 2022-03-22 NOTE — ASSESSMENT & PLAN NOTE
Currently patient is asymptomatic no localizing tenderness on examination of the abdomen today recommend continued follow-up with GI services and continuation of Asacol 800 mg 4 times a day  Heme-positive stool continues likely related to the patient's ulcerative colitis

## 2022-03-22 NOTE — ASSESSMENT & PLAN NOTE
Review of the patient's lipid profile today indicates upward trend on her LDL value reviewed with the patient diet and recommend changes to diet to address this upward trend    Follow-up testing 1 year

## 2022-03-25 ENCOUNTER — APPOINTMENT (OUTPATIENT)
Dept: OCCUPATIONAL THERAPY | Age: 62
End: 2022-03-25
Payer: COMMERCIAL

## 2022-03-28 ENCOUNTER — OFFICE VISIT (OUTPATIENT)
Dept: OCCUPATIONAL THERAPY | Age: 62
End: 2022-03-28
Payer: COMMERCIAL

## 2022-03-28 DIAGNOSIS — M79.642 LEFT HAND PAIN: Primary | ICD-10-CM

## 2022-03-28 DIAGNOSIS — S62.337A CLOSED DISPLACED FRACTURE OF NECK OF FIFTH METACARPAL BONE OF LEFT HAND, INITIAL ENCOUNTER: ICD-10-CM

## 2022-03-28 PROCEDURE — 97018 PARAFFIN BATH THERAPY: CPT

## 2022-03-28 PROCEDURE — 97140 MANUAL THERAPY 1/> REGIONS: CPT

## 2022-03-28 PROCEDURE — 97110 THERAPEUTIC EXERCISES: CPT

## 2022-03-28 PROCEDURE — 97530 THERAPEUTIC ACTIVITIES: CPT

## 2022-03-28 NOTE — PROGRESS NOTES
Daily Note     Today's date: 3/28/2022  Patient name: Andreia Knott  : 1960  MRN: [id number]  Referring provider: Marta Puckett MD  Dx:   Encounter Diagnosis     ICD-10-CM    1  Left hand pain  M79 642    2  Closed displaced fracture of neck of fifth metacarpal bone of left hand, initial encounter  S68 978A                   Subjective: It's just sore on the top when I move it      Objective: See treatment diary below      Assessment: Tolerated treatment well  Patient would benefit from continued OT  Upgraded again today with noted fatigue  Plan to provide upgraded putty NV for HEP      Plan: Continue per plan of care        Precautions: [place]  LATEX ALLERGY      Manuals  2/7 2/28 3/21 3/28        MEM 8'            STM  8' 8' 8' 8'                                  Neuro Re-Ed                                                                                                        Ther Ex             PROM 5'  5' 10' 5'        Pencils   1x          Progressive Grasp             Gross Grasp  RPW  RPW 3x10 GPW 3x10        Pinch Ring  R/R 2x           Hook Roll   2x10          Wrist Maze   x4          Lumbrical Block    x10          EDC    Sm RB x15 Sm RB x15                                  HEP: MCP PROM, Tendon glides, Isotoner for edema   Foam Fist          Ther Activity             Translation             Pinch Pins    R/R 2x         Pegs     R in, 10# out                                  Modalities

## 2022-04-01 ENCOUNTER — OFFICE VISIT (OUTPATIENT)
Dept: OCCUPATIONAL THERAPY | Age: 62
End: 2022-04-01
Payer: COMMERCIAL

## 2022-04-01 DIAGNOSIS — M79.642 LEFT HAND PAIN: Primary | ICD-10-CM

## 2022-04-01 DIAGNOSIS — S62.337A CLOSED DISPLACED FRACTURE OF NECK OF FIFTH METACARPAL BONE OF LEFT HAND, INITIAL ENCOUNTER: ICD-10-CM

## 2022-04-01 PROCEDURE — 97530 THERAPEUTIC ACTIVITIES: CPT

## 2022-04-01 PROCEDURE — 97110 THERAPEUTIC EXERCISES: CPT

## 2022-04-01 PROCEDURE — 97140 MANUAL THERAPY 1/> REGIONS: CPT

## 2022-04-01 NOTE — PROGRESS NOTES
Daily Note     Today's date: 2022  Patient name: Gustavo Carranza  : 1960  MRN: [id number]  Referring provider: Citlali Pollard MD  Dx:   Encounter Diagnosis     ICD-10-CM    1  Left hand pain  M79 642    2  Closed displaced fracture of neck of fifth metacarpal bone of left hand, initial encounter  S62 [id number]                   Subjective: It's just sore on the top when I move it      Objective: See treatment diary below      Assessment: Tolerated treatment well  Patient would benefit from continued OT  Upgraded again today with noted fatigue  Added mix of Y and R putty to HEP      Plan: Continue per plan of care        Precautions: [place]  LATEX ALLERGY      Manuals  2/7 2/28 3/21 3/28 4/1       MEM 8'            STM  8' 8' 8' 8' 8'                                 Neuro Re-Ed                                                                                                        Ther Ex             PROM 5'  5' 10' 5' 5'       Pencils   1x          Progressive Grasp             Gross Grasp  RPW  RPW 3x10 GPW 3x10 GPW 3x10       Pinch Ring  R/R 2x           Hook Roll   2x10          Wrist Maze   x4          Lumbrical Block    x10          EDC    Sm RB x15 Sm RB x15 Sm RB x15                                 HEP: MCP PROM, Tendon glides, Isotoner for edema   Foam Fist          Ther Activity             Translation             Pinch Pins    R/R 2x         Pegs     R in, 10# out R in, 10# out                                 Modalities

## 2022-04-04 ENCOUNTER — OFFICE VISIT (OUTPATIENT)
Dept: OCCUPATIONAL THERAPY | Age: 62
End: 2022-04-04
Payer: COMMERCIAL

## 2022-04-04 DIAGNOSIS — S62.337A CLOSED DISPLACED FRACTURE OF NECK OF FIFTH METACARPAL BONE OF LEFT HAND, INITIAL ENCOUNTER: Primary | ICD-10-CM

## 2022-04-04 DIAGNOSIS — M79.642 LEFT HAND PAIN: ICD-10-CM

## 2022-04-04 PROCEDURE — 97110 THERAPEUTIC EXERCISES: CPT

## 2022-04-04 PROCEDURE — 97140 MANUAL THERAPY 1/> REGIONS: CPT

## 2022-04-04 PROCEDURE — 97530 THERAPEUTIC ACTIVITIES: CPT

## 2022-04-04 NOTE — PROGRESS NOTES
Daily Note     Today's date: 2022  Patient name: Denise Ellis  : 1960  MRN: [id number]  Referring provider: Tashia Wiley MD  Dx:   Encounter Diagnosis     ICD-10-CM    1  Closed displaced fracture of neck of fifth metacarpal bone of left hand, initial encounter  S62 337A    2  Left hand pain  M79 642                   Subjective: My wrist still just aches all the time      Objective: See treatment diary below      Assessment: Tolerated treatment well  Patient would benefit from continued OT  Focused on wrist isometrics  She reports pain with palpation of her radial styloid (-) Finkelstein's and (-) WHAT test  Applied KT - Y strip with 1 piece along 1st dorsal and one along FCR in an underactive pattern with space correcting over 1st dorsal       Plan: Continue per plan of care        Precautions: [place]  LATEX ALLERGY      Manuals  2/7 2/28 3/21 3/28 4/1 4/4      MEM 8'            STM  8' 8' 8' 8' 8' 8'      KT       Y strip with space correction I strip                   Neuro Re-Ed                                                                                                        Ther Ex             PROM 5'  5' 10' 5' 5' 5'      Pencils   1x          Progressive Grasp             Gross Grasp  RPW  RPW 3x10 GPW 3x10 GPW 3x10 GPW 3x10      Pinch Ring  R/R 2x           Hook Roll   2x10          Wrist Maze   x4    x6      Lumbrical Block    x10          EDC    Sm RB x15 Sm RB x15 Sm RB x15 Sm RB x15      Wrist Strengthening       Isometrics YFB x15                   HEP: MCP PROM, Tendon glides, Isotoner for edema   Foam Fist          Ther Activity             Translation             Pinch Pins    R/R 2x         Pegs     R in, 10# out R in, 10# out                                 Modalities

## 2022-04-05 ENCOUNTER — HOSPITAL ENCOUNTER (OUTPATIENT)
Dept: ULTRASOUND IMAGING | Facility: HOSPITAL | Age: 62
Discharge: HOME | End: 2022-04-05
Attending: UROLOGY
Payer: COMMERCIAL

## 2022-04-05 ENCOUNTER — HOSPITAL ENCOUNTER (OUTPATIENT)
Dept: RADIOLOGY | Facility: HOSPITAL | Age: 62
Discharge: HOME | End: 2022-04-05
Attending: UROLOGY
Payer: COMMERCIAL

## 2022-04-05 ENCOUNTER — APPOINTMENT (OUTPATIENT)
Dept: LAB | Facility: AMBULARY SURGERY CENTER | Age: 62
End: 2022-04-05
Payer: COMMERCIAL

## 2022-04-05 DIAGNOSIS — Z00.8 HEALTH EXAMINATION IN POPULATION SURVEY: ICD-10-CM

## 2022-04-05 DIAGNOSIS — N20.0 CALCULUS OF KIDNEY: ICD-10-CM

## 2022-04-05 LAB
CHOLEST SERPL-MCNC: 172 MG/DL
EST. AVERAGE GLUCOSE BLD GHB EST-MCNC: 100 MG/DL
HBA1C MFR BLD: 5.1 %
HDLC SERPL-MCNC: 66 MG/DL
LDLC SERPL CALC-MCNC: 84 MG/DL (ref 0–100)
NONHDLC SERPL-MCNC: 106 MG/DL
TRIGL SERPL-MCNC: 111 MG/DL

## 2022-04-05 PROCEDURE — 80061 LIPID PANEL: CPT

## 2022-04-05 PROCEDURE — 76770 US EXAM ABDO BACK WALL COMP: CPT

## 2022-04-05 PROCEDURE — 74018 RADEX ABDOMEN 1 VIEW: CPT

## 2022-04-05 PROCEDURE — 83036 HEMOGLOBIN GLYCOSYLATED A1C: CPT

## 2022-04-05 PROCEDURE — 36415 COLL VENOUS BLD VENIPUNCTURE: CPT

## 2022-04-08 ENCOUNTER — APPOINTMENT (OUTPATIENT)
Dept: OCCUPATIONAL THERAPY | Age: 62
End: 2022-04-08
Payer: COMMERCIAL

## 2022-04-08 ENCOUNTER — OFFICE VISIT (OUTPATIENT)
Dept: OBGYN CLINIC | Facility: HOSPITAL | Age: 62
End: 2022-04-08
Payer: COMMERCIAL

## 2022-04-08 ENCOUNTER — HOSPITAL ENCOUNTER (OUTPATIENT)
Dept: RADIOLOGY | Facility: HOSPITAL | Age: 62
Discharge: HOME | End: 2022-04-08
Attending: ORTHOPAEDIC SURGERY
Payer: COMMERCIAL

## 2022-04-08 VITALS
HEART RATE: 88 BPM | DIASTOLIC BLOOD PRESSURE: 82 MMHG | WEIGHT: 124.5 LBS | SYSTOLIC BLOOD PRESSURE: 100 MMHG | BODY MASS INDEX: 19.54 KG/M2 | HEIGHT: 67 IN | OXYGEN SATURATION: 98 %

## 2022-04-08 DIAGNOSIS — S62.337A CLOSED DISPLACED FRACTURE OF NECK OF FIFTH METACARPAL BONE OF LEFT HAND, INITIAL ENCOUNTER: ICD-10-CM

## 2022-04-08 DIAGNOSIS — M65.4 TENDINITIS, DE QUERVAIN'S: ICD-10-CM

## 2022-04-08 DIAGNOSIS — S62.337A CLOSED DISPLACED FRACTURE OF NECK OF FIFTH METACARPAL BONE OF LEFT HAND, INITIAL ENCOUNTER: Primary | ICD-10-CM

## 2022-04-08 DIAGNOSIS — M25.532 PAIN IN LEFT WRIST: ICD-10-CM

## 2022-04-08 PROCEDURE — 99214 OFFICE O/P EST MOD 30 MIN: CPT | Performed by: ORTHOPAEDIC SURGERY

## 2022-04-08 PROCEDURE — 73110 X-RAY EXAM OF WRIST: CPT

## 2022-04-08 PROCEDURE — 73130 X-RAY EXAM OF HAND: CPT

## 2022-04-08 RX ORDER — LORAZEPAM 1 MG/1
1 TABLET ORAL
Qty: 2 TABLET | Refills: 0 | Status: SHIPPED | OUTPATIENT
Start: 2022-04-08 | End: 2022-05-31

## 2022-04-08 NOTE — PROGRESS NOTES
ASSESSMENT/PLAN:    Assessment:   Left small finger metacarpal neck fracture sustained on 10/25/21 (healed)  Left wrist deQuervain's   Left wrist pain    Plan:   MRI Left wrist evaluate deQuervain's vs internal derangement   Ativan sent to pharmacy     Follow Up: After Testing    To Do Next Visit:  Discuss MRI    General Discussions:  Richi Leroy Tenosynovitis: The anatomy and physiology of de Quervain's tenosynovitis was discussed with the patient today in the office  Edema and increased contact pressure within the first dorsal extensor compartment at the radial styloid can cause pain, crepitation, and limitation of function  Treatment options include resting thumb spica splints to decrease edema, oral anti-inflammatory medications, home or formal therapy exercises, up to 2 steroid injections within the first dorsal extensor compartment, or surgical release  While majority of patients do respond to conservative treatment, up to 20% may require surgical release      _____________________________________________________  CHIEF COMPLAINT:  Chief Complaint   Patient presents with    Left Hand - Fracture, Follow-up     Left small metacarpal fx         SUBJECTIVE:  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 who presents for follow up regarding Left small finger metacarpal neck fracture sustained on 10/25/21  Since last visit, Artist Fabry has tried therapy  Patient states she has no pain at the fracture site  She reports pain over the radial wrist  She reports wrist pain started about 2 weeks ago without injury        PAST MEDICAL HISTORY:  Past Medical History:   Diagnosis Date    Atopic dermatitis     last assessed 12/2/13    Atrophic vaginitis     last assessed 9/2/15    Dermatitis     Dry eye     Frequency of urination     Fungal infection     last assessed 8/14/13    Herpes     last assessed 6/27/14    Hyperlipidemia     Hypothyroidism     Interstitial cystitis     Osteoporosis     Periodontal abscess last assessed 13    PONV (postoperative nausea and vomiting)     Thyroid nodule     Ulcerative colitis ([address] )     Urinary frequency     resolved 17    UTI (urinary tract infection)     Vaginal atrophy     Vitamin D deficiency     last assessed 16    Voiding dysfunction     last assessed 10/7/15       PAST SURGICAL HISTORY:  Past Surgical History:   Procedure Laterality Date     SECTION       and      COLONOSCOPY  2020    CYSTOSCOPY      diagnostic resolved 05    FL RETROGRADE PYELOGRAM  2020    GANGLION CYST EXCISION Right 2019    Procedure: EXCISION GANGLION CYST RIGHT FIRST DORSAL EXTENSOR COMPARTMENT;  Surgeon: Naty Vergara MD;  [facility];  Service: Orthopedics    KIDNEY STONE SURGERY      ORIF PROXIMAL ULNA FRACTURE      metal plates and screws removed    OTHER SURGICAL HISTORY      punch biospy     KS COLONOSCOPY FLX DX W/COLLJ SPEC WHEN PFRMD N/A 2017    Procedure: COLONOSCOPY;  Surgeon: Francisco J Cook MD;  [facility];   Service: Colorectal    KS CYSTO/URETERO W/LITHOTRIPSY &INDWELL STENT INSRT Left 2020    Procedure: CYSTOSCOPY URETEROSCOPY WITH LITHOTRIPSY HOLMIUM LASER, RETROGRADE PYELOGRAM AND INSERTION STENT URETERAL;  Surgeon: Cece Nunez MD;  [facility];  Service: Urology    KS INCIS TENDON SHEATH,RADIAL STYLOID Right 2019    Procedure: Justice Nolan;  Surgeon: Naty Vergara MD;  [facility];  Service: Orthopedics    TUBAL LIGATION      UPPER GASTROINTESTINAL ENDOSCOPY  2020    WISDOM TOOTH EXTRACTION  1983    X2        FAMILY HISTORY:  Family History   Problem Relation Age of Onset    Gallbladder disease Mother     Thyroid disease Mother     Kidney disease Mother     Breast cancer Mother     Diabetes Father     Hypertension Father     Nephrolithiasis Father     Lymphoma Maternal Grandfather     Diabetes Paternal Grandmother    [facility] Breast cancer Paternal Aunt     No Known Problems Son     No Known Problems Son        SOCIAL HISTORY:  Social History     Tobacco Use    Smoking status: Never Smoker    Smokeless tobacco: Never Used   Vaping Use    Vaping Use: Never used   Substance Use Topics    Alcohol use: Yes     Comment: socially - 1-3 drinks per month    Drug use: No       MEDICATIONS:    Current Outpatient Medications:     Cholecalciferol (VITAMIN D PO), Take 3,000 Units by mouth daily, Disp: , Rfl:     Cranberry (ELLURA PO), Take 1 capsule by mouth daily, Disp: , Rfl:     cycloSPORINE (RESTASIS) 0 05 % ophthalmic emulsion, Administer 1 drop to both eyes 2 (two) times a day, Disp: , Rfl:     dicyclomine (BENTYL) 10 mg capsule, Take 2 capsules (20 mg total) by mouth 3 (three) times a day before meals, Disp: 270 capsule, Rfl: 3    estradiol (ESTRACE) 0 1 mg/g vaginal cream, Insert 1 applicator full vaginally nightly, Disp: 42 5 g, Rfl: 5    levothyroxine 50 mcg tablet, Take 1 tablet (50 mcg total) by mouth daily, Disp: 90 tablet, Rfl: 2    mesalamine (ASACOL) 800 MG EC tablet, TAKE ONE TABLET BY MOUTH FOUR TIMES A DAY, Disp: 360 tablet, Rfl: 0    Meth-Hyo-M Bl-Na Phos-Ph Sal (URO-MP PO), Take 1 capsule by mouth 2 (two) times a day , Disp: , Rfl:     Meth-Hyo-M Bl-Na Phos-Ph Sal (Uro-MP) 118 MG CAPS, TAKE 1 CAPSULE BY MOUTH FOUR TIMES A DAY FOR 28 DAYS, Disp: , Rfl:     pentosan polysulfate (Elmiron) 100 mg capsule, One pill 3 times a day, Disp: 90 capsule, Rfl: 4    rosuvastatin (CRESTOR) 5 mg tablet, Take 1 tablet (5 mg total) by mouth daily, Disp: 90 tablet, Rfl: 3    Triamcinolone Acetonide (Nasacort Allergy 24HR) 55 MCG/ACT AERO, into each nostril daily, Disp: , Rfl:     trospium chloride (SANCTURA) 20 mg tablet, Take 1 tablet (20 mg total) by mouth 2 (two) times a day, Disp: 60 tablet, Rfl: 5    ALLERGIES:  Allergies   Allergen Reactions    Iodine - Food Allergy Anaphylaxis     Allergy to Iodinated and non iodinated dye    Other Anaphylaxis     APPLES    TAPE  Also rash at times    Seasonal Ic [Cholestatin] Allergic Rhinitis    Latex        REVIEW OF SYSTEMS:  Pertinent items are noted in HPI  A comprehensive review of systems was negative  LABS:  HgA1c:   Lab Results   Component Value Date    HGBA1C 5 1 04/05/2022     BMP:   Lab Results   Component Value Date    GLUCOSE 89 08/07/2015    CALCIUM 9 5 03/01/2022     08/07/2015    K 3 9 03/01/2022    CO2 26 03/01/2022     03/01/2022    BUN 19 03/01/2022    CREATININE 0 87 03/01/2022           _____________________________________________________  PHYSICAL EXAMINATION:  Vital signs: /82   Pulse 88   Ht 5' 7" (1 702 m)   Wt 56 5 kg (124 lb 8 oz)   LMP  (LMP Unknown)   SpO2 98%   BMI 19 50 kg/m²   General: well developed and well nourished, alert, oriented times 3 and appears comfortable  Psychiatric: Normal  HEENT: Trachea Midline, No torticollis  Cardiovascular: No discernable arrhythmia  Pulmonary: No wheezing or stridor  Abdomen: No rebound or guarding  Extremities: No peripheral edema  Skin: No masses, erythema, lacerations, fluctation, ulcerations  Neurovascular: Sensation Intact to the Median, Ulnar, Radial Nerve, Motor Intact to the Median, Ulnar, Radial Nerve and Pulses Intact    MUSCULOSKELETAL EXAMINATION:  Left hand:  Non TTP over the fracture site small MC shaft  Full composite fist  TTP distal radius  60 extension, full flexion, full supination, full pronation   TTP First dorsal compartment, positive finklestein  _____________________________________________________  STUDIES REVIEWED:  Images were reviewed in PACS by Dr Nany Rand and demonstrate: left hand x-rays demonstrates healed 5th MC fracture    Left wrist demonstrates lucency over radial styloid, no fracture or dislocation       PROCEDURES PERFORMED:  Procedures  No Procedures performed today   Scribe Attestation    I,:  Any Cummins am acting as a scribe while in the presence of the attending physician :       I,:  Fide Jordan MD personally performed the services described in this documentation    as scribed in my presence :

## 2022-04-11 ENCOUNTER — OFFICE VISIT (OUTPATIENT)
Dept: OCCUPATIONAL THERAPY | Age: 62
End: 2022-04-11
Payer: COMMERCIAL

## 2022-04-11 DIAGNOSIS — S62.337A CLOSED DISPLACED FRACTURE OF NECK OF FIFTH METACARPAL BONE OF LEFT HAND, INITIAL ENCOUNTER: Primary | ICD-10-CM

## 2022-04-11 DIAGNOSIS — M79.642 LEFT HAND PAIN: ICD-10-CM

## 2022-04-11 PROCEDURE — 97140 MANUAL THERAPY 1/> REGIONS: CPT

## 2022-04-11 PROCEDURE — 97110 THERAPEUTIC EXERCISES: CPT

## 2022-04-11 NOTE — PROGRESS NOTES
Daily Note     Today's date: 2022  Patient name: Arielle Almonte  : 1960  MRN: [id number]  Referring provider: Sathish Monroe MD  Dx:   Encounter Diagnosis     ICD-10-CM    1  Closed displaced fracture of neck of fifth metacarpal bone of left hand, initial encounter  S62 337A    2  Left hand pain  M79 [id number]                   Subjective: I don't have my MRI until May      Objective: See treatment diary below      Assessment: Tolerated treatment well  Patient would benefit from continued OT  Increased tightness along thenar and FPL  Added thenar stretch to HEP and focused treatment today on decreasing trigger points and tightness  Instructed on removing pinch strengthening from HEP but continuing the rest of the hand strengthening  Plan: Continue per plan of care        Precautions: [place]  LATEX ALLERGY      Manuals  2/7 2/28 3/21 3/28 4/1 4/4 4/11     MEM 8'            STM  8' 8' 8' 8' 8' 8' 25'     KT       Y strip with space correction I strip                   Neuro Re-Ed                                                                                                        Ther Ex             PROM 5'  5' 10' 5' 5' 5'      Pencils   1x          Progressive Grasp             Gross Grasp  RPW  RPW 3x10 GPW 3x10 GPW 3x10 GPW 3x10      Pinch Ring  R/R 2x           Hook Roll   2x10          Wrist Maze   x4    x6 X6     Lumbrical Block    x10          EDC    Sm RB x15 Sm RB x15 Sm RB x15 Sm RB x15      Wrist Strengthening       Isometrics YFB x15 Isometrics YFB x15     Dart Throwers             HEP: MCP PROM, Tendon glides, Isotoner for edema   Foam Fist          Ther Activity             Translation             Pinch Pins    R/R 2x         Pegs     R in, 10# out R in, 10# out                                 Modalities

## 2022-04-13 ENCOUNTER — OFFICE VISIT (OUTPATIENT)
Dept: OCCUPATIONAL THERAPY | Age: 62
End: 2022-04-13
Payer: COMMERCIAL

## 2022-04-13 DIAGNOSIS — S62.337A CLOSED DISPLACED FRACTURE OF NECK OF FIFTH METACARPAL BONE OF LEFT HAND, INITIAL ENCOUNTER: Primary | ICD-10-CM

## 2022-04-13 PROCEDURE — 97140 MANUAL THERAPY 1/> REGIONS: CPT | Performed by: OCCUPATIONAL THERAPIST

## 2022-04-13 PROCEDURE — 97110 THERAPEUTIC EXERCISES: CPT | Performed by: OCCUPATIONAL THERAPIST

## 2022-04-13 NOTE — PROGRESS NOTES
Daily Note     Today's date: 2022  Patient name: Vangie Dawson  : 1960  MRN: [id number]  Referring provider: Conner Beard MD  Dx:   Encounter Diagnosis     ICD-10-CM    1  Closed displaced fracture of neck of fifth metacarpal bone of left hand, initial encounter  S62 337A                   Subjective: "It just hurts at my wrist!"      Objective: See treatment diary below      Assessment: Some success at decreasing pain with CMC stretch, but radial volar wrist pain persists with abrams abduction  Plan: Continue per plan of care        Precautions: [place]  LATEX ALLERGY      Manuals  2/7 2/28 3/21 3/28 4/1 4/4 4/11 4/13    MEM 8'            STM  8' 8' 8' 8' 8' 8' 25' 25    KT       Y strip with space correction I strip                   Neuro Re-Ed                                                                                                        Ther Ex             PROM 5'  5' 10' 5' 5' 5'  15    Pencils   1x          Progressive Grasp             Gross Grasp  RPW  RPW 3x10 GPW 3x10 GPW 3x10 GPW 3x10      Pinch Ring  R/R 2x           Hook Roll   2x10          Wrist Maze   x4    x6 X6     Lumbrical Block    x10          EDC    Sm RB x15 Sm RB x15 Sm RB x15 Sm RB x15      Wrist Strengthening       Isometrics YFB x15 Isometrics YFB x15 YFB 2x10 iso 4 planes    Dart Throwers             HEP: MCP PROM, Tendon glides, Isotoner for edema   Foam Fist          Ther Activity             Translation             Pinch Pins    R/R 2x         Pegs     R in, 10# out R in, 10# out                                 Modalities

## 2022-04-15 ENCOUNTER — APPOINTMENT (OUTPATIENT)
Dept: OCCUPATIONAL THERAPY | Age: 62
End: 2022-04-15
Payer: COMMERCIAL

## 2022-04-18 ENCOUNTER — OFFICE VISIT (OUTPATIENT)
Dept: OCCUPATIONAL THERAPY | Age: 62
End: 2022-04-18
Payer: COMMERCIAL

## 2022-04-18 DIAGNOSIS — S62.337A CLOSED DISPLACED FRACTURE OF NECK OF FIFTH METACARPAL BONE OF LEFT HAND, INITIAL ENCOUNTER: Primary | ICD-10-CM

## 2022-04-18 PROCEDURE — 97140 MANUAL THERAPY 1/> REGIONS: CPT

## 2022-04-18 NOTE — PROGRESS NOTES
Daily Note     Today's date: 2022  Patient name: Zofia Biggs  : 1960  MRN: [id number]  Referring provider: Iris Romero MD  Dx:   Encounter Diagnosis     ICD-10-CM    1  Closed displaced fracture of neck of fifth metacarpal bone of left hand, initial encounter  S62 337A                   Subjective: It just really hurts when I touch it      Objective: See treatment diary below      Assessment: Tolerated treatment well  Patient would benefit from continued OT  Pain at DRUJ with palpation  No pain with compression, reports it improves pain  Plan: Continue per plan of care        Precautions: [place]  LATEX ALLERGY      Manuals  2/7 2/28 3/21 3/28 4/1 4/4 4/11 4/13 4/18   MEM 8'            STM  8' 8' 8' 8' 8' 8' 25' 25 25'   KT       Y strip with space correction I strip                   Neuro Re-Ed                                                                                                        Ther Ex             PROM 5'  5' 10' 5' 5' 5'  15    Pencils   1x          Progressive Grasp             Gross Grasp  RPW  RPW 3x10 GPW 3x10 GPW 3x10 GPW 3x10      Pinch Ring  R/R 2x           Hook Roll   2x10          Wrist Maze   x4    x6 X6     Lumbrical Block    x10          EDC    Sm RB x15 Sm RB x15 Sm RB x15 Sm RB x15      Wrist Strengthening       Isometrics YFB x15 Isometrics YFB x15 YFB 2x10 iso 4 planes YFB 2x10 iso 4 planes   Dart Throwers             HEP: MCP PROM, Tendon glides, Isotoner for edema   Foam Fist          Ther Activity             Translation             Pinch Pins    R/R 2x         Pegs     R in, 10# out R in, 10# out                                 Modalities

## 2022-04-18 NOTE — PROGRESS NOTES
Daily Note     Today's date: 2022  Patient name: Zofia Biggs  : 1960  MRN: [id number]  Referring provider: Iris Romero MD  Dx:   Encounter Diagnosis     ICD-10-CM    1  Closed displaced fracture of neck of fifth metacarpal bone of left hand, initial encounter  S62 337A                   Subjective: ***      Objective: See treatment diary below      Assessment: Tolerated treatment {Tolerated treatment :[id number]}   Patient {assessment:[id number]}      Plan: {PLAN:[id number]}     Precautions: [place]  LATEX ALLERGY      Manuals  2/7 2/28 3/21 3/28 4/1 4/4 4/11 4/13 4/18   MEM 8'            STM  8' 8' 8' 8' 8' 8' 25' 25 25'   KT       Y strip with space correction I strip                   Neuro Re-Ed                                                                                                        Ther Ex             PROM 5'  5' 10' 5' 5' 5'  15    Pencils   1x          Progressive Grasp             Gross Grasp  RPW  RPW 3x10 GPW 3x10 GPW 3x10 GPW 3x10      Pinch Ring  R/R 2x           Hook Roll   2x10          Wrist Maze   x4    x6 X6     Lumbrical Block    x10          EDC    Sm RB x15 Sm RB x15 Sm RB x15 Sm RB x15      Wrist Strengthening       Isometrics YFB x15 Isometrics YFB x15 YFB 2x10 iso 4 planes YFB 2x10 iso 4 planes   Dart Throwers             HEP: MCP PROM, Tendon glides, Isotoner for edema   Foam Fist          Ther Activity             Translation             Pinch Pins    R/R 2x         Pegs     R in, 10# out R in, 10# out                                 Modalities

## 2022-04-25 ENCOUNTER — OFFICE VISIT (OUTPATIENT)
Dept: OCCUPATIONAL THERAPY | Age: 62
End: 2022-04-25
Payer: COMMERCIAL

## 2022-04-25 DIAGNOSIS — M79.642 LEFT HAND PAIN: ICD-10-CM

## 2022-04-25 DIAGNOSIS — S62.337A CLOSED DISPLACED FRACTURE OF NECK OF FIFTH METACARPAL BONE OF LEFT HAND, INITIAL ENCOUNTER: Primary | ICD-10-CM

## 2022-04-25 PROCEDURE — 97140 MANUAL THERAPY 1/> REGIONS: CPT

## 2022-04-25 PROCEDURE — 97018 PARAFFIN BATH THERAPY: CPT

## 2022-04-25 NOTE — PROGRESS NOTES
Daily Note     Today's date: 2022  Patient name: Evonne Han  : 1960  MRN: [id number]  Referring provider: Angelica Pandey MD  Dx:   Encounter Diagnosis     ICD-10-CM    1  Closed displaced fracture of neck of fifth metacarpal bone of left hand, initial encounter  S62 337A                   Subjective: It just really hurts when I touch it      Objective: See treatment diary below      Assessment: Tolerated treatment well  Patient would benefit from continued OT  Tightness within intrinsics and IF EDC  Focused on manuals today with reported relief  Plan: Continue per plan of care        Precautions: [place]  LATEX ALLERGY      Manuals 4/25 2/7 2/28 3/21 3/28 4/1 4/4 4/11 4/13 4/18   MEM             STM 38' 8' 8' 8' 8' 8' 8' 25' 25 25'   KT       Y strip with space correction I strip                   Neuro Re-Ed                                                                                                        Ther Ex             PROM   5' 10' 5' 5' 5'  15    Pencils   1x          Progressive Grasp             Gross Grasp  RPW  RPW 3x10 GPW 3x10 GPW 3x10 GPW 3x10      Pinch Ring  R/R 2x           Hook Roll   2x10          Wrist Maze   x4    x6 X6     Lumbrical Block    x10          EDC    Sm RB x15 Sm RB x15 Sm RB x15 Sm RB x15      Wrist Strengthening       Isometrics YFB x15 Isometrics YFB x15 YFB 2x10 iso 4 planes YFB 2x10 iso 4 planes   Dart Throwers             HEP: MCP PROM, Tendon glides, Isotoner for edema   Foam Fist          Ther Activity             Translation             Pinch Pins    R/R 2x         Pegs     R in, 10# out R in, 10# out                                 Modalities

## 2022-04-29 ENCOUNTER — TELEPHONE (OUTPATIENT)
Dept: GASTROENTEROLOGY | Facility: MEDICAL CENTER | Age: 62
End: 2022-04-29

## 2022-04-29 NOTE — TELEPHONE ENCOUNTER
Dr Amisha Escobedo,  Patient called she has some urgent questions in regards her IBS (that worsened) medication that she would like  to discuss only with you  She asked to be called @ [phone]  Thank you

## 2022-05-02 ENCOUNTER — OFFICE VISIT (OUTPATIENT)
Dept: OCCUPATIONAL THERAPY | Age: 62
End: 2022-05-02
Payer: COMMERCIAL

## 2022-05-02 DIAGNOSIS — M79.642 LEFT HAND PAIN: ICD-10-CM

## 2022-05-02 DIAGNOSIS — S62.337A CLOSED DISPLACED FRACTURE OF NECK OF FIFTH METACARPAL BONE OF LEFT HAND, INITIAL ENCOUNTER: Primary | ICD-10-CM

## 2022-05-02 PROCEDURE — 97140 MANUAL THERAPY 1/> REGIONS: CPT

## 2022-05-02 NOTE — PROGRESS NOTES
Daily Note     Today's date: 2022  Patient name: Artist Fabry  : 1960  MRN: [id number]  Referring provider: Evelyn Colmenares MD  Dx:   Encounter Diagnosis     ICD-10-CM    1  Closed displaced fracture of neck of fifth metacarpal bone of left hand, initial encounter  S62 337A                   Subjective: I do really think the cupping is helping      Objective: See treatment diary below      Assessment: Tolerated treatment well  Patient would benefit from continued OT  Continued tightness but improved  FU with MD this week  Pain with WF PROM    Plan: Continue per plan of care        Precautions: [place]  LATEX ALLERGY      Manuals 4/25 5/2 2/28 3/21 3/28 4/1 4/4 4/11 4/13 4/18   MEM             STM 38' 40' 8' 8' 8' 8' 8' 25' 25 25'   KT       Y strip with space correction I strip                   Neuro Re-Ed                                                                                                        Ther Ex             PROM   5' 10' 5' 5' 5'  15    Pencils   1x          Progressive Grasp             Gross Grasp    RPW 3x10 GPW 3x10 GPW 3x10 GPW 3x10      Pinch Ring             Hook Roll   2x10          Wrist Maze   x4    x6 X6     Lumbrical Block    x10          EDC    Sm RB x15 Sm RB x15 Sm RB x15 Sm RB x15      Wrist Strengthening       Isometrics YFB x15 Isometrics YFB x15 YFB 2x10 iso 4 planes YFB 2x10 iso 4 planes   Dart Throwers             HEP: MCP PROM, Tendon glides, Isotoner for edema   Foam Fist          Ther Activity             Translation             Pinch Pins    R/R 2x         Pegs     R in, 10# out R in, 10# out                                 Modalities

## 2022-05-03 ENCOUNTER — HOSPITAL ENCOUNTER (OUTPATIENT)
Dept: MRI IMAGING | Facility: HOSPITAL | Age: 62
Discharge: HOME | End: 2022-05-03
Attending: ORTHOPAEDIC SURGERY
Payer: COMMERCIAL

## 2022-05-03 DIAGNOSIS — M65.4 TENDINITIS, DE QUERVAIN'S: ICD-10-CM

## 2022-05-03 DIAGNOSIS — M25.532 PAIN IN LEFT WRIST: ICD-10-CM

## 2022-05-03 PROCEDURE — 73221 MRI JOINT UPR EXTREM W/O DYE: CPT

## 2022-05-03 PROCEDURE — G1004 CDSM NDSC: HCPCS

## 2022-05-05 DIAGNOSIS — K58.0 IRRITABLE BOWEL SYNDROME WITH DIARRHEA: Primary | ICD-10-CM

## 2022-05-05 DIAGNOSIS — F41.9 ANXIETY: ICD-10-CM

## 2022-05-05 RX ORDER — AMITRIPTYLINE HYDROCHLORIDE 10 MG/1
10 TABLET, FILM COATED ORAL
Qty: 30 TABLET | Refills: 2 | Status: SHIPPED | OUTPATIENT
Start: 2022-05-05 | End: 2022-05-31

## 2022-05-06 ENCOUNTER — OFFICE VISIT (OUTPATIENT)
Dept: OBGYN CLINIC | Facility: HOSPITAL | Age: 62
End: 2022-05-06
Payer: COMMERCIAL

## 2022-05-06 VITALS
DIASTOLIC BLOOD PRESSURE: 62 MMHG | SYSTOLIC BLOOD PRESSURE: 100 MMHG | HEART RATE: 88 BPM | HEIGHT: 67 IN | BODY MASS INDEX: 19.46 KG/M2 | WEIGHT: 124 LBS

## 2022-05-06 DIAGNOSIS — M25.532 PAIN IN LEFT WRIST: Primary | ICD-10-CM

## 2022-05-06 PROCEDURE — 99214 OFFICE O/P EST MOD 30 MIN: CPT | Performed by: ORTHOPAEDIC SURGERY

## 2022-05-06 NOTE — PROGRESS NOTES
ASSESSMENT/PLAN:    Assessment:   Extensor tendonitis left wrist  - 2nd compartment symptomatic    Split tear of the extensor pollicis brevis, TFCC tear, lunate chondromalacia-asymptomatic    Plan:   Bracing for comfort as needed  Therapy - can transition to HEP  NSAIDs PRN   Discussed activity modification as needed     Follow Up:  PRN      _____________________________________________________  CHIEF COMPLAINT:  Chief Complaint   Patient presents with    Left Wrist - Follow-up     MRI- 22         SUBJECTIVE:  Luz Randhawa is a 64 y o  female who presents for follow up regarding Pain and tenderness left wrist  She states that its the back of the wrist and sometimes wraps around  She states that the areas of pain tend to move  She has been doing therapy activities regularly  She has been trying to resume her normal exercise regimens  She denies any new injuries        PAST MEDICAL HISTORY:  Past Medical History:   Diagnosis Date    Atopic dermatitis     last assessed 13    Atrophic vaginitis     last assessed 9/2/15    Dermatitis     Dry eye     Frequency of urination     Fungal infection     last assessed 13    Herpes     last assessed 14    Hyperlipidemia     Hypothyroidism     Interstitial cystitis     Osteoporosis     Periodontal abscess     last assessed 13    PONV (postoperative nausea and vomiting)     Thyroid nodule     Ulcerative colitis ([address] )     Urinary frequency     resolved 17    UTI (urinary tract infection)     Vaginal atrophy     Vitamin D deficiency     last assessed 16    Voiding dysfunction     last assessed 10/7/15       PAST SURGICAL HISTORY:  Past Surgical History:   Procedure Laterality Date     SECTION       and      COLONOSCOPY  2020    CYSTOSCOPY      diagnostic resolved 05    FL RETROGRADE PYELOGRAM  2020    GANGLION CYST EXCISION Right 2019    Procedure: EXCISION GANGLION CYST RIGHT FIRST DORSAL EXTENSOR COMPARTMENT;  Surgeon: Yasmin Faria MD;  [facility];  Service: Orthopedics    KIDNEY STONE SURGERY      ORIF PROXIMAL ULNA FRACTURE      metal plates and screws removed    OTHER SURGICAL HISTORY      punch biospy     VT COLONOSCOPY FLX DX W/COLLJ SPEC WHEN PFRMD N/A 7/28/2017    Procedure: COLONOSCOPY;  Surgeon: Que Mcmillan MD;  [facility];   Service: Colorectal    VT CYSTO/URETERO W/LITHOTRIPSY &INDWELL STENT INSRT Left 1/7/2020    Procedure: CYSTOSCOPY URETEROSCOPY WITH LITHOTRIPSY HOLMIUM LASER, RETROGRADE PYELOGRAM AND INSERTION STENT URETERAL;  Surgeon: Beck Chicas MD;  [facility];  Service: Urology    VT [address] STYLOID Right 2/19/2019    Procedure: [facility] J[facility];  Surgeon: Yasmin Faria MD;  [facility];  Service: Orthopedics    TUBAL LIGATION      UPPER GASTROINTESTINAL ENDOSCOPY  09/11/2020    WISDOM TOOTH EXTRACTION  1983    X2        FAMILY HISTORY:  Family History   Problem Relation Age of Onset    Gallbladder disease Mother     Thyroid disease Mother     Kidney disease Mother     Breast cancer Mother     Diabetes Father     Hypertension Father     Nephrolithiasis Father     Lymphoma Maternal Grandfather     Diabetes Paternal Grandmother     Breast cancer Paternal Aunt     No Known Problems Son     No Known Problems Son        SOCIAL HISTORY:  Social History     Tobacco Use    Smoking status: Never Smoker    Smokeless tobacco: Never Used   Vaping Use    Vaping Use: Never used   Substance Use Topics    Alcohol use: Yes     Comment: socially - 1-3 drinks per month    Drug use: No       MEDICATIONS:    Current Outpatient Medications:     amitriptyline (ELAVIL) 10 mg tablet, Take 1 tablet (10 mg total) by mouth daily at bedtime, Disp: 30 tablet, Rfl: 2    Cholecalciferol (VITAMIN D PO), Take 3,000 Units by mouth daily, Disp: , Rfl:     Cranberry (ELLURA PO), Take 1 capsule by mouth daily, Disp: , Rfl:     cycloSPORINE (RESTASIS) 0 05 % ophthalmic emulsion, Administer 1 drop to both eyes 2 (two) times a day, Disp: , Rfl:     dicyclomine (BENTYL) 10 mg capsule, Take 2 capsules (20 mg total) by mouth 3 (three) times a day before meals, Disp: 270 capsule, Rfl: 3    estradiol (ESTRACE) 0 1 mg/g vaginal cream, Insert 1 applicator full vaginally nightly, Disp: 42 5 g, Rfl: 5    levothyroxine 50 mcg tablet, Take 1 tablet (50 mcg total) by mouth daily, Disp: 90 tablet, Rfl: 2    LORazepam (ATIVAN) 1 mg tablet, Take 1 tablet (1 mg total) by mouth 30 min pre-procedure, Disp: 2 tablet, Rfl: 0    mesalamine (ASACOL) 800 MG EC tablet, TAKE ONE TABLET BY MOUTH FOUR TIMES A DAY, Disp: 360 tablet, Rfl: 0    Meth-Hyo-M Bl-Na Phos-Ph Sal (URO-MP PO), Take 1 capsule by mouth 2 (two) times a day , Disp: , Rfl:     Meth-Hyo-M Bl-Na Phos-Ph Sal (Uro-MP) 118 MG CAPS, TAKE 1 CAPSULE BY MOUTH FOUR TIMES A DAY FOR 28 DAYS, Disp: , Rfl:     pentosan polysulfate (Elmiron) 100 mg capsule, One pill 3 times a day, Disp: 90 capsule, Rfl: 5    rosuvastatin (CRESTOR) 5 mg tablet, Take 1 tablet (5 mg total) by mouth daily, Disp: 90 tablet, Rfl: 3    Triamcinolone Acetonide (Nasacort Allergy 24HR) 55 MCG/ACT AERO, into each nostril daily, Disp: , Rfl:     trospium chloride (SANCTURA) 20 mg tablet, Take 1 tablet (20 mg total) by mouth 2 (two) times a day, Disp: 60 tablet, Rfl: 5    ALLERGIES:  Allergies   Allergen Reactions    Iodine - Food Allergy Anaphylaxis     Allergy to Iodinated and non iodinated dye    Other Anaphylaxis     APPLES    TAPE  Also rash at times    Seasonal Ic [Cholestatin] Allergic Rhinitis    Latex        REVIEW OF SYSTEMS:  Pertinent items are noted in HPI  A comprehensive review of systems was negative      LABS:  HgA1c:   Lab Results   Component Value Date    HGBA1C 5 1 04/05/2022     BMP:   Lab Results   Component Value Date    GLUCOSE 89 08/07/2015    CALCIUM 9 5 03/01/2022    NA 140 08/07/2015    K 3 9 03/01/2022    CO2 26 03/01/2022     03/01/2022    BUN 19 03/01/2022    CREATININE 0 87 03/01/2022         _____________________________________________________  PHYSICAL EXAMINATION:  Vital signs: /62   Pulse 88   Ht 5' 7" (1 702 m)   Wt 56 2 kg (124 lb)   LMP  (LMP Unknown)   BMI 19 42 kg/m²   General: well developed and well nourished, alert, oriented times 3 and appears comfortable  Psychiatric: Normal  HEENT: Trachea Midline, No torticollis  Cardiovascular: No discernable arrhythmia  Pulmonary: No wheezing or stridor  Abdomen: No rebound or guarding  Extremities: No peripheral edema  Skin: No masses, erythema, lacerations, fluctation, ulcerations  Neurovascular: Sensation Intact to the Median, Ulnar, Radial Nerve, Motor Intact to the Median, Ulnar, Radial Nerve and Pulses Intact    MUSCULOSKELETAL EXAMINATION:  Left Wrist:  No tenderness over EPB  Tenderness over 2nd compartment, near the intersection area  No tenderness over TFCC or lunate  Patient can make a full fist   Full range of motion without instability, no extensor carpi ulnaris subluxation, no triggering, no signs of de Quervain stenosing tenosynovitis     _____________________________________________________  STUDIES REVIEWED:  Images were reviewed in PACS by Dr Deya Bynum and demonstrate: interstitial tear EPB  Mild 2nd extensor compartment tenosynovitis   TFCC central perforation with lunate chondromalacia      PROCEDURES PERFORMED:  Procedures  No Procedures performed today    Scribe Attestation    I,:  Wilmer Mercado PA-C am acting as a scribe while in the presence of the attending physician :       I,:  Ethan Bridges MD personally performed the services described in this documentation    as scribed in my presence :

## 2022-05-09 ENCOUNTER — OFFICE VISIT (OUTPATIENT)
Dept: OCCUPATIONAL THERAPY | Age: 62
End: 2022-05-09
Payer: COMMERCIAL

## 2022-05-09 DIAGNOSIS — M79.642 LEFT HAND PAIN: ICD-10-CM

## 2022-05-09 DIAGNOSIS — S62.337A CLOSED DISPLACED FRACTURE OF NECK OF FIFTH METACARPAL BONE OF LEFT HAND, INITIAL ENCOUNTER: Primary | ICD-10-CM

## 2022-05-09 PROCEDURE — 97110 THERAPEUTIC EXERCISES: CPT

## 2022-05-09 PROCEDURE — 97140 MANUAL THERAPY 1/> REGIONS: CPT

## 2022-05-09 NOTE — PROGRESS NOTES
Daily Note     Today's date: 2022  Patient name: Nan Wilson  : 1960  MRN: [id number]  Referring provider: Zoila Kearns MD  Dx:   Encounter Diagnosis     ICD-10-CM    1  Closed displaced fracture of neck of fifth metacarpal bone of left hand, initial encounter  S62 337A                   Subjective: So now what? Objective: See treatment diary below      Assessment: Tolerated treatment well  Patient would benefit from continued OT  She reports that she was told to rest her hand with a WHO  At this time, she would like to get to receive an upgraded HEP and FU in 1 week to decide if we should fabricate the WHO at that time  Plan: Continue per plan of care        Precautions: [place]  LATEX ALLERGY       Manuals 4/25 5/2 5/9 3/21 3/28 4/1 4/4 4/11 4/13 4/18   MEM             STM 38' 40' 15' 8' 8' 8' 8' 25' 25 25'   KT       Y strip with space correction I strip                   Neuro Re-Ed                                                                                                        Ther Ex             PROM    10' 5' 5' 5'  15    Pencils             Progressive Grasp             Gross Grasp    RPW 3x10 GPW 3x10 GPW 3x10 GPW 3x10      Pinch Ring             Hook Roll             Wrist Maze       x6 X6     Lumbrical Block              EDC    Sm RB x15 Sm RB x15 Sm RB x15 Sm RB x15      Wrist Strengthening       Isometrics YFB x15 Isometrics YFB x15 YFB 2x10 iso 4 planes YFB 2x10 iso 4 planes   Dart Throwers             HEP: MCP PROM, Tendon glides, Isotoner for edema   Red theraputty, Red theraband          Ther Activity             Translation             Pinch Pins    R/R 2x         Pegs     R in, 10# out R in, 10# out                                 Modalities

## 2022-05-13 ENCOUNTER — OFFICE VISIT (OUTPATIENT)
Dept: UROLOGY | Facility: AMBULATORY SURGERY CENTER | Age: 62
End: 2022-05-13
Payer: COMMERCIAL

## 2022-05-13 VITALS
HEART RATE: 92 BPM | SYSTOLIC BLOOD PRESSURE: 118 MMHG | HEIGHT: 67 IN | BODY MASS INDEX: 18.83 KG/M2 | WEIGHT: 120 LBS | DIASTOLIC BLOOD PRESSURE: 78 MMHG

## 2022-05-13 DIAGNOSIS — N20.0 CALCULUS OF KIDNEY: Primary | ICD-10-CM

## 2022-05-13 PROCEDURE — 99214 OFFICE O/P EST MOD 30 MIN: CPT | Performed by: UROLOGY

## 2022-05-13 NOTE — PROGRESS NOTES
5/13/2022    Ilene Yanes  1960  [id number]        Assessment  History of nephrolithiasis without recurrence, status post left ureteroscopy      Discussion  We discussed her history of nephrolithiasis  We discussed referral to Nephrology  She wishes to hold at this time  She is following with the rheumatologist is interested in a plant based calcium supplement  I stressed the importance of hydration  Follow-up will be in 1 year with a repeat KUB as well as a renal bladder ultrasound  No other surgical intervention is recommended at this time  History of Present Illness  64 y o  female with a history of nephrolithiasis  Over the course of her lifetime she believes that she has had 4 stones  She had surgery most recently with me in January of 2020  At that time I performed left-sided ureteroscopy for an obstructing left ureteral calculus  In follow-up today she denies any flank pain but does have occasional right-sided back pain  Recent KUB as well as an ultrasound show no evidence of recurrent nephrolithiasis  She denies any hematuria  She is following with her rheumatologist           AUA Symptom Score      Review of Systems  Review of Systems   Constitutional: Negative  HENT: Negative  Eyes: Negative  Respiratory: Negative  Cardiovascular: Negative  Gastrointestinal: Negative  Endocrine: Negative  Genitourinary:        Per HPI   Musculoskeletal: Negative  Skin: Negative  Allergic/Immunologic: Negative  Neurological: Negative  Hematological: Negative  Psychiatric/Behavioral: Negative            Past Medical History  Past Medical History:   Diagnosis Date    Atopic dermatitis     last assessed 12/2/13    Atrophic vaginitis     last assessed 9/2/15    Dermatitis     Dry eye     Frequency of urination     Fungal infection     last assessed 8/14/13    Herpes     last assessed 6/27/14    Hyperlipidemia     Hypothyroidism     Interstitial cystitis     Osteoporosis     Periodontal abscess     last assessed 13    PONV (postoperative nausea and vomiting)     Thyroid nodule     Ulcerative colitis ([facility] [address] )     Urinary frequency     resolved 17    UTI (urinary tract infection)     Vaginal atrophy     Vitamin D deficiency     last assessed 16    Voiding dysfunction     last assessed 10/7/15       Past Social History  Past Surgical History:   Procedure Laterality Date     SECTION       and      COLONOSCOPY  2020    CYSTOSCOPY      diagnostic resolved 05    FL RETROGRADE PYELOGRAM  2020    GANGLION CYST EXCISION Right 2019    Procedure: EXCISION GANGLION CYST RIGHT FIRST DORSAL EXTENSOR COMPARTMENT;  Surgeon: Jc Causey MD;  [facility];  Service: Orthopedics    KIDNEY STONE SURGERY      ORIF PROXIMAL ULNA FRACTURE      metal plates and screws removed    OTHER SURGICAL HISTORY      punch biospy     HI COLONOSCOPY FLX DX W/COLLJ SPEC WHEN PFRMD N/A 2017    Procedure: COLONOSCOPY;  Surgeon: Aracely Henley MD;  [facility];   Service: Colorectal    HI CYSTO/URETERO W/LITHOTRIPSY &INDWELL STENT INSRT Left 2020    Procedure: CYSTOSCOPY URETEROSCOPY WITH LITHOTRIPSY HOLMIUM LASER, RETROGRADE PYELOGRAM AND INSERTION STENT URETERAL;  Surgeon: Pat Edmondson MD;  [facility];  Service: Urology    HI INCIS TENDON SHEATH,RADIAL STYLOID Right 2019    Procedure: [place];  Surgeon: Jc Causey MD;  [facility];  Service: Orthopedics    TUBAL LIGATION      UPPER GASTROINTESTINAL ENDOSCOPY  2020    WISDOM TOOTH EXTRACTION  1983    X2        Past Family History  Family History   Problem Relation Age of Onset    Gallbladder disease Mother     Thyroid disease Mother     Kidney disease Mother     Breast cancer Mother     Diabetes Father     Hypertension Father     Nephrolithiasis Father     Lymphoma Maternal Grandfather     Diabetes Paternal Grandmother     Breast cancer Paternal Aunt     No Known Problems Son     No Known Problems Son        Past Social history  Social History     Socioeconomic History    Marital status: /Civil Union     Spouse name: Herrera Chanel Number of children: 2    Years of education: Not on file    Highest education level: Not on file   Occupational History    Not on file   Tobacco Use    Smoking status: Never Smoker    Smokeless tobacco: Never Used   Vaping Use    Vaping Use: Never used   Substance and Sexual Activity    Alcohol use: Yes     Comment: socially - 1-3 drinks per month    Drug use: No    Sexual activity: Not Currently   Other Topics Concern    Not on file   Social History Narrative    Currently         Patient lives in a home that was built in [address] [place] [address]    Gas/forced hot air     [address] [place] in the bedroom     Finished basement-dry-no mold or musty smell     Dehumidifier in the basement     No air  or purifiers     Humidifier in the winter months     Central air     Home is smoke free     Patient does not live close to open fields or wooded area         No pets         Caffeine: seldom hot tea or soda     Chocolate consumed everyday         Patient lives with spouse and children      Social Determinants of Health     Financial Resource Strain: Not on file   Food Insecurity: Not on file   Transportation Needs: Not on file   Physical Activity: Not on file   Stress: Not on file   Social Connections: Not on file   Intimate Partner Violence: Not on file   Housing Stability: Not on file       Current Medications  Current Outpatient Medications   Medication Sig Dispense Refill    Cholecalciferol (VITAMIN D PO) Take 3,000 Units by mouth daily      Cranberry (ELLURA PO) Take 1 capsule by mouth daily      cycloSPORINE (RESTASIS) 0 05 % ophthalmic emulsion Administer 1 drop to both eyes 2 (two) times a day      dicyclomine (BENTYL) 10 mg capsule Take 2 capsules (20 mg total) by mouth 3 (three) times a day before meals 270 capsule 3    estradiol (ESTRACE) 0 1 mg/g vaginal cream Insert 1 applicator full vaginally nightly 42 5 g 5    levothyroxine 50 mcg tablet Take 1 tablet (50 mcg total) by mouth daily 90 tablet 2    mesalamine (ASACOL) 800 MG EC tablet TAKE ONE TABLET BY MOUTH FOUR TIMES A  tablet 0    Meth-Hyo-M Bl-Na Phos-Ph Sal (URO-MP PO) Take 1 capsule by mouth 2 (two) times a day       Meth-Hyo-M Bl-Na Phos-Ph Sal (Uro-MP) 118 MG CAPS TAKE 1 CAPSULE BY MOUTH FOUR TIMES A DAY FOR 28 DAYS      pentosan polysulfate (Elmiron) 100 mg capsule One pill 3 times a day 90 capsule 5    rosuvastatin (CRESTOR) 5 mg tablet Take 1 tablet (5 mg total) by mouth daily 90 tablet 3    Triamcinolone Acetonide (Nasacort Allergy) 55 MCG/ACT nasal spray into each nostril daily      trospium chloride (SANCTURA) 20 mg tablet Take 1 tablet (20 mg total) by mouth 2 (two) times a day 60 tablet 5    amitriptyline (ELAVIL) 10 mg tablet Take 1 tablet (10 mg total) by mouth daily at bedtime (Patient not taking: Reported on 5/13/2022) 30 tablet 2    LORazepam (ATIVAN) 1 mg tablet Take 1 tablet (1 mg total) by mouth 30 min pre-procedure (Patient not taking: Reported on 5/13/2022) 2 tablet 0     No current facility-administered medications for this visit  Allergies  Allergies   Allergen Reactions    Iodine - Food Allergy Anaphylaxis     Allergy to Iodinated and non iodinated dye    Other Anaphylaxis     APPLES    TAPE  Also rash at times    Seasonal Ic [Cholestatin] Allergic Rhinitis    Latex        Past Medical History, Social History, Family History, medications and allergies were reviewed  Vitals  Vitals:    05/13/22 1257   BP: 118/78   Pulse: 92   Weight: 54 4 kg (120 lb)   Height: 5' 7" (1 702 m)       Physical Exam  Physical Exam  On examination she is in no acute distress  Her abdomen is soft nontender nondistended   examination reveals no CVA tenderness    Skin is warm   Extremities without edema    Neurologic is grossly intact and nonfocal   Gait normal   Affect normal      Results  No results found for: PSA  Lab Results   Component Value Date    GLUCOSE 89 08/07/2015    CALCIUM 9 5 03/01/2022     08/07/2015    K 3 9 03/01/2022    CO2 26 03/01/2022     03/01/2022    BUN 19 03/01/2022    CREATININE 0 87 03/01/2022     Lab Results   Component Value Date    WBC 4 57 03/01/2022    HGB 13 2 03/01/2022    HCT 42 6 03/01/2022    MCV 91 03/01/2022     (L) 03/01/2022         Office Urine Dip  No results found for this or any previous visit (from the past 1 hour(s)) ]

## 2022-05-16 ENCOUNTER — OFFICE VISIT (OUTPATIENT)
Dept: OCCUPATIONAL THERAPY | Age: 62
End: 2022-05-16
Payer: COMMERCIAL

## 2022-05-16 DIAGNOSIS — S62.337A CLOSED DISPLACED FRACTURE OF NECK OF FIFTH METACARPAL BONE OF LEFT HAND, INITIAL ENCOUNTER: Primary | ICD-10-CM

## 2022-05-16 DIAGNOSIS — M79.642 LEFT HAND PAIN: ICD-10-CM

## 2022-05-16 PROCEDURE — 97140 MANUAL THERAPY 1/> REGIONS: CPT

## 2022-05-16 NOTE — PROGRESS NOTES
Daily Note     Today's date: 2022  Patient name: Adela Ramirez  : 1960  MRN: [id number]  Referring provider: Christy Cottrell MD  Dx:   Encounter Diagnosis     ICD-10-CM    1  Closed displaced fracture of neck of fifth metacarpal bone of left hand, initial encounter  S62 337A                   Subjective: I don't think it's gotten worse, but I can't say that it's gotten any better      Objective: See treatment diary below      Assessment: Tolerated treatment well  Patient would benefit from continued OT  Tightness noted today through extensor wad, focused on more proximal ECRB, noted stiffness in wrist/hand  Plan: Continue per plan of care        Precautions: [place]  LATEX ALLERGY       Manuals 4/25 5/2 5/9 5/16 3/28 4/1 4/4 4/11 4/13 4/18   MEM             STM 38' 40' 15' 36' with cupping 8' 8' 8' 25' 25 25'   KT       Y strip with space correction I strip                   Neuro Re-Ed                                                                                                        Ther Ex             PROM     5' 5' 5'  15    Pencils             Progressive Grasp             Gross Grasp     GPW 3x10 GPW 3x10 GPW 3x10      Pinch Ring             Hook Roll             Wrist Maze       x6 X6     Lumbrical Block              EDC     Sm RB x15 Sm RB x15 Sm RB x15      Wrist Strengthening       Isometrics YFB x15 Isometrics YFB x15 YFB 2x10 iso 4 planes YFB 2x10 iso 4 planes   Dart Throwers             HEP: MCP PROM, Tendon glides, Isotoner for edema   Red theraputty, Red theraband          Ther Activity             Translation             Pinch Pins             Pegs     R in, 10# out R in, 10# out                                 Modalities

## 2022-05-17 DIAGNOSIS — D69.6 THROMBOCYTOPENIA (HCC): Primary | ICD-10-CM

## 2022-05-17 DIAGNOSIS — R39.15 URINARY URGENCY: ICD-10-CM

## 2022-05-17 DIAGNOSIS — N20.0 CALCULUS OF KIDNEY: Primary | ICD-10-CM

## 2022-05-18 DIAGNOSIS — R39.15 URINARY URGENCY: Primary | ICD-10-CM

## 2022-05-18 LAB
BILIRUB UR QL STRIP: NEGATIVE
CLARITY UR: NORMAL
COLOR UR: NORMAL
GLUCOSE UR STRIP-MCNC: NEGATIVE MG/DL
HGB UR QL STRIP.AUTO: NEGATIVE
KETONES UR STRIP-MCNC: NEGATIVE MG/DL
LEUKOCYTE ESTERASE UR QL STRIP: NEGATIVE
NITRITE UR QL STRIP: NEGATIVE
PH UR STRIP.AUTO: 5.5 [PH]
PROT UR STRIP-MCNC: NEGATIVE MG/DL
SP GR UR STRIP.AUTO: 1.02 (ref 1–1.03)
UROBILINOGEN UR STRIP-ACNC: <2 MG/DL

## 2022-05-18 PROCEDURE — 81003 URINALYSIS AUTO W/O SCOPE: CPT | Performed by: INTERNAL MEDICINE

## 2022-05-20 DIAGNOSIS — R39.15 URINARY URGENCY: Primary | ICD-10-CM

## 2022-05-20 RX ORDER — CEPHALEXIN 500 MG/1
500 CAPSULE ORAL EVERY 12 HOURS SCHEDULED
Qty: 20 CAPSULE | Refills: 0 | Status: SHIPPED | OUTPATIENT
Start: 2022-05-20 | End: 2022-05-30

## 2022-05-23 ENCOUNTER — OFFICE VISIT (OUTPATIENT)
Dept: OCCUPATIONAL THERAPY | Age: 62
End: 2022-05-23
Payer: COMMERCIAL

## 2022-05-23 DIAGNOSIS — S62.337A CLOSED DISPLACED FRACTURE OF NECK OF FIFTH METACARPAL BONE OF LEFT HAND, INITIAL ENCOUNTER: ICD-10-CM

## 2022-05-23 DIAGNOSIS — M79.642 LEFT HAND PAIN: Primary | ICD-10-CM

## 2022-05-23 PROCEDURE — 97140 MANUAL THERAPY 1/> REGIONS: CPT

## 2022-05-23 NOTE — PROGRESS NOTES
Daily Note     Today's date: 2022  Patient name: Mya Denis  : 1960  MRN: [id number]  Referring provider: Gareth Dsouza MD  Dx:   Encounter Diagnosis     ICD-10-CM    1  Closed displaced fracture of neck of fifth metacarpal bone of left hand, initial encounter  S62 337A                   Subjective: This is the best it's felt since I broke it! Objective: See treatment diary below      Assessment: Tolerated treatment well  Patient would benefit from continued OT  Tightness noted today through extensor wad however improved since last visit  Plan: Continue per plan of care        Precautions: [place]  LATEX ALLERGY       Manuals    MEM             STM 38' 40' 15' 36' with cupping 30' with cupping 8' 8' 25' 25 25'   KT       Y strip with space correction I strip                   Neuro Re-Ed                                                                                                        Ther Ex             PROM      5' 5'  15    Pencils             Progressive Grasp             Gross Grasp      GPW 3x10 GPW 3x10      Pinch Ring             Hook Roll             Wrist Maze       x6 X6     Lumbrical Block              EDC      Sm RB x15 Sm RB x15      Wrist Strengthening       Isometrics YFB x15 Isometrics YFB x15 YFB 2x10 iso 4 planes YFB 2x10 iso 4 planes   Dart Throwers             HEP: MCP PROM, Tendon glides, Isotoner for edema   Red theraputty, Red theraband          Ther Activity             Translation             Pinch Pins             Pegs      R in, 10# out                                 Modalities

## 2022-05-31 DIAGNOSIS — T78.40XA ALLERGY, INITIAL ENCOUNTER: Primary | ICD-10-CM

## 2022-05-31 RX ORDER — TRIAMCINOLONE ACETONIDE 55 UG/1
1 SPRAY, METERED NASAL 2 TIMES DAILY
Qty: 49.5 G | Refills: 3 | Status: SHIPPED | OUTPATIENT
Start: 2022-05-31 | End: 2022-06-02

## 2022-06-02 DIAGNOSIS — T78.40XA ALLERGY, INITIAL ENCOUNTER: Primary | ICD-10-CM

## 2022-06-02 RX ORDER — MOMETASONE FUROATE 50 UG/1
2 SPRAY, METERED NASAL DAILY
Qty: 41 G | Refills: 3 | Status: SHIPPED | OUTPATIENT
Start: 2022-06-02

## 2022-06-03 ENCOUNTER — OFFICE VISIT (OUTPATIENT)
Dept: OCCUPATIONAL THERAPY | Age: 62
End: 2022-06-03
Payer: COMMERCIAL

## 2022-06-03 DIAGNOSIS — S62.337A CLOSED DISPLACED FRACTURE OF NECK OF FIFTH METACARPAL BONE OF LEFT HAND, INITIAL ENCOUNTER: ICD-10-CM

## 2022-06-03 DIAGNOSIS — M79.642 LEFT HAND PAIN: Primary | ICD-10-CM

## 2022-06-03 PROCEDURE — 97112 NEUROMUSCULAR REEDUCATION: CPT

## 2022-06-03 NOTE — PROGRESS NOTES
Daily Note     Today's date: 2022  Patient name: Sabiha Spears  : 1960  MRN: [id number]  Referring provider: Lynne Aguilar MD  Dx:   Encounter Diagnosis     ICD-10-CM    1  Closed displaced fracture of neck of fifth metacarpal bone of left hand, initial encounter  S62 337A                   Subjective: It's stiff, but still better! Objective: See treatment diary below      Assessment: Tolerated treatment well  Patient would benefit from continued OT  Extensor wad tightness however it continues to decrease    Plan: Continue per plan of care        Precautions: [place]  LATEX ALLERGY       Manuals 4/25 5/2 5/9 5/16 5/23 6/3 4/4 4/11 4/13 4/18   MEM             STM 38' 40' 15' 36' with cupping 30' with cupping 27' with cupping 8' 25' 25 25'   KT       Y strip with space correction I strip                   Neuro Re-Ed                                                                                                        Ther Ex             PROM       5'  15    Pencils             Progressive Grasp             Gross Grasp       GPW 3x10      Pinch Ring             Hook Roll             Wrist Maze       x6 X6     Lumbrical Block              EDC       Sm RB x15      Wrist Strengthening       Isometrics YFB x15 Isometrics YFB x15 YFB 2x10 iso 4 planes YFB 2x10 iso 4 planes   Dart Throwers             HEP: MCP PROM, Tendon glides, Isotoner for edema   Red theraputty, Red theraband          Ther Activity             Translation             Pinch Pins             Pegs                                       Modalities

## 2022-06-06 ENCOUNTER — OFFICE VISIT (OUTPATIENT)
Dept: OCCUPATIONAL THERAPY | Age: 62
End: 2022-06-06
Payer: COMMERCIAL

## 2022-06-06 DIAGNOSIS — M79.642 LEFT HAND PAIN: Primary | ICD-10-CM

## 2022-06-06 DIAGNOSIS — S62.337A CLOSED DISPLACED FRACTURE OF NECK OF FIFTH METACARPAL BONE OF LEFT HAND, INITIAL ENCOUNTER: ICD-10-CM

## 2022-06-06 PROCEDURE — 97110 THERAPEUTIC EXERCISES: CPT

## 2022-06-06 PROCEDURE — 97140 MANUAL THERAPY 1/> REGIONS: CPT

## 2022-06-06 NOTE — PROGRESS NOTES
Daily Note     Today's date: 2022  Patient name: Anton Han  : 1960  MRN: [id number]  Referring provider: Ariella Mccoy MD  Dx:   Encounter Diagnosis     ICD-10-CM    1  Closed displaced fracture of neck of fifth metacarpal bone of left hand, initial encounter  S62 337A                   Subjective: It's alright    Objective: See treatment diary below      Assessment: Tolerated treatment well  Patient would benefit from continued OT  Noted tightness over 2nd dorsal compartment and ECU near lateral epicondyle  Plan: Continue per plan of care        Precautions: [place]  LATEX ALLERGY       Manuals 4/25 5/2 5/9 5/16 5/23 6/3 6/6 4/11 4/13 4/18   MEM             STM 38' 40' 15' 36' with cupping 30' with cupping 30' with cupping 36' with cupping 25' 25 25'   KT                          Neuro Re-Ed                                                                                                        Ther Ex             PROM         15    Pencils             Progressive Grasp             Gross Grasp             Pinch Ring             Hook Roll             Wrist Maze        X6     Lumbrical Block              EDC             Wrist Strengthening        Isometrics YFB x15 YFB 2x10 iso 4 planes YFB 2x10 iso 4 planes   Dart Throwers             HEP: MCP PROM, Tendon glides, Isotoner for edema   Red theraputty, Red theraband          Ther Activity             Translation             Pinch Pins             Pegs                                       Modalities

## 2022-06-13 ENCOUNTER — OFFICE VISIT (OUTPATIENT)
Dept: OCCUPATIONAL THERAPY | Age: 62
End: 2022-06-13
Payer: COMMERCIAL

## 2022-06-13 DIAGNOSIS — S62.337A CLOSED DISPLACED FRACTURE OF NECK OF FIFTH METACARPAL BONE OF LEFT HAND, INITIAL ENCOUNTER: ICD-10-CM

## 2022-06-13 DIAGNOSIS — M79.642 LEFT HAND PAIN: Primary | ICD-10-CM

## 2022-06-13 PROCEDURE — 97140 MANUAL THERAPY 1/> REGIONS: CPT

## 2022-06-13 NOTE — PROGRESS NOTES
Daily Note     Today's date: 2022  Patient name: Nan Wilson  : 1960  MRN: [id number]  Referring provider: Zoila Kearns MD  Dx:   Encounter Diagnosis     ICD-10-CM    1  Closed displaced fracture of neck of fifth metacarpal bone of left hand, initial encounter  S62 337A                   Subjective: It is much stiffer than it was before    Objective: See treatment diary below    Assessment: Tolerated treatment well  Patient would benefit from continued OT  Increased crepitus along 4th and 5th lumbricals  Pain noted with 2nd dorsal compartment palpation  Relief post cupping noted  Plan: Continue per plan of care        Precautions: [place]  LATEX ALLERGY       Manuals 4/25 5/2 5/9 5/16 5/23 6/3 6/6 6/13 4/13 4/18   MEM             STM 38' 40' 15' 36' with cupping 30' with cupping 30' with cupping 36' with cupping 36' with cupping 25 25'   KT                          Neuro Re-Ed                                                                                                        Ther Ex             PROM         15    Pencils             Progressive Grasp             Gross Grasp             Pinch Ring             Hook Roll             Wrist Maze             Lumbrical Block              EDC             Wrist Strengthening         YFB 2x10 iso 4 planes YFB 2x10 iso 4 planes   Dart Throwers             HEP: MCP PROM, Tendon glides, Isotoner for edema   Red theraputty, Red theraband          Ther Activity             Translation             Pinch Pins             Pegs                                       Modalities

## 2022-06-27 ENCOUNTER — OFFICE VISIT (OUTPATIENT)
Dept: OCCUPATIONAL THERAPY | Age: 62
End: 2022-06-27
Payer: COMMERCIAL

## 2022-06-27 DIAGNOSIS — S62.337A CLOSED DISPLACED FRACTURE OF NECK OF FIFTH METACARPAL BONE OF LEFT HAND, INITIAL ENCOUNTER: ICD-10-CM

## 2022-06-27 DIAGNOSIS — M79.642 LEFT HAND PAIN: Primary | ICD-10-CM

## 2022-06-27 PROCEDURE — 97140 MANUAL THERAPY 1/> REGIONS: CPT

## 2022-06-27 NOTE — PROGRESS NOTES
Daily Note     Today's date: 2022  Patient name: Juju Stewart  : 1960  MRN: [id number]  Referring provider: Elizabeth Anthony MD  Dx:   Encounter Diagnosis     ICD-10-CM    1  Closed displaced fracture of neck of fifth metacarpal bone of left hand, initial encounter  S62 337A                   Subjective: I hit it the other day and it turned black and blue    Objective: See treatment diary below    Assessment: Tolerated treatment well  Patient would benefit from continued OT  Increased crepitus along 4th and 5th lumbricals from last visit, no pain with palpation of the joint and manipulation of the bone  Plan: Continue per plan of care        Precautions: [place]  LATEX ALLERGY       Manuals 4/25 5/2 5/9 5/16 5/23 6/3 6/6 6/13 6/27 4/18   MEM             STM 38' 40' 15' 36' with cupping 30' with cupping 30' with cupping 36' with cupping 36' with cupping 27' 25'   KT                          Neuro Re-Ed                                                                                                        Ther Ex             PROM             Pencils             Progressive Grasp             Gross Grasp             Pinch Ring             Hook Roll             Wrist Maze             Lumbrical Block              EDC             Wrist Strengthening          YFB 2x10 iso 4 planes   Dart Throwers             HEP: MCP PROM, Tendon glides, Isotoner for edema   Red theraputty, Red theraband          Ther Activity             Translation             Pinch Pins             Pegs                                       Modalities

## 2022-06-30 ENCOUNTER — TELEPHONE (OUTPATIENT)
Dept: GASTROENTEROLOGY | Facility: MEDICAL CENTER | Age: 62
End: 2022-06-30

## 2022-06-30 NOTE — TELEPHONE ENCOUNTER
Charly Thao,  Patient having worsening of side effects in the last two weeks  Especially yesterday  Patient had shortness of breath and extreme pain in upper gastric area  Please give patient a call to discuss  Thank you

## 2022-07-11 ENCOUNTER — OFFICE VISIT (OUTPATIENT)
Dept: OCCUPATIONAL THERAPY | Age: 62
End: 2022-07-11
Payer: COMMERCIAL

## 2022-07-11 DIAGNOSIS — M79.642 LEFT HAND PAIN: Primary | ICD-10-CM

## 2022-07-11 DIAGNOSIS — S62.337A CLOSED DISPLACED FRACTURE OF NECK OF FIFTH METACARPAL BONE OF LEFT HAND, INITIAL ENCOUNTER: ICD-10-CM

## 2022-07-11 PROCEDURE — 97140 MANUAL THERAPY 1/> REGIONS: CPT

## 2022-07-11 NOTE — PROGRESS NOTES
Daily Note     Today's date: 2022  Patient name: Avtar Barr  : 1960  MRN: [id number]  Referring provider: Chanda Fulton MD  Dx:   Encounter Diagnosis     ICD-10-CM    1  Closed displaced fracture of neck of fifth metacarpal bone of left hand, initial encounter  S62 337A                   Subjective: It'e been okay, honestly just really tight    Objective: See treatment diary below    Assessment: Tolerated treatment well  Patient would benefit from continued OT  Crepitus noted along hamate and pisiform as well as increased tightness at lateral epicondyle and second dorsal compartment  Plan: Continue per plan of care        Precautions: [place]  LATEX ALLERGY       Manuals 4/25 5/2 5/9 5/16 5/23 6/3 6/6 6/13 6/27 7/11   MEM             STM 38' 40' 15' 36' with cupping 30' with cupping 30' with cupping 36' with cupping 36' with cupping 30' 40' with cupping   KT                          Neuro Re-Ed                                                                                                        Ther Ex             PROM             Pencils             Progressive Grasp             Gross Grasp             Pinch Ring             Hook Roll             Wrist Maze             Lumbrical Block              EDC             Wrist Strengthening             Dart Throwers             HEP: MCP PROM, Tendon glides, Isotoner for edema   Red theraputty, Red theraband          Ther Activity             Translation             Pinch Pins             Pegs                                       Modalities

## 2022-07-13 DIAGNOSIS — R10.10 PAIN OF UPPER ABDOMEN: Primary | ICD-10-CM

## 2022-07-13 RX ORDER — OMEPRAZOLE 20 MG/1
20 CAPSULE, DELAYED RELEASE ORAL DAILY
Qty: 30 CAPSULE | Refills: 0 | Status: SHIPPED | OUTPATIENT
Start: 2022-07-13 | End: 2022-08-12

## 2022-07-25 ENCOUNTER — APPOINTMENT (OUTPATIENT)
Dept: OCCUPATIONAL THERAPY | Age: 62
End: 2022-07-25
Payer: COMMERCIAL

## 2022-07-29 ENCOUNTER — OFFICE VISIT (OUTPATIENT)
Dept: OCCUPATIONAL THERAPY | Age: 62
End: 2022-07-29
Payer: COMMERCIAL

## 2022-07-29 DIAGNOSIS — S62.337A CLOSED DISPLACED FRACTURE OF NECK OF FIFTH METACARPAL BONE OF LEFT HAND, INITIAL ENCOUNTER: Primary | ICD-10-CM

## 2022-07-29 DIAGNOSIS — M79.642 LEFT HAND PAIN: ICD-10-CM

## 2022-07-29 PROCEDURE — 97140 MANUAL THERAPY 1/> REGIONS: CPT

## 2022-07-29 NOTE — PROGRESS NOTES
Daily Note     Today's date: 2022  Patient name: Juan Or  : 1960  MRN: [id number]  Referring provider: Lg Benjamin MD  Dx:   Encounter Diagnosis     ICD-10-CM    1  Closed displaced fracture of neck of fifth metacarpal bone of left hand, initial encounter  S62 337A                   Subjective: Wow, I thought it was going to be good but it feels so tight    Objective: See treatment diary below    Assessment: Tolerated treatment well  Patient would benefit from continued OT  Tightness along extensor wad as well as over 2nd dorsal compartment  Also tightness along volar MCP of SF  Plan: Continue per plan of care        Precautions: [place]  LATEX ALLERGY       Manuals 7/29 5/2 5/9 5/16 5/23 6/3 6/6 6/13 6/27 7/11   MEM             STM 40' with cupping 36' 15' 36' with cupping 30' with cupping 30' with cupping 36' with cupping 36' with cupping 30' 40' with cupping   KT                          Neuro Re-Ed                                                                                                        Ther Ex             PROM             Pencils             Progressive Grasp             Gross Grasp             Pinch Ring             Hook Roll             Wrist Maze             Lumbrical Block              EDC             Wrist Strengthening             Dart Throwers             HEP: MCP PROM, Tendon glides, Isotoner for edema   Red theraputty, Red theraband          Ther Activity             Translation             Pinch Pins             Pegs                                       Modalities

## 2022-08-03 DIAGNOSIS — E04.1 NONTOXIC UNINODULAR GOITER: ICD-10-CM

## 2022-08-03 RX ORDER — LEVOTHYROXINE SODIUM 0.05 MG/1
50 TABLET ORAL DAILY
Qty: 90 TABLET | Refills: 3 | Status: SHIPPED | OUTPATIENT
Start: 2022-08-03 | End: 2023-07-15

## 2022-08-08 ENCOUNTER — OFFICE VISIT (OUTPATIENT)
Dept: OCCUPATIONAL THERAPY | Age: 62
End: 2022-08-08
Payer: COMMERCIAL

## 2022-08-08 DIAGNOSIS — S62.337A CLOSED DISPLACED FRACTURE OF NECK OF FIFTH METACARPAL BONE OF LEFT HAND, INITIAL ENCOUNTER: ICD-10-CM

## 2022-08-08 DIAGNOSIS — M79.642 LEFT HAND PAIN: Primary | ICD-10-CM

## 2022-08-08 PROCEDURE — 97110 THERAPEUTIC EXERCISES: CPT

## 2022-08-08 PROCEDURE — 97140 MANUAL THERAPY 1/> REGIONS: CPT

## 2022-08-08 NOTE — PROGRESS NOTES
Daily Note     Today's date: 2022  Patient name: Michell Hogue  : 1960  MRN: [id number]  Referring provider: Didier Jalloh MD  Dx:   Encounter Diagnosis     ICD-10-CM    1  Closed displaced fracture of neck of fifth metacarpal bone of left hand, initial encounter  S62 337A                   Subjective: I am so weak, I think I lost strength, I dropped a wine glass this past weekend    Objective: See treatment diary below  L : 31  R : 43    Assessment: Tolerated treatment well  Patient would benefit from continued OT  Tightness noted through ulnar sided hand today, strength is improved since last measurements however continues to be weaker than contralateral side, added PREs today    Plan: Continue per plan of care        Precautions: [place]  LATEX ALLERGY       Manuals 7/29 8/8 5/9 5/16 5/23 6/3 6/6 6/13 6/27 7/11   MEM             STM 40' with cupping 25' 15' 36' with cupping 30' with cupping 30' with cupping 36' with cupping 36' with cupping 30' 40' with cupping   KT                          Neuro Re-Ed                                                                                                        Ther Ex             PROM             Pencils             Progressive Grasp             Gross Grasp  GPW 3x10 pwr and cylin, Sustained G3  30 sec 3x           Pinch Ring             Hook Roll             Wrist Maze             Lumbrical Block              EDC             Wrist Strengthening             Dart Throwers             HEP: MCP PROM, Tendon glides, Isotoner for edema   Red theraputty, Red theraband          Ther Activity             Translation             Pinch Pins             Pegs                                       Modalities

## 2022-08-23 ENCOUNTER — OFFICE VISIT (OUTPATIENT)
Dept: OCCUPATIONAL THERAPY | Age: 62
End: 2022-08-23
Payer: COMMERCIAL

## 2022-08-23 DIAGNOSIS — S62.337A CLOSED DISPLACED FRACTURE OF NECK OF FIFTH METACARPAL BONE OF LEFT HAND, INITIAL ENCOUNTER: ICD-10-CM

## 2022-08-23 DIAGNOSIS — M79.642 LEFT HAND PAIN: Primary | ICD-10-CM

## 2022-08-23 PROCEDURE — 97140 MANUAL THERAPY 1/> REGIONS: CPT

## 2022-08-23 NOTE — PROGRESS NOTES
Daily Note     Today's date: 2022  Patient name: Juan Or  : 1960  MRN: [id number]  Referring provider: Lg Benjamin MD  Dx:   Encounter Diagnosis     ICD-10-CM    1  Closed displaced fracture of neck of fifth metacarpal bone of left hand, initial encounter  S62 337A                   Subjective: It's bothered me with the heat and humiditiy    Objective: See treatment diary below  L : 31  R : 43    Assessment: Tolerated treatment well  Patient would benefit from continued OT  Tightness noted through ulnar sided hand today, possible arthritic knuckle causing consistent pain  Plan: Continue per plan of care        Precautions: [place]  LATEX ALLERGY       Manuals 7/29 8/8 8/23 5/16 5/23 6/3 6/6 6/13 6/27 7/11   MEM             STM 40' with cupping 25' 40' 36' with cupping 30' with cupping 30' with cupping 36' with cupping 36' with cupping 30' 40' with cupping   KT                          Neuro Re-Ed                                                                                                        Ther Ex             PROM             Pencils             Progressive Grasp             Gross Grasp  GPW 3x10 pwr and cylin, Sustained G3  30 sec 3x           Pinch Ring             Hook Roll             Wrist Maze             Lumbrical Block              EDC             Wrist Strengthening             Dart Throwers             HEP: MCP PROM, Tendon glides, Isotoner for edema             Ther Activity             Translation             Pinch Pins             Pegs                                       Modalities

## 2022-08-29 ENCOUNTER — TELEPHONE (OUTPATIENT)
Dept: GASTROENTEROLOGY | Facility: MEDICAL CENTER | Age: 62
End: 2022-08-29

## 2022-08-29 DIAGNOSIS — K52.9 COLITIS WITHOUT COMPLICATION: ICD-10-CM

## 2022-08-29 RX ORDER — MESALAMINE 800 MG/1
800 TABLET, DELAYED RELEASE ORAL 4 TIMES DAILY
Qty: 360 TABLET | Refills: 0 | Status: SHIPPED | OUTPATIENT
Start: 2022-08-29

## 2022-08-29 NOTE — TELEPHONE ENCOUNTER
Patient called the office and would like a refill of mesalamine (ASACOL) 800 MG EC tablet sent to the [facility] [address] [facility] [address]

## 2022-09-02 ENCOUNTER — TELEPHONE (OUTPATIENT)
Dept: GASTROENTEROLOGY | Facility: MEDICAL CENTER | Age: 62
End: 2022-09-02

## 2022-09-02 ENCOUNTER — OFFICE VISIT (OUTPATIENT)
Dept: GASTROENTEROLOGY | Facility: MEDICAL CENTER | Age: 62
End: 2022-09-02
Payer: COMMERCIAL

## 2022-09-02 VITALS
SYSTOLIC BLOOD PRESSURE: 123 MMHG | WEIGHT: 123.2 LBS | BODY MASS INDEX: 19.3 KG/M2 | HEART RATE: 79 BPM | TEMPERATURE: 97.4 F | DIASTOLIC BLOOD PRESSURE: 70 MMHG

## 2022-09-02 DIAGNOSIS — R22.2 SUBCUTANEOUS NODULE OF ABDOMINAL WALL: ICD-10-CM

## 2022-09-02 DIAGNOSIS — K52.9 COLITIS: ICD-10-CM

## 2022-09-02 DIAGNOSIS — K51.80 OTHER ULCERATIVE COLITIS WITHOUT COMPLICATION (HCC): Primary | ICD-10-CM

## 2022-09-02 PROCEDURE — 99214 OFFICE O/P EST MOD 30 MIN: CPT | Performed by: INTERNAL MEDICINE

## 2022-09-02 NOTE — PATIENT INSTRUCTIONS
Rifaximin antibiotic - 2 weeks  Donnatal   Since it is not diarrhea / IBS would not consider Viberzi or Alosetron due to more side effects  Librax  SSRI (serotonin reuptake inhibitor)  Consider repeat Elavil with an acid reducer     IB Guard

## 2022-09-02 NOTE — TELEPHONE ENCOUNTER
Patient called to have [address] script sent to [facility] radiology  Fax given [phone]  Sent

## 2022-09-02 NOTE — PROGRESS NOTES
Outpatient Follow up  [address]    [place] [address] [facility] [address]  Marc Thacker MD  Ph : [phone]  Fax : [phone]  Mobile : [phone]  Email : [email or web address]  Also available on [employer]    Jess Barrios 58 y o  female MRN: [id number]    PCP: Betty Rick MD  Referring: No referring provider defined for this encounter  Jess Barrios presented for a follow up visit  My recommendations are included  Please do not hesitate to contact me with any questions you may have  ASSESSMENT AND PLAN:      No problem-specific Assessment & Plan notes found for this encounter  Diagnoses and all orders for this visit:    Other ulcerative colitis without complication (HCC)    Colitis    Subcutaneous nodule of abdominal wall  -     US abdomen complete; Future      58year-old with symptoms of IBS as well as indeterminate colitis  This is controlled on mesalamine  We discussed various treatment options which are discussed below  She will let me know what she would like to try  I feel either Donnatal or Librax may be helpful  1  Rifaximin antibiotic - 2 weeks  2  Donnatal   3  Since it is not diarrhea / IBS would not consider Viberzi or Alosetron due to more side effects  4  Librax  5  SSRI (serotonin reuptake inhibitor)  6  Consider repeat Elavil with an acid reducer  7  IB Guard  8  She feels a nodule in the abdominal wall/epigastric region  Recommend US  ______________________________________________________________________    SUBJECTIVE:  59 y/o lady who is following up with us in regards to her IBS and colitis  She was on the Elavil which was helping with her abdominal symptoms however she had chest discomfort and difficulty swallowing  Multiple episodes   Prilosec did help her a bit  During the elavil she did not have any issues with her abdominal symptoms     She has had symptoms of IBS and diarrhea with cramping  These affect her daily activities  She is on Bentyl - 2 in the morning and 2 at night  She is overall doing okay  Appetite is well  Weight is stable  REVIEW OF SYSTEMS IS OTHERWISE NEGATIVE  Historical Information   Past Medical History:   Diagnosis Date    Atopic dermatitis     last assessed 13    Atrophic vaginitis     last assessed 9/2/15    Dermatitis     Dry eye     Frequency of urination     Fungal infection     last assessed 13    Herpes     last assessed 14    Hyperlipidemia     Hypothyroidism     Interstitial cystitis     Osteoporosis     Periodontal abscess     last assessed 13    PONV (postoperative nausea and vomiting)     Thyroid nodule     Ulcerative colitis ([address] )     Urinary frequency     resolved 17    UTI (urinary tract infection)     Vaginal atrophy     Vitamin D deficiency     last assessed 16    Voiding dysfunction     last assessed 10/7/15     Past Surgical History:   Procedure Laterality Date     SECTION       and      COLONOSCOPY  2020    CYSTOSCOPY      diagnostic resolved 05    FL RETROGRADE PYELOGRAM  2020    GANGLION CYST EXCISION Right 2019    Procedure: EXCISION GANGLION CYST RIGHT FIRST DORSAL EXTENSOR COMPARTMENT;  Surgeon: Luz Maria Oliveros MD;  [facility];  Service: Orthopedics    KIDNEY STONE SURGERY      ORIF PROXIMAL ULNA FRACTURE      metal plates and screws removed    OTHER SURGICAL HISTORY      punch biospy     WV COLONOSCOPY FLX DX W/COLLJ SPEC WHEN PFRMD N/A 2017    Procedure: COLONOSCOPY;  Surgeon: Ray Cook MD;  [facility];   Service: Colorectal    WV CYSTO/URETERO W/LITHOTRIPSY &INDWELL STENT INSRT Left 2020    Procedure: CYSTOSCOPY URETEROSCOPY WITH LITHOTRIPSY HOLMIUM LASER, RETROGRADE PYELOGRAM AND INSERTION STENT URETERAL;  Surgeon: Ora Devi MD;  [facility];  Service: Urology  VA INCIS TENDON SHEATH,RADIAL STYLOID Right 2/19/2019    Procedure: Cyndy Sow;  Surgeon: Letha Mccauley MD;  [facility];  Service: Orthopedics    TUBAL LIGATION      UPPER GASTROINTESTINAL ENDOSCOPY  09/11/2020    WISDOM TOOTH EXTRACTION  1983    X2      Social History   Social History     Substance and Sexual Activity   Alcohol Use Yes    Comment: socially - 1-3 drinks per month     Social History     Substance and Sexual Activity   Drug Use No     Social History     Tobacco Use   Smoking Status Never Smoker   Smokeless Tobacco Never Used     Family History   Problem Relation Age of Onset    Gallbladder disease Mother     Thyroid disease Mother     Kidney disease Mother     Breast cancer Mother     Diabetes Father     Hypertension Father     Nephrolithiasis Father     Lymphoma Maternal Grandfather     Diabetes Paternal Grandmother     Breast cancer Paternal Aunt     No Known Problems Son     No Known Problems Son        Meds/Allergies       Current Outpatient Medications:     Cholecalciferol (VITAMIN D PO)    Cranberry (ELLURA PO)    cycloSPORINE (RESTASIS) 0 05 % ophthalmic emulsion    dicyclomine (BENTYL) 10 mg capsule    estradiol (ESTRACE) 0 1 mg/g vaginal cream    levothyroxine 50 mcg tablet    mesalamine (ASACOL) 800 MG EC tablet    Meth-Hyo-M Bl-Na Phos-Ph Sal (URO-MP PO)    Meth-Hyo-M Bl-Na Phos-Ph Sal (Uro-MP) 118 MG CAPS    mometasone (NASONEX) 50 mcg/act nasal spray    pentosan polysulfate (Elmiron) 100 mg capsule    rosuvastatin (CRESTOR) 5 mg tablet    trospium chloride (SANCTURA) 20 mg tablet    omeprazole (PriLOSEC) 20 mg delayed release capsule    Allergies   Allergen Reactions    Iodine - Food Allergy Anaphylaxis     Allergy to Iodinated and non iodinated dye    Other Anaphylaxis     APPLES    TAPE  Also rash at times    Seasonal Ic [Cholestatin] Allergic Rhinitis    Latex            Objective     Blood pressure 123/70, pulse 79, temperature (!) 97 4 °F (36 3 °C), temperature source Tympanic, weight 55 9 kg (123 lb 3 2 oz)  Body mass index is 19 3 kg/m²  PHYSICAL EXAM:      Physical Exam  Constitutional:       Appearance: Normal appearance  She is well-developed  HENT:      Head: Normocephalic and atraumatic  Eyes:      General: No scleral icterus  Conjunctiva/sclera: Conjunctivae normal       Pupils: Pupils are equal, round, and reactive to light  Cardiovascular:      Rate and Rhythm: Normal rate and regular rhythm  Heart sounds: Normal heart sounds  Pulmonary:      Effort: Pulmonary effort is normal  No respiratory distress  Breath sounds: Normal breath sounds  Abdominal:      General: Bowel sounds are normal  There is no distension  Palpations: Abdomen is soft  There is no mass  Tenderness: There is no abdominal tenderness  Hernia: No hernia is present  Comments: Nodule in the epigastric region     Musculoskeletal:         General: Normal range of motion  Cervical back: Normal range of motion  Lymphadenopathy:      Cervical: No cervical adenopathy  Skin:     General: Skin is warm  Neurological:      Mental Status: She is alert and oriented to person, place, and time  Psychiatric:         Behavior: Behavior normal          Thought Content: Thought content normal          Lab Results:   No visits with results within 1 Day(s) from this visit     Latest known visit with results is:   Orders Only on 05/18/2022   Component Date Value    Color, UA 05/18/2022 Other     Clarity, UA 05/18/2022 Turbid     Specific Gravity, UA 05/18/2022 1 017     pH, UA 05/18/2022 5 5     Leukocytes, UA 05/18/2022 Negative     Nitrite, UA 05/18/2022 Negative     Protein, UA 05/18/2022 Negative     Glucose, UA 05/18/2022 Negative     Ketones, UA 05/18/2022 Negative     Urobilinogen, UA 05/18/2022 <2 0     Bilirubin, UA 05/18/2022 Negative     Occult Blood, UA 05/18/2022 Negative Radiology Results:   No results found

## 2022-09-06 DIAGNOSIS — E74.39 GLUCOSE INTOLERANCE: ICD-10-CM

## 2022-09-06 DIAGNOSIS — D69.6 THROMBOCYTOPENIA (HCC): Primary | ICD-10-CM

## 2022-09-06 DIAGNOSIS — E78.2 MIXED HYPERLIPIDEMIA: ICD-10-CM

## 2022-09-06 DIAGNOSIS — E03.8 OTHER SPECIFIED HYPOTHYROIDISM: ICD-10-CM

## 2022-09-06 DIAGNOSIS — D70.9 GRANULOCYTOPENIA (HCC): ICD-10-CM

## 2022-09-08 NOTE — RESULT ENCOUNTER NOTE
Inform patient via [employer]  Please review the pathology/lab result of further discussion  Copied from [employer] message :       [address],     The ultrasound showed a small hernia  Otherwise it was unremarkable      Best regards,     Caitie Rehman MD

## 2022-09-09 ENCOUNTER — APPOINTMENT (OUTPATIENT)
Dept: OCCUPATIONAL THERAPY | Age: 62
End: 2022-09-09
Payer: COMMERCIAL

## 2022-09-12 ENCOUNTER — OFFICE VISIT (OUTPATIENT)
Dept: OCCUPATIONAL THERAPY | Age: 62
End: 2022-09-12
Payer: COMMERCIAL

## 2022-09-12 DIAGNOSIS — M79.642 LEFT HAND PAIN: ICD-10-CM

## 2022-09-12 DIAGNOSIS — S62.337A CLOSED DISPLACED FRACTURE OF NECK OF FIFTH METACARPAL BONE OF LEFT HAND, INITIAL ENCOUNTER: Primary | ICD-10-CM

## 2022-09-12 PROCEDURE — 97140 MANUAL THERAPY 1/> REGIONS: CPT

## 2022-09-12 NOTE — PROGRESS NOTES
Daily Note     Today's date: 2022  Patient name: Darron Medina  : 1960  MRN: [id number]  Referring provider: Daniele Monsivais MD  Dx:   Encounter Diagnosis     ICD-10-CM    1  Closed displaced fracture of neck of fifth metacarpal bone of left hand, initial encounter  S62 337A                   Subjective: It's not worse but I don't think it's any better    Objective: See treatment diary below  L : 31  R : 43    Assessment: Tolerated treatment well  Patient would benefit from continued OT  Tightness noted through ulnar sided hand today, possible arthritic knuckle causing consistent pain  Improved relief post cupping    Plan: Continue per plan of care        Precautions: [facility]  LATEX ALLERGY       Manuals 7/29 8/8 8/23 9/12 5/23 6/3 6/6 6/13 6/27 7/11   MEM             STM 40' with cupping 25' 40' 30' 30' with cupping 30' with cupping 36' with cupping 36' with cupping 30' 40' with cupping   KT                          Neuro Re-Ed                                                                                                        Ther Ex             PROM             Pencils             Progressive Grasp             Gross Grasp  GPW 3x10 pwr and cylin, Sustained G3  30 sec 3x           Pinch Ring             Hook Roll             Wrist Maze             Lumbrical Block              EDC             Wrist Strengthening             Dart Throwers             HEP: MCP PROM, Tendon glides, Isotoner for edema             Ther Activity             Translation             Pinch Pins             Pegs                                       Modalities

## 2022-09-19 ENCOUNTER — APPOINTMENT (OUTPATIENT)
Dept: LAB | Facility: AMBULARY SURGERY CENTER | Age: 62
End: 2022-09-19
Payer: COMMERCIAL

## 2022-09-19 DIAGNOSIS — E74.39 GLUCOSE INTOLERANCE: ICD-10-CM

## 2022-09-19 DIAGNOSIS — D70.9 GRANULOCYTOPENIA (HCC): ICD-10-CM

## 2022-09-19 DIAGNOSIS — E78.2 MIXED HYPERLIPIDEMIA: ICD-10-CM

## 2022-09-19 DIAGNOSIS — D69.6 THROMBOCYTOPENIA (HCC): ICD-10-CM

## 2022-09-19 DIAGNOSIS — E03.8 OTHER SPECIFIED HYPOTHYROIDISM: ICD-10-CM

## 2022-09-19 LAB
ALBUMIN SERPL BCP-MCNC: 3.6 G/DL (ref 3.5–5)
ALP SERPL-CCNC: 46 U/L (ref 46–116)
ALT SERPL W P-5'-P-CCNC: 23 U/L (ref 12–78)
ANION GAP SERPL CALCULATED.3IONS-SCNC: 3 MMOL/L (ref 4–13)
AST SERPL W P-5'-P-CCNC: 17 U/L (ref 5–45)
BASOPHILS # BLD AUTO: 0.04 THOUSANDS/ΜL (ref 0–0.1)
BASOPHILS NFR BLD AUTO: 1 % (ref 0–1)
BILIRUB SERPL-MCNC: 0.43 MG/DL (ref 0.2–1)
BUN SERPL-MCNC: 21 MG/DL (ref 5–25)
CALCIUM SERPL-MCNC: 9.1 MG/DL (ref 8.3–10.1)
CHLORIDE SERPL-SCNC: 106 MMOL/L (ref 96–108)
CHOLEST SERPL-MCNC: 197 MG/DL
CO2 SERPL-SCNC: 29 MMOL/L (ref 21–32)
CREAT SERPL-MCNC: 0.85 MG/DL (ref 0.6–1.3)
EOSINOPHIL # BLD AUTO: 0.05 THOUSAND/ΜL (ref 0–0.61)
EOSINOPHIL NFR BLD AUTO: 1 % (ref 0–6)
ERYTHROCYTE [DISTWIDTH] IN BLOOD BY AUTOMATED COUNT: 12.9 % (ref 11.6–15.1)
GFR SERPL CREATININE-BSD FRML MDRD: 73 ML/MIN/1.73SQ M
GLUCOSE P FAST SERPL-MCNC: 86 MG/DL (ref 65–99)
HCT VFR BLD AUTO: 43.4 % (ref 34.8–46.1)
HDLC SERPL-MCNC: 78 MG/DL
HGB BLD-MCNC: 13.7 G/DL (ref 11.5–15.4)
IMM GRANULOCYTES # BLD AUTO: 0 THOUSAND/UL (ref 0–0.2)
IMM GRANULOCYTES NFR BLD AUTO: 0 % (ref 0–2)
LDLC SERPL CALC-MCNC: 108 MG/DL (ref 0–100)
LYMPHOCYTES # BLD AUTO: 0.65 THOUSANDS/ΜL (ref 0.6–4.47)
LYMPHOCYTES NFR BLD AUTO: 19 % (ref 14–44)
MCH RBC QN AUTO: 28.5 PG (ref 26.8–34.3)
MCHC RBC AUTO-ENTMCNC: 31.6 G/DL (ref 31.4–37.4)
MCV RBC AUTO: 90 FL (ref 82–98)
MONOCYTES # BLD AUTO: 0.4 THOUSAND/ΜL (ref 0.17–1.22)
MONOCYTES NFR BLD AUTO: 11 % (ref 4–12)
NEUTROPHILS # BLD AUTO: 2.38 THOUSANDS/ΜL (ref 1.85–7.62)
NEUTS SEG NFR BLD AUTO: 68 % (ref 43–75)
NONHDLC SERPL-MCNC: 119 MG/DL
NRBC BLD AUTO-RTO: 0 /100 WBCS
PLATELET # BLD AUTO: 126 THOUSANDS/UL (ref 149–390)
PMV BLD AUTO: 11.3 FL (ref 8.9–12.7)
POTASSIUM SERPL-SCNC: 4.1 MMOL/L (ref 3.5–5.3)
PROT SERPL-MCNC: 7.4 G/DL (ref 6.4–8.4)
RBC # BLD AUTO: 4.81 MILLION/UL (ref 3.81–5.12)
SODIUM SERPL-SCNC: 138 MMOL/L (ref 135–147)
T4 FREE SERPL-MCNC: 1.01 NG/DL (ref 0.76–1.46)
TRIGL SERPL-MCNC: 53 MG/DL
TSH SERPL DL<=0.05 MIU/L-ACNC: 1.33 UIU/ML (ref 0.45–4.5)
WBC # BLD AUTO: 3.52 THOUSAND/UL (ref 4.31–10.16)

## 2022-09-19 PROCEDURE — 84443 ASSAY THYROID STIM HORMONE: CPT

## 2022-09-19 PROCEDURE — 84439 ASSAY OF FREE THYROXINE: CPT

## 2022-09-19 PROCEDURE — 80061 LIPID PANEL: CPT

## 2022-09-19 PROCEDURE — 85025 COMPLETE CBC W/AUTO DIFF WBC: CPT

## 2022-09-19 PROCEDURE — 36415 COLL VENOUS BLD VENIPUNCTURE: CPT

## 2022-09-19 PROCEDURE — 80053 COMPREHEN METABOLIC PANEL: CPT

## 2022-09-20 ENCOUNTER — CONSULT (OUTPATIENT)
Dept: SURGERY | Facility: CLINIC | Age: 62
End: 2022-09-20
Payer: COMMERCIAL

## 2022-09-20 DIAGNOSIS — K43.9 VENTRAL HERNIA WITHOUT OBSTRUCTION OR GANGRENE: Primary | ICD-10-CM

## 2022-09-20 PROCEDURE — 99212 OFFICE O/P EST SF 10 MIN: CPT | Performed by: SURGERY

## 2022-09-20 NOTE — PROGRESS NOTES
Assessment/Plan:  Patient presents with an 8 mm ventral hernia  This is noted by ultrasound  She developed a lump in the upper midline aspect of the abdomen 6 months ago  She denies pain  No history for trauma  Evaluation reveals an 8 mm nodule in the upper midline abdomen  It is not reducible  It is nontender  No abdominal mass effect  This is consistent with a small ventral hernia  Observation is reasonable  I asked her to return should become larger or more symptomatic  She is agreeable  She continues with the mid abdominal chronic intermittent pain difficulties  GI and colonoscopy notes were reviewed  Medications for ulcerative colitis have been moderately helpful  I suggested that she discuss a trial of Levsin with her gastroenterologist   This could be helpful if it is related to intestinal cramps  All questions answered  There are no diagnoses linked to this encounter  Subjective:      Patient ID: Juan Augustine is a 58 y o  female  Presents for evaluation of abdominal bulge above umbilicus  Present 6 months, denies pain  US 2022  The following portions of the patient's history were reviewed and updated as appropriate:     She  has a past medical history of Atopic dermatitis, Atrophic vaginitis, Dermatitis, Dry eye, Frequency of urination, Fungal infection, Herpes, Hyperlipidemia, Hypothyroidism, Interstitial cystitis, Osteoporosis, Periodontal abscess, PONV (postoperative nausea and vomiting), Thyroid nodule, Ulcerative colitis ([address] ), Urinary frequency, UTI (urinary tract infection), Vaginal atrophy, Vitamin D deficiency, and Voiding dysfunction  She  has a past surgical history that includes Kidney stone surgery; ORIF proximal ulna fracture; pr colonoscopy flx dx w/collj spec when pfrmd (N/A, 2017); Cystoscopy;  section; pr incis tendon sheath,radial styloid (Right, 2019); Ganglion cyst excision (Right, 2019);  [place] tooth extraction (6680); Tubal ligation; Other surgical history; pr cysto/uretero w/lithotripsy &indwell stent insrt (Left, 1/7/2020); FL retrograde pyelogram (1/7/2020); Colonoscopy (09/11/2020); and Upper gastrointestinal endoscopy (09/11/2020)  Her family history includes Breast cancer in her mother and paternal aunt; Diabetes in her father and paternal grandmother; Gallbladder disease in her mother; Hypertension in her father; Kidney disease in her mother; Lymphoma in her maternal grandfather; Nephrolithiasis in her father; No Known Problems in her son and son; Thyroid disease in her mother  She  reports that she has never smoked  She has never used smokeless tobacco  She reports current alcohol use  She reports that she does not use drugs    Current Outpatient Medications   Medication Sig Dispense Refill    Cholecalciferol (VITAMIN D PO) Take 3,000 Units by mouth daily      Cranberry (ELLURA PO) Take 1 capsule by mouth daily      cycloSPORINE (RESTASIS) 0 05 % ophthalmic emulsion Administer 1 drop to both eyes 2 (two) times a day      dicyclomine (BENTYL) 10 mg capsule Take 2 capsules (20 mg total) by mouth 3 (three) times a day before meals 270 capsule 3    estradiol (ESTRACE) 0 1 mg/g vaginal cream Insert 1 applicator full vaginally nightly 42 5 g 5    levothyroxine 50 mcg tablet Take 1 tablet (50 mcg total) by mouth daily 90 tablet 3    mesalamine (ASACOL) 800 MG EC tablet Take 1 tablet (800 mg total) by mouth 4 (four) times a day 360 tablet 0    Meth-Hyo-M Bl-Na Phos-Ph Sal (URO-MP PO) Take 1 capsule by mouth 2 (two) times a day       Meth-Hyo-M Bl-Na Phos-Ph Sal (Uro-MP) 118 MG CAPS TAKE 1 CAPSULE BY MOUTH FOUR TIMES A DAY FOR 28 DAYS      mometasone (NASONEX) 50 mcg/act nasal spray 2 sprays into each nostril daily 41 g 3    omeprazole (PriLOSEC) 20 mg delayed release capsule Take 1 capsule (20 mg total) by mouth daily 30 capsule 0    pentosan polysulfate (Elmiron) 100 mg capsule One pill 3 times a day 90 capsule 5    rosuvastatin (CRESTOR) 5 mg tablet Take 1 tablet (5 mg total) by mouth daily 90 tablet 3    trospium chloride (SANCTURA) 20 mg tablet Take 1 tablet (20 mg total) by mouth 2 (two) times a day 60 tablet 5     No current facility-administered medications for this visit  She is allergic to iodine - food allergy, other, seasonal ic [cholestatin], and latex       Review of Systems   Constitutional: Negative  Negative for activity change  HENT: Negative  Eyes: Negative  Respiratory: Negative  Cardiovascular: Negative  Gastrointestinal: Positive for abdominal pain  Endocrine: Negative  Genitourinary: Negative  Musculoskeletal: Negative  Skin: Negative  Allergic/Immunologic: Negative  Neurological: Negative  Psychiatric/Behavioral: Negative for agitation, behavioral problems and confusion  The patient is not nervous/anxious  Objective:      LMP  (LMP Unknown)          Physical Exam  Constitutional:       Appearance: Normal appearance  She is well-developed  HENT:      Head: Normocephalic and atraumatic  Nose: Nose normal    Eyes:      Extraocular Movements: Extraocular movements intact  Conjunctiva/sclera: Conjunctivae normal    Neck:      Vascular: No carotid bruit  Cardiovascular:      Rate and Rhythm: Normal rate and regular rhythm  Heart sounds: Normal heart sounds  Pulmonary:      Effort: Pulmonary effort is normal       Breath sounds: Normal breath sounds  Abdominal:      General: Abdomen is flat  Palpations: There is mass (Small ventral hernia  Not reducible  Nontender  )  Skin:     General: Skin is warm and dry  Neurological:      Mental Status: She is alert and oriented to person, place, and time     Psychiatric:         Mood and Affect: Mood normal

## 2022-09-26 ENCOUNTER — OFFICE VISIT (OUTPATIENT)
Dept: OCCUPATIONAL THERAPY | Age: 62
End: 2022-09-26
Payer: COMMERCIAL

## 2022-09-26 DIAGNOSIS — S62.337A CLOSED DISPLACED FRACTURE OF NECK OF FIFTH METACARPAL BONE OF LEFT HAND, INITIAL ENCOUNTER: Primary | ICD-10-CM

## 2022-09-26 DIAGNOSIS — M79.642 LEFT HAND PAIN: ICD-10-CM

## 2022-09-26 PROCEDURE — 97110 THERAPEUTIC EXERCISES: CPT

## 2022-09-26 PROCEDURE — 97140 MANUAL THERAPY 1/> REGIONS: CPT

## 2022-09-27 ENCOUNTER — OFFICE VISIT (OUTPATIENT)
Dept: INTERNAL MEDICINE CLINIC | Facility: CLINIC | Age: 62
End: 2022-09-27
Payer: COMMERCIAL

## 2022-09-27 ENCOUNTER — TELEPHONE (OUTPATIENT)
Dept: GASTROENTEROLOGY | Facility: MEDICAL CENTER | Age: 62
End: 2022-09-27

## 2022-09-27 VITALS
OXYGEN SATURATION: 99 % | DIASTOLIC BLOOD PRESSURE: 68 MMHG | BODY MASS INDEX: 19.27 KG/M2 | SYSTOLIC BLOOD PRESSURE: 114 MMHG | HEIGHT: 67 IN | HEART RATE: 94 BPM | WEIGHT: 122.8 LBS | TEMPERATURE: 98.3 F

## 2022-09-27 DIAGNOSIS — D69.6 THROMBOCYTOPENIA (HCC): ICD-10-CM

## 2022-09-27 DIAGNOSIS — N30.01 ACUTE CYSTITIS WITH HEMATURIA: ICD-10-CM

## 2022-09-27 DIAGNOSIS — K43.9 VENTRAL HERNIA WITHOUT OBSTRUCTION OR GANGRENE: ICD-10-CM

## 2022-09-27 DIAGNOSIS — D70.9 GRANULOCYTOPENIA (HCC): ICD-10-CM

## 2022-09-27 DIAGNOSIS — E78.2 MIXED HYPERLIPIDEMIA: ICD-10-CM

## 2022-09-27 DIAGNOSIS — K58.0 IRRITABLE BOWEL SYNDROME WITH DIARRHEA: Primary | ICD-10-CM

## 2022-09-27 DIAGNOSIS — K51.80 OTHER ULCERATIVE COLITIS WITHOUT COMPLICATION (HCC): ICD-10-CM

## 2022-09-27 DIAGNOSIS — E03.8 OTHER SPECIFIED HYPOTHYROIDISM: ICD-10-CM

## 2022-09-27 PROBLEM — R22.2 SUBCUTANEOUS NODULE OF ABDOMINAL WALL: Status: RESOLVED | Noted: 2022-09-02 | Resolved: 2022-09-27

## 2022-09-27 PROCEDURE — 99214 OFFICE O/P EST MOD 30 MIN: CPT | Performed by: INTERNAL MEDICINE

## 2022-09-27 NOTE — ASSESSMENT & PLAN NOTE
Hypothyroid condition reviewed today patient is stable on current dosing of 50 mcg daily free T4 TSH both within normal range

## 2022-09-27 NOTE — PROGRESS NOTES
Name: Kenny Martins      : 1960      MRN: [id number]  Encounter Provider: Adrienne Harris MD  Encounter Date: 2022   Encounter department: [address]     1  Other ulcerative colitis without complication (HCC)  Assessment & Plan:  Recommend continuation of Asacol therapy 800 mg 4 times daily      2  Irritable bowel syndrome with diarrhea  Assessment & Plan:  I have reviewed with the patient her irritable bowel syndrome and also reviewed with her her most recent consultation with her GI consultant  She is currently on Bentyl twice a day for control of her symptoms  Her gastroenterologist has recommended consideration of alternative medications to see if they might be more beneficial to control her symptoms  After reviewing his recommendations it appears that possibly Donnatal may be a reasonable medication to try  I recommended that she contact him and discuss further the type of dosing he would recommend of this medication  Obviously she would discontinue the Bentyl while trying the Donnatal       3  Other specified hypothyroidism  Assessment & Plan:  Hypothyroid condition reviewed today patient is stable on current dosing of 50 mcg daily free T4 TSH both within normal range      4  Thrombocytopenia ([address] )  Assessment & Plan:  CBC reviewed shows of ongoing thrombocytopenia no abnormal bleeding symptoms are noted recommend continued surveillance next testing in 6 months      5  Ventral hernia without obstruction or gangrene  Assessment & Plan:  Small ventral hernia noted on examination this is asymptomatic patient may decide to have this fixed as she does enjoy exercising which may tend to cause further enlargement of the hernia  At her discretion if she would like to have it repaired  6  Mixed hyperlipidemia  Assessment & Plan:  Lipid profile reviewed with patient does show an upward trend in LDL    She admits to having a fondness for ice cream   Recommend that she try to decrease her consumption of this high fat food  Next testing for cholesterol in 6 months      7  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[facility] [address] )  Assessment & Plan:  CBC reviewed does confirm the presence of granulocytopenia patient does not seem to have any increase in frequency of infections of values do not show any major deviation over the past several studies recommend continued surveillance        Depression Screening and Follow-up Plan: Patient was screened for depression during today's encounter  They screened negative with a PHQ-2 score of 0  Subjective      This pleasant 63-year-old female patient presents to the office today for routine follow-up visit  She is here today to review recent consultations with GI services regarding her ongoing irritable bowel syndrome  She also has had a recent visit with her general surgeon regarding a small ventral hernia  We reviewed the recommendations of her surgeon during today's visit  In addition we reviewed the patient's most recent blood work  Patient relates that she has been under increased levels of stress due to the current health situation with her parents  There declining health has created issues that have been stressful for the patient  I am certain that the ongoing stress that she is under with her parents is exacerbating her irritable bowel syndrome  She continues to experience discomfort across the upper abdomen intermittently  Review of Systems   Gastrointestinal: Positive for abdominal pain  Psychiatric/Behavioral: The patient is nervous/anxious  All other systems reviewed and are negative        Current Outpatient Medications on File Prior to Visit   Medication Sig    Cholecalciferol (VITAMIN D PO) Take 3,000 Units by mouth daily    Cranberry (ELLURA PO) Take 1 capsule by mouth daily    cycloSPORINE (RESTASIS) 0 05 % ophthalmic emulsion Administer 1 drop to both eyes 2 (two) times a day    dicyclomine (BENTYL) 10 mg capsule Take 2 capsules (20 mg total) by mouth 3 (three) times a day before meals (Patient taking differently: Take 20 mg by mouth 2 (two) times a day)    estradiol (ESTRACE) 0 1 mg/g vaginal cream Insert 1 applicator full vaginally nightly    levothyroxine 50 mcg tablet Take 1 tablet (50 mcg total) by mouth daily    mesalamine (ASACOL) 800 MG EC tablet Take 1 tablet (800 mg total) by mouth 4 (four) times a day    Meth-Hyo-M Bl-Na Phos-Ph Sal (URO-MP PO) Take 1 capsule by mouth 2 (two) times a day     mometasone (NASONEX) 50 mcg/act nasal spray 2 sprays into each nostril daily    pentosan polysulfate (Elmiron) 100 mg capsule One pill 3 times a day    rosuvastatin (CRESTOR) 5 mg tablet Take 1 tablet (5 mg total) by mouth daily    trospium chloride (SANCTURA) 20 mg tablet Take 1 tablet (20 mg total) by mouth 2 (two) times a day    omeprazole (PriLOSEC) 20 mg delayed release capsule Take 1 capsule (20 mg total) by mouth daily    [DISCONTINUED] Meth-Hyo-M Bl-Na Phos-Ph Sal (Uro-MP) 118 MG CAPS TAKE 1 CAPSULE BY MOUTH FOUR TIMES A DAY FOR 28 DAYS       Objective     /68   Pulse 94   Temp 98 3 °F (36 8 °C)   Ht 5' 7" (1 702 m)   Wt 55 7 kg (122 lb 12 8 oz)   LMP  (LMP Unknown)   SpO2 99%   BMI 19 23 kg/m²     Physical Exam  Vitals reviewed  Constitutional:       General: She is not in acute distress  Appearance: Normal appearance  She is well-developed  She is not ill-appearing  HENT:      Head: Normocephalic  Right Ear: Hearing and external ear normal       Left Ear: Hearing and external ear normal       Nose: Nose normal  No mucosal edema  Mouth/Throat:      Pharynx: Uvula midline  Eyes:      General: Lids are normal       Conjunctiva/sclera: Conjunctivae normal       Pupils: Pupils are equal, round, and reactive to light  Neck:      Thyroid: No thyromegaly  Vascular: No carotid bruit or JVD  Cardiovascular:      Rate and Rhythm: Normal rate and regular rhythm  Heart sounds: Normal heart sounds  No murmur heard  Pulmonary:      Effort: Pulmonary effort is normal  No respiratory distress  Breath sounds: Normal breath sounds  No wheezing, rhonchi or rales  Abdominal:      General: Bowel sounds are normal       Palpations: Abdomen is soft  Hernia: A hernia is present  Comments: Small ventral hernia above the umbilicus   Musculoskeletal:         General: Normal range of motion  Lymphadenopathy:      Cervical: No cervical adenopathy  Skin:     General: Skin is warm and dry  Neurological:      Mental Status: She is alert and oriented to person, place, and time  Deep Tendon Reflexes: Reflexes are normal and symmetric  Psychiatric:         Speech: Speech normal          Behavior: Behavior normal  Behavior is cooperative  Thought Content:  Thought content normal          Judgment: Judgment normal        George James MD

## 2022-09-27 NOTE — ASSESSMENT & PLAN NOTE
Small ventral hernia noted on examination this is asymptomatic patient may decide to have this fixed as she does enjoy exercising which may tend to cause further enlargement of the hernia  At her discretion if she would like to have it repaired

## 2022-09-27 NOTE — TELEPHONE ENCOUNTER
Pt called and wanted to go over changes in her medication  She was told to call back and review changes with Dr Miriam Roy

## 2022-09-27 NOTE — ASSESSMENT & PLAN NOTE
I have reviewed with the patient her irritable bowel syndrome and also reviewed with her her most recent consultation with her GI consultant  She is currently on Bentyl twice a day for control of her symptoms  Her gastroenterologist has recommended consideration of alternative medications to see if they might be more beneficial to control her symptoms  After reviewing his recommendations it appears that possibly Donnatal may be a reasonable medication to try  I recommended that she contact him and discuss further the type of dosing he would recommend of this medication    Obviously she would discontinue the Bentyl while trying the Donnatal

## 2022-09-27 NOTE — ASSESSMENT & PLAN NOTE
CBC reviewed does confirm the presence of granulocytopenia patient does not seem to have any increase in frequency of infections of values do not show any major deviation over the past several studies recommend continued surveillance

## 2022-09-27 NOTE — ASSESSMENT & PLAN NOTE
CBC reviewed shows of ongoing thrombocytopenia no abnormal bleeding symptoms are noted recommend continued surveillance next testing in 6 months

## 2022-09-27 NOTE — ASSESSMENT & PLAN NOTE
Lipid profile reviewed with patient does show an upward trend in LDL  She admits to having a fondness for ice cream   Recommend that she try to decrease her consumption of this high fat food    Next testing for cholesterol in 6 months

## 2022-09-28 ENCOUNTER — PROBLEM (OUTPATIENT)
Dept: URBAN - METROPOLITAN AREA CLINIC 6 | Facility: CLINIC | Age: 62
End: 2022-09-28

## 2022-09-28 DIAGNOSIS — N30.01 ACUTE CYSTITIS WITH HEMATURIA: Primary | ICD-10-CM

## 2022-09-28 DIAGNOSIS — H04.123: ICD-10-CM

## 2022-09-28 DIAGNOSIS — H25.13: ICD-10-CM

## 2022-09-28 PROCEDURE — 87086 URINE CULTURE/COLONY COUNT: CPT | Performed by: INTERNAL MEDICINE

## 2022-09-28 PROCEDURE — 87186 SC STD MICRODIL/AGAR DIL: CPT | Performed by: INTERNAL MEDICINE

## 2022-09-28 PROCEDURE — 87077 CULTURE AEROBIC IDENTIFY: CPT | Performed by: INTERNAL MEDICINE

## 2022-09-28 PROCEDURE — 92012 INTRM OPH EXAM EST PATIENT: CPT

## 2022-09-28 ASSESSMENT — TONOMETRY
OD_IOP_MMHG: 13
OS_IOP_MMHG: 13

## 2022-09-28 ASSESSMENT — VISUAL ACUITY
OD_CC: 20/25-2
OS_CC: 20/25-2

## 2022-09-30 DIAGNOSIS — K58.0 IRRITABLE BOWEL SYNDROME WITH DIARRHEA: Primary | ICD-10-CM

## 2022-09-30 LAB — BACTERIA UR CULT: ABNORMAL

## 2022-09-30 RX ORDER — PHENOBARBITAL, HYOSCYAMINE SULFATE, ATROPINE SULFATE AND SCOPOLAMINE HYDROBROMIDE .0194; .1037; 16.2; .0065 MG/1; MG/1; MG/1; MG/1
1 TABLET ORAL EVERY 8 HOURS PRN
Qty: 60 TABLET | Refills: 0 | Status: SHIPPED | OUTPATIENT
Start: 2022-09-30 | End: 2022-10-12

## 2022-09-30 NOTE — PROGRESS NOTES
Donnatal was prescribed after discussion with the patient  She will let us know how she does with it

## 2022-10-01 DIAGNOSIS — N30.01 ACUTE CYSTITIS WITH HEMATURIA: Primary | ICD-10-CM

## 2022-10-01 RX ORDER — CEPHALEXIN 500 MG/1
500 CAPSULE ORAL EVERY 12 HOURS SCHEDULED
Qty: 30 CAPSULE | Refills: 2 | Status: SHIPPED | OUTPATIENT
Start: 2022-10-01 | End: 2022-10-16

## 2022-10-03 ENCOUNTER — TELEPHONE (OUTPATIENT)
Dept: GASTROENTEROLOGY | Facility: MEDICAL CENTER | Age: 62
End: 2022-10-03

## 2022-10-03 NOTE — TELEPHONE ENCOUNTER
Started a prior authorization for Donnatal 16 2mg tabs   Key #: [id number]- Rx #: [id number]  Per [facility] Team is unable to review this request for prior authorization as the requested medication is on formulary and does not require a prior authorization  No further action is needed at this time  Please Advise an alternative

## 2022-10-06 ENCOUNTER — TELEPHONE (OUTPATIENT)
Dept: GASTROENTEROLOGY | Facility: MEDICAL CENTER | Age: 62
End: 2022-10-06

## 2022-10-06 NOTE — TELEPHONE ENCOUNTER
Pt called in and asked if there is any alternative drug we could try and send in for her   The prior auth for Donnatal was unsuccessful

## 2022-10-09 ENCOUNTER — APPOINTMENT (OUTPATIENT)
Dept: LAB | Facility: CLINIC | Age: 62
End: 2022-10-09
Payer: COMMERCIAL

## 2022-10-09 DIAGNOSIS — R30.0 DYSURIA: ICD-10-CM

## 2022-10-09 PROCEDURE — 87086 URINE CULTURE/COLONY COUNT: CPT

## 2022-10-10 ENCOUNTER — OFFICE VISIT (OUTPATIENT)
Dept: OCCUPATIONAL THERAPY | Age: 62
End: 2022-10-10
Payer: COMMERCIAL

## 2022-10-10 DIAGNOSIS — S62.337A CLOSED DISPLACED FRACTURE OF NECK OF FIFTH METACARPAL BONE OF LEFT HAND, INITIAL ENCOUNTER: Primary | ICD-10-CM

## 2022-10-10 LAB — BACTERIA UR CULT: NORMAL

## 2022-10-10 PROCEDURE — 97110 THERAPEUTIC EXERCISES: CPT | Performed by: OCCUPATIONAL THERAPIST

## 2022-10-10 NOTE — PROGRESS NOTES
Daily Note     Today's date: 10/10/2022  Patient name: Jose Miguel Nelson  : 1960  MRN: [id number]  Referring provider: Mi Hobbs MD  Dx:   Encounter Diagnosis     ICD-10-CM    1  Closed displaced fracture of neck of fifth metacarpal bone of left hand, initial encounter  S62 337A                   Subjective: "It still hurts grasping heavy things"      Objective: See treatment diary below      Assessment: Tolerated well, but persisting MCP stiffness and pain    Plan: Continue per plan of care        Precautions: Bly  LATEX ALLERGY      Manuals  2/7 2/28 3/21 3/28 4/1 4/4 4/11 4/13    MEM 8'            STM  8' 8' 8' 8' 8' 8' 25' 25    KT       Y strip with space correction I strip                   Neuro Re-Ed                                                                                                        Ther Ex             PROM 5'  5' 10' 5' 5' 5'  15    Pencils   1x          Progressive Grasp             Gross Grasp  RPW  RPW 3x10 GPW 3x10 GPW 3x10 GPW 3x10      Pinch Ring  R/R 2x           Hook Roll   2x10          Wrist Maze   x4    x6 X6     Lumbrical Block    x10          EDC    Sm RB x15 Sm RB x15 Sm RB x15 Sm RB x15      Wrist Strengthening       Isometrics YFB x15 Isometrics YFB x15 YFB 2x10 iso 4 planes    Dart Throwers             HEP: MCP PROM, Tendon glides, Isotoner for edema   Foam Fist          Ther Activity             Translation             Pinch Pins    R/R 2x         Pegs     R in, 10# out R in, 10# out                                 Modalities

## 2022-10-12 ENCOUNTER — TELEPHONE (OUTPATIENT)
Dept: GASTROENTEROLOGY | Facility: MEDICAL CENTER | Age: 62
End: 2022-10-12

## 2022-10-12 DIAGNOSIS — K58.9 IRRITABLE BOWEL SYNDROME WITHOUT DIARRHEA: Primary | ICD-10-CM

## 2022-10-12 DIAGNOSIS — K58.0 IRRITABLE BOWEL SYNDROME WITH DIARRHEA: ICD-10-CM

## 2022-10-12 PROBLEM — Z00.00 HEALTH CARE MAINTENANCE: Status: RESOLVED | Noted: 2021-02-08 | Resolved: 2022-10-12

## 2022-10-12 NOTE — TELEPHONE ENCOUNTER
Dr Madrid please call the pt to discuss an alternative medication   Silvestre ANTONIO suggested Levsin as an alternative she refused

## 2022-10-12 NOTE — TELEPHONE ENCOUNTER
Pt called stating she is not able to get the Donnatal that Dr Miriam Roy had prescribed  She is asking if there are any alternatives she would be able to try in the meantime

## 2022-10-13 ENCOUNTER — DOCUMENTATION (OUTPATIENT)
Dept: GASTROENTEROLOGY | Facility: MEDICAL CENTER | Age: 62
End: 2022-10-13

## 2022-10-13 DIAGNOSIS — K52.9 COLITIS: Primary | ICD-10-CM

## 2022-10-13 NOTE — PROGRESS NOTES
Discussed with patient  She continues to have symptoms  Recommend repeat evaluation for her colitis to see if that's the reason for her discomfort  Discussed doing another EGD/ Colon and biopsies but she is hesitant  We will plan on doing surrogate markers for inflammation including calprotectin and CRP  Otherwise her recent blood work was [employer]  She was recommended Donnatal but has not been able to get it but will be working on it  Discussed other options as well but did not tolerate Amitriptyline and does not want to risk the side effects with Librax  Other options might be an SSRI  Will discuss with Dr Evette Zhou if she is unable to get hte Donnatal    Thank you     Corona Orellana

## 2022-10-17 ENCOUNTER — OFFICE VISIT (OUTPATIENT)
Dept: SURGERY | Facility: CLINIC | Age: 62
End: 2022-10-17
Payer: COMMERCIAL

## 2022-10-17 ENCOUNTER — OFFICE VISIT (OUTPATIENT)
Dept: INTERNAL MEDICINE CLINIC | Facility: CLINIC | Age: 62
End: 2022-10-17
Payer: COMMERCIAL

## 2022-10-17 VITALS — SYSTOLIC BLOOD PRESSURE: 132 MMHG | DIASTOLIC BLOOD PRESSURE: 70 MMHG

## 2022-10-17 VITALS — HEIGHT: 67 IN | WEIGHT: 123 LBS | BODY MASS INDEX: 19.3 KG/M2

## 2022-10-17 DIAGNOSIS — R10.31 RLQ ABDOMINAL PAIN: Primary | ICD-10-CM

## 2022-10-17 DIAGNOSIS — R10.31 RIGHT LOWER QUADRANT ABDOMINAL PAIN: Primary | ICD-10-CM

## 2022-10-17 DIAGNOSIS — R30.0 DIFFICULT OR PAINFUL URINATION: Primary | ICD-10-CM

## 2022-10-17 DIAGNOSIS — N20.0 CALCULUS OF KIDNEY: ICD-10-CM

## 2022-10-17 PROCEDURE — 99214 OFFICE O/P EST MOD 30 MIN: CPT | Performed by: INTERNAL MEDICINE

## 2022-10-17 PROCEDURE — 99213 OFFICE O/P EST LOW 20 MIN: CPT | Performed by: SURGERY

## 2022-10-17 NOTE — ASSESSMENT & PLAN NOTE
Patient is having difficulty with right lower quadrant abdominal pain  Urgency with urination  Was going for ultrasound rule out stone  If negative spiral CT  Addendum both of the studies are negative    Unknown etiology of pain

## 2022-10-17 NOTE — ASSESSMENT & PLAN NOTE
Patient presents urgently today in our office for evaluation of right lower quadrant abdominal pain which is approximately 21 hours in duration now  Pain initially started as a waxing and waning type of pain pain is now consistent  She had her last normal bowel movement within 24 hours  She has some mild nausea but no actual vomiting  CT and ultrasound did not show any evidence of stones urinalysis shows no leukocytes negative nitrites no protein  At this point I am concerned about a possible appendicitis  I have called the patient's surgeon Dr Mahsa Mcguire and I have asked him to take a look at the patient  He is happy to see the patient in we are sending her right over to his office for further evaluation

## 2022-10-17 NOTE — PROGRESS NOTES
Assessment/Plan:  Patient has a prolonged history for intermittent abdominal complaints  She is seen gastroenterology  Endoscopies completed  She is not been able to undergo small bowel follow-through testing as she is difficulty with barium or Gastrografin products  She is a history for intermittent right lower quadrant abdominal discomfort  This usually is brief and then dissipates  It is been more frequent over the last 2 weeks  She developed significant right lower quadrant abdominal pain last night and it remained constant throughout the evening  She is not able to sleep  She describes the pain as sharp  She describes frequent urination without dysuria  She denies fevers or chills  Denies nausea or vomiting  Denies diarrhea  She is postmenopausal    CT scan of the abdomen and pelvis without contrast reveals no evidence for a kidney stone  Since she is quite thin, it is not possible to evaluate the appendix  Examination reveals her abdomen is soft  She is quite tender to palpation in the right lower quadrant of the abdomen  No muscular guarding  Positive for rebound  No evidence for adenopathy or herniation  I recommended a repeat CT scan with p o  contrast   Patient will flavor the Omnipaque in order to help tolerate is flavor  Further recommendations pending this result  There are no diagnoses linked to this encounter  Subjective:      Patient ID: Sterling Hutchins is a 58 y o  female  Patient presents for evaluation of abdominal pain  States she has had intermittent RLQ pain for 2 weeks  Abdominal Pain  The current episode started 1 to 4 weeks ago  The problem occurs intermittently  The problem has been gradually worsening  The pain is located in the RLQ  The quality of the pain is aching and sharp         The following portions of the patient's history were reviewed and updated as appropriate:     She  has a past medical history of Atopic dermatitis, Atrophic vaginitis, Dermatitis, Dry eye, Frequency of urination, Fungal infection, Herpes, Hyperlipidemia, Hypothyroidism, Interstitial cystitis, Osteoporosis, Periodontal abscess, PONV (postoperative nausea and vomiting), Thyroid nodule, Ulcerative colitis ([address] ), Urinary frequency, UTI (urinary tract infection), Vaginal atrophy, Vitamin D deficiency, and Voiding dysfunction  She  has a past surgical history that includes Kidney stone surgery; ORIF proximal ulna fracture; pr colonoscopy flx dx w/collj spec when pfrmd (N/A, 2017); Cystoscopy;  section; pr incis tendon sheath,radial styloid (Right, 2019); Ganglion cyst excision (Right, 2019); [place] tooth extraction (); Tubal ligation; Other surgical history; pr cysto/uretero w/lithotripsy &indwell stent insrt (Left, 2020); [place] retrograde pyelogram (2020); Colonoscopy (2020); and Upper gastrointestinal endoscopy (2020)  Her family history includes Breast cancer in her mother and paternal aunt; Diabetes in her father and paternal grandmother; Gallbladder disease in her mother; Hypertension in her father; Kidney disease in her mother; Lymphoma in her maternal grandfather; Nephrolithiasis in her father; No Known Problems in her son and son; Thyroid disease in her mother  She  reports that she has never smoked  She has never used smokeless tobacco  She reports current alcohol use  She reports that she does not use drugs    Current Outpatient Medications   Medication Sig Dispense Refill   • Cholecalciferol (VITAMIN D PO) Take 3,000 Units by mouth daily     • Cranberry (ELLURA PO) Take 1 capsule by mouth daily     • cycloSPORINE (RESTASIS) 0 05 % ophthalmic emulsion Administer 1 drop to both eyes 2 (two) times a day     • dicyclomine (BENTYL) 10 mg capsule Take 2 capsules (20 mg total) by mouth 3 (three) times a day before meals (Patient taking differently: Take 20 mg by mouth 2 (two) times a day) 270 capsule 3   • estradiol (ESTRACE) 0 1 mg/g vaginal cream Insert 1 applicator full vaginally nightly 42 5 g 5   • levothyroxine 50 mcg tablet Take 1 tablet (50 mcg total) by mouth daily 90 tablet 3   • mesalamine (ASACOL) 800 MG EC tablet Take 1 tablet (800 mg total) by mouth 4 (four) times a day 360 tablet 0   • Meth-Hyo-M Bl-Na Phos-Ph Sal (URO-MP PO) Take 1 capsule by mouth 2 (two) times a day      • mometasone (NASONEX) 50 mcg/act nasal spray 2 sprays into each nostril daily 41 g 3   • omeprazole (PriLOSEC) 20 mg delayed release capsule Take 1 capsule (20 mg total) by mouth daily 30 capsule 0   • pentosan polysulfate (Elmiron) 100 mg capsule One pill 3 times a day 90 capsule 5   • rosuvastatin (CRESTOR) 5 mg tablet Take 1 tablet (5 mg total) by mouth daily 90 tablet 3   • trospium chloride (SANCTURA) 20 mg tablet Take 1 tablet (20 mg total) by mouth 2 (two) times a day 60 tablet 5     No current facility-administered medications for this visit  She is allergic to iodine - food allergy, other, seasonal ic [cholestatin], and latex       Review of Systems   Constitutional: Negative  Negative for activity change  HENT: Negative  Eyes: Negative  Respiratory: Negative  Cardiovascular: Negative  Gastrointestinal: Positive for abdominal pain  Endocrine: Negative  Genitourinary: Negative  Musculoskeletal: Negative  Skin: Negative  Allergic/Immunologic: Negative  Neurological: Negative  Psychiatric/Behavioral: Negative for agitation, behavioral problems and confusion  The patient is not nervous/anxious  Objective:      Ht 5' 7" (1 702 m)   Wt 55 8 kg (123 lb)   LMP  (LMP Unknown)   BMI 19 26 kg/m²          Physical Exam  Constitutional:       Appearance: Normal appearance  She is well-developed  HENT:      Head: Normocephalic and atraumatic  Nose: Nose normal    Eyes:      Conjunctiva/sclera: Conjunctivae normal    Cardiovascular:      Rate and Rhythm: Normal rate and regular rhythm        Heart sounds: Normal heart sounds  Pulmonary:      Effort: Pulmonary effort is normal    Abdominal:      General: Abdomen is flat  Tenderness: There is abdominal tenderness in the right lower quadrant  There is rebound  There is no guarding  Musculoskeletal:         General: Normal range of motion  Cervical back: Normal range of motion  Skin:     General: Skin is warm and dry  Neurological:      Mental Status: She is alert and oriented to person, place, and time     Psychiatric:         Mood and Affect: Mood normal

## 2022-10-17 NOTE — PROGRESS NOTES
Name: Refugio Conde      : 1960      MRN: [id number]  Encounter Provider: Rachel Cowart MD  Encounter Date: 10/17/2022   Encounter department: [address]     1  Right lower quadrant abdominal pain  Assessment & Plan:  Patient presents urgently today in our office for evaluation of right lower quadrant abdominal pain which is approximately 21 hours in duration now  Pain initially started as a waxing and waning type of pain pain is now consistent  She had her last normal bowel movement within 24 hours  She has some mild nausea but no actual vomiting  CT and ultrasound did not show any evidence of stones urinalysis shows no leukocytes negative nitrites no protein  At this point I am concerned about a possible appendicitis  I have called the patient's surgeon Dr Brook Oliveira and I have asked him to take a look at the patient  He is happy to see the patient in we are sending her right over to his office for further evaluation  Subjective      This 51-year-old female patient presents today for an acute visit  She is here today for evaluation of abdominal pain  She indicates that yesterday around 3:00  In the afternoon while cooking in her kitchen she began experiencing of right lower quadrant abdominal pain which was initially a waxing and waning type of pain  Pain is sharp in nature  The pain now has evolved into a constant pain  Her last bowel movement was within the past 24 hours  She indicates that was a normal bowel movement  She has minimal nausea at this point  She did take a Bentyl pill earlier today which did not seem to relieve any of her discomfort  She did have a ultrasound of her kidneys and bladder as well as a spiral CT scan at radiology and MRI site of Te  Verbal report is that there is no evidence of any stones  They are not able to see her appendix clearly on the studies      Review of Systems Gastrointestinal: Positive for abdominal pain  All other systems reviewed and are negative  Current Outpatient Medications on File Prior to Visit   Medication Sig   • [] cephalexin (KEFLEX) 500 mg capsule Take 1 capsule (500 mg total) by mouth every 12 (twelve) hours for 15 days   • Cholecalciferol (VITAMIN D PO) Take 3,000 Units by mouth daily   • Cranberry (ELLURA PO) Take 1 capsule by mouth daily   • cycloSPORINE (RESTASIS) 0 05 % ophthalmic emulsion Administer 1 drop to both eyes 2 (two) times a day   • dicyclomine (BENTYL) 10 mg capsule Take 2 capsules (20 mg total) by mouth 3 (three) times a day before meals (Patient taking differently: Take 20 mg by mouth 2 (two) times a day)   • estradiol (ESTRACE) 0 1 mg/g vaginal cream Insert 1 applicator full vaginally nightly   • levothyroxine 50 mcg tablet Take 1 tablet (50 mcg total) by mouth daily   • mesalamine (ASACOL) 800 MG EC tablet Take 1 tablet (800 mg total) by mouth 4 (four) times a day   • Meth-Hyo-M Bl-Na Phos-Ph Sal (URO-MP PO) Take 1 capsule by mouth 2 (two) times a day    • mometasone (NASONEX) 50 mcg/act nasal spray 2 sprays into each nostril daily   • omeprazole (PriLOSEC) 20 mg delayed release capsule Take 1 capsule (20 mg total) by mouth daily   • pentosan polysulfate (Elmiron) 100 mg capsule One pill 3 times a day   • rosuvastatin (CRESTOR) 5 mg tablet Take 1 tablet (5 mg total) by mouth daily   • trospium chloride (SANCTURA) 20 mg tablet Take 1 tablet (20 mg total) by mouth 2 (two) times a day       Objective     /70   LMP  (LMP Unknown)     Physical Exam  Abdominal:      General: Abdomen is flat  There is no distension  Palpations: Abdomen is soft  There is no mass  Tenderness: There is abdominal tenderness  There is guarding and rebound  Comments: Patient has tenderness in the right lower quadrant along with evidence of rebound in McBurney's point         Albertina Crowley MD

## 2022-10-18 ENCOUNTER — TELEPHONE (OUTPATIENT)
Dept: OTHER | Facility: OTHER | Age: 62
End: 2022-10-18

## 2022-10-18 ENCOUNTER — OFFICE VISIT (OUTPATIENT)
Dept: UROLOGY | Facility: AMBULATORY SURGERY CENTER | Age: 62
End: 2022-10-18
Payer: COMMERCIAL

## 2022-10-18 ENCOUNTER — TELEPHONE (OUTPATIENT)
Dept: UROLOGY | Facility: AMBULATORY SURGERY CENTER | Age: 62
End: 2022-10-18

## 2022-10-18 VITALS
OXYGEN SATURATION: 96 % | DIASTOLIC BLOOD PRESSURE: 88 MMHG | HEIGHT: 67 IN | SYSTOLIC BLOOD PRESSURE: 150 MMHG | HEART RATE: 88 BPM | BODY MASS INDEX: 19.3 KG/M2 | WEIGHT: 123 LBS

## 2022-10-18 DIAGNOSIS — R33.9 INCOMPLETE BLADDER EMPTYING: Primary | ICD-10-CM

## 2022-10-18 DIAGNOSIS — N20.0 NEPHROLITHIASIS: ICD-10-CM

## 2022-10-18 LAB — POST-VOID RESIDUAL VOLUME, ML POC: 273 ML

## 2022-10-18 PROCEDURE — 99214 OFFICE O/P EST MOD 30 MIN: CPT | Performed by: UROLOGY

## 2022-10-18 PROCEDURE — 51798 US URINE CAPACITY MEASURE: CPT | Performed by: UROLOGY

## 2022-10-18 RX ORDER — NITROFURANTOIN 25; 75 MG/1; MG/1
CAPSULE ORAL
COMMUNITY
Start: 2022-10-09

## 2022-10-18 RX ORDER — AMITRIPTYLINE HYDROCHLORIDE 10 MG/1
TABLET, FILM COATED ORAL
COMMUNITY

## 2022-10-18 NOTE — PROGRESS NOTES
10/18/2022    Shannen Moore  1960  [id number]        Assessment  History nephrolithiasis without recurrent stones, incomplete bladder emptying, right lower quadrant abdominal pain, prior history of urethral surgery with Urogynecology      Discussion  We discussed her elevated postvoid residual and history of urethral dilation at length in the office today  I discussed with her performing cystoscopy in the office today to assess the caliber of her urethra but she would prefer to not have cystoscopy at this time  I provided her with female catheters and recommended that she catheterize herself twice daily for the next 7 days to see if decompressing her bladder will help relieve her symptoms  A urine culture from yesterday is pending  Pending the results I would hold on antibiotics  Zachariah Middletonfreddieprabhjot was instructed to call me if she is unable to pass a catheter as she would require dilation in the office  History of Present Illness  58 y o  female with a history of nephrolithiasis status post ureteroscopy with holmium laser lithotripsy  This was most recently performed in January of 2020  She was last seen in May of 2022 and had no sign of recurrent stone disease  Most recently she complains of right lower quadrant abdominal pain of such intensity she was concerned she had acute appendicitis  She was evaluated by General surgery and a CT scan was obtained  There was no sign of appendicitis noted  Her bladder was noted to be quite distended however  She had a urinary tract infection treated with antibiotics in late September 2022  In the office today postvoid residual assessment is 273 cc  She reported that many years ago she had urethral surgery with Dr Be Vazquez  She is unsure of the exact procedure  Possibly this was prolapse repair or a mid urethral sling  She states that he provided her with plastic female sounds and she has been instructed to calibrate her urethra on a regular basis    She states that she recently attempted to pass a sound and felt resistance although she states she that when she voids she has a strong urinary stream   She denies any underlying neurologic condition  AUA Symptom Score      Review of Systems  Review of Systems   Constitutional: Negative  HENT: Negative  Eyes: Negative  Respiratory: Negative  Cardiovascular: Negative  Gastrointestinal: Negative  Endocrine: Negative  Genitourinary:        Per HPI   Musculoskeletal: Negative  Skin: Negative  Allergic/Immunologic: Negative  Neurological: Negative  Hematological: Negative  Psychiatric/Behavioral: Negative            Past Medical History  Past Medical History:   Diagnosis Date   • Atopic dermatitis     last assessed 13   • Atrophic vaginitis     last assessed 9/2/15   • Dermatitis    • Dry eye    • Frequency of urination    • Fungal infection     last assessed 13   • Herpes     last assessed 14   • Hyperlipidemia    • Hypothyroidism    • Interstitial cystitis    • Osteoporosis    • Periodontal abscess     last assessed 13   • PONV (postoperative nausea and vomiting)    • Thyroid nodule    • Ulcerative colitis ([facility] [address] )    • Urinary frequency     resolved 17   • UTI (urinary tract infection)    • Vaginal atrophy    • Vitamin D deficiency     last assessed 16   • Voiding dysfunction     last assessed 10/7/15       Past Social History  Past Surgical History:   Procedure Laterality Date   •  SECTION       and     • COLONOSCOPY  2020   • CYSTOSCOPY      diagnostic resolved 05   • FL RETROGRADE PYELOGRAM  2020   • GANGLION CYST EXCISION Right 2019    Procedure: EXCISION GANGLION CYST RIGHT FIRST DORSAL EXTENSOR COMPARTMENT;  Surgeon: Mercedez Amin MD;  [facility];  Service: Orthopedics   • KIDNEY STONE SURGERY     • ORIF PROXIMAL ULNA FRACTURE      metal plates and screws removed   • OTHER SURGICAL HISTORY      punch biospy    • OH COLONOSCOPY FLX DX W/COLLJ SPEC WHEN PFRMD N/A 7/28/2017    Procedure: COLONOSCOPY;  Surgeon: Inocencio Castillo MD;  [facility];   Service: Colorectal   • OH CYSTO/URETERO W/LITHOTRIPSY &INDWELL STENT INSRT Left 1/7/2020    Procedure: CYSTOSCOPY URETEROSCOPY WITH LITHOTRIPSY HOLMIUM LASER, RETROGRADE PYELOGRAM AND INSERTION STENT URETERAL;  Surgeon: Eddie Esparza MD;  [facility];  Service: Urology   • OH INCIS TENDON SHEATH,RADIAL STYLOID Right 2/19/2019    Procedure: Billy Ballesteros;  Surgeon: Cristian Lopez MD;  [facility];  Service: Orthopedics   • TUBAL LIGATION     • UPPER GASTROINTESTINAL ENDOSCOPY  09/11/2020   • WISDOM TOOTH EXTRACTION  1983    X2        Past Family History  Family History   Problem Relation Age of Onset   • Gallbladder disease Mother    • Thyroid disease Mother    • Kidney disease Mother    • Breast cancer Mother    • Diabetes Father    • Hypertension Father    • Nephrolithiasis Father    • Lymphoma Maternal Grandfather    • Diabetes Paternal Grandmother    • Breast cancer Paternal Aunt    • No Known Problems Son    • No Known Problems Son        Past Social history  Social History     Socioeconomic History   • Marital status: /Civil Union     Spouse name: Shwetha Billy    • Number of children: 2   • Years of education: Not on file   • Highest education level: Not on file   Occupational History   • Not on file   Tobacco Use   • Smoking status: Never Smoker   • Smokeless tobacco: Never Used   Vaping Use   • Vaping Use: Never used   Substance and Sexual Activity   • Alcohol use: Yes     Comment: socially - 1-3 drinks per month   • Drug use: No   • Sexual activity: Not Currently   Other Topics Concern   • Not on file   Social History Narrative    Currently         Patient lives in a home that was built in [address] [place] [address]    Gas/forced hot air     [employer]arp[employer] eloise in the bedroom     Finished basement-dry-no mold or musty smell     Dehumidifier in the basement     No air  or purifiers     Humidifier in the winter months     Central air     Home is smoke free     Patient does not live close to open fields or wooded area         No pets         Caffeine: seldom hot tea or soda     Chocolate consumed everyday         Patient lives with spouse and children      Social Determinants of Health     Financial Resource Strain: Not on file   Food Insecurity: Not on file   Transportation Needs: Not on file   Physical Activity: Not on file   Stress: Not on file   Social Connections: Not on file   Intimate Partner Violence: Not on file   Housing Stability: Not on file       Current Medications  Current Outpatient Medications   Medication Sig Dispense Refill   • amitriptyline (ELAVIL) 10 mg tablet amitriptyline 10 mg tablet     • Cholecalciferol (VITAMIN D PO) Take 3,000 Units by mouth daily     • Cranberry (ELLURA PO) Take 1 capsule by mouth daily     • cycloSPORINE (RESTASIS) 0 05 % ophthalmic emulsion Administer 1 drop to both eyes 2 (two) times a day     • dicyclomine (BENTYL) 10 mg capsule Take 2 capsules (20 mg total) by mouth 3 (three) times a day before meals (Patient taking differently: Take 20 mg by mouth 2 (two) times a day) 270 capsule 3   • estradiol (ESTRACE) 0 1 mg/g vaginal cream Insert 1 applicator full vaginally nightly 42 5 g 5   • levothyroxine 50 mcg tablet Take 1 tablet (50 mcg total) by mouth daily 90 tablet 3   • mesalamine (ASACOL) 800 MG EC tablet Take 1 tablet (800 mg total) by mouth 4 (four) times a day 360 tablet 0   • Meth-Hyo-M Bl-Na Phos-Ph Sal (URO-MP PO) Take 1 capsule by mouth 2 (two) times a day      • mometasone (NASONEX) 50 mcg/act nasal spray 2 sprays into each nostril daily 41 g 3   • nitrofurantoin (MACROBID) 100 mg capsule      • pentosan polysulfate (Elmiron) 100 mg capsule One pill 3 times a day 90 capsule 5   • rosuvastatin (CRESTOR) 5 mg tablet Take 1 tablet (5 mg total) by mouth daily 90 tablet 3   • trospium chloride (SANCTURA) 20 mg tablet Take 1 tablet (20 mg total) by mouth 2 (two) times a day 60 tablet 5   • omeprazole (PriLOSEC) 20 mg delayed release capsule Take 1 capsule (20 mg total) by mouth daily 30 capsule 0     No current facility-administered medications for this visit  Allergies  Allergies   Allergen Reactions   • Apple Fruit Extract - Food Allergy Anaphylaxis   • Iodine - Food Allergy Anaphylaxis     Allergy to Iodinated and non iodinated dye   • Other Anaphylaxis     APPLES    TAPE  Also rash at times   • Seasonal Ic [Cholestatin] Allergic Rhinitis   • Latex        Past Medical History, Social History, Family History, medications and allergies were reviewed  Vitals  Vitals:    10/18/22 1156   BP: 150/88   BP Location: Left arm   Patient Position: Sitting   Cuff Size: Adult   Pulse: 88   SpO2: 96%   Weight: 55 8 kg (123 lb)   Height: 5' 7" (1 702 m)       Physical Exam  Physical Exam  On examination she is in no acute distress  Her abdomen is soft nontender nondistended  The bladder is nonpalpable  Right lower quadrant tenderness is out of proportion to physical examination findings  There is no CVA tenderness  Skin is warm  Extremities without edema    Neurologic is grossly intact and nonfocal   Gait normal   Affect normal      Results  No results found for: PSA  Lab Results   Component Value Date    GLUCOSE 89 08/07/2015    CALCIUM 9 1 09/19/2022     08/07/2015    K 4 1 09/19/2022    CO2 29 09/19/2022     09/19/2022    BUN 21 09/19/2022    CREATININE 0 85 09/19/2022     Lab Results   Component Value Date    WBC 3 52 (L) 09/19/2022    HGB 13 7 09/19/2022    HCT 43 4 09/19/2022    MCV 90 09/19/2022     (L) 09/19/2022         Office Urine Dip  Recent Results (from the past 1 hour(s))   POCT Measure PVR    Collection Time: 10/18/22 11:57 AM   Result Value Ref Range    POST-VOID RESIDUAL VOLUME, ML  mL   ]

## 2022-10-18 NOTE — TELEPHONE ENCOUNTER
Pt was here in [place] office not long ago and Dr Parikh was able to call and speak to the pt about her problem

## 2022-10-18 NOTE — TELEPHONE ENCOUNTER
TT forwarded by Dr Luis Alfredo Verdin from Dr Deborah Reynoso asking if anyone can see patient today  Per Dr Luis Alfredo Verdin, she is to be seen today  Called and spoke with patient  She is having pain in lower R quadrant, has IC getting worse and going every 30 minutes, constant pressure every 30 minutes along with emptying bladder, CT/US and urine culture done  CT showed bladder distended even though she emptied prior to scan  R/o appendicitis with CT and urine culture came back normal with mixed contaniments  She was given antibiotic by  who is an MD in order to help aid in relief without success  Still having ongoing pain with lots of pressure and frequency  Using heating pad that is helping a bit and Ibprofen without relief

## 2022-10-18 NOTE — LETTER
October 18, 2022     George James MD  [address]  [address] [place] [address], [address] [place] [address],[address]    Patient: Ashley Major   YOB: 1960   Date of Visit: 10/18/2022       Dear Dr Maru Mercado:    Thank you for referring Miriam Barnes to me for evaluation  Below are my notes for this consultation  If you have questions, please do not hesitate to call me  I look forward to following your patient along with you  Sincerely,        Francy Brooke MD        CC: Vonzell Landau, Zita Ill, MD Lillette Slipper, MD Lucillie Given, MD  10/18/2022  1:07 PM  Sign when Signing Visit  10/18/2022    Ashley Major  1960  [id number]        Assessment  History nephrolithiasis without recurrent stones, incomplete bladder emptying, right lower quadrant abdominal pain, prior history of urethral surgery with Urogynecology      Discussion  We discussed her elevated postvoid residual and history of urethral dilation at length in the office today  I discussed with her performing cystoscopy in the office today to assess the caliber of her urethra but she would prefer to not have cystoscopy at this time  I provided her with female catheters and recommended that she catheterize herself twice daily for the next 7 days to see if decompressing her bladder will help relieve her symptoms  A urine culture from yesterday is pending  Pending the results I would hold on antibiotics  Vinita Desai was instructed to call me if she is unable to pass a catheter as she would require dilation in the office  History of Present Illness  58 y o  female with a history of nephrolithiasis status post ureteroscopy with holmium laser lithotripsy  This was most recently performed in January of 2020  She was last seen in May of 2022 and had no sign of recurrent stone disease    Most recently she complains of right lower quadrant abdominal pain of such intensity she was concerned she had acute appendicitis  She was evaluated by General surgery and a CT scan was obtained  There was no sign of appendicitis noted  Her bladder was noted to be quite distended however  She had a urinary tract infection treated with antibiotics in late September 2022  In the office today postvoid residual assessment is 273 cc  She reported that many years ago she had urethral surgery with Dr Uma Siu  She is unsure of the exact procedure  Possibly this was prolapse repair or a mid urethral sling  She states that he provided her with plastic female sounds and she has been instructed to calibrate her urethra on a regular basis  She states that she recently attempted to pass a sound and felt resistance although she states she that when she voids she has a strong urinary stream   She denies any underlying neurologic condition  AUA Symptom Score      Review of Systems  Review of Systems   Constitutional: Negative  HENT: Negative  Eyes: Negative  Respiratory: Negative  Cardiovascular: Negative  Gastrointestinal: Negative  Endocrine: Negative  Genitourinary:        Per HPI   Musculoskeletal: Negative  Skin: Negative  Allergic/Immunologic: Negative  Neurological: Negative  Hematological: Negative  Psychiatric/Behavioral: Negative            Past Medical History  Past Medical History:   Diagnosis Date   • Atopic dermatitis     last assessed 12/2/13   • Atrophic vaginitis     last assessed 9/2/15   • Dermatitis    • Dry eye    • Frequency of urination    • Fungal infection     last assessed 8/14/13   • Herpes     last assessed 6/27/14   • Hyperlipidemia    • Hypothyroidism    • Interstitial cystitis    • Osteoporosis    • Periodontal abscess     last assessed 9/6/13   • PONV (postoperative nausea and vomiting)    • Thyroid nodule    • Ulcerative colitis ([facility] [address] )    • Urinary frequency     resolved 2/22/17   • UTI (urinary tract infection)    • Vaginal atrophy    • Vitamin D deficiency last assessed 16   • Voiding dysfunction     last assessed 10/7/15       Past Social History  Past Surgical History:   Procedure Laterality Date   •  SECTION       and     • COLONOSCOPY  2020   • CYSTOSCOPY      diagnostic resolved 05   • FL RETROGRADE PYELOGRAM  2020   • GANGLION CYST EXCISION Right 2019    Procedure: EXCISION GANGLION CYST RIGHT FIRST DORSAL EXTENSOR COMPARTMENT;  Surgeon: Jimmy Denis MD;  [facility];  Service: Orthopedics   • KIDNEY STONE SURGERY     • ORIF PROXIMAL ULNA FRACTURE      metal plates and screws removed   • OTHER SURGICAL HISTORY      punch biospy    • [place] COLONOSCOPY FLX DX W/COLLJ SPEC WHEN PFRMD N/A 2017    Procedure: COLONOSCOPY;  Surgeon: Lit Perez MD;  [facility];   Service: Colorectal   • [place] CYSTO/URETERO W/LITHOTRIPSY &INDWELL STENT INSRT Left 2020    Procedure: CYSTOSCOPY URETEROSCOPY WITH LITHOTRIPSY HOLMIUM LASER, RETROGRADE PYELOGRAM AND INSERTION STENT URETERAL;  Surgeon: Ken Lambert MD;  [facility];  Service: Urology   • NJ INCIS TENDON SHEATH,RADIAL STYLOID Right 2019    Procedure: Diana Daubs;  Surgeon: Jimmy Denis MD;  [facility];  Service: Orthopedics   • TUBAL LIGATION     • UPPER GASTROINTESTINAL ENDOSCOPY  2020   • WISDOM TOOTH EXTRACTION  1983    X2        Past Family History  Family History   Problem Relation Age of Onset   • Gallbladder disease Mother    • Thyroid disease Mother    • Kidney disease Mother    • Breast cancer Mother    • Diabetes Father    • Hypertension Father    • Nephrolithiasis Father    • Lymphoma Maternal Grandfather    • Diabetes Paternal Grandmother    • Breast cancer Paternal Aunt    • No Known Problems Son    • No Known Problems Son        Past Social history  Social History     Socioeconomic History   • Marital status: /Civil Union     Spouse name: Raj Sam    • Number of children: 2   • Years of education: Not on file   • Highest education level: Not on file   Occupational History   • Not on file   Tobacco Use   • Smoking status: Never Smoker   • Smokeless tobacco: Never Used   Vaping Use   • Vaping Use: Never used   Substance and Sexual Activity   • Alcohol use: Yes     Comment: socially - 1-3 drinks per month   • Drug use: No   • Sexual activity: Not Currently   Other Topics Concern   • Not on file   Social History Narrative    Currently         Patient lives in a home that was built in [address] [place] [address]    Gas/forced hot air     [address] [facility] eloise in the bedroom     Finished basement-dry-no mold or musty smell     Dehumidifier in the basement     No air  or purifiers     Humidifier in the winter months     Central air     Home is smoke free     Patient does not live close to open fields or wooded area         No pets         Caffeine: seldom hot tea or soda     Chocolate consumed everyday         Patient lives with spouse and children      Social Determinants of Health     Financial Resource Strain: Not on file   Food Insecurity: Not on file   Transportation Needs: Not on file   Physical Activity: Not on file   Stress: Not on file   Social Connections: Not on file   Intimate Partner Violence: Not on file   Housing Stability: Not on file       Current Medications  Current Outpatient Medications   Medication Sig Dispense Refill   • amitriptyline (ELAVIL) 10 mg tablet amitriptyline 10 mg tablet     • Cholecalciferol (VITAMIN D PO) Take 3,000 Units by mouth daily     • Cranberry (ELLURA PO) Take 1 capsule by mouth daily     • cycloSPORINE (RESTASIS) 0 05 % ophthalmic emulsion Administer 1 drop to both eyes 2 (two) times a day     • dicyclomine (BENTYL) 10 mg capsule Take 2 capsules (20 mg total) by mouth 3 (three) times a day before meals (Patient taking differently: Take 20 mg by mouth 2 (two) times a day) 270 capsule 3   • estradiol (ESTRACE) 0 1 mg/g vaginal cream Insert 1 applicator full vaginally nightly 42 5 g 5   • levothyroxine 50 mcg tablet Take 1 tablet (50 mcg total) by mouth daily 90 tablet 3   • mesalamine (ASACOL) 800 MG EC tablet Take 1 tablet (800 mg total) by mouth 4 (four) times a day 360 tablet 0   • Meth-Hyo-M Bl-Na Phos-Ph Sal (URO-MP PO) Take 1 capsule by mouth 2 (two) times a day      • mometasone (NASONEX) 50 mcg/act nasal spray 2 sprays into each nostril daily 41 g 3   • nitrofurantoin (MACROBID) 100 mg capsule      • pentosan polysulfate (Elmiron) 100 mg capsule One pill 3 times a day 90 capsule 5   • rosuvastatin (CRESTOR) 5 mg tablet Take 1 tablet (5 mg total) by mouth daily 90 tablet 3   • trospium chloride (SANCTURA) 20 mg tablet Take 1 tablet (20 mg total) by mouth 2 (two) times a day 60 tablet 5   • omeprazole (PriLOSEC) 20 mg delayed release capsule Take 1 capsule (20 mg total) by mouth daily 30 capsule 0     No current facility-administered medications for this visit  Allergies  Allergies   Allergen Reactions   • Apple Fruit Extract - Food Allergy Anaphylaxis   • Iodine - Food Allergy Anaphylaxis     Allergy to Iodinated and non iodinated dye   • Other Anaphylaxis     APPLES    TAPE  Also rash at times   • Seasonal Ic [Cholestatin] Allergic Rhinitis   • Latex        Past Medical History, Social History, Family History, medications and allergies were reviewed  Vitals  Vitals:    10/18/22 1156   BP: 150/88   BP Location: Left arm   Patient Position: Sitting   Cuff Size: Adult   Pulse: 88   SpO2: 96%   Weight: 55 8 kg (123 lb)   Height: 5' 7" (1 702 m)       Physical Exam  Physical Exam  On examination she is in no acute distress  Her abdomen is soft nontender nondistended  The bladder is nonpalpable  Right lower quadrant tenderness is out of proportion to physical examination findings  There is no CVA tenderness  Skin is warm  Extremities without edema    Neurologic is grossly intact and nonfocal   Gait normal   Affect normal      Results  No results found for:  PSA  Lab Results   Component Value Date    GLUCOSE 89 08/07/2015    CALCIUM 9 1 09/19/2022     08/07/2015    K 4 1 09/19/2022    CO2 29 09/19/2022     09/19/2022    BUN 21 09/19/2022    CREATININE 0 85 09/19/2022     Lab Results   Component Value Date    WBC 3 52 (L) 09/19/2022    HGB 13 7 09/19/2022    HCT 43 4 09/19/2022    MCV 90 09/19/2022     (L) 09/19/2022         Office Urine Dip  Recent Results (from the past 1 hour(s))   POCT Measure PVR    Collection Time: 10/18/22 11:57 AM   Result Value Ref Range    POST-VOID RESIDUAL VOLUME, ML  mL   ]

## 2022-10-18 NOTE — TELEPHONE ENCOUNTER
Patient stated she is having a problem needs to speak to a urology nurse   She would not say what the problem is and says its personal

## 2022-10-19 ENCOUNTER — DOCUMENTATION (OUTPATIENT)
Dept: GASTROENTEROLOGY | Facility: MEDICAL CENTER | Age: 62
End: 2022-10-19

## 2022-10-19 NOTE — PROGRESS NOTES
Discussed with patient  She had a CT scan done which showed urinary bladder distention and fecal stasis  She is seeing Dr Anna Friends and will be having a cystoscopy  I recommended holding off on the bentyl for now if possible  She will follow up with me

## 2022-10-20 ENCOUNTER — OFFICE VISIT (OUTPATIENT)
Dept: UROLOGY | Facility: AMBULATORY SURGERY CENTER | Age: 62
End: 2022-10-20
Payer: COMMERCIAL

## 2022-10-20 VITALS
BODY MASS INDEX: 19.3 KG/M2 | WEIGHT: 123 LBS | SYSTOLIC BLOOD PRESSURE: 122 MMHG | DIASTOLIC BLOOD PRESSURE: 74 MMHG | HEIGHT: 67 IN | RESPIRATION RATE: 18 BRPM

## 2022-10-20 DIAGNOSIS — N39.8 VOIDING DYSFUNCTION: Primary | ICD-10-CM

## 2022-10-20 PROCEDURE — 99214 OFFICE O/P EST MOD 30 MIN: CPT | Performed by: UROLOGY

## 2022-10-20 NOTE — LETTER
October 24, 2022     Sam Carrillo MD  [address]  [address] [place] [address], [address] [place] [address],[address]    Patient: Dena Parks   YOB: 1960   Date of Visit: 10/20/2022       Dear Dr Lacretia Cooks:    Thank you for referring Speedy Tamez to me for evaluation  Below are my notes for this consultation  If you have questions, please do not hesitate to call me  I look forward to following your patient along with you  Sincerely,        Chiqui Parks MD        CC: MD Chiqui Lewis MD  10/24/2022  8:08 AM  Sign when Signing Visit  10/20/2022    Dena Jesuspalma  1960  [id number]        Assessment  Voiding dysfunction, history of interstitial cystitis, no evidence of urethral stricture, minimal postvoid residual      Discussion  I discussed with Radha Greco that I was able to easily pass 14 Macedonian red rubber catheter  Her urethral meatus was slightly retracted with variant angulation, however, there was no sign of urethral stricture  After easily catheterizing the patient 25 cc were drained from the bladder  We discussed that her right lower quadrant abdominal pain could perhaps be related to her history of irritable bowel and or ulcerative colitis  I provided her with reassurance that her most recent urine culture from October 2022 shows only low levels of mixed contaminants and is not consistent with an infection  I do not feel that there is an underlying urologic etiology to her abdominal pain and I recommend follow-up with GI at this time  History of Present Illness  58 y o  female with a history of bladder pain and interstitial cystitis  More recently she has been found to have elevated postvoid residuals on multiple imaging studies  She had a CT scan of the abdomen pelvis performed MRI [facility] which shows no underlying pathology  She has a history of ulcerative colitis and irritable bowel    She reports of prior urethral or bladder procedure by Dr Aly Schaeffer from Urogynecology more than 10 years ago  In the past she has catheterized her urethra  She is concerned that she could have a recurrent urethral stricture although she states that she is voiding  Her last PVR in the office was 273 cc  She presents today for straight catheterization and possible urethral dilation  She does not wish to have cystoscopy performed in the office today  AUA Symptom Score      Review of Systems  Review of Systems   Constitutional: Negative  HENT: Negative  Eyes: Negative  Respiratory: Negative  Cardiovascular: Negative  Gastrointestinal: Negative  Endocrine: Negative  Genitourinary:        Per HPI   Musculoskeletal: Negative  Skin: Negative  Allergic/Immunologic: Negative  Neurological: Negative  Hematological: Negative  Psychiatric/Behavioral: Negative            Past Medical History  Past Medical History:   Diagnosis Date   • Atopic dermatitis     last assessed 13   • Atrophic vaginitis     last assessed 9/2/15   • Dermatitis    • Dry eye    • Frequency of urination    • Fungal infection     last assessed 13   • Herpes     last assessed 14   • Hyperlipidemia    • Hypothyroidism    • Interstitial cystitis    • Osteoporosis    • Periodontal abscess     last assessed 13   • PONV (postoperative nausea and vomiting)    • Thyroid nodule    • Ulcerative colitis ([facility] [address] )    • Urinary frequency     resolved 17   • UTI (urinary tract infection)    • Vaginal atrophy    • Vitamin D deficiency     last assessed 16   • Voiding dysfunction     last assessed 10/7/15       Past Social History  Past Surgical History:   Procedure Laterality Date   •  SECTION       and     • COLONOSCOPY  2020   • CYSTOSCOPY      diagnostic resolved 05   • FL RETROGRADE PYELOGRAM  2020   • GANGLION CYST EXCISION Right 2019    Procedure: EXCISION GANGLION CYST RIGHT FIRST DORSAL EXTENSOR COMPARTMENT; Surgeon: Dot Robertson MD;  [facility];  Service: Orthopedics   • KIDNEY STONE SURGERY     • ORIF PROXIMAL ULNA FRACTURE      metal plates and screws removed   • OTHER SURGICAL HISTORY      punch biospy    • NV COLONOSCOPY FLX DX W/COLLJ SPEC WHEN PFRMD N/A 7/28/2017    Procedure: COLONOSCOPY;  Surgeon: Nicholas Ford MD;  [facility];   Service: Colorectal   • NV CYSTO/URETERO W/LITHOTRIPSY &INDWELL STENT INSRT Left 1/7/2020    Procedure: CYSTOSCOPY URETEROSCOPY WITH LITHOTRIPSY HOLMIUM LASER, RETROGRADE PYELOGRAM AND INSERTION STENT URETERAL;  Surgeon: Earlene Reyna MD;  [facility];  Service: Urology   • NV INCIS TENDON SHEATH,RADIAL STYLOID Right 2/19/2019    Procedure: Tessie Contreras;  Surgeon: Dot Robertson MD;  [facility];  Service: Orthopedics   • TUBAL LIGATION     • UPPER GASTROINTESTINAL ENDOSCOPY  09/11/2020   • WISDOM TOOTH EXTRACTION  1983    X2        Past Family History  Family History   Problem Relation Age of Onset   • Gallbladder disease Mother    • Thyroid disease Mother    • Kidney disease Mother    • Breast cancer Mother    • Diabetes Father    • Hypertension Father    • Nephrolithiasis Father    • Lymphoma Maternal Grandfather    • Diabetes Paternal Grandmother    • Breast cancer Paternal Aunt    • No Known Problems Son    • No Known Problems Son        Past Social history  Social History     Socioeconomic History   • Marital status: /Civil Union     Spouse name: Maricruz Chávez    • Number of children: 2   • Years of education: Not on file   • Highest education level: Not on file   Occupational History   • Not on file   Tobacco Use   • Smoking status: Never Smoker   • Smokeless tobacco: Never Used   Vaping Use   • Vaping Use: Never used   Substance and Sexual Activity   • Alcohol use: Yes     Comment: socially - 1-3 drinks per month   • Drug use: No   • Sexual activity: Not Currently   Other Topics Concern   • Not on file   Social History Narrative    Currently         Patient lives in a home that was built in [address] [place] [address]    Gas/forced hot air     [address] [place] in the bedroom     Finished basement-dry-no mold or musty smell     Dehumidifier in the basement     No air  or purifiers     Humidifier in the winter months     Central air     Home is smoke free     Patient does not live close to open fields or wooded area         No pets         Caffeine: seldom hot tea or soda     Chocolate consumed everyday         Patient lives with spouse and children      Social Determinants of Health     Financial Resource Strain: Not on file   Food Insecurity: Not on file   Transportation Needs: Not on file   Physical Activity: Not on file   Stress: Not on file   Social Connections: Not on file   Intimate Partner Violence: Not on file   Housing Stability: Not on file       Current Medications  Current Outpatient Medications   Medication Sig Dispense Refill   • amitriptyline (ELAVIL) 10 mg tablet amitriptyline 10 mg tablet     • Cholecalciferol (VITAMIN D PO) Take 3,000 Units by mouth daily     • Cranberry (ELLURA PO) Take 1 capsule by mouth daily     • cycloSPORINE (RESTASIS) 0 05 % ophthalmic emulsion Administer 1 drop to both eyes 2 (two) times a day     • dicyclomine (BENTYL) 10 mg capsule Take 2 capsules (20 mg total) by mouth 3 (three) times a day before meals (Patient taking differently: Take 20 mg by mouth 2 (two) times a day) 270 capsule 3   • estradiol (ESTRACE) 0 1 mg/g vaginal cream Insert 1 applicator full vaginally nightly 42 5 g 5   • levothyroxine 50 mcg tablet Take 1 tablet (50 mcg total) by mouth daily 90 tablet 3   • mesalamine (ASACOL) 800 MG EC tablet Take 1 tablet (800 mg total) by mouth 4 (four) times a day 360 tablet 0   • Meth-Hyo-M Bl-Na Phos-Ph Sal (URO-MP PO) Take 1 capsule by mouth 2 (two) times a day      • mometasone (NASONEX) 50 mcg/act nasal spray 2 sprays into each nostril daily 41 g 3   • nitrofurantoin (MACROBID) 100 mg capsule      • pentosan polysulfate (Elmiron) 100 mg capsule One pill 3 times a day 90 capsule 5   • rosuvastatin (CRESTOR) 5 mg tablet Take 1 tablet (5 mg total) by mouth daily 90 tablet 3   • trospium chloride (SANCTURA) 20 mg tablet Take 1 tablet (20 mg total) by mouth 2 (two) times a day 60 tablet 5   • omeprazole (PriLOSEC) 20 mg delayed release capsule Take 1 capsule (20 mg total) by mouth daily 30 capsule 0     No current facility-administered medications for this visit  Allergies  Allergies   Allergen Reactions   • Apple Fruit Extract - Food Allergy Anaphylaxis   • Iodine - Food Allergy Anaphylaxis     Allergy to Iodinated and non iodinated dye   • Other Anaphylaxis     APPLES    TAPE  Also rash at times   • Seasonal Ic [Cholestatin] Allergic Rhinitis   • Latex        Past Medical History, Social History, Family History, medications and allergies were reviewed  Vitals  Vitals:    10/20/22 1438   BP: 122/74   Resp: 18   Weight: 55 8 kg (123 lb)   Height: 5' 7" (1 702 m)       Physical Exam  Physical Exam  On examination she is in no acute distress  Her abdomen is soft nontender nondistended   examination reveals no CVA tenderness  The bladder is nonpalpable  Skin is warm  Extremities without edema  Neurologic is grossly intact nonfocal   Gait normal   Affect normal     With the female staff member present in the room the patient was placed in a modified frogleg position  Her genitalia was prepped and draped in sterile fashion  I was able to easily pass a 15 [employer] Irizarry catheter although the urethral meatus angle was slightly downward making insertion of the catheter somewhat more challenging but ultimately without difficulty  25 cc of blue urine drained      Results  No results found for: PSA  Lab Results   Component Value Date    GLUCOSE 89 08/07/2015    CALCIUM 9 1 09/19/2022     08/07/2015    K 4 1 09/19/2022    CO2 29 09/19/2022     09/19/2022    BUN 21 09/19/2022 CREATININE 0 85 09/19/2022     Lab Results   Component Value Date    WBC 3 52 (L) 09/19/2022    HGB 13 7 09/19/2022    HCT 43 4 09/19/2022    MCV 90 09/19/2022     (L) 09/19/2022         Office Urine Dip  No results found for this or any previous visit (from the past 1 hour(s)) ]

## 2022-10-24 ENCOUNTER — ANNUAL EXAM (OUTPATIENT)
Dept: OBGYN CLINIC | Facility: CLINIC | Age: 62
End: 2022-10-24

## 2022-10-24 ENCOUNTER — ULTRASOUND (OUTPATIENT)
Dept: OBGYN CLINIC | Facility: CLINIC | Age: 62
End: 2022-10-24

## 2022-10-24 VITALS
DIASTOLIC BLOOD PRESSURE: 80 MMHG | WEIGHT: 123 LBS | BODY MASS INDEX: 19.3 KG/M2 | SYSTOLIC BLOOD PRESSURE: 120 MMHG | HEIGHT: 67 IN

## 2022-10-24 DIAGNOSIS — R10.2 PELVIC PAIN: ICD-10-CM

## 2022-10-24 DIAGNOSIS — Z01.419 PAP SMEAR, AS PART OF ROUTINE GYNECOLOGICAL EXAMINATION: ICD-10-CM

## 2022-10-24 DIAGNOSIS — N95.2 ATROPHIC VAGINITIS: ICD-10-CM

## 2022-10-24 DIAGNOSIS — Z12.31 ENCOUNTER FOR SCREENING MAMMOGRAM FOR BREAST CANCER: ICD-10-CM

## 2022-10-24 DIAGNOSIS — N85.01 ENDOMETRIAL HYPERPLASIA, SIMPLE: Primary | ICD-10-CM

## 2022-10-24 DIAGNOSIS — Z01.419 WOMEN'S ANNUAL ROUTINE GYNECOLOGICAL EXAMINATION: Primary | ICD-10-CM

## 2022-10-24 NOTE — PROGRESS NOTES
Assessment/Plan:    No problem-specific Assessment & Plan notes found for this encounter  Diagnoses and all orders for this visit:    Women's annual routine gynecological examination  -     Liquid-based pap, screening    Pap smear, as part of routine gynecological examination    Encounter for screening mammogram for breast cancer  -     Mammo screening bilateral w 3d & cad; Future    Atrophic vaginitis          Normal gynecological physical examination  Self-breast examination stressed  Mammogram ordered  Discussed regular exercise, healthy diet, importance of vitamin D and calcium supplements  Discussed importance of sun block use during periods of prolonged sun exposure  Patient will be seen in 1 year for routine gynecologic and medical examination  Patient will call office for any problems, concerns, or issues which may arise during the interim  Subjective:          HPI    Patient ID: Corina Silvestre is a 58 y o  female who presents today for her annual gynecologic and medical examination    Menstrual status:  Patient is postmenopausal denies any vaginal bleeding at this time  We will obtain an interval pelvic ultrasound to check for stability of the endometrial stripe  Vasomotor symptoms:  Patient denies any significant vasomotor symptoms    Patient reports normal appetite    Patient reports normal bowel and bladder habits    Patient denies any significant pelvic or abdominal pain    Patient denies any headaches, chest pain, shortness of breath fever shakes or chills    Patient denies any COVID 19 symptoms including cough or loss of taste or smell    COVID vaccine status:  Patient is up-to-date with COVID vaccination    Medical problems:  Patient is followed for thyroid and cholesterol    Colonoscopy status:  Patient is up-to-date with screening colonoscopy    Mammogram status:  Patient does regular self-breast examinations and is up-to-date with screening mammography as well    Appropriate arrangements for her annual screening mammogram were placed in the EMR system at today's visit  The following portions of the patient's history were reviewed and updated as appropriate: allergies, current medications, past family history, past medical history, past social history, past surgical history and problem list     Review of Systems   Constitutional: Negative  Negative for appetite change, diaphoresis, fatigue, fever and unexpected weight change  HENT: Negative  Eyes: Negative  Respiratory: Negative  Cardiovascular: Negative  Followed for cholesterol   Gastrointestinal: Negative  Negative for abdominal pain, blood in stool, constipation, diarrhea, nausea and vomiting  Endocrine: Negative  Negative for cold intolerance and heat intolerance  Patient followed for thyroid   Genitourinary: Negative  Negative for dysuria, frequency, hematuria, urgency, vaginal bleeding, vaginal discharge and vaginal pain  Musculoskeletal: Negative  Skin: Negative  Allergic/Immunologic: Negative  Neurological: Negative  Hematological: Negative  Negative for adenopathy  Psychiatric/Behavioral: Negative  Objective:      /80   Ht 5' 7" (1 702 m)   Wt 55 8 kg (123 lb)   LMP  (LMP Unknown)   BMI 19 26 kg/m²          Physical Exam  Constitutional:       General: She is not in acute distress  Appearance: Normal appearance  She is well-developed  She is not diaphoretic  HENT:      Head: Normocephalic and atraumatic  Eyes:      Pupils: Pupils are equal, round, and reactive to light  Cardiovascular:      Rate and Rhythm: Normal rate and regular rhythm  Heart sounds: Normal heart sounds  No murmur heard  No friction rub  No gallop  Pulmonary:      Effort: Pulmonary effort is normal       Breath sounds: Normal breath sounds     Chest:   Breasts: Breasts are symmetrical       Right: No inverted nipple, mass, nipple discharge, skin change, tenderness or supraclavicular adenopathy  Left: No inverted nipple, mass, nipple discharge, skin change, tenderness or supraclavicular adenopathy  Abdominal:      General: Bowel sounds are normal       Palpations: Abdomen is soft  Genitourinary:     General: Normal vulva  Exam position: Supine  Labia:         Right: No rash or lesion  Left: No rash or lesion  Urethra: No urethral swelling or urethral lesion  Vagina: Normal  No vaginal discharge, erythema, tenderness or bleeding  Cervix: No discharge or friability  Uterus: Not enlarged and not tender  Adnexa:         Right: No mass, tenderness or fullness  Left: No mass, tenderness or fullness  Rectum: Normal  Guaiac result negative  Comments: Exam revealed minimal atrophic vaginitis   Good pelvic support confirmed  Musculoskeletal:         General: Normal range of motion  Cervical back: Normal range of motion and neck supple  Lymphadenopathy:      Cervical: No cervical adenopathy  Upper Body:      Right upper body: No supraclavicular adenopathy  Left upper body: No supraclavicular adenopathy  Skin:     General: Skin is warm and dry  Findings: No rash  Neurological:      General: No focal deficit present  Mental Status: She is alert and oriented to person, place, and time  Psychiatric:         Mood and Affect: Mood normal          Speech: Speech normal          Behavior: Behavior normal          Thought Content:  Thought content normal          Judgment: Judgment normal

## 2022-10-24 NOTE — PROGRESS NOTES
10/20/2022    Bindu Foreman  1960  [id number]        Assessment  Voiding dysfunction, history of interstitial cystitis, no evidence of urethral stricture, minimal postvoid residual      Discussion  I discussed with Brenda Cedeño that I was able to easily pass 14 [place] red rubber catheter  Her urethral meatus was slightly retracted with variant angulation, however, there was no sign of urethral stricture  After easily catheterizing the patient 25 cc were drained from the bladder  We discussed that her right lower quadrant abdominal pain could perhaps be related to her history of irritable bowel and or ulcerative colitis  I provided her with reassurance that her most recent urine culture from October 2022 shows only low levels of mixed contaminants and is not consistent with an infection  I do not feel that there is an underlying urologic etiology to her abdominal pain and I recommend follow-up with GI at this time  History of Present Illness  58 y o  female with a history of bladder pain and interstitial cystitis  More recently she has been found to have elevated postvoid residuals on multiple imaging studies  She had a CT scan of the abdomen pelvis performed MRI [place] which shows no underlying pathology  She has a history of ulcerative colitis and irritable bowel  She reports of prior urethral or bladder procedure by Dr Esthela Chavez from Urogynecology more than 10 years ago  In the past she has catheterized her urethra  She is concerned that she could have a recurrent urethral stricture although she states that she is voiding  Her last PVR in the office was 273 cc  She presents today for straight catheterization and possible urethral dilation  She does not wish to have cystoscopy performed in the office today  AUA Symptom Score      Review of Systems  Review of Systems   Constitutional: Negative  HENT: Negative  Eyes: Negative  Respiratory: Negative  Cardiovascular: Negative  Gastrointestinal: Negative  Endocrine: Negative  Genitourinary:        Per HPI   Musculoskeletal: Negative  Skin: Negative  Allergic/Immunologic: Negative  Neurological: Negative  Hematological: Negative  Psychiatric/Behavioral: Negative  Past Medical History  Past Medical History:   Diagnosis Date   • Atopic dermatitis     last assessed 13   • Atrophic vaginitis     last assessed 9/2/15   • Dermatitis    • Dry eye    • Frequency of urination    • Fungal infection     last assessed 13   • Herpes     last assessed 14   • Hyperlipidemia    • Hypothyroidism    • Interstitial cystitis    • Osteoporosis    • Periodontal abscess     last assessed 13   • PONV (postoperative nausea and vomiting)    • Thyroid nodule    • Ulcerative colitis ([facility] [address] )    • Urinary frequency     resolved 17   • UTI (urinary tract infection)    • Vaginal atrophy    • Vitamin D deficiency     last assessed 16   • Voiding dysfunction     last assessed 10/7/15       Past Social History  Past Surgical History:   Procedure Laterality Date   •  SECTION       and     • COLONOSCOPY  2020   • CYSTOSCOPY      diagnostic resolved 05   • FL RETROGRADE PYELOGRAM  2020   • GANGLION CYST EXCISION Right 2019    Procedure: EXCISION GANGLION CYST RIGHT FIRST DORSAL EXTENSOR COMPARTMENT;  Surgeon: Liseth Al MD;  [facility];  Service: Orthopedics   • KIDNEY STONE SURGERY     • ORIF PROXIMAL ULNA FRACTURE      metal plates and screws removed   • OTHER SURGICAL HISTORY      punch biospy    • NJ COLONOSCOPY FLX DX W/COLLJ SPEC WHEN PFRMD N/A 2017    Procedure: COLONOSCOPY;  Surgeon: Elan Siegel MD;  [facility];   Service: Colorectal   • NJ CYSTO/URETERO W/LITHOTRIPSY &INDWELL STENT INSRT Left 2020    Procedure: CYSTOSCOPY URETEROSCOPY WITH LITHOTRIPSY HOLMIUM LASER, RETROGRADE PYELOGRAM AND INSERTION STENT URETERAL;  Surgeon: Linda Torres MD;  [facility];  Service: Urology   • NE INCIS TENDON SHEATH,RADIAL STYLOID Right 2/19/2019    Procedure: Noel Bumpers;  Surgeon: Bridgett Esparza MD;  [facility];  Service: Orthopedics   • TUBAL LIGATION     • UPPER GASTROINTESTINAL ENDOSCOPY  09/11/2020   • WISDOM TOOTH EXTRACTION  1983    X2        Past Family History  Family History   Problem Relation Age of Onset   • Gallbladder disease Mother    • Thyroid disease Mother    • Kidney disease Mother    • Breast cancer Mother    • Diabetes Father    • Hypertension Father    • Nephrolithiasis Father    • Lymphoma Maternal Grandfather    • Diabetes Paternal Grandmother    • Breast cancer Paternal Aunt    • No Known Problems Son    • No Known Problems Son        Past Social history  Social History     Socioeconomic History   • Marital status: /Civil Union     Spouse name: Lubna Love    • Number of children: 2   • Years of education: Not on file   • Highest education level: Not on file   Occupational History   • Not on file   Tobacco Use   • Smoking status: Never Smoker   • Smokeless tobacco: Never Used   Vaping Use   • Vaping Use: Never used   Substance and Sexual Activity   • Alcohol use: Yes     Comment: socially - 1-3 drinks per month   • Drug use: No   • Sexual activity: Not Currently   Other Topics Concern   • Not on file   Social History Narrative    Currently         Patient lives in a home that was built in [address] [place] [address]    Gas/forced hot air     Carpet eloise in the bedroom     Finished basement-dry-no mold or musty smell     Dehumidifier in the basement     No air  or purifiers     Humidifier in the winter months     Central air     Home is smoke free     Patient does not live close to open fields or wooded area         No pets         Caffeine: seldom hot tea or soda     Chocolate consumed everyday         Patient lives with spouse and children      Social Determinants of Health     Financial Resource Strain: Not on file   Food Insecurity: Not on file   Transportation Needs: Not on file   Physical Activity: Not on file   Stress: Not on file   Social Connections: Not on file   Intimate Partner Violence: Not on file   Housing Stability: Not on file       Current Medications  Current Outpatient Medications   Medication Sig Dispense Refill   • amitriptyline (ELAVIL) 10 mg tablet amitriptyline 10 mg tablet     • Cholecalciferol (VITAMIN D PO) Take 3,000 Units by mouth daily     • Cranberry (ELLURA PO) Take 1 capsule by mouth daily     • cycloSPORINE (RESTASIS) 0 05 % ophthalmic emulsion Administer 1 drop to both eyes 2 (two) times a day     • dicyclomine (BENTYL) 10 mg capsule Take 2 capsules (20 mg total) by mouth 3 (three) times a day before meals (Patient taking differently: Take 20 mg by mouth 2 (two) times a day) 270 capsule 3   • estradiol (ESTRACE) 0 1 mg/g vaginal cream Insert 1 applicator full vaginally nightly 42 5 g 5   • levothyroxine 50 mcg tablet Take 1 tablet (50 mcg total) by mouth daily 90 tablet 3   • mesalamine (ASACOL) 800 MG EC tablet Take 1 tablet (800 mg total) by mouth 4 (four) times a day 360 tablet 0   • Meth-Hyo-M Bl-Na Phos-Ph Sal (URO-MP PO) Take 1 capsule by mouth 2 (two) times a day      • mometasone (NASONEX) 50 mcg/act nasal spray 2 sprays into each nostril daily 41 g 3   • nitrofurantoin (MACROBID) 100 mg capsule      • pentosan polysulfate (Elmiron) 100 mg capsule One pill 3 times a day 90 capsule 5   • rosuvastatin (CRESTOR) 5 mg tablet Take 1 tablet (5 mg total) by mouth daily 90 tablet 3   • trospium chloride (SANCTURA) 20 mg tablet Take 1 tablet (20 mg total) by mouth 2 (two) times a day 60 tablet 5   • omeprazole (PriLOSEC) 20 mg delayed release capsule Take 1 capsule (20 mg total) by mouth daily 30 capsule 0     No current facility-administered medications for this visit         Allergies  Allergies   Allergen Reactions   • Apple Fruit Extract - Food Allergy Anaphylaxis • Iodine - Food Allergy Anaphylaxis     Allergy to Iodinated and non iodinated dye   • Other Anaphylaxis     APPLES    TAPE  Also rash at times   • Seasonal Ic [Cholestatin] Allergic Rhinitis   • Latex        Past Medical History, Social History, Family History, medications and allergies were reviewed  Vitals  Vitals:    10/20/22 1438   BP: 122/74   Resp: 18   Weight: 55 8 kg (123 lb)   Height: 5' 7" (1 702 m)       Physical Exam  Physical Exam  On examination she is in no acute distress  Her abdomen is soft nontender nondistended   examination reveals no CVA tenderness  The bladder is nonpalpable  Skin is warm  Extremities without edema  Neurologic is grossly intact nonfocal   Gait normal   Affect normal     With the female staff member present in the room the patient was placed in a modified frogleg position  Her genitalia was prepped and draped in sterile fashion  I was able to easily pass a 15 [place] Irizarry catheter although the urethral meatus angle was slightly downward making insertion of the catheter somewhat more challenging but ultimately without difficulty  25 cc of blue urine drained      Results  No results found for: PSA  Lab Results   Component Value Date    GLUCOSE 89 08/07/2015    CALCIUM 9 1 09/19/2022     08/07/2015    K 4 1 09/19/2022    CO2 29 09/19/2022     09/19/2022    BUN 21 09/19/2022    CREATININE 0 85 09/19/2022     Lab Results   Component Value Date    WBC 3 52 (L) 09/19/2022    HGB 13 7 09/19/2022    HCT 43 4 09/19/2022    MCV 90 09/19/2022     (L) 09/19/2022         Office Urine Dip  No results found for this or any previous visit (from the past 1 hour(s)) ]

## 2022-10-24 NOTE — PROGRESS NOTES
AMB US Pelvic Non OB    Date/Time: 10/24/2022 9:50 AM  Performed by: Thomas Sterling  Authorized by: Sabino Payton MD     Procedure details:     Indications: non-obstetric abdominal pain      Technique:  US Pelvic, Non-OB with complete exam  Uterine findings:     Length (cm): 5 9    Height (cm):  5 9    Width (cm):  3 9    Uterine adhesions: not identified      Adnexal mass: not identified      Polyps: not identified      Myomas: not identified      Endometrial stripe: identified      Endometrial hyperplasia: not identified      Endometrium thickness (mm):  6  Left ovary findings:     Left ovary:  Visualized    Cysts: not identified      Length (cm): 2 5    Height (cm): 2 8    Width (cm): 2 2  Right ovary findings:     Right ovary:  Visualized    Cysts: not identified      Length (cm): 2 8    Height (cm): 2 5    Width (cm): 2  Other findings:     Free pelvic fluid: not identified      Free peritoneal fluid: not identified    Post-Procedure Details:     Impression:  Endo-6mm, WNL    Tolerance: Tolerated well, no immediate complications  Additional Procedure Comments:          Dr Ermelinda Adorno MD  [address] [facility] [address]  [address]  [phone]

## 2022-10-28 ENCOUNTER — OFFICE VISIT (OUTPATIENT)
Dept: OCCUPATIONAL THERAPY | Age: 62
End: 2022-10-28
Payer: COMMERCIAL

## 2022-10-28 DIAGNOSIS — S62.337A CLOSED DISPLACED FRACTURE OF NECK OF FIFTH METACARPAL BONE OF LEFT HAND, INITIAL ENCOUNTER: Primary | ICD-10-CM

## 2022-10-28 PROCEDURE — 97140 MANUAL THERAPY 1/> REGIONS: CPT

## 2022-10-28 NOTE — PROGRESS NOTES
Daily Note     Today's date: 10/28/2022  Patient name: Jasper Aponte  : 1960  MRN: [id number]  Referring provider: Erica Garcia MD  Dx:   Encounter Diagnosis     ICD-10-CM    1  Closed displaced fracture of neck of fifth metacarpal bone of left hand, initial encounter  S62 337A                   Subjective: "It's just still so stiff, now with the weather it won't go away"      Objective: See treatment diary below      Assessment: Tolerated well, but persisting MCP stiffness and pain with palpation    Plan: Continue per plan of care        Precautions: George  LATEX ALLERGY      Manuals  2/7 2/28 3/21 3/28 4/1 4/4 4/11 4/13 10/28   MEM 8'            STM  8' 8' 8' 8' 8' 8' 25' 25 40'   KT       Y strip with space correction I strip                   Neuro Re-Ed                                                                                                        Ther Ex             PROM 5'  5' 10' 5' 5' 5'  15    Pencils   1x          Progressive Grasp             Gross Grasp  RPW  RPW 3x10 GPW 3x10 GPW 3x10 GPW 3x10      Pinch Ring  R/R 2x           Hook Roll   2x10          Wrist Maze   x4    x6 X6     Lumbrical Block    x10          EDC    Sm RB x15 Sm RB x15 Sm RB x15 Sm RB x15      Wrist Strengthening       Isometrics YFB x15 Isometrics YFB x15 YFB 2x10 iso 4 planes    Dart Throwers             HEP: MCP PROM, Tendon glides, Isotoner for edema   Foam Fist          Ther Activity             Translation             Pinch Pins    R/R 2x         Pegs     R in, 10# out R in, 10# out                                 Modalities

## 2022-10-31 ENCOUNTER — LAB (OUTPATIENT)
Dept: LAB | Facility: AMBULARY SURGERY CENTER | Age: 62
End: 2022-10-31

## 2022-10-31 ENCOUNTER — PROCEDURE VISIT (OUTPATIENT)
Dept: UROLOGY | Facility: AMBULATORY SURGERY CENTER | Age: 62
End: 2022-10-31

## 2022-10-31 VITALS
DIASTOLIC BLOOD PRESSURE: 66 MMHG | SYSTOLIC BLOOD PRESSURE: 112 MMHG | HEART RATE: 80 BPM | HEIGHT: 67 IN | BODY MASS INDEX: 19.3 KG/M2 | WEIGHT: 123 LBS

## 2022-10-31 DIAGNOSIS — K52.9 COLITIS: ICD-10-CM

## 2022-10-31 DIAGNOSIS — N20.0 CALCULUS OF KIDNEY: ICD-10-CM

## 2022-10-31 DIAGNOSIS — N39.8 VOIDING DYSFUNCTION: Primary | ICD-10-CM

## 2022-10-31 DIAGNOSIS — R30.0 DIFFICULT OR PAINFUL URINATION: ICD-10-CM

## 2022-10-31 DIAGNOSIS — R10.31 RIGHT LOWER QUADRANT ABDOMINAL PAIN: ICD-10-CM

## 2022-10-31 DIAGNOSIS — R39.15 URINARY URGENCY: ICD-10-CM

## 2022-10-31 LAB
BASOPHILS # BLD AUTO: 0.05 THOUSANDS/ÂΜL (ref 0–0.1)
BASOPHILS NFR BLD AUTO: 2 % (ref 0–1)
CRP SERPL QL: <3 MG/L
EOSINOPHIL # BLD AUTO: 0.05 THOUSAND/ÂΜL (ref 0–0.61)
EOSINOPHIL NFR BLD AUTO: 2 % (ref 0–6)
ERYTHROCYTE [DISTWIDTH] IN BLOOD BY AUTOMATED COUNT: 12.6 % (ref 11.6–15.1)
HCT VFR BLD AUTO: 44 % (ref 34.8–46.1)
HGB BLD-MCNC: 13.3 G/DL (ref 11.5–15.4)
IMM GRANULOCYTES # BLD AUTO: 0.01 THOUSAND/UL (ref 0–0.2)
IMM GRANULOCYTES NFR BLD AUTO: 0 % (ref 0–2)
LAB AP GYN PRIMARY INTERPRETATION: NORMAL
LYMPHOCYTES # BLD AUTO: 0.67 THOUSANDS/ÂΜL (ref 0.6–4.47)
LYMPHOCYTES NFR BLD AUTO: 22 % (ref 14–44)
Lab: NORMAL
MCH RBC QN AUTO: 27.8 PG (ref 26.8–34.3)
MCHC RBC AUTO-ENTMCNC: 30.2 G/DL (ref 31.4–37.4)
MCV RBC AUTO: 92 FL (ref 82–98)
MONOCYTES # BLD AUTO: 0.28 THOUSAND/ÂΜL (ref 0.17–1.22)
MONOCYTES NFR BLD AUTO: 9 % (ref 4–12)
NEUTROPHILS # BLD AUTO: 1.95 THOUSANDS/ÂΜL (ref 1.85–7.62)
NEUTS SEG NFR BLD AUTO: 65 % (ref 43–75)
NRBC BLD AUTO-RTO: 0 /100 WBCS
PLATELET # BLD AUTO: 125 THOUSANDS/UL (ref 149–390)
PMV BLD AUTO: 12.1 FL (ref 8.9–12.7)
POST-VOID RESIDUAL VOLUME, ML POC: 107 ML
RBC # BLD AUTO: 4.78 MILLION/UL (ref 3.81–5.12)
WBC # BLD AUTO: 3.01 THOUSAND/UL (ref 4.31–10.16)

## 2022-10-31 NOTE — PROGRESS NOTES
10/31/2022  Vikas Mckinney is a 58 y o  female  [id number]    Diagnosis:  Chief Complaint     PVR          Patient presents for follow up post void residual s/p voiding dysfunction managed by Dr Padilla Risk:  Follow up as scheduled   Knows to call the office in the meantime with concerns  Assessment:  Patient following up after incomplete emptying sensation  Unable to pass catheter due to a tilt in the urethra  Vitals:    10/31/22 0928   BP: 112/66   Pulse: 80   Weight: 55 8 kg (123 lb)   Height: 5' 7" (1 702 m)       Patient voided in the office  Post void residual measured via bladder scanner to be 107 mL  Dr Sunitha Weaver aware  Plan to follow up as scheduled in two weeks for reassessment      Recent Results (from the past 6 hour(s))   POCT Measure PVR    Collection Time: 10/31/22  9:34 AM   Result Value Ref Range    POST-VOID RESIDUAL VOLUME, ML  mL         Yves Pulse

## 2022-11-01 NOTE — RESULT ENCOUNTER NOTE
Inform patient via MyChart  Please review the pathology/lab result of further discussion      Copied from [employer] message :       Charly collazo,     Your CRP (inflammation marker in blood) was normal      Best regards,     Kristin Milligan MD

## 2022-11-14 NOTE — PROGRESS NOTES
11/15/2022    Arias Rubi  1960  [id number]        Assessment  -Interstitial cystitis    -Nephrolithiasis     Discussion/Plan  Tim Sebastian is a 58 y o  female being managed by Dr Bev Abel  1  Interstitial cystitis- PVR in the office today is 85 mL  She is requesting at specimen be sent for urine culture  We will call with her results  Unfortunately, she is still pending follow-up with Gastroenterology  We discussed that GI etiology may be exacerbating her urinary symptoms associated with IC  Recommend cystoscopy to further evaluate bladder, but she again defers  Patient states she has had numerous cystoscopies performed in the past which were all unremarkable and procedure would need to be performed under general anesthesia in the operating room  She will remain on trospium, Uribel, and Elmiron daily  Patient will also continue to follow with Urogynecology  We also discussed referral to pelvic floor physical therapy, but she defers  Patient states pelvic floor PT exacerbated her symptoms a few years ago  Recommend follow-up with GI  2  Nephrolithiasis- patient will follow-up as scheduled for yearly office visit in March 2023 with KUB and renal ultrasound  Prior upper tract imaging showed no evidence of renal calculi  Follow-up in March 2023 with KUB and renal ultrasound  She was advised to call sooner with any questions or issues     -All questions answered, patients agree with plan     History of Present Illness  58 y o  female with a history of interstitial cystitis and voiding dysfunction presents today for follow up  Patient last seen in the office 1 month ago  She was noted to have elevated postvoid residuals on imaging studies  Dr Bev Abel inserted a 14 [place] red rubber catheter which was easily inserted with urethral meatus slightly retracted  Patient has history of ulcerative colitis and IBS    She states she has spoken with Gastroenterology, but is awaiting an office follow-up and possible colonoscopy  Patient reports recurrence of urinary symptoms 4 days ago  She notes increased urinary frequency, urgency, and bladder pressure  Patient continues to follow with Dr Curry Amor with Urogynecology  She denies any episodes of gross hematuria or dysuria  Patient remains on trospium, Elmiron, and Uribel daily  She states she underwent cystoscopy by Dr José David many years ago  This was performed under general anesthesia in the operating room  Additional history includes nephrolithiasis  She denies any recent stone episodes or flank pain  Review of Systems  Review of Systems   Constitutional: Negative  HENT: Negative  Respiratory: Negative  Cardiovascular: Negative  Gastrointestinal: Negative  Genitourinary: Positive for frequency and pelvic pain  Negative for decreased urine volume, difficulty urinating, dysuria, flank pain, hematuria and urgency  Musculoskeletal: Negative  Skin: Negative  Neurological: Negative  Psychiatric/Behavioral: Negative          Past Medical History  Past Medical History:   Diagnosis Date   • Atopic dermatitis     last assessed 13   • Atrophic vaginitis     last assessed 9/2/15   • Dermatitis    • Dry eye    • Frequency of urination    • Fungal infection     last assessed 13   • Herpes     last assessed 14   • Hyperlipidemia    • Hypothyroidism    • Interstitial cystitis    • Osteoporosis    • Periodontal abscess     last assessed 13   • PONV (postoperative nausea and vomiting)    • Thyroid nodule    • [facility] )    • Urinary frequency     resolved 17   • UTI (urinary tract infection)    • Vaginal atrophy    • Vitamin D deficiency     last assessed 16   • Voiding dysfunction     last assessed 10/7/15       Past Social History  Past Surgical History:   Procedure Laterality Date   •  SECTION       and     • COLONOSCOPY  2020   • CYSTOSCOPY      diagnostic resolved 1/24/05   • FL RETROGRADE PYELOGRAM  1/7/2020   • GANGLION CYST EXCISION Right 2/19/2019    Procedure: EXCISION GANGLION CYST RIGHT FIRST DORSAL EXTENSOR COMPARTMENT;  Surgeon: Tawny Barreto MD;  [facility];  Service: Orthopedics   • KIDNEY STONE SURGERY     • ORIF PROXIMAL ULNA FRACTURE      metal plates and screws removed   • OTHER SURGICAL HISTORY      punch biospy    • NM COLONOSCOPY FLX DX W/COLLJ SPEC WHEN PFRMD N/A 7/28/2017    Procedure: COLONOSCOPY;  Surgeon: Jason Ware MD;  [facility];   Service: Colorectal   • NM CYSTO/URETERO W/LITHOTRIPSY &INDWELL STENT INSRT Left 1/7/2020    Procedure: CYSTOSCOPY URETEROSCOPY WITH LITHOTRIPSY HOLMIUM LASER, RETROGRADE PYELOGRAM AND INSERTION STENT URETERAL;  Surgeon: Blily Stoddard MD;  [facility];  Service: Urology   • NM INCIS TENDON SHEATH,RADIAL STYLOID Right 2/19/2019    Procedure: Hesham Morris;  Surgeon: Tawny Barreto MD;  [facility];  Service: Orthopedics   • TUBAL LIGATION     • UPPER GASTROINTESTINAL ENDOSCOPY  09/11/2020   • WISDOM TOOTH EXTRACTION  1983    X2        Past Family History  Family History   Problem Relation Age of Onset   • Gallbladder disease Mother    • Thyroid disease Mother    • Kidney disease Mother    • Breast cancer Mother    • Diabetes Father    • Hypertension Father    • Nephrolithiasis Father    • Lymphoma Maternal Grandfather    • Diabetes Paternal Grandmother    • Breast cancer Paternal Aunt    • No Known Problems Son    • No Known Problems Son        Past Social history  Social History     Socioeconomic History   • Marital status: /Civil Union     Spouse name: Rosalva Ny    • Number of children: 2   • Years of education: Not on file   • Highest education level: Not on file   Occupational History   • Not on file   Tobacco Use   • Smoking status: Never Smoker   • Smokeless tobacco: Never Used   Vaping Use   • Vaping Use: Never used   Substance and Sexual Activity   • Alcohol use: Yes     Comment: socially - 1-3 drinks per month   • Drug use: No   • Sexual activity: Yes     Partners: Male   Other Topics Concern   • Not on file   Social History Narrative    Currently         Patient lives in a home that was [address] in [address]    Gas/forced hot air     [address]oise in the bedroom     Finished basement-dry-no mold or musty smell     Dehumidifier in the basement     No air  or purifiers     Humidifier in the winter months     Central air     Home is smoke free     Patient does not live close to open fields or wooded area         No pets         Caffeine: seldom hot tea or soda     Chocolate consumed everyday         Patient lives with spouse and children      Social Determinants of Health     Financial Resource Strain: Not on file   Food Insecurity: Not on file   Transportation Needs: Not on file   Physical Activity: Not on file   Stress: Not on file   Social Connections: Not on file   Intimate Partner Violence: Not on file   Housing Stability: Not on file       Current Medications  Current Outpatient Medications   Medication Sig Dispense Refill   • amitriptyline (ELAVIL) 10 mg tablet amitriptyline 10 mg tablet (Patient not taking: No sig reported)     • Cholecalciferol (VITAMIN D PO) Take 3,000 Units by mouth daily     • Cranberry (ELLURA PO) Take 1 capsule by mouth daily     • cycloSPORINE (RESTASIS) 0 05 % ophthalmic emulsion Administer 1 drop to both eyes 2 (two) times a day     • dicyclomine (BENTYL) 10 mg capsule Take 2 capsules (20 mg total) by mouth 3 (three) times a day before meals (Patient taking differently: Take 20 mg by mouth 2 (two) times a day) 270 capsule 3   • estradiol (ESTRACE) 0 1 mg/g vaginal cream Insert 1 applicator full vaginally nightly 42 5 g 5   • levothyroxine 50 mcg tablet Take 1 tablet (50 mcg total) by mouth daily 90 tablet 3   • mesalamine (ASACOL) 800 MG EC tablet Take 1 tablet (800 mg total) by mouth 4 (four) times a day 360 tablet 0 • Meth-Hyo-M Bl-Na Phos-Ph Sal (URO-MP PO) Take 1 capsule by mouth 2 (two) times a day      • mometasone (NASONEX) 50 mcg/act nasal spray 2 sprays into each nostril daily 41 g 3   • nitrofurantoin (MACROBID) 100 mg capsule  (Patient not taking: No sig reported)     • omeprazole (PriLOSEC) 20 mg delayed release capsule Take 1 capsule (20 mg total) by mouth daily 30 capsule 0   • pentosan polysulfate (Elmiron) 100 mg capsule One pill 3 times a day 90 capsule 5   • rosuvastatin (CRESTOR) 5 mg tablet Take 1 tablet (5 mg total) by mouth daily 90 tablet 3   • trospium chloride (SANCTURA) 20 mg tablet Take 1 tablet (20 mg total) by mouth 2 (two) times a day 60 tablet 5     No current facility-administered medications for this visit  Allergies  Allergies   Allergen Reactions   • Apple Fruit Extract - Food Allergy Anaphylaxis   • Iodine - Food Allergy Anaphylaxis     Allergy to Iodinated and non iodinated dye   • Other Anaphylaxis     APPLES    TAPE  Also rash at times   • Seasonal Ic [Cholestatin] Allergic Rhinitis   • Latex        Past medical history, social history, family history, medications and allergies were reviewed  Vitals  There were no vitals filed for this visit  Physical Exam  Physical Exam  Constitutional:       Appearance: Normal appearance  She is well-developed  HENT:      Head: Normocephalic  Eyes:      Pupils: Pupils are equal, round, and reactive to light  Pulmonary:      Effort: Pulmonary effort is normal    Abdominal:      Palpations: Abdomen is soft  Tenderness: There is no right CVA tenderness or left CVA tenderness  Musculoskeletal:         General: Normal range of motion  Cervical back: Normal range of motion  Skin:     General: Skin is warm and dry  Neurological:      General: No focal deficit present  Mental Status: She is alert and oriented to person, place, and time     Psychiatric:         Mood and Affect: Mood normal          Behavior: Behavior normal  Thought Content: Thought content normal          Judgment: Judgment normal          Results    I have personally reviewed all pertinent lab results and reviewed with patient  Lab Results   Component Value Date    GLUCOSE 89 08/07/2015    CALCIUM 9 1 09/19/2022     08/07/2015    K 4 1 09/19/2022    CO2 29 09/19/2022     09/19/2022    BUN 21 09/19/2022    CREATININE 0 85 09/19/2022     Lab Results   Component Value Date    WBC 3 01 (L) 10/31/2022    HGB 13 3 10/31/2022    HCT 44 0 10/31/2022    MCV 92 10/31/2022     (L) 10/31/2022     No results found for this or any previous visit (from the past 1 hour(s))

## 2022-11-15 ENCOUNTER — OFFICE VISIT (OUTPATIENT)
Dept: UROLOGY | Facility: AMBULATORY SURGERY CENTER | Age: 62
End: 2022-11-15

## 2022-11-15 VITALS
OXYGEN SATURATION: 96 % | DIASTOLIC BLOOD PRESSURE: 86 MMHG | SYSTOLIC BLOOD PRESSURE: 138 MMHG | RESPIRATION RATE: 18 BRPM | HEIGHT: 67 IN | HEART RATE: 105 BPM | WEIGHT: 123 LBS | BODY MASS INDEX: 19.3 KG/M2

## 2022-11-15 DIAGNOSIS — R33.9 INCOMPLETE BLADDER EMPTYING: Primary | ICD-10-CM

## 2022-11-15 DIAGNOSIS — N30.10 INTERSTITIAL CYSTITIS: ICD-10-CM

## 2022-11-15 LAB — POST-VOID RESIDUAL VOLUME, ML POC: 85 ML

## 2022-11-15 RX ORDER — METHENAMINE, SODIUM PHOSPHATE, MONOBASIC, ANHYDROUS, PHENYL SALICYLATE, METHYLENE BLUE AND HYOSCYAMINE SULFATE 118; 40.8; 36; 10; .12 MG/1; MG/1; MG/1; MG/1; MG/1
CAPSULE ORAL
COMMUNITY
Start: 2022-10-19

## 2022-11-17 LAB — BACTERIA UR CULT: ABNORMAL

## 2022-11-17 NOTE — PATIENT INSTRUCTIONS
Normal gynecological physical examination  Self-breast examination stressed  Mammogram ordered  Discussed regular exercise, healthy diet, importance of vitamin D and calcium supplements  Discussed importance of sun block use during periods of prolonged sun exposure  Patient will be seen in 1 year for routine gynecologic and medical examination  Patient will call office for any problems, concerns, or issues which may arise during the interim    Pelvic ultrasound will be ordered to check for stability of the endometrial stripe

## 2022-11-21 ENCOUNTER — OFFICE VISIT (OUTPATIENT)
Dept: OCCUPATIONAL THERAPY | Age: 62
End: 2022-11-21

## 2022-11-21 DIAGNOSIS — S62.337A CLOSED DISPLACED FRACTURE OF NECK OF FIFTH METACARPAL BONE OF LEFT HAND, INITIAL ENCOUNTER: Primary | ICD-10-CM

## 2022-11-21 NOTE — PROGRESS NOTES
Daily Note     Today's date: 2022  Patient name: Gustavo Carranza  : 1960  MRN: [id number]  Referring provider: Citlali Pollard MD  Dx:   Encounter Diagnosis     ICD-10-CM    1  Closed displaced fracture of neck of fifth metacarpal bone of left hand, initial encounter  S62 337A                      Subjective: "It's was great when the weather was great but now it's terrible      Objective: See treatment diary below      Assessment: Tolerated well, but persisting MCP stiffness and pain with palpation, trigger points noted     Plan: Continue per plan of care    FU through end of year     Precautions: [place]  LATEX ALLERGY      Manuals 11/21 2/7 2/28 3/21 3/28 4/1 4/4 4/11 4/13 10/28   MEM             STM 40' 8' 8' 8' 8' 8' 8' 25' 25 40'   KT       Y strip with space correction I strip                   Neuro Re-Ed                                                                                                        Ther Ex             PROM   5' 10' 5' 5' 5'  15    Pencils   1x          Progressive Grasp             Gross Grasp  RPW  RPW 3x10 GPW 3x10 GPW 3x10 GPW 3x10      Pinch Ring  R/R 2x           Hook Roll   2x10          Wrist Maze   x4    x6 X6     Lumbrical Block    x10          EDC    Sm RB x15 Sm RB x15 Sm RB x15 Sm RB x15      Wrist Strengthening       Isometrics YFB x15 Isometrics YFB x15 YFB 2x10 iso 4 planes    Dart Throwers             HEP: MCP PROM, Tendon glides, Isotoner for edema   Foam Fist          Ther Activity             Translation             Pinch Pins    R/R 2x         Pegs     R in, 10# out R in, 10# out                                 Modalities

## 2022-11-27 PROBLEM — N39.0 UTI (URINARY TRACT INFECTION): Status: RESOLVED | Noted: 2019-12-05 | Resolved: 2022-11-27

## 2022-12-05 ENCOUNTER — OFFICE VISIT (OUTPATIENT)
Dept: OCCUPATIONAL THERAPY | Age: 62
End: 2022-12-05

## 2022-12-05 DIAGNOSIS — S62.337A CLOSED DISPLACED FRACTURE OF NECK OF FIFTH METACARPAL BONE OF LEFT HAND, INITIAL ENCOUNTER: Primary | ICD-10-CM

## 2022-12-05 NOTE — PROGRESS NOTES
Daily Note     Today's date: 2022  Patient name: Cherry García  : 1960  MRN: [id number]  Referring provider: Pinkey Canavan, MD  Dx:   Encounter Diagnosis     ICD-10-CM    1  Closed displaced fracture of neck of fifth metacarpal bone of left hand, initial encounter  S62 337A                      Subjective: "My girlfriend grabbed my hand the other day and it really hurt"      Objective: See treatment diary below      Assessment: Tolerated well, but persisting MCP stiffness and pain with palpation, trigger points noted     Plan: Continue per plan of care    FU through end of year     Precautions: [place]  LATEX ALLERGY      Manuals 11/21 12/5 2/28 3/21 3/28 4/1 4/4 4/11 4/13 10/28   MEM             STM 40' 40' 8' 8' 8' 8' 8' 25' 25 40'   KT       Y strip with space correction I strip                   Neuro Re-Ed                                                                                                        Ther Ex             PROM   5' 10' 5' 5' 5'  15    Pencils   1x          Progressive Grasp             Gross Grasp    RPW 3x10 GPW 3x10 GPW 3x10 GPW 3x10      Pinch Ring             Hook Roll   2x10          Wrist Maze   x4    x6 X6     Lumbrical Block    x10          EDC    Sm RB x15 Sm RB x15 Sm RB x15 Sm RB x15      Wrist Strengthening       Isometrics YFB x15 Isometrics YFB x15 YFB 2x10 iso 4 planes    Dart Throwers             HEP: MCP PROM, Tendon glides, Isotoner for edema   Foam Fist          Ther Activity             Translation             Pinch Pins    R/R 2x         Pegs     R in, 10# out R in, 10# out                                 Modalities

## 2022-12-06 ENCOUNTER — APPOINTMENT (OUTPATIENT)
Dept: LAB | Facility: AMBULARY SURGERY CENTER | Age: 62
End: 2022-12-06
Attending: INTERNAL MEDICINE

## 2022-12-06 DIAGNOSIS — K52.9 COLITIS: ICD-10-CM

## 2022-12-08 LAB — CALPROTECTIN STL-MCNT: <16 UG/G (ref 0–120)

## 2022-12-12 NOTE — RESULT ENCOUNTER NOTE
Inform patient via MyChart  Please review the pathology/lab result of further discussion      Copied from [employer] message :       Jessica Anderson,     Your inflammation markers were normal     Best regards,     Reagan Wing MD

## 2022-12-19 ENCOUNTER — OFFICE VISIT (OUTPATIENT)
Dept: OCCUPATIONAL THERAPY | Age: 62
End: 2022-12-19

## 2022-12-19 ENCOUNTER — PROBLEM (OUTPATIENT)
Dept: URBAN - METROPOLITAN AREA CLINIC 6 | Facility: CLINIC | Age: 62
End: 2022-12-19

## 2022-12-19 DIAGNOSIS — Z12.31 ENCOUNTER FOR SCREENING MAMMOGRAM FOR BREAST CANCER: ICD-10-CM

## 2022-12-19 DIAGNOSIS — S62.337A CLOSED DISPLACED FRACTURE OF NECK OF FIFTH METACARPAL BONE OF LEFT HAND, INITIAL ENCOUNTER: Primary | ICD-10-CM

## 2022-12-19 DIAGNOSIS — T15.11XA: ICD-10-CM

## 2022-12-19 DIAGNOSIS — H04.123: ICD-10-CM

## 2022-12-19 PROCEDURE — 92012 INTRM OPH EXAM EST PATIENT: CPT

## 2022-12-19 PROCEDURE — 65205 REMOVE FOREIGN BODY FROM EYE: CPT

## 2022-12-19 NOTE — PROGRESS NOTES
Daily Note     Today's date: 2022  Patient name: Gideon Guallpa  : 1960  MRN: [id number]  Referring provider: Marcos Alaniz MD  Dx:   Encounter Diagnosis     ICD-10-CM    1  Closed displaced fracture of neck of fifth metacarpal bone of left hand, initial encounter  S62 337A                      Subjective: "I get a deep ache whenever someone grabs it"      Objective: See treatment diary below      Assessment: Tolerated well, but persisting MCP stiffness and pain with palpation, trigger points noted     Plan: Continue per plan of care    FU through end of year     Precautions: [place]  LATEX ALLERGY      Manuals 11/21 12/5 12/19 3/21 3/28 4/1 4/4 4/11 4/13 10/28   MEM             STM 40' 40' 40' 8' 8' 8' 8' 25' 25 40'   KT       Y strip with space correction I strip                   Neuro Re-Ed                                                                                                        Ther Ex             PROM    10' 5' 5' 5'  15    Pencils             Progressive Grasp             Gross Grasp    RPW 3x10 GPW 3x10 GPW 3x10 GPW 3x10      Pinch Ring             Hook Roll             Wrist Maze       x6 X6     Lumbrical Block              EDC    Sm RB x15 Sm RB x15 Sm RB x15 Sm RB x15      Wrist Strengthening       Isometrics YFB x15 Isometrics YFB x15 YFB 2x10 iso 4 planes    Dart Throwers             HEP: MCP PROM, Tendon glides, Isotoner for edema             Ther Activity             Translation             Pinch Pins    R/R 2x         Pegs     R in, 10# out R in, 10# out                                 Modalities

## 2022-12-27 ENCOUNTER — OFFICE VISIT (OUTPATIENT)
Dept: OCCUPATIONAL THERAPY | Age: 62
End: 2022-12-27

## 2022-12-27 DIAGNOSIS — S62.337A CLOSED DISPLACED FRACTURE OF NECK OF FIFTH METACARPAL BONE OF LEFT HAND, INITIAL ENCOUNTER: Primary | ICD-10-CM

## 2022-12-27 NOTE — PROGRESS NOTES
Daily Note     Today's date: 2022  Patient name: Irvin Urbina  : 1960  MRN: [id number]  Referring provider: Keena Paz MD  Dx:   Encounter Diagnosis     ICD-10-CM    1  Closed displaced fracture of neck of fifth metacarpal bone of left hand, initial encounter  S62 337A                      Subjective: "It's my last one!"      Objective: See treatment diary below      Assessment: Tolerated well, but persisting MCP stiffness and pain with palpation, trigger points noted     Plan: D/C to HEP     Precautions: [place]  LATEX ALLERGY      Manuals 11/21 12/5 12/19 3/21 3/28 4/1 4/4 4/11 4/13 10/28   MEM             STM 40' 40' 40' 8' 8' 8' 8' 25' 25 40'   KT       Y strip with space correction I strip                   Neuro Re-Ed                                                                                                        Ther Ex             PROM    10' 5' 5' 5'  15    Pencils             Progressive Grasp             Gross Grasp    RPW 3x10 GPW 3x10 GPW 3x10 GPW 3x10      Pinch Ring             Hook Roll             Wrist Maze       x6 X6     Lumbrical Block              EDC    Sm RB x15 Sm RB x15 Sm RB x15 Sm RB x15      Wrist Strengthening       Isometrics YFB x15 Isometrics YFB x15 YFB 2x10 iso 4 planes    Dart Throwers             HEP: MCP PROM, Tendon glides, Isotoner for edema             Ther Activity             Translation             Pinch Pins    R/R 2x         Pegs     R in, 10# out R in, 10# out                                 Modalities

## 2023-01-04 ENCOUNTER — TELEPHONE (OUTPATIENT)
Dept: GASTROENTEROLOGY | Facility: MEDICAL CENTER | Age: 63
End: 2023-01-04

## 2023-01-23 DIAGNOSIS — K52.9 COLITIS WITHOUT COMPLICATION: ICD-10-CM

## 2023-01-23 RX ORDER — MESALAMINE 800 MG/1
800 TABLET, DELAYED RELEASE ORAL 4 TIMES DAILY
Qty: 360 TABLET | Refills: 0 | Status: SHIPPED | OUTPATIENT
Start: 2023-01-23 | End: 2023-04-23

## 2023-02-27 ENCOUNTER — HOSPITAL ENCOUNTER (OUTPATIENT)
Dept: ULTRASOUND IMAGING | Facility: HOSPITAL | Age: 63
Discharge: HOME | End: 2023-02-27
Attending: UROLOGY

## 2023-02-27 ENCOUNTER — HOSPITAL ENCOUNTER (OUTPATIENT)
Dept: RADIOLOGY | Facility: HOSPITAL | Age: 63
Discharge: HOME | End: 2023-02-27
Attending: UROLOGY

## 2023-02-27 DIAGNOSIS — N20.0 CALCULUS OF KIDNEY: ICD-10-CM

## 2023-03-06 ENCOUNTER — APPOINTMENT (OUTPATIENT)
Dept: LAB | Facility: AMBULARY SURGERY CENTER | Age: 63
End: 2023-03-06

## 2023-03-06 DIAGNOSIS — R39.15 URINARY URGENCY: Primary | ICD-10-CM

## 2023-03-06 DIAGNOSIS — R39.15 URINARY URGENCY: ICD-10-CM

## 2023-03-08 LAB — BACTERIA UR CULT: ABNORMAL

## 2023-03-14 DIAGNOSIS — E74.39 GLUCOSE INTOLERANCE: ICD-10-CM

## 2023-03-14 DIAGNOSIS — D69.6 THROMBOCYTOPENIA (HCC): Primary | ICD-10-CM

## 2023-03-14 DIAGNOSIS — E78.2 MIXED HYPERLIPIDEMIA: ICD-10-CM

## 2023-03-14 DIAGNOSIS — E03.8 OTHER SPECIFIED HYPOTHYROIDISM: ICD-10-CM

## 2023-03-14 DIAGNOSIS — N30.10 INTERSTITIAL CYSTITIS: ICD-10-CM

## 2023-03-17 ENCOUNTER — OFFICE VISIT (OUTPATIENT)
Dept: GASTROENTEROLOGY | Facility: MEDICAL CENTER | Age: 63
End: 2023-03-17

## 2023-03-17 VITALS
TEMPERATURE: 99.1 F | BODY MASS INDEX: 18.92 KG/M2 | SYSTOLIC BLOOD PRESSURE: 115 MMHG | WEIGHT: 120.8 LBS | HEART RATE: 80 BPM | DIASTOLIC BLOOD PRESSURE: 81 MMHG

## 2023-03-17 DIAGNOSIS — R10.31 RIGHT LOWER QUADRANT ABDOMINAL PAIN: ICD-10-CM

## 2023-03-17 DIAGNOSIS — K52.9 COLITIS: Primary | ICD-10-CM

## 2023-03-17 DIAGNOSIS — K58.2 IRRITABLE BOWEL SYNDROME WITH BOTH CONSTIPATION AND DIARRHEA: ICD-10-CM

## 2023-03-17 NOTE — PROGRESS NOTES
Outpatient Follow up  [address]    [place] [address] [facility] [address]  Pernell Salcido MD  Ph : [phone]  Fax : [phone]  Mobile : [phone]  Email : [email or web address]  Also available on [employer]    Jing Martinez 58 y o  female MRN: [id number]    PCP: Freedom Hernandez MD  Referring: No referring provider defined for this encounter  Jing Martinez presented for a follow up visit  My recommendations are included  Please do not hesitate to contact me with any questions you may have  ASSESSMENT AND PLAN:      No problem-specific Assessment & Plan notes found for this encounter  Diagnoses and all orders for this visit:    Colitis    Right lower quadrant abdominal pain    Irritable bowel syndrome with both constipation and diarrhea  -     XR colon transit study; Future      Judd Rose is a 79-year-old lady who is following up with us in regards to her symptoms of IBS  She has intermittent abdominal pain  In the past we have treated her for IBS with diarrhea  She may have constipation causing her symptoms as it is seen on imaging  We reviewed all her past abdominal imaging since 2019  She does appear to have moderate stool on the right side  Her symptoms are mostly on the right side as well  She is on a number of medications including Bentyl which can make constipation worse  I would suggest getting a sitz marker study to evaluate and recommend treatment from there  Depending on the findings we may consider to be more aggressive with her bowel regimen  She does not feel that she is constipated but may have some retained stool on the right side  Follow up in couple months    ______________________________________________________________________    SUBJECTIVE:  79-year-old with symptoms of IBS as well as indeterminate colitis  This is controlled on mesalamine  We discussed various treatment       She was recommended Donnatal but has not been able to get it but will be working on it  Discussed other options as well but did not tolerate Amitriptyline and does not want to risk the side effects with Librax  Other options might be an SSRI  Will discuss with Dr Evette Jaffe if she is unable to get hte Donnatal      EGD : 9/20 : Mild nodularity in distal esophagus - biopsies done  Likely related to GERD  Normal stomach and duodenum - biopsies done  Colon 9/20 : One sessile polyp measuring smaller than 5 mm in the cecum; performed complete en bloc removal by cold biopsy  Performed multiple biopsies in the terminal ileum, cecum, ascending colon, transverse colon, descending colon, sigmoid colon and rectum  Otherwise normal appearing colonic mucosa throughout, normal intubated terminal ileum, normal retroflexion rectum, multiple labeled random interval biopsies taken for colitis history  She has seen surgery in the past - 8mm ventral hernia  No surgery recommended  Xray : 2/23 : Stool obscures right kidney  No radiopaque urinary tract calculi  Last seen in September and October and did well after that  However, she has some good days and bad days  Did well for the most part from Nov to January but then end of January she had worsening symptoms  She has had interstitial cystitis as well  She uses heating pad  She sees Dr Marisa Amador and Dr Archana Gaines  She does not feel that she has constipation  However, she has had imaging done over the years and we reviewed these and she does have stool on the right side  No bleeding  Moves her bowels every day but may not be moving as much  She stays well hydrated  REVIEW OF SYSTEMS IS OTHERWISE NEGATIVE        Historical Information   Past Medical History:   Diagnosis Date   • Atopic dermatitis     last assessed 12/2/13   • Atrophic vaginitis     last assessed 9/2/15   • Dermatitis    • Dry eye    • Frequency of urination    • Fungal infection     last assessed 13   • Herpes     last assessed 14   • Hyperlipidemia    • Hypothyroidism    • Interstitial cystitis    • Osteoporosis    • Periodontal abscess     last assessed 13   • PONV (postoperative nausea and vomiting)    • Thyroid nodule    • Ulcerative colitis ([facility] [address] )    • Urinary frequency     resolved 17   • UTI (urinary tract infection)    • Vaginal atrophy    • Vitamin D deficiency     last assessed 16   • Voiding dysfunction     last assessed 10/7/15     Past Surgical History:   Procedure Laterality Date   •  SECTION       and     • COLONOSCOPY  2020   • CYSTOSCOPY      diagnostic resolved 05   • FL RETROGRADE PYELOGRAM  2020   • GANGLION CYST EXCISION Right 2019    Procedure: EXCISION GANGLION CYST RIGHT FIRST DORSAL EXTENSOR COMPARTMENT;  Surgeon: Priyank Romero MD;  [facility]: [facility];  Service: Orthopedics   • KIDNEY STONE SURGERY     • ORIF PROXIMAL ULNA FRACTURE      metal plates and screws removed   • OTHER SURGICAL HISTORY      punch biospy    • KY COLONOSCOPY FLX DX W/COLLJ SPEC WHEN PFRMD N/A 2017    Procedure: COLONOSCOPY;  Surgeon: Vick Khan MD;  [facility];   Service: Colorectal   • KY CYSTO/URETERO W/LITHOTRIPSY &INDWELL STENT INSRT Left 2020    Procedure: CYSTOSCOPY URETEROSCOPY WITH LITHOTRIPSY HOLMIUM LASER, RETROGRADE PYELOGRAM AND INSERTION STENT URETERAL;  Surgeon: Sebastien Lobo MD;  [facility];  Service: Urology   • KY INCISION EXTENSOR TENDON SHEATH WRIST Right 2019    Procedure: Starling Hamming;  Surgeon: Priyank Romero MD;  [facility];  Service: Orthopedics   • TUBAL LIGATION     • UPPER GASTROINTESTINAL ENDOSCOPY  2020   • WISDOM TOOTH EXTRACTION  1983    X2      Social History   Social History     Substance and Sexual Activity   Alcohol Use Yes    Comment: socially - 1-3 drinks per month     Social History     Substance and Sexual Activity   Drug Use No Social History     Tobacco Use   Smoking Status Never   Smokeless Tobacco Never     Family History   Problem Relation Age of Onset   • Gallbladder disease Mother    • Thyroid disease Mother    • Kidney disease Mother    • Breast cancer Mother    • Diabetes Father    • Hypertension Father    • Nephrolithiasis Father    • Lymphoma Maternal Grandfather    • Diabetes Paternal Grandmother    • Breast cancer Paternal Aunt    • No Known Problems Son    • No Known Problems Son        Meds/Allergies       Current Outpatient Medications:   •  Cholecalciferol (VITAMIN D PO)  •  Cranberry (ELLURA PO)  •  cycloSPORINE (RESTASIS) 0 05 % ophthalmic emulsion  •  dicyclomine (BENTYL) 10 mg capsule  •  estradiol (ESTRACE) 0 1 mg/g vaginal cream  •  levothyroxine 50 mcg tablet  •  mesalamine (ASACOL) 800 MG EC tablet  •  Meth-Hyo-M Bl-Na Phos-Ph Sal (URO-MP PO)  •  Meth-Hyo-M Bl-Na Phos-Ph Sal (Uro-MP) 118 MG CAPS  •  mometasone (NASONEX) 50 mcg/act nasal spray  •  pentosan polysulfate (Elmiron) 100 mg capsule  •  rosuvastatin (CRESTOR) 5 mg tablet  •  trospium chloride (SANCTURA) 20 mg tablet  •  omeprazole (PriLOSEC) 20 mg delayed release capsule    Allergies   Allergen Reactions   • Apple Fruit Extract - Food Allergy Anaphylaxis   • Iodine - Food Allergy Anaphylaxis     Allergy to Iodinated and non iodinated dye   • Other Anaphylaxis     APPLES    TAPE  Also rash at times   • Seasonal Ic [Cholestatin] Allergic Rhinitis   • Latex            Objective     Blood pressure 115/81, pulse 80, temperature 99 1 °F (37 3 °C), temperature source Tympanic, weight 54 8 kg (120 lb 12 8 oz)  Body mass index is 18 92 kg/m²  PHYSICAL EXAM:      Physical Exam  Constitutional:       Appearance: Normal appearance  She is well-developed  HENT:      Head: Normocephalic and atraumatic  Eyes:      General: No scleral icterus  Conjunctiva/sclera: Conjunctivae normal       Pupils: Pupils are equal, round, and reactive to light  Cardiovascular:      Rate and Rhythm: Normal rate and regular rhythm  Heart sounds: Normal heart sounds  Pulmonary:      Effort: Pulmonary effort is normal  No respiratory distress  Breath sounds: Normal breath sounds  Abdominal:      General: Bowel sounds are normal  There is no distension  Palpations: Abdomen is soft  There is no mass  Tenderness: There is no abdominal tenderness  Hernia: No hernia is present  Musculoskeletal:         General: Normal range of motion  Cervical back: Normal range of motion  Lymphadenopathy:      Cervical: No cervical adenopathy  Skin:     General: Skin is warm  Neurological:      Mental Status: She is alert and oriented to person, place, and time  Psychiatric:         Behavior: Behavior normal          Thought Content: Thought content normal          Lab Results:   No visits with results within 1 Day(s) from this visit  Latest known visit with results is:   Appointment on 03/06/2023   Component Date Value   • Urine Culture 03/06/2023 20,000-29,000 cfu/ml Lactobacillus species (A)    • Color, UA 03/06/2023 Other    • Clarity, UA 03/06/2023 Turbid    • Specific Gravity, UA 03/06/2023 1 016    • pH, UA 03/06/2023 5 5    • Leukocytes, UA 03/06/2023 Negative    • Nitrite, UA 03/06/2023 Negative    • Protein, UA 03/06/2023 Negative    • Glucose, UA 03/06/2023 Negative    • Ketones, UA 03/06/2023 Negative    • Urobilinogen, UA 03/06/2023 <2 0    • Bilirubin, UA 03/06/2023 Negative    • Occult Blood, UA 03/06/2023 Negative          Radiology Results:   XR abdomen 1 view kub    Result Date: 3/1/2023  Narrative: ABDOMEN INDICATION:   N20 0: Calculus of kidney  COMPARISON:  Compared with 4/5/2022 VIEWS:  AP supine FINDINGS: Stool obscures right kidney  No radiopaque renal calculi seen  No radiopaque ureteral calculi identified  Nonobstructive bowel gas pattern  No acute osseous abnormality is seen       Impression: Stool obscures right kidney No radiopaque urinary tract calculi  Workstation performed: [id number]     US kidney and bladder    Result Date: 3/6/2023  Narrative: RENAL ULTRASOUND INDICATION:   N20 0: Calculus of kidney  COMPARISON: Kidney/bladder ultrasound dated 4/5/2022 TECHNIQUE:   Ultrasound of the retroperitoneum was performed with a curvilinear transducer utilizing volumetric sweeps and still imaging techniques  FINDINGS: KIDNEYS: Symmetric and normal size  Right kidney:  10 2 x 4 9 x 5 3 cm  Volume 137 6 mL Left kidney:  9 5 x 5 5 x 4 5 cm  Volume 123 5 mL Right kidney Normal echogenicity and contour  No mass is identified  No hydronephrosis  Note is made of extrarenal pelvis  No shadowing calculi  No perinephric fluid collections  Left kidney Normal echogenicity and contour  No mass is identified  No hydronephrosis  Note is made of extrarenal pelvis  No shadowing calculi  No perinephric fluid collections  URETERS: Nonvisualized  BLADDER: Normally distended  Note is made of low level internal echoes within the urinary bladder suggesting complex urine  No focal thickening or mass lesions  Bilateral ureteral jets detected  Impression: 1  Low level echoes within the urinary bladder suggesting complex urine  Correlation with urinalysis is recommended  2   Unremarkable kidneys without hydronephrosis  The study was marked in [facility] for immediate notification   Workstation performed: [id number]

## 2023-03-20 ENCOUNTER — APPOINTMENT (OUTPATIENT)
Dept: LAB | Facility: AMBULARY SURGERY CENTER | Age: 63
End: 2023-03-20

## 2023-03-20 DIAGNOSIS — E74.39 GLUCOSE INTOLERANCE: ICD-10-CM

## 2023-03-20 DIAGNOSIS — D69.6 THROMBOCYTOPENIA (HCC): ICD-10-CM

## 2023-03-20 DIAGNOSIS — E78.2 MIXED HYPERLIPIDEMIA: ICD-10-CM

## 2023-03-20 DIAGNOSIS — N30.10 INTERSTITIAL CYSTITIS: ICD-10-CM

## 2023-03-20 DIAGNOSIS — E03.8 OTHER SPECIFIED HYPOTHYROIDISM: ICD-10-CM

## 2023-03-20 LAB
ALBUMIN SERPL BCP-MCNC: 3.6 G/DL (ref 3.5–5)
ALP SERPL-CCNC: 47 U/L (ref 46–116)
ALT SERPL W P-5'-P-CCNC: 16 U/L (ref 12–78)
ANION GAP SERPL CALCULATED.3IONS-SCNC: 1 MMOL/L (ref 4–13)
AST SERPL W P-5'-P-CCNC: 14 U/L (ref 5–45)
BASOPHILS # BLD AUTO: 0.05 THOUSANDS/ÂΜL (ref 0–0.1)
BASOPHILS NFR BLD AUTO: 1 % (ref 0–1)
BILIRUB SERPL-MCNC: 0.32 MG/DL (ref 0.2–1)
BILIRUB UR QL STRIP: NEGATIVE
BUN SERPL-MCNC: 17 MG/DL (ref 5–25)
CALCIUM SERPL-MCNC: 9.1 MG/DL (ref 8.3–10.1)
CHLORIDE SERPL-SCNC: 109 MMOL/L (ref 96–108)
CHOLEST SERPL-MCNC: 176 MG/DL
CLARITY UR: NORMAL
CO2 SERPL-SCNC: 28 MMOL/L (ref 21–32)
COLOR UR: NORMAL
CREAT SERPL-MCNC: 0.79 MG/DL (ref 0.6–1.3)
EOSINOPHIL # BLD AUTO: 0.05 THOUSAND/ÂΜL (ref 0–0.61)
EOSINOPHIL NFR BLD AUTO: 1 % (ref 0–6)
ERYTHROCYTE [DISTWIDTH] IN BLOOD BY AUTOMATED COUNT: 12.3 % (ref 11.6–15.1)
GFR SERPL CREATININE-BSD FRML MDRD: 80 ML/MIN/1.73SQ M
GLUCOSE P FAST SERPL-MCNC: 89 MG/DL (ref 65–99)
GLUCOSE UR STRIP-MCNC: NEGATIVE MG/DL
HCT VFR BLD AUTO: 42.1 % (ref 34.8–46.1)
HDLC SERPL-MCNC: 74 MG/DL
HGB BLD-MCNC: 13.1 G/DL (ref 11.5–15.4)
HGB UR QL STRIP.AUTO: NEGATIVE
IMM GRANULOCYTES # BLD AUTO: 0.01 THOUSAND/UL (ref 0–0.2)
IMM GRANULOCYTES NFR BLD AUTO: 0 % (ref 0–2)
KETONES UR STRIP-MCNC: NEGATIVE MG/DL
LDLC SERPL CALC-MCNC: 91 MG/DL (ref 0–100)
LEUKOCYTE ESTERASE UR QL STRIP: NEGATIVE
LYMPHOCYTES # BLD AUTO: 0.68 THOUSANDS/ÂΜL (ref 0.6–4.47)
LYMPHOCYTES NFR BLD AUTO: 18 % (ref 14–44)
MCH RBC QN AUTO: 27.9 PG (ref 26.8–34.3)
MCHC RBC AUTO-ENTMCNC: 31.1 G/DL (ref 31.4–37.4)
MCV RBC AUTO: 90 FL (ref 82–98)
MONOCYTES # BLD AUTO: 0.32 THOUSAND/ÂΜL (ref 0.17–1.22)
MONOCYTES NFR BLD AUTO: 9 % (ref 4–12)
NEUTROPHILS # BLD AUTO: 2.58 THOUSANDS/ÂΜL (ref 1.85–7.62)
NEUTS SEG NFR BLD AUTO: 71 % (ref 43–75)
NITRITE UR QL STRIP: NEGATIVE
NONHDLC SERPL-MCNC: 102 MG/DL
NRBC BLD AUTO-RTO: 0 /100 WBCS
PH UR STRIP.AUTO: 6 [PH]
PLATELET # BLD AUTO: 143 THOUSANDS/UL (ref 149–390)
PMV BLD AUTO: 11.6 FL (ref 8.9–12.7)
POTASSIUM SERPL-SCNC: 3.9 MMOL/L (ref 3.5–5.3)
PROT SERPL-MCNC: 7 G/DL (ref 6.4–8.4)
PROT UR STRIP-MCNC: NEGATIVE MG/DL
RBC # BLD AUTO: 4.69 MILLION/UL (ref 3.81–5.12)
SODIUM SERPL-SCNC: 138 MMOL/L (ref 135–147)
SP GR UR STRIP.AUTO: 1.02 (ref 1–1.03)
T4 FREE SERPL-MCNC: 1.05 NG/DL (ref 0.76–1.46)
TRIGL SERPL-MCNC: 53 MG/DL
TSH SERPL DL<=0.05 MIU/L-ACNC: 3.77 UIU/ML (ref 0.45–4.5)
UROBILINOGEN UR STRIP-ACNC: <2 MG/DL
WBC # BLD AUTO: 3.69 THOUSAND/UL (ref 4.31–10.16)

## 2023-03-21 ENCOUNTER — OFFICE VISIT (OUTPATIENT)
Dept: UROLOGY | Facility: AMBULATORY SURGERY CENTER | Age: 63
End: 2023-03-21

## 2023-03-21 VITALS
BODY MASS INDEX: 19.3 KG/M2 | DIASTOLIC BLOOD PRESSURE: 70 MMHG | SYSTOLIC BLOOD PRESSURE: 108 MMHG | HEIGHT: 67 IN | OXYGEN SATURATION: 99 % | HEART RATE: 67 BPM | WEIGHT: 123 LBS

## 2023-03-21 DIAGNOSIS — N39.8 DYSFUNCTIONAL VOIDING OF URINE: Primary | ICD-10-CM

## 2023-03-21 NOTE — PROGRESS NOTES
3/21/2023    Jess Barrios  1960  [id number]        Assessment  History nephrolithiasis, history of interstitial cystitis, resolved elevated postvoid residual, urgency of urination      Discussion  I provided Zachariah Kirkland with reassurance that a recent renal bladder ultrasound was within normal limits  She will continue on her Uribel as well as the Elmiron  She also takes Susana [place] and Carla for her urgency of urination  Follow-up will be in 6 months with a postvoid residual assessment  She was instructed to call sooner if she has difficulty voiding  History of Present Illness  58 y o  female with a history of for lithiasis previously treated with ureteroscopy in 2020  In the fall 2022 she felt as if she was unable to void  Multiple postvoid residual assessment in the office showed no evidence of retention  She was instructed on clean intermittent catheterization but was ultimately unable to perform this herself  Fortunately she is voiding well at this time  She denies any flank pain or hematuria  AUA Symptom Score      Review of Systems  Review of Systems   Constitutional: Negative  HENT: Negative  Eyes: Negative  Respiratory: Negative  Cardiovascular: Negative  Gastrointestinal: Negative  Endocrine: Negative  Genitourinary:        Per HPI   Musculoskeletal: Negative  Skin: Negative  Allergic/Immunologic: Negative  Neurological: Negative  Hematological: Negative  Psychiatric/Behavioral: Negative            Past Medical History  Past Medical History:   Diagnosis Date   • Atopic dermatitis     last assessed 12/2/13   • Atrophic vaginitis     last assessed 9/2/15   • Dermatitis    • Dry eye    • Frequency of urination    • Fungal infection     last assessed 8/14/13   • Herpes     last assessed 6/27/14   • Hyperlipidemia    • Hypothyroidism    • Interstitial cystitis    • Osteoporosis    • Periodontal abscess     last assessed 9/6/13   • PONV (postoperative nausea and vomiting)    • Thyroid nodule    • Ulcerative colitis ([facility] [address] )    • Urinary frequency     resolved 17   • UTI (urinary tract infection)    • Vaginal atrophy    • Vitamin D deficiency     last assessed 16   • Voiding dysfunction     last assessed 10/7/15       Past Social History  Past Surgical History:   Procedure Laterality Date   •  SECTION       and     • COLONOSCOPY  2020   • CYSTOSCOPY      diagnostic resolved 05   • FL RETROGRADE PYELOGRAM  2020   • GANGLION CYST EXCISION Right 2019    Procedure: EXCISION GANGLION CYST RIGHT FIRST DORSAL EXTENSOR COMPARTMENT;  Surgeon: Alisson Ortega MD;  Location: [facility];  Service: Orthopedics   • KIDNEY STONE SURGERY     • ORIF PROXIMAL ULNA FRACTURE      metal plates and screws removed   • OTHER SURGICAL HISTORY      punch biospy    • GA COLONOSCOPY FLX DX W/COLLJ SPEC WHEN PFRMD N/A 2017    Procedure: COLONOSCOPY;  Surgeon: Mark Harrison MD;  [facility]: [facility];   Service: Colorectal   • GA CYSTO/URETERO W/LITHOTRIPSY &INDWELL STENT INSRT Left 2020    Procedure: CYSTOSCOPY URETEROSCOPY WITH LITHOTRIPSY HOLMIUM LASER, RETROGRADE PYELOGRAM AND INSERTION STENT URETERAL;  Surgeon: Kip Chow MD;  [facility];  Service: Urology   • GA INCISION EXTENSOR TENDON SHEATH WRIST Right 2019    Procedure: Laura Ely;  Surgeon: Alisson Ortega MD;  Location: [facility];  Service: Orthopedics   • TUBAL LIGATION     • UPPER GASTROINTESTINAL ENDOSCOPY  2020   • WISDOM TOOTH EXTRACTION  1983    X2        Past Family History  Family History   Problem Relation Age of Onset   • Gallbladder disease Mother    • Thyroid disease Mother    • Kidney disease Mother    • Breast cancer Mother    • Diabetes Father    • Hypertension Father    • Nephrolithiasis Father    • Lymphoma Maternal Grandfather    • Diabetes Paternal Grandmother    • Breast cancer Paternal Aunt    • No Known Problems Son    • No Known Problems Son        Past Social history  Social History     Socioeconomic History   • Marital status: /Civil Union     Spouse name: Bradley Chau    • Number of children: 2   • Years of education: Not on file   • Highest education level: Not on file   Occupational History   • Not on file   Tobacco Use   • Smoking status: Never   • Smokeless tobacco: Never   Vaping Use   • Vaping Use: Never used   Substance and Sexual Activity   • Alcohol use: Yes     Comment: socially - 1-3 drinks per month   • Drug use: No   • Sexual activity: Yes     Partners: Male   Other Topics Concern   • Not on file   Social History Narrative    Currently         Patient lives in a home that was built in [address] [place] [address]    Gas/forced hot air     [address] [place] in the bedroom     Finished basement-dry-no mold or musty smell     Dehumidifier in the basement     No air  or purifiers     Humidifier in the winter months     Central air     Home is smoke free     Patient does not live close to open fields or wooded area         No pets         Caffeine: seldom hot tea or soda     Chocolate consumed everyday         Patient lives with spouse and children      Social Determinants of Health     Financial Resource Strain: Not on file   Food Insecurity: Not on file   Transportation Needs: Not on file   Physical Activity: Not on file   Stress: Not on file   Social Connections: Not on file   Intimate Partner Violence: Not on file   Housing Stability: Not on file       Current Medications  Current Outpatient Medications   Medication Sig Dispense Refill   • Cholecalciferol (VITAMIN D PO) Take 3,000 Units by mouth daily     • Cranberry (ELLURA PO) Take 1 capsule by mouth daily     • cycloSPORINE (RESTASIS) 0 05 % ophthalmic emulsion Administer 1 drop to both eyes 2 (two) times a day     • dicyclomine (BENTYL) 10 mg capsule Take 2 capsules (20 mg total) by mouth 3 (three) times a day before meals (Patient taking differently: Take 20 mg by mouth 2 (two) times a day) 270 capsule 3   • estradiol (ESTRACE) 0 1 mg/g vaginal cream Insert 1 applicator full vaginally nightly 42 5 g 5   • levothyroxine 50 mcg tablet Take 1 tablet (50 mcg total) by mouth daily 90 tablet 3   • mesalamine (ASACOL) 800 MG EC tablet Take 1 tablet (800 mg total) by mouth 4 (four) times a day 360 tablet 0   • Meth-Hyo-M Bl-Na Phos-Ph Sal (URO-MP PO) Take 1 capsule by mouth 2 (two) times a day      • Meth-Hyo-M Bl-Na Phos-Ph Sal (Uro-MP) 118 MG CAPS TAKE 1 CAPSULE BY MOUTH FOUR TIMES A DAY FOR 28 DAYS     • mometasone (NASONEX) 50 mcg/act nasal spray 2 sprays into each nostril daily 41 g 3   • pentosan polysulfate (Elmiron) 100 mg capsule One pill 3 times a day 90 capsule 5   • rosuvastatin (CRESTOR) 5 mg tablet Take 1 tablet (5 mg total) by mouth daily 90 tablet 3   • trospium chloride (SANCTURA) 20 mg tablet Take 1 tablet (20 mg total) by mouth 2 (two) times a day 60 tablet 5   • omeprazole (PriLOSEC) 20 mg delayed release capsule Take 1 capsule (20 mg total) by mouth daily 30 capsule 0     No current facility-administered medications for this visit  Allergies  Allergies   Allergen Reactions   • Apple Fruit Extract - Food Allergy Anaphylaxis   • Iodine - Food Allergy Anaphylaxis     Allergy to Iodinated and non iodinated dye   • Other Anaphylaxis     APPLES    TAPE  Also rash at times   • Seasonal Ic [Cholestatin] Allergic Rhinitis   • Latex        Past Medical History, Social History, Family History, medications and allergies were reviewed  Vitals  Vitals:    03/21/23 1517   BP: 108/70   BP Location: Left arm   Patient Position: Sitting   Cuff Size: Adult   Pulse: 67   SpO2: 99%   Weight: 55 8 kg (123 lb)   Height: 5' 7" (1 702 m)       Physical Exam  Physical Exam    On examination she is in no acute distress  Her abdomen is soft nontender nondistended  The bladder is nonpalpable   examination feels no CVA tenderness  Skin is warm  Extremities without edema    Neurologic is grossly intact and nonfocal   Gait normal   Affect normal    Results  No results found for: PSA  Lab Results   Component Value Date    GLUCOSE 89 08/07/2015    CALCIUM 9 1 03/20/2023     08/07/2015    K 3 9 03/20/2023    CO2 28 03/20/2023     (H) 03/20/2023    BUN 17 03/20/2023    CREATININE 0 79 03/20/2023     Lab Results   Component Value Date    WBC 3 69 (L) 03/20/2023    HGB 13 1 03/20/2023    HCT 42 1 03/20/2023    MCV 90 03/20/2023     (L) 03/20/2023         Office Urine Dip  No results found for this or any previous visit (from the past 1 hour(s)) ]

## 2023-03-27 ENCOUNTER — CL RECORD (OUTPATIENT)
Dept: URBAN - METROPOLITAN AREA CLINIC 6 | Facility: CLINIC | Age: 63
End: 2023-03-27

## 2023-03-27 ENCOUNTER — COMPLETE EYE EXAM (OUTPATIENT)
Dept: URBAN - METROPOLITAN AREA CLINIC 6 | Facility: CLINIC | Age: 63
End: 2023-03-27

## 2023-03-27 DIAGNOSIS — Z97.3: ICD-10-CM

## 2023-03-27 DIAGNOSIS — H04.123: ICD-10-CM

## 2023-03-27 DIAGNOSIS — H25.13: ICD-10-CM

## 2023-03-27 DIAGNOSIS — H35.372: ICD-10-CM

## 2023-03-27 PROCEDURE — 92310 CONTACT LENS FITTING OU: CPT | Mod: NC

## 2023-03-27 PROCEDURE — 92012 INTRM OPH EXAM EST PATIENT: CPT

## 2023-03-27 ASSESSMENT — VISUAL ACUITY
OS_CC: 20/30
OD_CC: 20/30

## 2023-03-27 ASSESSMENT — TONOMETRY
OS_IOP_MMHG: 13
OD_IOP_MMHG: 14

## 2023-03-28 ENCOUNTER — OFFICE VISIT (OUTPATIENT)
Dept: INTERNAL MEDICINE CLINIC | Facility: CLINIC | Age: 63
End: 2023-03-28

## 2023-03-28 VITALS
SYSTOLIC BLOOD PRESSURE: 92 MMHG | BODY MASS INDEX: 19.68 KG/M2 | HEART RATE: 85 BPM | HEIGHT: 67 IN | OXYGEN SATURATION: 98 % | WEIGHT: 125.4 LBS | TEMPERATURE: 97.5 F | DIASTOLIC BLOOD PRESSURE: 62 MMHG

## 2023-03-28 DIAGNOSIS — E03.8 OTHER SPECIFIED HYPOTHYROIDISM: Primary | ICD-10-CM

## 2023-03-28 DIAGNOSIS — D70.9 GRANULOCYTOPENIA (HCC): ICD-10-CM

## 2023-03-28 DIAGNOSIS — E78.2 MIXED HYPERLIPIDEMIA: ICD-10-CM

## 2023-03-28 DIAGNOSIS — N95.2 VAGINAL ATROPHY: ICD-10-CM

## 2023-03-28 DIAGNOSIS — K43.9 VENTRAL HERNIA WITHOUT OBSTRUCTION OR GANGRENE: ICD-10-CM

## 2023-03-28 DIAGNOSIS — R73.09 ABNORMAL GLUCOSE: ICD-10-CM

## 2023-03-28 DIAGNOSIS — R10.9 INTERMITTENT ABDOMINAL PAIN: ICD-10-CM

## 2023-03-28 DIAGNOSIS — K58.2 IRRITABLE BOWEL SYNDROME WITH BOTH CONSTIPATION AND DIARRHEA: ICD-10-CM

## 2023-03-28 LAB — SL AMB POCT HEMOGLOBIN AIC: 5.4 (ref ?–6.5)

## 2023-03-28 RX ORDER — ESTRADIOL 0.1 MG/G
CREAM VAGINAL
Qty: 42.5 G | Refills: 5 | Status: SHIPPED | OUTPATIENT
Start: 2023-03-28

## 2023-03-28 NOTE — ASSESSMENT & PLAN NOTE
Review of lipid profile shows good control continue rosuvastatin and healthy balanced diet 5 mg daily

## 2023-03-28 NOTE — ASSESSMENT & PLAN NOTE
Chronic asymptomatic granulocytopenia reviewed with the patient certainly no evidence of increase in frequency of infections continue observation and surveillance no intervention necessary

## 2023-03-28 NOTE — PROGRESS NOTES
Name: Gio Lancaster      : 1960      MRN: [id number]  Encounter Provider: Marco Darby MD  Encounter Date: 3/28/2023   Encounter department: [address]     1  Vaginal atrophy  -     estradiol (ESTRACE) 0 1 mg/g vaginal cream; Insert 1 applicator full vaginally nightly    2  Abnormal glucose  -     POCT hemoglobin A1c    3  Other specified hypothyroidism  -     T4, free; Future  -     TSH, 3rd generation; Future    4  Intermittent abdominal pain  Assessment & Plan:  Intermittent symptoms of abdominal pain reviewed again with the patient today examination of her abdomen today is benign  I reviewed the notes from her gastroenterologist to indicates that most multiple times she has had sniffing and stool sitting in the right colon with empty left colon  Question of whether there irritability or motility issues with the large intestine  He is requested a sitz marker study to evaluate further  5  Mixed hyperlipidemia  Assessment & Plan:  Review of lipid profile shows good control continue rosuvastatin and healthy balanced diet 5 mg daily      6  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[address] )  Assessment & Plan:  Chronic asymptomatic granulocytopenia reviewed with the patient certainly no evidence of increase in frequency of infections continue observation and surveillance no intervention necessary      7  Ventral hernia without obstruction or gangrene  Assessment & Plan:  Ventral hernia continues no indication of need for repair recommend continued surveillance      8  Irritable bowel syndrome with both constipation and diarrhea  Assessment & Plan:  Continue current dosing of Bentyl 20 mg twice a day  Subjective      This pleasant 17-year-old female patient presents today for her annual physical examination and a review of her comprehensive blood work  She has been evaluated recently by her gastroenterologist regarding her intermittent abdominal pain    An interesting finding on his note indicates that frequently he notes that the right side of her colon has more stool in it than he would normally expect  It is questionable but this may be a factor in her frequent or intermittent abdominal pain  He has scheduled her for further testing to evaluate this finding  Patient continues to experience stressful situation with her aging parents and concerns about their ability to live independently in their declining health  Review of Systems   All other systems reviewed and are negative        Current Outpatient Medications on File Prior to Visit   Medication Sig   • Cholecalciferol (VITAMIN D PO) Take 3,000 Units by mouth daily   • Cranberry (ELLURA PO) Take 1 capsule by mouth daily   • cycloSPORINE (RESTASIS) 0 05 % ophthalmic emulsion Administer 1 drop to both eyes 2 (two) times a day   • dicyclomine (BENTYL) 10 mg capsule Take 2 capsules (20 mg total) by mouth 3 (three) times a day before meals (Patient taking differently: Take 20 mg by mouth 2 (two) times a day)   • levothyroxine 50 mcg tablet Take 1 tablet (50 mcg total) by mouth daily   • mesalamine (ASACOL) 800 MG EC tablet Take 1 tablet (800 mg total) by mouth 4 (four) times a day   • Meth-Hyo-M Bl-Na Phos-Ph Sal (URO-MP PO) Take 1 capsule by mouth 2 (two) times a day    • Meth-Hyo-M Bl-Na Phos-Ph Sal (Uro-MP) 118 MG CAPS TAKE 1 CAPSULE BY MOUTH FOUR TIMES A DAY FOR 28 DAYS   • mometasone (NASONEX) 50 mcg/act nasal spray 2 sprays into each nostril daily   • pentosan polysulfate (Elmiron) 100 mg capsule One pill 3 times a day   • rosuvastatin (CRESTOR) 5 mg tablet Take 1 tablet (5 mg total) by mouth daily   • trospium chloride (SANCTURA) 20 mg tablet Take 1 tablet (20 mg total) by mouth 2 (two) times a day   • [DISCONTINUED] estradiol (ESTRACE) 0 1 mg/g vaginal cream Insert 1 applicator full vaginally nightly   • omeprazole (PriLOSEC) 20 mg delayed release capsule Take 1 capsule (20 mg total) by mouth daily "      Objective     BP 92/62   Pulse 85   Temp 97 5 °F (36 4 °C)   Ht 5' 7\" (1 702 m)   Wt 56 9 kg (125 lb 6 4 oz)   LMP  (LMP Unknown)   SpO2 98%   BMI 19 64 kg/m²     Physical Exam  Vitals reviewed  Constitutional:       General: She is not in acute distress  Appearance: Normal appearance  She is well-developed  She is not ill-appearing  HENT:      Head: Normocephalic  Right Ear: Hearing, tympanic membrane, ear canal and external ear normal       Left Ear: Hearing, tympanic membrane, ear canal and external ear normal       Nose: Nose normal  No mucosal edema  Mouth/Throat:      Mouth: Mucous membranes are moist       Pharynx: Oropharynx is clear  Uvula midline  Eyes:      General: Lids are normal          Right eye: No discharge  Left eye: No discharge  Extraocular Movements: Extraocular movements intact  Conjunctiva/sclera: Conjunctivae normal       Pupils: Pupils are equal, round, and reactive to light  Neck:      Thyroid: No thyromegaly  Vascular: No carotid bruit or JVD  Cardiovascular:      Rate and Rhythm: Normal rate and regular rhythm  Heart sounds: Normal heart sounds  No murmur heard  Pulmonary:      Effort: Pulmonary effort is normal  No respiratory distress  Breath sounds: Normal breath sounds  No wheezing, rhonchi or rales  Abdominal:      General: Abdomen is flat  Bowel sounds are normal  There is no distension  Palpations: Abdomen is soft  There is no mass  Tenderness: There is no abdominal tenderness  There is no guarding  Musculoskeletal:         General: No swelling or tenderness  Normal range of motion  Cervical back: Normal range of motion and neck supple  No rigidity or tenderness  Right lower leg: No edema  Left lower leg: No edema  Lymphadenopathy:      Cervical: No cervical adenopathy  Skin:     General: Skin is warm and dry  Coloration: Skin is not jaundiced or pale     Neurological:      " General: No focal deficit present  Mental Status: She is alert and oriented to person, place, and time  Mental status is at baseline  Deep Tendon Reflexes: Reflexes are normal and symmetric  Reflexes normal    Psychiatric:         Speech: Speech normal          Behavior: Behavior normal  Behavior is cooperative  Thought Content:  Thought content normal          Judgment: Judgment normal        Abdiaziz Fajardo MD

## 2023-03-28 NOTE — ASSESSMENT & PLAN NOTE
Intermittent symptoms of abdominal pain reviewed again with the patient today examination of her abdomen today is benign  I reviewed the notes from her gastroenterologist to indicates that most multiple times she has had sniffing and stool sitting in the right colon with empty left colon  Question of whether there irritability or motility issues with the large intestine  He is requested a sitz marker study to evaluate further

## 2023-04-24 ENCOUNTER — ULTRASOUND (OUTPATIENT)
Dept: OBGYN CLINIC | Facility: CLINIC | Age: 63
End: 2023-04-24

## 2023-04-24 DIAGNOSIS — R93.89 INCREASED ENDOMETRIAL STRIPE: Primary | ICD-10-CM

## 2023-04-24 NOTE — PROGRESS NOTES
AMB US Pelvic Non OB    Date/Time: 4/24/2023 9:18 AM  Performed by: Jared Felix  Authorized by: Mickie Booth MD     Procedure details:     Indications: endometrial hyperplasia      Technique:  US Pelvic, Non-OB with complete exam  Uterine findings:     Length (cm): 7 3    Height (cm):  4 7    Width (cm):  5 1    Uterine adhesions: not identified      Adnexal mass: not identified      Polyps: not identified      Myomas: not identified      Endometrial stripe: identified      Endometrial hyperplasia: not identified      Endometrium thickness (mm):  5  Left ovary findings:     Left ovary:  Visualized    Cysts: not identified      Length (cm): 2 5    Height (cm): 2    Width (cm): 1 4  Right ovary findings:     Right ovary:  Visualized    Cysts: not identified      Length (cm): 2 8    Height (cm): 2 2    Width (cm): 1 6  Other findings:     Free pelvic fluid: not identified      Free peritoneal fluid: not identified    Post-Procedure Details:     Impression:  Endo-5mm, WNL    Tolerance: Tolerated well, no immediate complications  Additional Procedure Comments:          Dr Tres Kapoor MD  [address] [facility] [address]  [address]  [phone]

## 2023-04-25 DIAGNOSIS — K52.9 COLITIS WITHOUT COMPLICATION: ICD-10-CM

## 2023-04-25 RX ORDER — MESALAMINE 800 MG/1
800 TABLET, DELAYED RELEASE ORAL 4 TIMES DAILY
Qty: 360 TABLET | Refills: 0 | Status: SHIPPED | OUTPATIENT
Start: 2023-04-25 | End: 2023-07-24

## 2023-05-01 DIAGNOSIS — K58.0 IRRITABLE BOWEL SYNDROME WITH DIARRHEA: ICD-10-CM

## 2023-05-01 RX ORDER — DICYCLOMINE HYDROCHLORIDE 10 MG/1
20 CAPSULE ORAL
Qty: 270 CAPSULE | Refills: 3 | Status: SHIPPED | OUTPATIENT
Start: 2023-05-01

## 2023-05-02 NOTE — PROGRESS NOTES
Atrial stripe remains stable at 5 mm    Patient denies any vaginal bleeding    We will see her in 6 months for routine follow-up    Patient to call for any problems, questions, issues or concerns

## 2023-07-15 DIAGNOSIS — E04.1 NONTOXIC UNINODULAR GOITER: ICD-10-CM

## 2023-07-15 RX ORDER — LEVOTHYROXINE SODIUM 0.05 MG/1
TABLET ORAL
Qty: 90 TABLET | Refills: 0 | Status: SHIPPED | OUTPATIENT
Start: 2023-07-15

## 2023-08-01 ENCOUNTER — APPOINTMENT (OUTPATIENT)
Dept: LAB | Facility: AMBULARY SURGERY CENTER | Age: 63
End: 2023-08-01
Payer: COMMERCIAL

## 2023-08-01 DIAGNOSIS — E03.8 OTHER SPECIFIED HYPOTHYROIDISM: ICD-10-CM

## 2023-08-01 LAB
T4 FREE SERPL-MCNC: 0.75 NG/DL (ref 0.61–1.12)
TSH SERPL DL<=0.05 MIU/L-ACNC: 2.37 UIU/ML (ref 0.45–4.5)

## 2023-08-01 PROCEDURE — 84443 ASSAY THYROID STIM HORMONE: CPT

## 2023-08-01 PROCEDURE — 84439 ASSAY OF FREE THYROXINE: CPT

## 2023-08-01 PROCEDURE — 36415 COLL VENOUS BLD VENIPUNCTURE: CPT

## 2023-08-07 ENCOUNTER — OFFICE VISIT (OUTPATIENT)
Dept: INTERNAL MEDICINE CLINIC | Facility: CLINIC | Age: 63
End: 2023-08-07
Payer: COMMERCIAL

## 2023-08-07 VITALS
HEIGHT: 67 IN | HEART RATE: 79 BPM | DIASTOLIC BLOOD PRESSURE: 70 MMHG | OXYGEN SATURATION: 99 % | SYSTOLIC BLOOD PRESSURE: 110 MMHG | TEMPERATURE: 97 F | WEIGHT: 127.2 LBS | BODY MASS INDEX: 19.97 KG/M2

## 2023-08-07 DIAGNOSIS — E03.8 OTHER SPECIFIED HYPOTHYROIDISM: Primary | ICD-10-CM

## 2023-08-07 DIAGNOSIS — K52.9 COLITIS: ICD-10-CM

## 2023-08-07 DIAGNOSIS — K52.9 COLITIS WITHOUT COMPLICATION: ICD-10-CM

## 2023-08-07 DIAGNOSIS — K58.2 IRRITABLE BOWEL SYNDROME WITH BOTH CONSTIPATION AND DIARRHEA: ICD-10-CM

## 2023-08-07 DIAGNOSIS — D69.6 THROMBOCYTOPENIA (HCC): ICD-10-CM

## 2023-08-07 PROCEDURE — 99214 OFFICE O/P EST MOD 30 MIN: CPT | Performed by: INTERNAL MEDICINE

## 2023-08-07 RX ORDER — MESALAMINE 800 MG/1
800 TABLET, DELAYED RELEASE ORAL 2 TIMES DAILY
Qty: 180 TABLET | Refills: 0
Start: 2023-08-07 | End: 2023-11-05

## 2023-08-07 NOTE — PROGRESS NOTES
Name: Jamee Mari      : 1960      MRN: [id number]  Encounter Provider: Bradley Morgan MD  Encounter Date: 2023   Encounter department: [address] [place] [address]     1. Irritable bowel syndrome with both constipation and diarrhea  Assessment & Plan:  Irritable bowel symptoms remain stable at the present time recommend continued use of Bentyl of taking 1 or 2 capsules daily can increase to 4 capsules daily for symptomatic relief. Regular follow-up with GI services is recommended. 2. Colitis without complication  -     mesalamine (ASACOL) 800 MG EC tablet; Take 1 tablet (800 mg total) by mouth 2 (two) times a day    3. Colitis  Assessment & Plan:  Patient continues on Asacol 800 mg twice a day for management of colitis currently symptoms are stable regular follow-up with GI services recommended most recent visit reviewed. 4. Other specified hypothyroidism  Assessment & Plan:  T4 and TSH values reviewed with the patient today both are within normal range and clinically the patient is stable recommend continuation of levothyroxine at 50 mcg daily. 5. Thrombocytopenia ([address])  Assessment & Plan:  History of thrombocytopenia without any abnormal bleeding symptoms noted on today's visit recommend CBC on next visit           Subjective     This 80-year-old female patient returns today for a routine follow-up visit. During today's visit we reviewed her free T4 and TSH values which are both in normal range clinically patient continues to exhibit euthyroid status on current dosing of 50 mcg of levothyroxine daily. Patient continues under the care of GI services for her irritable bowel syndrome. Current therapy reviewed. Patient also has a history of thrombocytopenia without any abnormal bleeding or bruising noted. She continues under the care of of urology services for management of interstitial cystitis.     Review of Systems   Gastrointestinal: Intermittent symptoms of abdominal cramping   All other systems reviewed and are negative. Past Medical History:   Diagnosis Date   • Atopic dermatitis     last assessed 13   • Atrophic vaginitis     last assessed 9/2/15   • Dermatitis    • Dry eye    • Frequency of urination    • Fungal infection     last assessed 13   • Herpes     last assessed 14   • Hyperlipidemia    • Hypothyroidism    • Interstitial cystitis    • Osteoporosis    • Periodontal abscess     last assessed 13   • PONV (postoperative nausea and vomiting)    • Thyroid nodule    • Ulcerative colitis ([address])    • Urinary frequency     resolved 17   • UTI (urinary tract infection)    • Vaginal atrophy    • Vitamin D deficiency     last assessed 16   • Voiding dysfunction     last assessed 10/7/15     Past Surgical History:   Procedure Laterality Date   •  SECTION       and     • COLONOSCOPY  2020   • CYSTOSCOPY      diagnostic resolved 05   • FL RETROGRADE PYELOGRAM  2020   • GANGLION CYST EXCISION Right 2019    Procedure: EXCISION GANGLION CYST RIGHT FIRST DORSAL EXTENSOR COMPARTMENT;  Surgeon: Samm Tyson MD;  [facility];  Service: Orthopedics   • KIDNEY STONE SURGERY     • ORIF PROXIMAL ULNA FRACTURE      metal plates and screws removed   • OTHER SURGICAL HISTORY      punch biospy    • ME COLONOSCOPY FLX DX W/COLLJ SPEC WHEN PFRMD N/A 2017    Procedure: COLONOSCOPY;  Surgeon: Alivia Meracdo MD;  [facility];   Service: Colorectal   • ME CYSTO/URETERO W/LITHOTRIPSY &INDWELL STENT INSRT Left 2020    Procedure: CYSTOSCOPY URETEROSCOPY WITH LITHOTRIPSY HOLMIUM LASER, RETROGRADE PYELOGRAM AND INSERTION STENT URETERAL;  Surgeon: Nabil Atkinson MD;  [facility];  Service: Urology   • ME INCISION EXTENSOR TENDON SHEATH WRIST Right 2019    Procedure: Angel Lark;  Surgeon: Samm Tyson MD;  [facility]; Service: Orthopedics   • TUBAL LIGATION     • UPPER GASTROINTESTINAL ENDOSCOPY  09/11/2020   • WISDOM TOOTH EXTRACTION  1983    X2      Family History   Problem Relation Age of Onset   • Gallbladder disease Mother    • Thyroid disease Mother    • Kidney disease Mother    • Breast cancer Mother    • Diabetes Father    • Hypertension Father    • Nephrolithiasis Father    • Lymphoma Maternal Grandfather    • Diabetes Paternal Grandmother    • Breast cancer Paternal Aunt    • No Known Problems Son    • No Known Problems Son      Social History     Socioeconomic History   • Marital status: /Civil Union     Spouse name: Karina Potts    • Number of children: 2   • Years of education: Not on file   • Highest education level: Not on file   Occupational History   • Not on file   Tobacco Use   • Smoking status: Never   • Smokeless tobacco: Never   Vaping Use   • Vaping Use: Never used   Substance and Sexual Activity   • Alcohol use: Yes     Comment: socially - 1-3 drinks per month   • Drug use: No   • Sexual activity: Yes     Partners: Male   Other Topics Concern   • Not on file   Social History Narrative    Currently         Patient lives in a home that was built in [address] [place] [address] Road    Gas/forced hot air     Carpet eloise in the bedroom     Finished basement-dry-no mold or musty smell     Dehumidifier in the basement     No air  or purifiers     Humidifier in the winter months     Central air     Home is smoke free     Patient does not live close to open fields or wooded area         No pets         Caffeine: seldom hot tea or soda     Chocolate consumed everyday         Patient lives with spouse and children      Social Determinants of Health     Financial Resource Strain: Not on file   Food Insecurity: Not on file   Transportation Needs: Not on file   Physical Activity: Sufficiently Active (12/5/2019)    Exercise Vital Sign    • Days of Exercise per Week: 4 days    • Minutes of Exercise per Session: 60 min   Stress: Not on file   Social Connections: Not on file   Intimate Partner Violence: Not on file   Housing Stability: Not on file     Current Outpatient Medications on File Prior to Visit   Medication Sig   • Cholecalciferol (VITAMIN D PO) Take 3,000 Units by mouth daily   • Cranberry (ELLURA PO) Take 1 capsule by mouth daily   • cycloSPORINE (RESTASIS) 0.05 % ophthalmic emulsion Administer 1 drop to both eyes 2 (two) times a day   • dicyclomine (BENTYL) 10 mg capsule Take 2 capsules (20 mg total) by mouth 3 (three) times a day before meals   • estradiol (ESTRACE) 0.1 mg/g vaginal cream Insert 1 applicator full vaginally nightly   • levothyroxine 50 mcg tablet TAKE ONE TABLET BY MOUTH EVERY DAY   • Meth-Hyo-M Bl-Na Phos-Ph Sal (URO-MP PO) Take 1 capsule by mouth 2 (two) times a day    • Meth-Hyo-M Bl-Na Phos-Ph Sal (Uro-MP) 118 MG CAPS TAKE 1 CAPSULE BY MOUTH FOUR TIMES A DAY FOR 28 DAYS   • mometasone (NASONEX) 50 mcg/act nasal spray 2 sprays into each nostril daily   • pentosan polysulfate (Elmiron) 100 mg capsule One pill 3 times a day   • rosuvastatin (CRESTOR) 5 mg tablet Take 1 tablet (5 mg total) by mouth daily   • trospium chloride (SANCTURA) 20 mg tablet Take 1 tablet (20 mg total) by mouth 2 (two) times a day   • omeprazole (PriLOSEC) 20 mg delayed release capsule Take 1 capsule (20 mg total) by mouth daily   • [DISCONTINUED] mesalamine (ASACOL) 800 MG EC tablet Take 1 tablet (800 mg total) by mouth 4 (four) times a day     Allergies   Allergen Reactions   • Apple Fruit Extract - Food Allergy Anaphylaxis   • Iodine - Food Allergy Anaphylaxis     Allergy to Iodinated and non iodinated dye   • Other Anaphylaxis     APPLES    TAPE  Also rash at times   • Seasonal Ic [Cholestatin] Allergic Rhinitis   • Latex      Immunization History   Administered Date(s) Administered   • COVID-19 PFIZER VACCINE 0.3 ML IM 01/08/2021, 01/29/2021, 11/13/2021   • INFLUENZA 10/17/2017, 10/16/2019, 10/14/2020, 10/10/2021, 10/10/2021, 10/15/2022   • Influenza, recombinant, quadrivalent,injectable, preservative free 10/23/2018   • Influenza, seasonal, injectable 10/17/2017       Objective     /70   Pulse 79   Temp (!) 97 °F (36.1 °C) (Tympanic)   Ht 5' 7" (1.702 m)   Wt 57.7 kg (127 lb 3.2 oz)   LMP  (LMP Unknown)   SpO2 99%   BMI 19.92 kg/m²     Physical Exam  Vitals reviewed. Constitutional:       General: She is not in acute distress. Appearance: Normal appearance. She is well-developed. She is not ill-appearing. HENT:      Head: Normocephalic. Right Ear: Hearing, tympanic membrane, ear canal and external ear normal.      Left Ear: Hearing, tympanic membrane, ear canal and external ear normal.      Nose: Nose normal. No mucosal edema. Mouth/Throat:      Mouth: Mucous membranes are moist.      Pharynx: Oropharynx is clear. Uvula midline. Eyes:      General: Lids are normal.      Conjunctiva/sclera: Conjunctivae normal.      Pupils: Pupils are equal, round, and reactive to light. Neck:      Thyroid: No thyromegaly. Vascular: No carotid bruit or JVD. Cardiovascular:      Rate and Rhythm: Normal rate and regular rhythm. Heart sounds: Normal heart sounds. No murmur heard. Pulmonary:      Effort: Pulmonary effort is normal. No respiratory distress. Breath sounds: Normal breath sounds. No wheezing, rhonchi or rales. Abdominal:      General: Bowel sounds are normal. There is no distension. Palpations: Abdomen is soft. There is no mass. Tenderness: There is no abdominal tenderness. There is no guarding. Musculoskeletal:         General: Normal range of motion. Right lower leg: No edema. Left lower leg: No edema. Lymphadenopathy:      Cervical: No cervical adenopathy. Skin:     General: Skin is warm and dry. Coloration: Skin is not jaundiced or pale. Neurological:      Mental Status: She is alert and oriented to person, place, and time. Mental status is at baseline. Deep Tendon Reflexes: Reflexes are normal and symmetric. Reflexes normal.   Psychiatric:         Mood and Affect: Mood normal.         Speech: Speech normal.         Behavior: Behavior normal. Behavior is cooperative. Thought Content:  Thought content normal.         Judgment: Judgment normal.       Brian Samayoa MD

## 2023-08-07 NOTE — ASSESSMENT & PLAN NOTE
Irritable bowel symptoms remain stable at the present time recommend continued use of Bentyl of taking 1 or 2 capsules daily can increase to 4 capsules daily for symptomatic relief. Regular follow-up with GI services is recommended.

## 2023-08-07 NOTE — ASSESSMENT & PLAN NOTE
History of thrombocytopenia without any abnormal bleeding symptoms noted on today's visit recommend CBC on next visit no

## 2023-08-07 NOTE — ASSESSMENT & PLAN NOTE
T4 and TSH values reviewed with the patient today both are within normal range and clinically the patient is stable recommend continuation of levothyroxine at 50 mcg daily.

## 2023-08-07 NOTE — ASSESSMENT & PLAN NOTE
Patient continues on Asacol 800 mg twice a day for management of colitis currently symptoms are stable regular follow-up with GI services recommended most recent visit reviewed.

## 2023-08-09 ENCOUNTER — OFFICE VISIT (OUTPATIENT)
Dept: GASTROENTEROLOGY | Facility: MEDICAL CENTER | Age: 63
End: 2023-08-09
Payer: COMMERCIAL

## 2023-08-09 VITALS
BODY MASS INDEX: 19.83 KG/M2 | DIASTOLIC BLOOD PRESSURE: 81 MMHG | WEIGHT: 126.6 LBS | TEMPERATURE: 98.7 F | HEART RATE: 79 BPM | SYSTOLIC BLOOD PRESSURE: 120 MMHG

## 2023-08-09 DIAGNOSIS — K52.9 COLITIS: Primary | ICD-10-CM

## 2023-08-09 PROCEDURE — 99214 OFFICE O/P EST MOD 30 MIN: CPT | Performed by: INTERNAL MEDICINE

## 2023-08-09 NOTE — PROGRESS NOTES
Outpatient Follow up  [address] [place] [address] [place]  [facility] [address]  [place] [address]  Lex Hernandez MD  Ph : [phone]  Fax : [phone]  Mobile : [phone]  Email : [email or web address]  Also available on Tiger Text    Luis A Rogers 61 y.o. female MRN: [id number]    PCP: Herrera Borrego MD  Referring: No referring provider defined for this encounter. Luis A Rogers presented for a follow up visit. My recommendations are included. Please do not hesitate to contact me with any questions you may have. ASSESSMENT AND PLAN:      No problem-specific Assessment & Plan notes found for this encounter. Diagnoses and all orders for this visit:    Colitis  -     Calprotectin,Fecal; Future         1. Possible IBD/ulcerative colitis. Multiple biopsies have been unremarkable in the past with the last colonoscopy did show positive for chronic colitis. She is however hoping to come off some of the medications. 2. Try to come down on the mesalamine - take it once a day and the bentyl once a day for 2 weeks. Then stop the mesalamine all together and continue the bentyl once a day for 2 more weeks. Let me know how you do. 3. Stool calprotectin in 2 months (once the mesalamine is off). 4. Repeat colonoscopy in 3 months (last in 2020 and due in 3 years). Will do random biopsies. Let Dr. Felix Angeles know. 5. Eventually may need to consider treatment for constipation with a trial of Linzess. 6.  Recommend evaluation by our IBD team. I have recommended for her to see Dr. Shyam Box.   ______________________________________________________________________    SUBJECTIVE:  60 y/o with history of colitis possibly ulcerative colitis diagnosed several years ago. She has been on mesalamine.   Her CRP was always normal.  Her stool calprotectin was normal.  Last colonoscopy in 2020 was endoscopically unremarkable but biopsies showed chronic inactive colitis throughout the colon. The colonoscopy in 2014 biopsies were unremarkable and in 2018 were unremarkable as well. She gets these bouts of abdominal pain which are debilitating. She has diarrhea with that as well. No definite triggers have been found. It takes some time for her to relieve her symptoms. She does take Bentyl. We have tried Elavil in the past but she was not able to tolerate that. Donnatal and Librax were also  We have also thought that she may have symptoms secondary to constipation rather than diarrhea. We did a Sitzmarks study which was normal however significant stool was still seen on the x-rays. REVIEW OF SYSTEMS IS OTHERWISE NEGATIVE.       Historical Information   Past Medical History:   Diagnosis Date   • Atopic dermatitis     last assessed 13   • Atrophic vaginitis     last assessed 9/2/15   • Dermatitis    • Dry eye    • Frequency of urination    • Fungal infection     last assessed 13   • Herpes     last assessed 14   • Hyperlipidemia    • Hypothyroidism    • Interstitial cystitis    • Osteoporosis    • Periodontal abscess     last assessed 13   • PONV (postoperative nausea and vomiting)    • Thyroid nodule    • Ulcerative colitis ([address])    • Urinary frequency     resolved 17   • UTI (urinary tract infection)    • Vaginal atrophy    • Vitamin D deficiency     last assessed 16   • Voiding dysfunction     last assessed 10/7/15     Past Surgical History:   Procedure Laterality Date   •  SECTION       and     • COLONOSCOPY  2020   • CYSTOSCOPY      diagnostic resolved 05   • FL RETROGRADE PYELOGRAM  2020   • GANGLION CYST EXCISION Right 2019    Procedure: EXCISION GANGLION CYST RIGHT FIRST DORSAL EXTENSOR COMPARTMENT;  Surgeon: Cira Loving MD;  [facility];  Service: Orthopedics   • KIDNEY STONE SURGERY     • ORIF PROXIMAL ULNA FRACTURE      metal plates and screws removed   • OTHER SURGICAL HISTORY      punch biospy    • NH COLONOSCOPY FLX DX W/COLLJ SPEC WHEN PFRMD N/A 7/28/2017    Procedure: COLONOSCOPY;  Surgeon: Mimi Sheppard MD;  [facility];   Service: Colorectal   • NH CYSTO/URETERO W/LITHOTRIPSY &INDWELL STENT INSRT Left 1/7/2020    Procedure: CYSTOSCOPY URETEROSCOPY WITH LITHOTRIPSY HOLMIUM LASER, RETROGRADE PYELOGRAM AND INSERTION STENT URETERAL;  Surgeon: Ancelmo Salomon MD;  [facility];  Service: Urology   • NH INCISION EXTENSOR TENDON SHEATH WRIST Right 2/19/2019    Procedure: Kevin Velazquez;  Surgeon: Alexia Tamez MD;  [facility];  Service: Orthopedics   • TUBAL LIGATION     • UPPER GASTROINTESTINAL ENDOSCOPY  09/11/2020   • [address] EXTRACTION  1983    X2      Social History   Social History     Substance and Sexual Activity   Alcohol Use Yes    Comment: socially - 1-3 drinks per month     Social History     Substance and Sexual Activity   Drug Use No     Social History     Tobacco Use   Smoking Status Never   Smokeless Tobacco Never     Family History   Problem Relation Age of Onset   • Gallbladder disease Mother    • Thyroid disease Mother    • Kidney disease Mother    • Breast cancer Mother    • Diabetes Father    • Hypertension Father    • Nephrolithiasis Father    • Lymphoma Maternal Grandfather    • Diabetes Paternal Grandmother    • Breast cancer Paternal Aunt    • No Known Problems Son    • No Known Problems Son        Meds/Allergies       Current Outpatient Medications:   •  Cholecalciferol (VITAMIN D PO)  •  Cranberry (ELLURA PO)  •  cycloSPORINE (RESTASIS) 0.05 % ophthalmic emulsion  •  dicyclomine (BENTYL) 10 mg capsule  •  estradiol (ESTRACE) 0.1 mg/g vaginal cream  •  levothyroxine 50 mcg tablet  •  mesalamine (ASACOL) 800 MG EC tablet  •  Meth-Hyo-M Bl-Na Phos-Ph Sal (URO-MP PO)  •  Meth-Hyo-M Bl-Na Phos-Ph Sal (Uro-MP) 118 MG CAPS  •  mometasone (NASONEX) 50 mcg/act nasal spray  •  pentosan polysulfate (Elmiron) 100 mg capsule  •  rosuvastatin (CRESTOR) 5 mg tablet  •  trospium chloride (SANCTURA) 20 mg tablet    Allergies   Allergen Reactions   • Apple Fruit Extract - Food Allergy Anaphylaxis   • Iodine - Food Allergy Anaphylaxis     Allergy to Iodinated and non iodinated dye   • Other Anaphylaxis     APPLES    TAPE  Also rash at times   • Seasonal Ic [Cholestatin] Allergic Rhinitis   • Latex            Objective     Blood pressure 120/81, pulse 79, temperature 98.7 °F (37.1 °C), temperature source Tympanic, weight 57.4 kg (126 lb 9.6 oz). Body mass index is 19.83 kg/m². PHYSICAL EXAM:      Physical Exam  Constitutional:       Appearance: Normal appearance. She is well-developed. HENT:      Head: Normocephalic and atraumatic. Eyes:      General: No scleral icterus. Conjunctiva/sclera: Conjunctivae normal.      Pupils: Pupils are equal, round, and reactive to light. Cardiovascular:      Rate and Rhythm: Normal rate and regular rhythm. Heart sounds: Normal heart sounds. Pulmonary:      Effort: Pulmonary effort is normal. No respiratory distress. Breath sounds: Normal breath sounds. Abdominal:      General: Bowel sounds are normal. There is no distension. Palpations: Abdomen is soft. There is no mass. Tenderness: There is no abdominal tenderness. Hernia: No hernia is present. Musculoskeletal:         General: Normal range of motion. Cervical back: Normal range of motion. Lymphadenopathy:      Cervical: No cervical adenopathy. Skin:     General: Skin is warm. Neurological:      Mental Status: She is alert and oriented to person, place, and time. Psychiatric:         Behavior: Behavior normal.         Thought Content: Thought content normal.         Lab Results:   No visits with results within 1 Day(s) from this visit.    Latest known visit with results is:   Appointment on 08/01/2023   Component Date Value   • Free T4 08/01/2023 0.75    • TSH 3RD GENERATON 08/01/2023 2.366 Radiology Results:   No results found.

## 2023-09-07 DIAGNOSIS — G47.9 SLEEP DISTURBANCE: Primary | ICD-10-CM

## 2023-09-07 RX ORDER — ALPRAZOLAM 0.25 MG/1
0.25 TABLET ORAL
Qty: 20 TABLET | Refills: 0 | Status: SHIPPED | OUTPATIENT
Start: 2023-09-07

## 2023-09-22 ENCOUNTER — OFFICE VISIT (OUTPATIENT)
Dept: UROLOGY | Facility: CLINIC | Age: 63
End: 2023-09-22
Payer: COMMERCIAL

## 2023-09-22 VITALS
SYSTOLIC BLOOD PRESSURE: 118 MMHG | BODY MASS INDEX: 20.09 KG/M2 | HEIGHT: 67 IN | DIASTOLIC BLOOD PRESSURE: 72 MMHG | RESPIRATION RATE: 18 BRPM | WEIGHT: 128 LBS

## 2023-09-22 DIAGNOSIS — R33.9 INCOMPLETE BLADDER EMPTYING: Primary | ICD-10-CM

## 2023-09-22 LAB — POST-VOID RESIDUAL VOLUME, ML POC: 0 ML

## 2023-09-22 PROCEDURE — 99213 OFFICE O/P EST LOW 20 MIN: CPT | Performed by: UROLOGY

## 2023-09-22 PROCEDURE — 51798 US URINE CAPACITY MEASURE: CPT | Performed by: UROLOGY

## 2023-09-22 NOTE — PROGRESS NOTES
9/22/2023    Davidmothy First  1960  [id number]        Assessment  History of nephrolithiasis, history of interstitial cystitis, resolved elevated postvoid residual      Discussion  I provided Jacquelin Sousa with reassurance that her PVR in the office today is minimal and her examination benign. She will continue to follow with Dr. Fatou Alanis office regarding her interstitial cystitis and continued use of Elmiron. I recommend follow-up in 1 year with a renal and bladder ultrasound with postvoid residual assessment. This will evaluate for stone disease as well as to ensure complete bladder emptying. She was instructed to call in the interim if she has difficulty emptying her bladder. History of Present Illness  61 y.o. female with a history of nephrolithiasis. She also previously had an episode of an elevated postvoid residual.  She returns to the office today for PVR evaluation. The PVR is minimal in the office today. She states that she is voiding well with an adequate urinary stream and feels that she empties to completion. She denies any flank pain or hematuria. She continues on the Elmiron. AUA Symptom Score      Review of Systems  Review of Systems   Constitutional: Negative. HENT: Negative. Eyes: Negative. Respiratory: Negative. Cardiovascular: Negative. Gastrointestinal: Negative. Endocrine: Negative. Genitourinary:        Per HPI   Musculoskeletal: Negative. Skin: Negative. Allergic/Immunologic: Negative. Neurological: Negative. Hematological: Negative. Psychiatric/Behavioral: Negative.           Past Medical History  Past Medical History:   Diagnosis Date   • Atopic dermatitis     last assessed 12/2/13   • Atrophic vaginitis     last assessed 9/2/15   • Dermatitis    • Dry eye    • Frequency of urination    • Fungal infection     last assessed 8/14/13   • Herpes     last assessed 6/27/14   • Hyperlipidemia    • Hypothyroidism    • Interstitial cystitis    • Osteoporosis    • Periodontal abscess     last assessed 13   • PONV (postoperative nausea and vomiting)    • Thyroid nodule    • Ulcerative colitis ([address])    • Urinary frequency     resolved 17   • UTI (urinary tract infection)    • Vaginal atrophy    • Vitamin D deficiency     last assessed 16   • Voiding dysfunction     last assessed 10/7/15       Past Social History  Past Surgical History:   Procedure Laterality Date   •  SECTION       and     • COLONOSCOPY  2020   • CYSTOSCOPY      diagnostic resolved 05   • FL RETROGRADE PYELOGRAM  2020   • GANGLION CYST EXCISION Right 2019    Procedure: EXCISION GANGLION CYST RIGHT FIRST DORSAL EXTENSOR COMPARTMENT;  Surgeon: Maribel Machado MD;  Location: [facility];  Service: Orthopedics   • KIDNEY STONE SURGERY     • ORIF PROXIMAL ULNA FRACTURE      metal plates and screws removed   • OTHER SURGICAL HISTORY      punch biospy    • OK COLONOSCOPY FLX DX W/COLLJ SPEC WHEN PFRMD N/A 2017    Procedure: COLONOSCOPY;  Surgeon: Radha Mercado MD;  [facility];   Service: Colorectal   • OK CYSTO/URETERO W/LITHOTRIPSY &INDWELL STENT INSRT Left 2020    Procedure: CYSTOSCOPY URETEROSCOPY WITH LITHOTRIPSY HOLMIUM LASER, RETROGRADE PYELOGRAM AND INSERTION STENT URETERAL;  Surgeon: Melony Calderon MD;  [facility];  Service: Urology   • OK INCISION EXTENSOR TENDON SHEATH WRIST Right 2019    Procedure: Dominguez Christine;  Surgeon: Maribel Machado MD;  [facility];  Service: Orthopedics   • TUBAL LIGATION     • UPPER GASTROINTESTINAL ENDOSCOPY  2020   • WISDOM TOOTH EXTRACTION  1983    X2        Past Family History  Family History   Problem Relation Age of Onset   • Gallbladder disease Mother    • Thyroid disease Mother    • Kidney disease Mother    • Breast cancer Mother    • Diabetes Father    • Hypertension Father    • Nephrolithiasis Father    • Lymphoma Maternal Grandfather • Diabetes Paternal Grandmother    • Breast cancer Paternal Aunt    • No Known Problems Son    • No Known Problems Son        Past Social history  Social History     Socioeconomic History   • Marital status: /Civil Union     Spouse name: Ildefonso Gracia    • Number of children: 2   • Years of education: Not on file   • Highest education level: Not on file   Occupational History   • Not on file   Tobacco Use   • Smoking status: Never   • Smokeless tobacco: Never   Vaping Use   • Vaping Use: Never used   Substance and Sexual Activity   • Alcohol use: Yes     Comment: socially - 1-3 drinks per month   • Drug use: No   • Sexual activity: Yes     Partners: Male   Other Topics Concern   • Not on file   Social History Narrative    Currently         Patient lives in a home that was built in [address]    Gas/forced hot air     [address] [place] in the bedroom     Finished basement-dry-no mold or musty smell     Dehumidifier in the basement     No air  or purifiers     Humidifier in the winter months     Central air     Home is smoke free     Patient does not live close to open fields or wooded area         No pets         Caffeine: seldom hot tea or soda     Chocolate consumed everyday         Patient lives with spouse and children      Social Determinants of Health     Financial Resource Strain: Not on file   Food Insecurity: Not on file   Transportation Needs: Not on file   Physical Activity: Sufficiently Active (12/5/2019)    Exercise Vital Sign    • Days of Exercise per Week: 4 days    • Minutes of Exercise per Session: 60 min   Stress: Not on file   Social Connections: Not on file   Intimate Partner Violence: Not on file   Housing Stability: Not on file       Current Medications  Current Outpatient Medications   Medication Sig Dispense Refill   • ALPRAZolam (XANAX) 0.25 mg tablet Take 1 tablet (0.25 mg total) by mouth daily at bedtime as needed for anxiety or sleep 20 tablet 0   • Cholecalciferol (VITAMIN D PO) Take 3,000 Units by mouth daily     • Cranberry (ELLURA PO) Take 1 capsule by mouth daily     • cycloSPORINE (RESTASIS) 0.05 % ophthalmic emulsion Administer 1 drop to both eyes 2 (two) times a day     • dicyclomine (BENTYL) 10 mg capsule Take 2 capsules (20 mg total) by mouth 3 (three) times a day before meals 270 capsule 3   • estradiol (ESTRACE) 0.1 mg/g vaginal cream Insert 1 applicator full vaginally nightly 42.5 g 5   • levothyroxine 50 mcg tablet TAKE ONE TABLET BY MOUTH EVERY DAY 90 tablet 0   • mesalamine (ASACOL) 800 MG EC tablet Take 1 tablet (800 mg total) by mouth 2 (two) times a day 180 tablet 0   • Meth-Hyo-M Bl-Na Phos-Ph Sal (URO-MP PO) Take 1 capsule by mouth 2 (two) times a day      • Meth-Hyo-M Bl-Na Phos-Ph Sal (Uro-MP) 118 MG CAPS TAKE 1 CAPSULE BY MOUTH FOUR TIMES A DAY FOR 28 DAYS     • mometasone (NASONEX) 50 mcg/act nasal spray 2 sprays into each nostril daily 41 g 3   • pentosan polysulfate (Elmiron) 100 mg capsule One pill 3 times a day 90 capsule 5   • rosuvastatin (CRESTOR) 5 mg tablet Take 1 tablet (5 mg total) by mouth daily 90 tablet 3   • trospium chloride (SANCTURA) 20 mg tablet Take 1 tablet (20 mg total) by mouth 2 (two) times a day 60 tablet 5     No current facility-administered medications for this visit. Allergies  Allergies   Allergen Reactions   • Apple Fruit Extract - Food Allergy Anaphylaxis   • Iodine - Food Allergy Anaphylaxis     Allergy to Iodinated and non iodinated dye   • Other Anaphylaxis     APPLES    TAPE  Also rash at times   • Seasonal Ic [Cholestatin] Allergic Rhinitis   • Latex        Past Medical History, Social History, Family History, medications and allergies were reviewed. Vitals  Vitals:    09/22/23 1454   BP: 118/72   Resp: 18   Weight: 58.1 kg (128 lb)   Height: 5' 7" (1.702 m)       Physical Exam  Physical Exam    On examination she is in no acute distress. Her abdomen is soft nontender nondistended.    examination reveals no CVA tenderness. Skin is warm. Extremities without edema.   Neurologic is grossly intact and nonfocal.      Results  No results found for: "PSA"  Lab Results   Component Value Date    GLUCOSE 89 08/07/2015    CALCIUM 9.1 03/20/2023     08/07/2015    K 3.9 03/20/2023    CO2 28 03/20/2023     (H) 03/20/2023    BUN 17 03/20/2023    CREATININE 0.79 03/20/2023     Lab Results   Component Value Date    WBC 3.69 (L) 03/20/2023    HGB 13.1 03/20/2023    HCT 42.1 03/20/2023    MCV 90 03/20/2023     (L) 03/20/2023         Office Urine Dip  Recent Results (from the past 1 hour(s))   POCT Measure PVR    Collection Time: 09/22/23  3:02 PM   Result Value Ref Range    POST-VOID RESIDUAL VOLUME, ML POC 0 mL   ]

## 2023-09-27 ENCOUNTER — LAB (OUTPATIENT)
Dept: LAB | Facility: AMBULARY SURGERY CENTER | Age: 63
End: 2023-09-27
Payer: COMMERCIAL

## 2023-09-27 DIAGNOSIS — K52.9 COLITIS: ICD-10-CM

## 2023-09-27 PROCEDURE — 83993 ASSAY FOR CALPROTECTIN FECAL: CPT

## 2023-09-29 LAB — CALPROTECTIN STL-MCNT: 28 UG/G (ref 0–120)

## 2023-09-29 NOTE — RESULT ENCOUNTER NOTE
Inform patient via [facility]. Please review the pathology/lab result of further discussion. Copied from [facility] message :       Owen Woods,     Your fecal calprotectin was normal as has been your previous tests. He will also be due for a colonoscopy. I discussed with Dr. Lena Kumari and he was okay with us proceeding with a colonoscopy as we discussed in the office sometime in November. If you are able to schedule this with Dr. Lexis Chaudhari that would be fine as well.   Thank you    Best regards,     Phylicia Epstein MD

## 2023-10-09 DIAGNOSIS — Z23 ENCOUNTER FOR IMMUNIZATION: Primary | ICD-10-CM

## 2023-10-20 ENCOUNTER — TELEPHONE (OUTPATIENT)
Age: 63
End: 2023-10-20

## 2023-10-20 NOTE — TELEPHONE ENCOUNTER
Patients GI provider:  Dr. Pao Aguilar    Number to return call: ( [phone]    Reason for call: Pt calling asking if she can have a call back from office about how she is to go about testing she had done    Scheduled procedure/appointment date if applicable: Apt/procedure

## 2023-10-20 NOTE — LETTER
Annette Silverman  [address] [place]  [place] [address]        FLEXIBLE COLONOSCOPY INSTRUCTIONS  PLEASE NOTE. .. AS OF JUNE 1, 2014, OUR OFFICE REQUIRES 72 HOURS NOTICE OF CANCELLATION/RESCHEDULE OF A PROCEDURE TO AVOID INCURRING A MISSED APPOINTMENT FEE. Your Colonoscopy Procedure has been scheduled at:  [address] [facility]   [address] [facility], [address]    The date of your procedure is: February 28, 2024      The hospital facility will contact you the evening prior to your scheduled procedure with your arrival time. Use the bowel preparation as directed. Check with your family doctor if you are taking a blood thinner (Coumadin, Plavix, Xarelto, Pradaxa, Gingko biloba, Ginseng, Feverfew, [place]'s Wort). We suggest stopping these for 3 days. Special instructions may be needed if you are taking aspirin or any aspirin-containing medication. Check with your family physician. If you are on DIABETIC MEDICATION (tablets or insulin) your doctor may make changes in your preparation. Take all medications usual unless otherwise instructed. As always, if you have any questions or concerns, feel free to call the office. Our  staff will be happy to connect you with one of the nurses to answer any questions or address any concerns you have regarding your procedure. Do not wear any jewelry the day of your procedure including wedding rings. Arrangements must be made for a responsible party to drive you home from the procedure. If you do not have a responsible family member or friend to drive you home, the procedure will not be done. Vast or a taxi is not acceptable. It is important you notify our office of any insurance changes prior to your procedure and, if necessary, supply us with referrals from your primary care physician.         COLONOSCOPY PREPARATION INSTRUCTIONS    Purchase (prescription not required):  · 238 gram bottle of Miralax® (Glycolax®)  · 4 Dulcolax® (Bisacodyl) Laxative Tablets  · 64 oz. bottle of Gatorade® or your preference of a non-carbonated clear liquid - NOT RED OR PURPLE     One Day Prior to Colonoscopy Procedure  · Nothing to eat the day before your procedure, only clear liquids. · It is important that you drink plenty of clear liquids throughout the day to prevent dehydration. Clear Liquids include:  o Water/Iced Tea/Lemonade/Gatorade®/Black Coffee or tea (no milk or creamers  o Soft drinks: orange, ginger ale, cola, Pepsi®, Sprite®, 7Up®  o Herson-Aid® (lemonade or orange flavors only)  o Strained fruit juices without pulp such as apple, white grape, white cranberry  o Jell-O®, lemon, lime or orange (no fruit or toppings)  o Popsicles, Rahul Ice (No Ice Cream, sherbets, or fruit bars)  o Chicken or beef bouillon/broth  DO NOT EAT OR DRINK ANYTHING RED OR PURPLE  DO NOT DRINK ANY ALCOHOLIC BEVERAGES  DIABETIC PATIENTS: Consult your physician    At 4:00 pm, take (2) Dulcolax® (Bisacodyl) Laxative Tablets. Swallow the tablets whole with an 8 oz. glass of water. At 8:00 pm, take the additional (2) Dulcolax® (Bisacodyl) Laxative Tablets with 8 oz. of water. The package may direct you not to exceed (2) tablets at any time but for the purpose of this examination, you should take (4) total.    Mix the 238 gm of Miralax® in 64 oz. of Gatorade® and shake the solution until the Miralax® is dissolved. You will drink half (32 oz) of this solution this evening, beginning between 4 and 6 o'clock. Drink 8 oz glassfuls at your own pace. It may take several hours to drink the solution. Remember to stay close to toilet facilities. DAY OF COLONOSCOPY PROCEDURE    Five (5) hours before your procedure, drink the other half (32 oz) of the Miralax®/Gatorade® mixture within a two (2) hour period. This may require you to get up very early if you are scheduled for an early procedure.     NOTHING IS TO BE TAKEN BY MOUTH 3 HOURS PRIOR TO PROCEDURE. If you use an inhaler, please bring it with you to your procedure.

## 2023-10-20 NOTE — TELEPHONE ENCOUNTER
10/20/23  Screened by: Chyna Stock    Referring Provider - Destiny Loving    123lbs  5' 7"    Has patient been referred for a routine screening Colonoscopy? yes  Is the patient between 43-73 years old? yes      Previous Colonoscopy yes   If yes:    Date: 9/2020    Facility:  [facility]    Reason: HX of Polyps      SCHEDULING STAFF: If the patient is between 39yrs-51yrs, please advise patient to confirm benefits/coverage with their insurance company for a routine screening colonoscopy, some insurance carriers will only cover at [address] [place] [address] or older. If the patient is over 66years old, please schedule an office visit. Does the patient want to see a Gastroenterologist prior to their procedure OR are they having any GI symptoms? yes - IBD flare up, Ulcerative Colitis, abdominal pain ongoing, diarrhea, mostly pain. Has the patient been hospitalized or had abdominal surgery in the past 6 months? no    Does the patient use supplemental oxygen? no    Does the patient take Coumadin, Lovenox, Plavix, Elliquis, Xarelto, or other blood thinning medication? no    Has the patient had a stroke, cardiac event, or stent placed in the past year? no    SCHEDULING STAFF: If patient answers NO to above questions, then schedule procedure. If patient answers YES to above questions, then schedule office appointment. If patient is between 45yrs - 49yrs, please advise patient that we will have to confirm benefits & coverage with their insurance company for a routine screening colonoscopy.

## 2023-10-20 NOTE — TELEPHONE ENCOUNTER
Pt called to schedule her 3 year recall colonoscopy with . Pt currently experiencing a lot of symptoms, IBD flare up, Ulcerative Colitis, abdominal pain ongoing, diarrhea, mostly pain. Pt scheduled for 10/31/2023 at IBD clinic with [facility]. Will an OV be required or can pt schedule colonoscopy? Please call to notify.

## 2023-10-22 DIAGNOSIS — E04.1 NONTOXIC UNINODULAR GOITER: ICD-10-CM

## 2023-10-22 DIAGNOSIS — E78.01 FAMILIAL HYPERCHOLESTEROLEMIA: ICD-10-CM

## 2023-10-22 RX ORDER — LEVOTHYROXINE SODIUM 0.05 MG/1
50 TABLET ORAL DAILY
Qty: 90 TABLET | Refills: 0 | Status: SHIPPED | OUTPATIENT
Start: 2023-10-22 | End: 2024-01-20

## 2023-10-22 RX ORDER — ROSUVASTATIN CALCIUM 5 MG/1
5 TABLET, COATED ORAL DAILY
Qty: 90 TABLET | Refills: 3 | Status: SHIPPED | OUTPATIENT
Start: 2023-10-22

## 2023-10-23 NOTE — TELEPHONE ENCOUNTER
Spoke to patient - asked if she wanted to talk to a nurse about current symptoms - she declined this is something she has been managing for 5 years she is okay with managing herself. She would like her colonoscopy to be with Dr. Lexy Pak and wanted to know if she needed an appointment with him prior to scheduling the 3 year repeat colonoscopy. - Please advise and return patient call     At this time she is going to keep Oct 30th with Dr. Yessy Allen for UC and also Nov 17th with Dr. Bonnie Zhang.

## 2023-10-24 ENCOUNTER — PREP FOR PROCEDURE (OUTPATIENT)
Age: 63
End: 2023-10-24

## 2023-10-24 ENCOUNTER — CLINICAL SUPPORT (OUTPATIENT)
Dept: INTERNAL MEDICINE CLINIC | Facility: CLINIC | Age: 63
End: 2023-10-24
Payer: COMMERCIAL

## 2023-10-24 DIAGNOSIS — Z23 ENCOUNTER FOR IMMUNIZATION: Primary | ICD-10-CM

## 2023-10-24 DIAGNOSIS — Z86.010 PERSONAL HISTORY OF COLONIC POLYPS: ICD-10-CM

## 2023-10-24 DIAGNOSIS — K52.9 COLITIS WITHOUT COMPLICATION: Primary | ICD-10-CM

## 2023-10-24 PROCEDURE — 90471 IMMUNIZATION ADMIN: CPT | Performed by: INTERNAL MEDICINE

## 2023-10-24 PROCEDURE — 90686 IIV4 VACC NO PRSV 0.5 ML IM: CPT | Performed by: INTERNAL MEDICINE

## 2023-10-24 NOTE — TELEPHONE ENCOUNTER
DE; last fc 9/11/20; Hx of colon polyps; BMI 20    Scheduled 2/28/24 @ [facility] palma PRAKASH  Paperwork mailed to patient.

## 2023-10-29 NOTE — PROGRESS NOTES
[facility] - Outpatient Follow-up Note  Gertrude Davison 61 y.o. female MRN: [id number]  Encounter: [id number]          ASSESSMENT AND PLAN:    Gertrude Davison is a 61 y.o. female with colitis (UC vs indeterminate colitis vs Crohn's colitis) who presents for second opinion. She has been having severe intermittent RLQ abdominal pain. Colonoscopy from 2020 endoscopically unremarkable aside from a cecal polyp but biopsies with chronic inactive colitis throughout the colon. Colonoscopy from 2014 and 2018 reportedly unremarkable. Prior sits marker study normal however significant stool still seen within the x-rays. Previously tried Elavil, [employer], Librax. This recent calprotectin 28 and the 1 prior to that was normal, as well as several normal calprotectin's. CMP from March with slight elevated chloride but otherwise normal electrolytes and liver tests. CBC with slight leukopenia with white blood cell count of 3.69 but no evidence of anemia, but there was some evidence of mild thrombocytopenia with platelets of 522. Normal hemoglobin A1c as well as TSH. Prior CRP from 1 year ago normal. The etiology of the pain is not clear but might be from active inflammation from colitis vs Crohn's vs inguinal hernia vs musckuloskeletal pathology vs gas/IBS/functional pathology vs other. 1. Right lower quadrant abdominal pain    2. Colitis    3. Irritable bowel syndrome with both constipation and diarrhea        Orders Placed This Encounter   Procedures    US groin/inguinal area     Awaiting repeat colonoscopy with pan biopsies and ileal assessment to evaluate both for dysplasia as well as colitis  MR enterography depending on colonoscopy results (would pre-medicate with lowest volume study)   Okay to hold off of Asacol  Dicyclomine as needed for pain but take 2 times a day  At this time, if she does have ulcerative colitis it appears to be very mild and currently in remission.   Low residue diet  Stool softener  Drink at least 8 cups of water per day  Inguinal ultrasound      ______________________________________________________________________    SUBJECTIVE:    Farrah Bright is a 61 y.o. female who presents with complaint of colitis. She was doing well and she was not getting the attacks. On Friday she was getting ready to go for a walk. She gets RLQ severe pain. Not sharp but it doubles her over and she can not function. She uses bentyl and heating pad. It can go away for 20 minutes or it can last hours. Usually gone after she sleeps. It is not always a full fledge attack. She has interstitial cystitis. It can be debilitating. Nothing helps. She was getting it almost daily for a while. Her bentyl was changed and her asacol was stopped. She felt better but then she got the attack on Friday. She tried to cut back on gluten, checked for celiac. Calprotectin normal. Maybe stress. She is usually not constipated. No clear trigger. Sometimes it might help when she has a BM but not always and she does not feel well. When she presses it feels deeper inside. She still has her appendix. She has her gallbladder. Her HIDA showed slow emptying but she saw the surgeon and decided not to remove it. Normal stools. No recent BRBPR or black stools. No urgency, no nocturnal BMs, no incontinence. No rashes, no mouth sores, no joint pains  No heartburn, dysphagia, odynophagia. Some nausea before her spells at times. No vomiting  No weight loss. REVIEW OF SYSTEMS IS OTHERWISE NEGATIVE.   10 point ROS reviewed and negative, except as above      Historical Information   Past Medical History:   Diagnosis Date    Atopic dermatitis     last assessed 12/2/13    Atrophic vaginitis     last assessed 9/2/15    Dermatitis     Dry eye     Frequency of urination     Fungal infection     last assessed 8/14/13    Herpes     last assessed 6/27/14    Hyperlipidemia     Hypothyroidism     Interstitial cystitis Osteoporosis     Periodontal abscess     last assessed 13    PONV (postoperative nausea and vomiting)     Thyroid nodule     Ulcerative colitis ([address])     Urinary frequency     resolved 17    UTI (urinary tract infection)     Vaginal atrophy     Vitamin D deficiency     last assessed 16    Voiding dysfunction     last assessed 10/7/15     Past Surgical History:   Procedure Laterality Date     SECTION       and      COLONOSCOPY  2020    CYSTOSCOPY      diagnostic resolved 05    FL RETROGRADE PYELOGRAM  2020    GANGLION CYST EXCISION Right 2019    Procedure: EXCISION GANGLION CYST RIGHT FIRST DORSAL EXTENSOR COMPARTMENT;  Surgeon: Canary Goodpasture, MD;  [facility];  Service: Orthopedics    KIDNEY STONE SURGERY      ORIF PROXIMAL ULNA FRACTURE      metal plates and screws removed    OTHER SURGICAL HISTORY      punch biospy     MS COLONOSCOPY FLX DX W/COLLJ SPEC WHEN PFRMD N/A 2017    Procedure: COLONOSCOPY;  Surgeon: Jill Ceron MD;  [facility];   Service: Colorectal    MS CYSTO/URETERO W/LITHOTRIPSY &INDWELL STENT INSRT Left 2020    Procedure: CYSTOSCOPY URETEROSCOPY WITH LITHOTRIPSY HOLMIUM LASER, RETROGRADE PYELOGRAM AND INSERTION STENT URETERAL;  Surgeon: Mark Loo MD;  [facility];  Service: Urology    MS INCISION EXTENSOR TENDON SHEATH WRIST Right 2019    Procedure: Hanny Loera;  Surgeon: Canary Goodpasture, MD;  [facility];  Service: Orthopedics    TUBAL LIGATION      UPPER GASTROINTESTINAL ENDOSCOPY  2020    WISDOM TOOTH EXTRACTION  1983    X2      Social History   Social History     Substance and Sexual Activity   Alcohol Use Yes    Comment: socially - 1-3 drinks per month     Social History     Substance and Sexual Activity   Drug Use No     Social History     Tobacco Use   Smoking Status Never   Smokeless Tobacco Never     Family History   Problem Relation Age of Onset Gallbladder disease Mother     Thyroid disease Mother     Kidney disease Mother     Breast cancer Mother     Diabetes Father     Hypertension Father     Nephrolithiasis Father     Lymphoma Maternal Grandfather     Diabetes Paternal Grandmother     Breast cancer Paternal Aunt     No Known Problems Son     No Known Problems Son        Meds/Allergies       Current Outpatient Medications:     Cholecalciferol (VITAMIN D PO)    Cranberry (ELLURA PO)    cycloSPORINE (RESTASIS) 0.05 % ophthalmic emulsion    dicyclomine (BENTYL) 10 mg capsule    estradiol (ESTRACE) 0.1 mg/g vaginal cream    levothyroxine 50 mcg tablet    Meth-Hyo-M Bl-Na Phos-Ph Sal (URO-MP PO)    Meth-Hyo-M Bl-Na Phos-Ph Sal (Uro-MP) 118 MG CAPS    mometasone (NASONEX) 50 mcg/act nasal spray    pentosan polysulfate (Elmiron) 100 mg capsule    rosuvastatin (CRESTOR) 5 mg tablet    trospium chloride (SANCTURA) 20 mg tablet    ALPRAZolam (XANAX) 0.25 mg tablet    mesalamine (ASACOL) 800 MG EC tablet    Allergies   Allergen Reactions    Apple Fruit Extract - Food Allergy Anaphylaxis    Iodine - Food Allergy Anaphylaxis     Allergy to Iodinated and non iodinated dye    Other Anaphylaxis     APPLES    TAPE  Also rash at times    Seasonal Ic [Cholestatin] Allergic Rhinitis    Latex            Objective     Blood pressure 138/85, pulse 82, temperature 97.8 °F (36.6 °C), temperature source Tympanic, height 5' 7" (1.702 m), weight 59 kg (130 lb), SpO2 99 %. Body mass index is 20.36 kg/m². PHYSICAL EXAMINATION:    General Appearance:   Alert, cooperative, no distress   HEENT:  Normocephalic, atraumatic, anicteric. Neck supple, symmetrical, trachea midline. Lungs:   Equal chest rise and unlabored breathing, normal effort, no coughing. Cardiovascular:   No visualized JVD. Abdomen:   No abdominal distension. Skin:   No jaundice, rashes, or lesions. Musculoskeletal:   Normal range of motion visualized. Psych:  Normal affect and normal insight.    Neuro: Alert and appropriate. Lab Results:   No visits with results within 1 Day(s) from this visit. Latest known visit with results is:   Lab on 09/27/2023   Component Date Value    Calprotectin 09/27/2023 28        Lab Results   Component Value Date    WBC 3.69 (L) 03/20/2023    HGB 13.1 03/20/2023    HCT 42.1 03/20/2023    MCV 90 03/20/2023     (L) 03/20/2023       Lab Results   Component Value Date     08/07/2015    SODIUM 138 03/20/2023    K 3.9 03/20/2023     (H) 03/20/2023    CO2 28 03/20/2023    ANIONGAP 5 08/07/2015    AGAP 1 (L) 03/20/2023    BUN 17 03/20/2023    CREATININE 0.79 03/20/2023    GLUC 78 12/18/2019    GLUF 89 03/20/2023    CALCIUM 9.1 03/20/2023    AST 14 03/20/2023    ALT 16 03/20/2023    ALKPHOS 47 03/20/2023    PROT 7.2 07/10/2015    TP 7.0 03/20/2023    BILITOT 0.60 07/10/2015    TBILI 0.32 03/20/2023    EGFR 80 03/20/2023       Lab Results   Component Value Date    CRP <3.0 10/31/2022       Lab Results   Component Value Date    ZTF9HMTZGDKQ 2.366 08/01/2023       No results found for: "IRON", "TIBC", "FERRITIN"    Radiology Results:   No results found.

## 2023-10-30 ENCOUNTER — OFFICE VISIT (OUTPATIENT)
Dept: GASTROENTEROLOGY | Facility: CLINIC | Age: 63
End: 2023-10-30
Payer: COMMERCIAL

## 2023-10-30 VITALS
WEIGHT: 130 LBS | BODY MASS INDEX: 20.4 KG/M2 | OXYGEN SATURATION: 99 % | TEMPERATURE: 97.8 F | SYSTOLIC BLOOD PRESSURE: 138 MMHG | HEIGHT: 67 IN | DIASTOLIC BLOOD PRESSURE: 85 MMHG | HEART RATE: 82 BPM

## 2023-10-30 DIAGNOSIS — K58.2 IRRITABLE BOWEL SYNDROME WITH BOTH CONSTIPATION AND DIARRHEA: ICD-10-CM

## 2023-10-30 DIAGNOSIS — R10.31 RIGHT LOWER QUADRANT ABDOMINAL PAIN: Primary | ICD-10-CM

## 2023-10-30 DIAGNOSIS — K52.9 COLITIS: ICD-10-CM

## 2023-10-30 PROCEDURE — 99214 OFFICE O/P EST MOD 30 MIN: CPT | Performed by: INTERNAL MEDICINE

## 2023-10-30 NOTE — PATIENT INSTRUCTIONS
Awaiting repeat colonoscopy  Consider small bowel imaging in the future  Dicyclomine as needed for pain but take 2 times a day  Low residue diet (avoid leafy green vegetables, raw fruits and vegetables especially with skin, nuts, seeds, corn/popcorn).  Try to modify texture to allow foods to pass more easily through an area of inflammation  Stool softener  Drink at least 6-8 cups of water per day  Inguinal ultrasound

## 2023-10-31 ENCOUNTER — ANNUAL EXAM (OUTPATIENT)
Dept: OBGYN CLINIC | Facility: CLINIC | Age: 63
End: 2023-10-31
Payer: COMMERCIAL

## 2023-10-31 VITALS
BODY MASS INDEX: 20.88 KG/M2 | WEIGHT: 133 LBS | SYSTOLIC BLOOD PRESSURE: 130 MMHG | HEIGHT: 67 IN | DIASTOLIC BLOOD PRESSURE: 88 MMHG

## 2023-10-31 DIAGNOSIS — Z01.419 WOMEN'S ANNUAL ROUTINE GYNECOLOGICAL EXAMINATION: ICD-10-CM

## 2023-10-31 DIAGNOSIS — Z01.419 PAP SMEAR, AS PART OF ROUTINE GYNECOLOGICAL EXAMINATION: ICD-10-CM

## 2023-10-31 DIAGNOSIS — Z12.31 ENCOUNTER FOR SCREENING MAMMOGRAM FOR BREAST CANCER: Primary | ICD-10-CM

## 2023-10-31 DIAGNOSIS — N95.2 ATROPHIC VAGINITIS: ICD-10-CM

## 2023-10-31 PROCEDURE — G0145 SCR C/V CYTO,THINLAYER,RESCR: HCPCS | Performed by: OBSTETRICS & GYNECOLOGY

## 2023-10-31 PROCEDURE — G0476 HPV COMBO ASSAY CA SCREEN: HCPCS | Performed by: OBSTETRICS & GYNECOLOGY

## 2023-10-31 PROCEDURE — S0612 ANNUAL GYNECOLOGICAL EXAMINA: HCPCS | Performed by: OBSTETRICS & GYNECOLOGY

## 2023-10-31 NOTE — PROGRESS NOTES
Assessment/Plan:    No problem-specific Assessment & Plan notes found for this encounter. Diagnoses and all orders for this visit:    Encounter for screening mammogram for breast cancer  -     Mammo screening bilateral w 3d & cad; Future    Women's annual routine gynecological examination  -     Liquid-based pap, screening    Pap smear, as part of routine gynecological examination    Atrophic vaginitis          Normal gynecological physical examination. Self-breast examination stressed. Mammogram ordered. Discussed regular exercise, healthy diet, importance of vitamin D and calcium supplements. Discussed importance of sun block use during periods of prolonged sun exposure. Patient will be seen in 1 year for routine gynecologic and medical examination. Patient will call office for any problems, concerns, or issues which may arise during the interim. Subjective:          HPI    Patient ID: Nancy Mathews is a  61 y.o. female with PMH of IBS, osteoporosis, Hashimoto's, ulcerative colitis, and anxiety who presents today for her annual gynecologic and medical examination    Menstrual status: Patient is postmenopausal and denies any vaginal bleeding    Vasomotor symptoms: Patient denies any significant vasomotor symptoms    Patient reports normal appetite    Patient reports normal bowel and bladder habits    Patient denies any significant pelvic or abdominal pain    Patient denies any headaches, chest pain, shortness of breath fever shakes or chills    Patient denies any COVID 19 symptoms including cough or loss of taste or smell    COVID vaccine status: up to date    Medical problems: IBS, osteoporosis, Hashimoto's, ulcerative colitis, and anxiety    Colonoscopy status: patient is up to date, follows with GI    Mammogram status: Patient does regular self-breast examinations and is up-to-date with screening mammography as well.   Appropriate arrangements for her annual screening mammogram were placed in the EMR system at today's visit. The following portions of the patient's history were reviewed and updated as appropriate: allergies, current medications, past family history, past medical history, past social history, past surgical history and problem list.    Review of Systems   Constitutional: Negative. Negative for appetite change, diaphoresis, fatigue, fever and unexpected weight change. HENT: Negative. Eyes: Negative. Respiratory: Negative. Cardiovascular: Negative. Gastrointestinal: Negative. Negative for abdominal pain, blood in stool, constipation, diarrhea, nausea and vomiting. Followed for IBS and ulcerative colitis   Endocrine: Negative. Negative for cold intolerance and heat intolerance. Followed for Hashimoto thyroiditis   Genitourinary: Negative. Negative for dysuria, frequency, hematuria, urgency, vaginal bleeding, vaginal discharge and vaginal pain. Musculoskeletal: Negative. Skin: Negative. Allergic/Immunologic: Negative. Neurological: Negative. Hematological: Negative. Negative for adenopathy. Psychiatric/Behavioral: Negative. Objective:      /88   Ht 5' 7" (1.702 m)   Wt 60.3 kg (133 lb)   LMP  (LMP Unknown)   BMI 20.83 kg/m²          Physical Exam  Constitutional:       General: She is not in acute distress. Appearance: Normal appearance. She is well-developed. She is not diaphoretic. HENT:      Head: Normocephalic and atraumatic. Eyes:      Pupils: Pupils are equal, round, and reactive to light. Cardiovascular:      Rate and Rhythm: Normal rate and regular rhythm. Heart sounds: Normal heart sounds. No murmur heard. No friction rub. No gallop. Pulmonary:      Effort: Pulmonary effort is normal.      Breath sounds: Normal breath sounds. Chest:   Breasts:     Breasts are symmetrical.      Right: No inverted nipple, mass, nipple discharge, skin change or tenderness.       Left: No inverted nipple, mass, nipple discharge, skin change or tenderness. Abdominal:      General: Bowel sounds are normal.      Palpations: Abdomen is soft. Genitourinary:     Exam position: Supine. Labia:         Right: No rash or lesion. Left: No rash or lesion. Vagina: Normal. No vaginal discharge, erythema, tenderness or bleeding. Cervix: No discharge or friability. Uterus: Not enlarged and not tender. Adnexa:         Right: No mass, tenderness or fullness. Left: No mass, tenderness or fullness. Rectum: Normal. Guaiac result negative. Comments: Exam revealed minimal atrophic vaginitis   Good pelvic support confirmed  Musculoskeletal:         General: Normal range of motion. Cervical back: Normal range of motion and neck supple. Lymphadenopathy:      Cervical: No cervical adenopathy. Upper Body:      Right upper body: No supraclavicular adenopathy. Left upper body: No supraclavicular adenopathy. Skin:     General: Skin is warm and dry. Findings: No rash. Neurological:      General: No focal deficit present. Mental Status: She is alert and oriented to person, place, and time. Psychiatric:         Mood and Affect: Mood normal.         Speech: Speech normal.         Behavior: Behavior normal.         Thought Content:  Thought content normal.         Judgment: Judgment normal.

## 2023-11-03 ENCOUNTER — CONTACT LENS REFIT (OUTPATIENT)
Dept: URBAN - METROPOLITAN AREA CLINIC 6 | Facility: CLINIC | Age: 63
End: 2023-11-03

## 2023-11-03 DIAGNOSIS — H52.223: ICD-10-CM

## 2023-11-03 DIAGNOSIS — Z97.3: ICD-10-CM

## 2023-11-03 LAB
LAB AP GYN PRIMARY INTERPRETATION: NORMAL
Lab: NORMAL

## 2023-11-03 PROCEDURE — 92310 CONTACT LENS FITTING OU: CPT

## 2023-11-03 ASSESSMENT — VISUAL ACUITY
OS_CC: 20/25
OD_CC: 20/25

## 2023-11-16 ENCOUNTER — TELEPHONE (OUTPATIENT)
Age: 63
End: 2023-11-16

## 2023-11-16 NOTE — TELEPHONE ENCOUNTER
Patients GI provider:  Dr. Christina Eastman    Number to return call: [phone]    Reason for call: Pt returning call to confirm that she can make 07:45AM apt.     Scheduled procedure/appointment date if applicable: Apt 97/29/0362

## 2023-11-17 ENCOUNTER — OFFICE VISIT (OUTPATIENT)
Dept: GASTROENTEROLOGY | Facility: MEDICAL CENTER | Age: 63
End: 2023-11-17
Payer: COMMERCIAL

## 2023-11-17 VITALS
SYSTOLIC BLOOD PRESSURE: 114 MMHG | TEMPERATURE: 98.2 F | BODY MASS INDEX: 19.98 KG/M2 | DIASTOLIC BLOOD PRESSURE: 79 MMHG | HEART RATE: 86 BPM | WEIGHT: 127.6 LBS

## 2023-11-17 DIAGNOSIS — K52.9 COLITIS: ICD-10-CM

## 2023-11-17 DIAGNOSIS — R10.9 INTERMITTENT ABDOMINAL PAIN: ICD-10-CM

## 2023-11-17 DIAGNOSIS — R10.31 RIGHT LOWER QUADRANT ABDOMINAL PAIN: ICD-10-CM

## 2023-11-17 DIAGNOSIS — K58.2 IRRITABLE BOWEL SYNDROME WITH BOTH CONSTIPATION AND DIARRHEA: Primary | ICD-10-CM

## 2023-11-17 PROCEDURE — 99214 OFFICE O/P EST MOD 30 MIN: CPT | Performed by: INTERNAL MEDICINE

## 2023-11-17 NOTE — PROGRESS NOTES
Outpatient Follow up  [address] [place] [address] Gomez De Los Santos.    [place] [address]  Malini Bee MD  Ph : [phone]  Fax : [phone]  Mobile : [phone]  Email : [email or web address]  Also available on [employer]    Susannah Vaughn 61 y.o. female MRN: [id number]    PCP: Dulce Abdul MD  Referring: No referring provider defined for this encounter. Susannah Vaughn presented for a follow up visit. My recommendations are included. Please do not hesitate to contact me with any questions you may have. ASSESSMENT AND PLAN:      No problem-specific Assessment & Plan notes found for this encounter. Diagnoses and all orders for this visit:    Irritable bowel syndrome with both constipation and diarrhea    Colitis    Right lower quadrant abdominal pain    Intermittent abdominal pain      71-year-old lady who presents to us for follow-up. She has had intermittent episodes of abdominal pain. She has a history of colitis as well. Most likely her symptoms are related to IBS with constipation. Multiple treatments have been tried however unsuccessful. She is unable to take any of the stronger medication such as Linzess due to intolerance. Would recommend the following. Schedule repeat colonoscopy with Dr. Vikash Munoz with random biopsies - she is scheduled for February. Follow up with Dr. Ita Mathew - after the colonoscopy. May use magnesium citrate for constipation/ stool stasis - may also use this for the colonoscopy prep however, would recommend discussion with Dr. Niles Gagnon office. The prep dose is one 10oz mag citrate with 2 - 3 cups of water the evening before and same the morning of the procedure. I would also recommend doing Miralax a few days before (daily) the colonoscopy to help with the prep. Discuss with Dr. Niles Gagnon office to see if vitamin water would be okay as opposed to Gatorade.    Follow up in office in 6 months.     ______________________________________________________________________    SUBJECTIVE:  61y.o. year old who presents with Follow-up (Pt states her symptoms  is the same from last visit upcoming colonoscopy )       Last GI office visit : 23    Summary of recommendations from last visit :   See IBD team : She did see Dr. Matty Chavarria in October. Recommended colonoscopy with biopsies, come off the mesalamine, continue Bentyl and inguinal ultrasound  Consider Linzess    Tests completed after last visit :   Dr. César Neely visit. Interval history :    She is doing well but still has had symptoms. No acid reflux problems right now. Her bowels are regular. She is taking a stool softener once a day. She had her last episode yesterday. She recently lost her job. PRIOR ENDOSCOPIC EVALUATION :     Endoscopy : yes    Colonoscopy : 2020     Last Cologuard: Not on file        REVIEW OF SYSTEMS IS OTHERWISE NEGATIVE.       Historical Information   Past Medical History:   Diagnosis Date    Atopic dermatitis     last assessed 13    Atrophic vaginitis     last assessed 9/2/15    Dermatitis     Dry eye     Frequency of urination     Fungal infection     last assessed 13    Herpes     last assessed 14    Hyperlipidemia     Hypothyroidism     Interstitial cystitis     Osteoporosis     Periodontal abscess     last assessed 13    PONV (postoperative nausea and vomiting)     Thyroid nodule     Ulcerative colitis ([address])     Urinary frequency     resolved 17    UTI (urinary tract infection)     Vaginal atrophy     Vitamin D deficiency     last assessed 16    Voiding dysfunction     last assessed 10/7/15     Past Surgical History:   Procedure Laterality Date     SECTION       and      COLONOSCOPY  2020    CYSTOSCOPY      diagnostic resolved 05    FL RETROGRADE PYELOGRAM  2020    GANGLION CYST EXCISION Right 2019    Procedure: EXCISION GANGLION CYST RIGHT FIRST DORSAL EXTENSOR COMPARTMENT;  Surgeon: Allyson Winn MD;  [facility];  Service: Orthopedics    KIDNEY STONE SURGERY      ORIF PROXIMAL ULNA FRACTURE      metal plates and screws removed    OTHER SURGICAL HISTORY      punch biospy     MO COLONOSCOPY FLX DX W/COLLJ SPEC WHEN PFRMD N/A 7/28/2017    Procedure: COLONOSCOPY;  Surgeon: Deon Weinberg MD;  [facility];   Service: Colorectal    MO CYSTO/URETERO W/LITHOTRIPSY &INDWELL STENT INSRT Left 1/7/2020    Procedure: CYSTOSCOPY URETEROSCOPY WITH LITHOTRIPSY HOLMIUM LASER, RETROGRADE PYELOGRAM AND INSERTION STENT URETERAL;  Surgeon: Cristal Mercer MD;  [facility];  Service: Urology    MO INCISION EXTENSOR TENDON SHEATH WRIST Right 2/19/2019    Procedure: Baline Elizabeth;  Surgeon: Allyson Winn MD;  [facility];  Service: Orthopedics    TUBAL LIGATION      UPPER GASTROINTESTINAL ENDOSCOPY  09/11/2020    WISDOM TOOTH EXTRACTION  1983    X2      Social History   Social History     Substance and Sexual Activity   Alcohol Use Yes    Comment: socially - 1-3 drinks per month     Social History     Substance and Sexual Activity   Drug Use No     Social History     Tobacco Use   Smoking Status Never   Smokeless Tobacco Never     Family History   Problem Relation Age of Onset    Gallbladder disease Mother     Thyroid disease Mother     Kidney disease Mother     Breast cancer Mother     Diabetes Father     Hypertension Father     Nephrolithiasis Father     Lymphoma Maternal Grandfather     Diabetes Paternal Grandmother     Breast cancer Paternal Aunt     No Known Problems Son     No Known Problems Son        Meds/Allergies       Current Outpatient Medications:     Cholecalciferol (VITAMIN D PO)    Cranberry (ELLURA PO)    cycloSPORINE (RESTASIS) 0.05 % ophthalmic emulsion    dicyclomine (BENTYL) 10 mg capsule    estradiol (ESTRACE) 0.1 mg/g vaginal cream    levothyroxine 50 mcg tablet    Meth-Hyo-M Bl-Na Phos-Ph Sal (URO-MP PO)    Meth-Hyo-M Bl-Na Phos-Ph Sal (Uro-MP) 118 MG CAPS    mometasone (NASONEX) 50 mcg/act nasal spray    pentosan polysulfate (Elmiron) 100 mg capsule    rosuvastatin (CRESTOR) 5 mg tablet    trospium chloride (SANCTURA) 20 mg tablet    ALPRAZolam (XANAX) 0.25 mg tablet    mesalamine (ASACOL) 800 MG EC tablet    Allergies   Allergen Reactions    Apple Fruit Extract - Food Allergy Anaphylaxis    Iodine - Food Allergy Anaphylaxis     Allergy to Iodinated and non iodinated dye    Other Anaphylaxis     APPLES    TAPE  Also rash at times    Seasonal Ic [Cholestatin] Allergic Rhinitis    Latex            Objective     Blood pressure 114/79, pulse 86, temperature 98.2 °F (36.8 °C), weight 57.9 kg (127 lb 9.6 oz). Body mass index is 19.98 kg/m². PHYSICAL EXAM:      Physical Exam  Constitutional:       Appearance: Normal appearance. She is well-developed. HENT:      Head: Normocephalic and atraumatic. Eyes:      General: No scleral icterus. Conjunctiva/sclera: Conjunctivae normal.      Pupils: Pupils are equal, round, and reactive to light. Cardiovascular:      Rate and Rhythm: Normal rate and regular rhythm. Heart sounds: Normal heart sounds. Pulmonary:      Effort: Pulmonary effort is normal. No respiratory distress. Breath sounds: Normal breath sounds. Abdominal:      General: Bowel sounds are normal. There is no distension. Palpations: Abdomen is soft. There is no mass. Tenderness: There is no abdominal tenderness. Hernia: No hernia is present. Musculoskeletal:         General: Normal range of motion. Cervical back: Normal range of motion. Lymphadenopathy:      Cervical: No cervical adenopathy. Skin:     General: Skin is warm. Neurological:      Mental Status: She is alert and oriented to person, place, and time. Psychiatric:         Behavior: Behavior normal.         Thought Content:  Thought content normal.       Lab Results:   Lab Results   Component Value Date/Time    WBC 3.69 (L) 03/20/2023 07:44 AM    WBC 3.04 (L) 08/07/2015 07:40 AM    HGB 13.1 03/20/2023 07:44 AM    HGB 12.8 08/07/2015 07:40 AM    HCT 42.1 03/20/2023 07:44 AM    HCT 40.8 08/07/2015 07:40 AM    MCV 90 03/20/2023 07:44 AM    MCV 89 08/07/2015 07:40 AM     (L) 03/20/2023 07:44 AM     (L) 08/07/2015 07:40 AM    SODIUM 138 03/20/2023 07:44 AM    K 3.9 03/20/2023 07:44 AM    K 3.9 08/07/2015 07:40 AM     (H) 03/20/2023 07:44 AM     08/07/2015 07:40 AM    CO2 28 03/20/2023 07:44 AM    CO2 30 08/07/2015 07:40 AM    BUN 17 03/20/2023 07:44 AM    BUN 23 08/07/2015 07:40 AM    CREATININE 0.79 03/20/2023 07:44 AM    CREATININE 0.90 08/07/2015 07:40 AM    GLUF 89 03/20/2023 07:44 AM    GLUF 78 06/06/2014 08:08 AM    GLUCOSE 89 08/07/2015 07:40 AM    AST 14 03/20/2023 07:44 AM    AST 19 07/10/2015 07:26 AM    ALT 16 03/20/2023 07:44 AM    ALT 27 07/10/2015 07:26 AM    ALKPHOS 47 03/20/2023 07:44 AM    ALKPHOS 42 (L) 07/10/2015 07:26 AM    TBILI 0.32 03/20/2023 07:44 AM    ALB 3.6 03/20/2023 07:44 AM    ALB 4.0 07/10/2015 07:26 AM    LIPASE 113 10/28/2019 07:27 AM         Radiology Results:   No results found.

## 2023-11-20 ENCOUNTER — HOSPITAL ENCOUNTER (OUTPATIENT)
Dept: ULTRASOUND IMAGING | Facility: HOSPITAL | Age: 63
Discharge: HOME | End: 2023-11-20
Attending: INTERNAL MEDICINE
Payer: COMMERCIAL

## 2023-11-20 DIAGNOSIS — R10.31 RIGHT LOWER QUADRANT ABDOMINAL PAIN: ICD-10-CM

## 2023-11-20 PROCEDURE — 76705 ECHO EXAM OF ABDOMEN: CPT

## 2023-11-27 DIAGNOSIS — T78.40XA ALLERGY, INITIAL ENCOUNTER: ICD-10-CM

## 2023-11-27 RX ORDER — MOMETASONE FUROATE 50 UG/1
2 SPRAY, METERED NASAL DAILY
Qty: 51 G | Refills: 3 | Status: SHIPPED | OUTPATIENT
Start: 2023-11-27

## 2023-11-27 RX ORDER — MOMETASONE FUROATE 50 UG/1
2 SPRAY, METERED NASAL DAILY
Qty: 41 G | Refills: 3 | Status: SHIPPED | OUTPATIENT
Start: 2023-11-27 | End: 2023-11-27 | Stop reason: SDUPTHER

## 2024-01-28 DIAGNOSIS — G47.9 SLEEP DISTURBANCE: ICD-10-CM

## 2024-01-28 RX ORDER — ALPRAZOLAM 0.25 MG/1
0.25 TABLET ORAL 3 TIMES DAILY PRN
Qty: 30 TABLET | Refills: 0 | Status: SHIPPED | OUTPATIENT
Start: 2024-01-28

## 2024-03-19 ENCOUNTER — OFFICE VISIT (OUTPATIENT)
Dept: GASTROENTEROLOGY | Facility: AMBULARY SURGERY CENTER | Age: 64
End: 2024-03-19
Payer: COMMERCIAL

## 2024-03-19 VITALS
OXYGEN SATURATION: 99 % | DIASTOLIC BLOOD PRESSURE: 64 MMHG | HEIGHT: 67 IN | WEIGHT: 123 LBS | HEART RATE: 74 BPM | BODY MASS INDEX: 19.3 KG/M2 | SYSTOLIC BLOOD PRESSURE: 118 MMHG

## 2024-03-19 DIAGNOSIS — K58.2 IRRITABLE BOWEL SYNDROME WITH BOTH CONSTIPATION AND DIARRHEA: ICD-10-CM

## 2024-03-19 DIAGNOSIS — K51.80 OTHER ULCERATIVE COLITIS WITHOUT COMPLICATION (HCC): Primary | ICD-10-CM

## 2024-03-19 DIAGNOSIS — R10.9 ABDOMINAL PAIN, UNSPECIFIED ABDOMINAL LOCATION: ICD-10-CM

## 2024-03-19 PROCEDURE — 99215 OFFICE O/P EST HI 40 MIN: CPT | Performed by: INTERNAL MEDICINE

## 2024-03-19 NOTE — PROGRESS NOTES
[facility] - Outpatient Follow-up Note  Patricia Moore 63 y.o. female MRN: [id number]  Encounter: [id number]          ASSESSMENT AND PLAN:    Patricia Moore is a 63 y.o. female with colitis (ulcerative colitis versus indeterminate versus Crohn's colitis) who presents for follow-up, last seen back in October with symptoms of pain, now presenting for follow-up.  She was doing well but recently has been under stress as her  had a AAA repair and she has been helping to take care of her parents and arrange for an appropriate living situation for them.  Her pain had returned in the setting.    Colonoscopy 2020 endoscopically unremarkable aside from cecal polyp and biopsies with chronic inactive colitis throughout the colon.  Colonoscopies from 2014 and 2018 reportedly unremarkable.  Prior sits marker study normal however significant stool still seen within the x-ray.    For her pain she has previously tried Elavil, Donatello, Librax.    Most recent calprotectin 28 and the one prior to that was normal.  CBC with a white count of 3.69, platelets low at 143, normal hemoglobin and MCV.  CMP with chloride elevated at 109 but otherwise normal electrolytes and creatinine and liver test.  Prior hemoglobin A1c and TSH normal.  Prior CRP normal.  Groin ultrasound unremarkable.    Etiology of pain may be related to irritable bowel syndrome with constipation versus musculoskeletal pain, less likely inflammation related to colitis.    1. Other ulcerative colitis without complication (HCC)    2. Irritable bowel syndrome with both constipation and diarrhea    3. Abdominal pain, unspecified abdominal location        No orders of the defined types were placed in this encounter.    Awaiting colonoscopy with biopsies  Okay to use magnesium citrate or potentially sutab to help with upcoming colonoscopy  Dicyclomine as needed for pain  Consider Elavil again   Stool softener  Drink at least 8 cups of water  per day  Consider low residue diet in the future  MR enterography depending on the colonoscopy results  Okay to hold off of mesalamine    I have spent a total time of 40 minutes on 03/19/24 in caring for this patient including Diagnostic results, Prognosis, Risks and benefits of tx options, Instructions for management, Patient and family education, Risk factor reductions, Impressions, Counseling / Coordination of care, Documenting in the medical record, Reviewing / ordering tests, medicine, procedures  , and Obtaining or reviewing history  .    ______________________________________________________________________    SUBJECTIVE:    Patricia Moore is a 63 y.o. female who presents with complaint of UC.     Her  had a triple A repair. She has some family stress. She was doing well without attacks. Maybe the stress contributed but it brought on symptoms. She was reschedule her colonoscopy. She is exhausted    It is lower abdominal pain, across the abdomen.   The asacol was stopped and she felt much better. She was doing well and no attacks. With everything going on she has these attacks.  Nothing helps. She takes an extra bentyl and uses a heating pad. It just has to run its course. Sometimes she will have a BM but it does not always relieve it. It can last for a variable amount. All day or a couple of hours. Sometimes a half hour. Her Bms have been pretty good. With the stress she has diarrhea. No blood or black stools. Some urgency. No accidents. No nocturnal Bms.   She is on xanax.   Wt Readings from Last 3 Encounters:   03/19/24 55.8 kg (123 lb)   11/17/23 57.9 kg (127 lb 9.6 oz)   10/31/23 60.3 kg (133 lb)           REVIEW OF SYSTEMS IS OTHERWISE NEGATIVE.  10 point ROS reviewed and negative, except as above      Historical Information   Past Medical History:   Diagnosis Date    Atopic dermatitis     last assessed 12/2/13    Atrophic vaginitis     last assessed 9/2/15    Dermatitis     Dry eye     Frequency  of urination     Fungal infection     last assessed 13    Herpes     last assessed 14    Hyperlipidemia     Hypothyroidism     Interstitial cystitis     Osteoporosis     Periodontal abscess     last assessed 13    PONV (postoperative nausea and vomiting)     Thyroid nodule     Ulcerative colitis (HCC)     Urinary frequency     resolved 17    UTI (urinary tract infection)     Vaginal atrophy     Vitamin D deficiency     last assessed 16    Voiding dysfunction     last assessed 10/7/15     Past Surgical History:   Procedure Laterality Date     SECTION       and      COLONOSCOPY  2020    CYSTOSCOPY      diagnostic resolved 05    FL RETROGRADE PYELOGRAM  2020    GANGLION CYST EXCISION Right 2019    Procedure: EXCISION GANGLION CYST RIGHT FIRST DORSAL EXTENSOR COMPARTMENT;  Surgeon: Cheng Quintero MD;  Location: [facility];  Service: Orthopedics    KIDNEY STONE SURGERY      ORIF PROXIMAL ULNA FRACTURE      metal plates and screws removed    OTHER SURGICAL HISTORY      punch biospy     NY COLONOSCOPY FLX DX W/COLLJ SPEC WHEN PFRMD N/A 2017    Procedure: COLONOSCOPY;  Surgeon: Hannah Luke MD;  [facility]: [facility];  Service: Colorectal    NY CYSTO/URETERO W/LITHOTRIPSY &INDWELL STENT INSRT Left 2020    Procedure: CYSTOSCOPY URETEROSCOPY WITH LITHOTRIPSY HOLMIUM LASER, RETROGRADE PYELOGRAM AND INSERTION STENT URETERAL;  Surgeon: Joshua Parikh MD;  [facility];  Service: Urology    NY INCISION EXTENSOR TENDON SHEATH WRIST Right 2019    Procedure: RELEASE DEQUERVAINS;  Surgeon: Cheng Quintero MD;  [facility];  Service: Orthopedics    TUBAL LIGATION      UPPER GASTROINTESTINAL ENDOSCOPY  2020    WISDOM TOOTH EXTRACTION  1983    X2      Social History   Social History     Substance and Sexual Activity   Alcohol Use Yes    Comment: socially - 1-3 drinks per month     Social History     Substance and Sexual  "Activity   Drug Use No     Social History     Tobacco Use   Smoking Status Never   Smokeless Tobacco Never     Family History   Problem Relation Age of Onset    Gallbladder disease Mother     Thyroid disease Mother     Kidney disease Mother     Breast cancer Mother     Diabetes Father     Hypertension Father     Nephrolithiasis Father     Lymphoma Maternal Grandfather     Diabetes Paternal Grandmother     Breast cancer Paternal Aunt     No Known Problems Son     No Known Problems Son        Meds/Allergies       Current Outpatient Medications:     ALPRAZolam (XANAX) 0.25 mg tablet    Cholecalciferol (VITAMIN D PO)    Cranberry (ELLURA PO)    cycloSPORINE (RESTASIS) 0.05 % ophthalmic emulsion    dicyclomine (BENTYL) 10 mg capsule    estradiol (ESTRACE) 0.1 mg/g vaginal cream    levothyroxine 50 mcg tablet    Meth-Hyo-M Bl-Na Phos-Ph Sal (Uro-MP) 118 MG CAPS    mometasone (NASONEX) 50 mcg/act nasal spray    pentosan polysulfate (Elmiron) 100 mg capsule    rosuvastatin (CRESTOR) 5 mg tablet    Sodium Sulfate-Mag Sulfate-KCl 3848-072-994 MG TABS    trospium chloride (SANCTURA) 20 mg tablet    mesalamine (ASACOL) 800 MG EC tablet    Meth-Hyo-M Bl-Na Phos-Ph Sal (URO-MP PO)    Allergies   Allergen Reactions    Apple Fruit Extract - Food Allergy Anaphylaxis    Iodine - Food Allergy Anaphylaxis     Allergy to Iodinated and non iodinated dye    Other Anaphylaxis     APPLES    TAPE  Also rash at times    Seasonal Ic [Cholestatin] Allergic Rhinitis    Latex            Objective     Blood pressure 118/64, pulse 74, height 5' 7\" (1.702 m), weight 55.8 kg (123 lb), SpO2 99%. Body mass index is 19.26 kg/m².      PHYSICAL EXAMINATION:    General Appearance:   Alert, cooperative, no distress   HEENT:  Normocephalic, atraumatic, anicteric. Neck supple, symmetrical, trachea midline.   Lungs:   Equal chest rise and unlabored breathing, normal effort, no coughing.   Cardiovascular:   No visualized JVD.   Abdomen:   No abdominal " distension.   Skin:   No jaundice, rashes, or lesions.    Musculoskeletal:   Normal range of motion visualized.   Psych:  Normal affect and normal insight.   Neuro:  Alert and appropriate.         Lab Results:   No visits with results within 1 Day(s) from this visit.   Latest known visit with results is:   Annual Exam on 10/31/2023   Component Date Value    Case Report 10/31/2023                      Value:Gynecologic Cytology Report                       Case: [id number]                                  Authorizing Provider:  Zeb Chaudhry MD   Collected:           10/31/2023 1538              Ordering Location:     [facility] Received:            10/31/2023 1538                                     GYN                                                                          First Screen:          Eva Tovar, CT                                                       Specimen:    LIQUID-BASED PAP, SCREENING, Cervix                                                        Primary Interpretation 10/31/2023 Negative for intraepithelial lesion or malignancy     Specimen Adequacy 10/31/2023 Satisfactory for evaluation. Endocervical/transformation zone component present.     Additional Information 10/31/2023                      Value:This result contains rich text formatting which cannot be displayed here.    HPV Other HR 10/31/2023 Negative     HPV16 10/31/2023 Negative     HPV18 10/31/2023 Negative        Lab Results   Component Value Date    WBC 3.69 (L) 03/20/2023    HGB 13.1 03/20/2023    HCT 42.1 03/20/2023    MCV 90 03/20/2023     (L) 03/20/2023       Lab Results   Component Value Date     08/07/2015    SODIUM 138 03/20/2023    K 3.9 03/20/2023     (H) 03/20/2023    CO2 28 03/20/2023    ANIONGAP 5 08/07/2015    AGAP 1 (L) 03/20/2023    BUN 17 03/20/2023    CREATININE 0.79 03/20/2023    GLUC 78 12/18/2019    GLUF 89 03/20/2023    CALCIUM 9.1 03/20/2023    AST 14  "03/20/2023    ALT 16 03/20/2023    ALKPHOS 47 03/20/2023    PROT 7.2 07/10/2015    TP 7.0 03/20/2023    BILITOT 0.60 07/10/2015    TBILI 0.32 03/20/2023    EGFR 80 03/20/2023       Lab Results   Component Value Date    CRP <3.0 10/31/2022       Lab Results   Component Value Date    SNU3UAOUUMWV 2.366 08/01/2023       No results found for: \"IRON\", \"TIBC\", \"FERRITIN\"    Radiology Results:   No results found.  "

## 2024-03-19 NOTE — PATIENT INSTRUCTIONS
Amitriptyline (By mouth)   Amitriptyline (am-i-TRIP-ti-jacy)  Treats depression. This medicine is a TCA.   Brand Name(s): Elavil   There may be other brand names for this medicine.  When This Medicine Should Not Be Used:   This medicine is not right for everyone. Do not use if you had an allergic reaction to amitriptyline.  How to Use This Medicine:   Tablet  Take your medicine as directed. Your dose may need to be changed several times to find what works best for you.  This medicine should come with a Medication Guide. Ask your pharmacist for a copy if you do not have one.  Store the medicine in a closed container at room temperature, away from heat, moisture, and direct light.  Missed dose: Take a dose as soon as you remember. If it is almost time for your next dose, wait until then and take a regular dose. Do not take extra medicine to make up for a missed dose.  Drugs and Foods to Avoid:   Ask your doctor or pharmacist before using any other medicine, including over-the-counter medicines, vitamins, and herbal products.  Do not use this medicine with cisapride. Do not use this medicine and an MAO inhibitor (MAOI) within 14 days of each other.  Some medicines and foods can affect how amitriptyline works. Tell your doctor if you are using cimetidine, disulfiram, or guanethidine. Tell your doctor if you are using other medicine for depression (such as fluoxetine, paroxetine, sertraline), a phenothiazine medicine (such as promethazine, chlorpromazine), medicine for heart rhythm problems (such as flecainide, propafenone, quinidine), or thyroid medicine.  Tell your doctor if you use anything else that makes you sleepy. Some examples are allergy medicine, narcotic pain medicine, and alcohol.  Warnings While Using This Medicine:   Tell your doctor if you are pregnant or breastfeeding, or if you have liver disease, heart disease, diabetes, narrow-angle glaucoma, a seizure disorder, a thyroid problem, or trouble  urinating.  For some children, teenagers, and young adults, this medicine may increase mental or emotional problems. This may lead to thoughts of suicide and violence. Talk with your doctor right away if you have any thoughts or behavior changes that concern you. Tell your doctor if you or anyone in your family has a history of bipolar disorder or suicide attempts.  This medicine may cause the following problems:   Heart rhythm problems  High or low blood sugar levels  This medicine may make you dizzy or drowsy. Do not drive or do anything else that could be dangerous until you know how this medicine affects you.  Do not stop using this medicine suddenly. Your doctor will need to slowly decrease your dose before you stop it completely.  Keep all medicine out of the reach of children. Never share your medicine with anyone.  Possible Side Effects While Using This Medicine:   Call your doctor right away if you notice any of these side effects:  Allergic reaction: Itching or hives, swelling in your face or hands, swelling or tingling in your mouth or throat, chest tightness, trouble breathing  Anxiety, restlessness, seeing or hearing things that are not there  Chest pain, trouble breathing  Fast, pounding, or uneven heartbeat  Feeling more excited or energetic than usual, racing thoughts, trouble sleeping  Lightheadedness, dizziness, or fainting  Seizures  Thoughts of hurting yourself or others, unusual behavior  Unusual bleeding or bruising  If you notice these less serious side effects, talk with your doctor:   Blurred vision, dry mouth, fever  Change in how much or how often you urinate  Constipation, diarrhea, nausea, vomiting  Drowsiness, sleepiness  Sexual problems  If you notice other side effects that you think are caused by this medicine, tell your doctor.   Call your doctor for medical advice about side effects. You may report side effects to FDA at 6-368-FDA-0684  © Copyright [employer] 2023 Information is for  End User's use only and may not be sold, redistributed or otherwise used for commercial purposes.  The above information is an  only. It is not intended as medical advice for individual conditions or treatments. Talk to your doctor, nurse or pharmacist before following any medical regimen to see if it is safe and effective for you.

## 2024-03-25 DIAGNOSIS — D70.9 GRANULOCYTOPENIA (HCC): Primary | ICD-10-CM

## 2024-03-25 DIAGNOSIS — E55.9 VITAMIN D DEFICIENCY: ICD-10-CM

## 2024-03-25 DIAGNOSIS — R73.09 ABNORMAL GLUCOSE: ICD-10-CM

## 2024-03-25 DIAGNOSIS — E78.01 FAMILIAL HYPERCHOLESTEROLEMIA: ICD-10-CM

## 2024-03-25 DIAGNOSIS — R39.9 UTI SYMPTOMS: ICD-10-CM

## 2024-03-25 DIAGNOSIS — Z00.8 OTHER SPECIFIED GENERAL MEDICAL EXAMINATION: ICD-10-CM

## 2024-03-25 DIAGNOSIS — Z13.29 SCREENING FOR HYPOTHYROIDISM: ICD-10-CM

## 2024-04-09 ENCOUNTER — TELEPHONE (OUTPATIENT)
Age: 64
End: 2024-04-09

## 2024-04-21 DIAGNOSIS — N95.2 VAGINAL ATROPHY: ICD-10-CM

## 2024-04-21 DIAGNOSIS — E04.1 NONTOXIC UNINODULAR GOITER: ICD-10-CM

## 2024-04-22 RX ORDER — LEVOTHYROXINE SODIUM 0.05 MG/1
50 TABLET ORAL DAILY
Qty: 90 TABLET | Refills: 0 | Status: SHIPPED | OUTPATIENT
Start: 2024-04-22

## 2024-04-22 RX ORDER — ESTRADIOL 0.1 MG/G
CREAM VAGINAL
Qty: 42.5 G | Refills: 0 | Status: SHIPPED | OUTPATIENT
Start: 2024-04-22

## 2024-04-26 NOTE — PROGRESS NOTES
AMB US Pelvic Non OB    Date/Time: 4/30/2024 9:00 AM    Performed by: Nany Collins  Authorized by: Zeb Chaudhry MD  Universal Protocol:  Consent given by: patient  Timeout called at: 4/30/2024 9:09 AM.  Patient understanding: patient states understanding of the procedure being performed  Patient identity confirmed: verbally with patient    Procedure details:     SIS Procedure: No    Indications: non-obstetric vaginal bleeding      Technique:  US Pelvic, Non-OB with complete exam    Position: supine exam    Uterine findings:     Length (cm): 8.28    Height (cm):  3.65    Width (cm):  5.72    Endometrial stripe: identified      Endometrium thickness (mm):  4.86  Left ovary findings:     Left ovary:  Visualized    Length (cm): 2.83    Height (cm): 1.74    Width (cm): 2.25  Right ovary findings:     Right ovary:  Visualized    Length (cm): 2.63    Height (cm): 1.68    Width (cm): 2.23  Other findings:     Free pelvic fluid: not identified      Free peritoneal fluid: not identified    Post-Procedure Details:     Impression:  Anteverted uterus and bilateral ovaries appear within normal limits. No free fluid.     Tolerance:  Tolerated well, no immediate complications    Complications: no complications    Additional Procedure Comments:      Transvaginal technique was utilized for today's exam.     Ultrasound performed at:     [facility]  [address] [place] [address]  [address] [place] [address]  Phone  [phone]  Fax  [phone]

## 2024-04-29 ENCOUNTER — ESTABLISHED COMPREHENSIVE EXAM (OUTPATIENT)
Dept: URBAN - METROPOLITAN AREA CLINIC 6 | Facility: CLINIC | Age: 64
End: 2024-04-29

## 2024-04-29 DIAGNOSIS — H25.13: ICD-10-CM

## 2024-04-29 DIAGNOSIS — H35.372: ICD-10-CM

## 2024-04-29 DIAGNOSIS — H31.103: ICD-10-CM

## 2024-04-29 DIAGNOSIS — H04.123: ICD-10-CM

## 2024-04-29 DIAGNOSIS — H44.23: ICD-10-CM

## 2024-04-29 PROCEDURE — 92014 COMPRE OPH EXAM EST PT 1/>: CPT

## 2024-04-29 PROCEDURE — 92134 CPTRZ OPH DX IMG PST SGM RTA: CPT

## 2024-04-29 ASSESSMENT — VISUAL ACUITY
OD_CC: 20/25
OS_CC: J1+
OS_CC: 20/25
OD_CC: J1+

## 2024-04-29 ASSESSMENT — TONOMETRY
OD_IOP_MMHG: 13
OS_IOP_MMHG: 13

## 2024-04-30 ENCOUNTER — ULTRASOUND (OUTPATIENT)
Dept: OBGYN CLINIC | Facility: CLINIC | Age: 64
End: 2024-04-30
Payer: COMMERCIAL

## 2024-04-30 DIAGNOSIS — R93.89 THICKENED ENDOMETRIUM: Primary | ICD-10-CM

## 2024-04-30 PROCEDURE — 76856 US EXAM PELVIC COMPLETE: CPT | Performed by: OBSTETRICS & GYNECOLOGY

## 2024-05-02 NOTE — PROGRESS NOTES
I personally reviewed the pelvic ultrasound and agree with the clinical findings.  No significant abnormalities were noted.    Endometrial stripe remains stable at 4.86    Results discussed in detail with patient    All questions answered    Will see patient in 6 months for her annual gynecologic medical exam    Patient to call for any problems, questions, issues or concerns which arise for her.

## 2024-05-03 ENCOUNTER — TELEPHONE (OUTPATIENT)
Age: 64
End: 2024-05-03

## 2024-05-11 DIAGNOSIS — M79.671 RIGHT FOOT PAIN: Primary | ICD-10-CM

## 2024-05-12 ENCOUNTER — HOSPITAL ENCOUNTER (OUTPATIENT)
Dept: RADIOLOGY | Facility: HOSPITAL | Age: 64
Discharge: HOME | End: 2024-05-12
Payer: COMMERCIAL

## 2024-05-12 DIAGNOSIS — M79.671 RIGHT FOOT PAIN: ICD-10-CM

## 2024-05-12 PROCEDURE — 73630 X-RAY EXAM OF FOOT: CPT

## 2024-05-12 NOTE — ASSESSMENT & PLAN NOTE
Patient had accidental injury at home.  Pain with injury to the lateral portion of her right foot with difficulties with ambulation.  Patient will be going to the hospital for an x-ray for evaluation and most likely would need to be seen by podiatry for further care.

## 2024-05-13 ENCOUNTER — TELEPHONE (OUTPATIENT)
Age: 64
End: 2024-05-13

## 2024-05-13 DIAGNOSIS — S92.524A CLOSED NONDISPLACED FRACTURE OF MIDDLE PHALANX OF LESSER TOE OF RIGHT FOOT, INITIAL ENCOUNTER: Primary | ICD-10-CM

## 2024-05-13 NOTE — TELEPHONE ENCOUNTER
Caller: Dr. Tomas Moore    Doctor and/or Office: Dr. Montalvo/Janet    #: [phone]    Escalation: Appointment Calling on behalf of his wife, Patricia, she has an ASAP referral in [facility] for a nondisplaced fracture. Patient would like to see Dr. Montalvo. Please return call to schedule. Thank you

## 2024-05-13 NOTE — TELEPHONE ENCOUNTER
Hello,  Please advise if the following patient can be forced onto the schedule:    Patient: Patricia Moore    : 1960    MRN: [id number]    Call back #: [phone]    Insurance: [facility]    Reason for appointment: FX/non displaced/closed of right foot/Dr. Moore's wife/needs to be  seen/ASAP referral in Epic/ER     Requested doctor and/or location: Dr. Montalvo/Janet      Thank you.

## 2024-05-13 NOTE — TELEPHONE ENCOUNTER
Caller: Tomascarlo oMore -Spouse     Doctor: Katia     Reason for call: Calling on update for a forced on appointment for patient , I did let Tomas know a message has been sent to the provider and he should be receiving a call back     Please advise     Call back#: [phone]

## 2024-05-14 ENCOUNTER — OFFICE VISIT (OUTPATIENT)
Dept: PODIATRY | Facility: CLINIC | Age: 64
End: 2024-05-14
Payer: COMMERCIAL

## 2024-05-14 VITALS
RESPIRATION RATE: 18 BRPM | BODY MASS INDEX: 19.26 KG/M2 | DIASTOLIC BLOOD PRESSURE: 78 MMHG | HEART RATE: 70 BPM | HEIGHT: 67 IN | SYSTOLIC BLOOD PRESSURE: 118 MMHG

## 2024-05-14 DIAGNOSIS — S92.524A CLOSED NONDISPLACED FRACTURE OF MIDDLE PHALANX OF LESSER TOE OF RIGHT FOOT, INITIAL ENCOUNTER: ICD-10-CM

## 2024-05-14 PROCEDURE — 99243 OFF/OP CNSLTJ NEW/EST LOW 30: CPT | Performed by: PODIATRIST

## 2024-05-14 NOTE — PROGRESS NOTES
Assessment/Plan:    Explained to patient that she is dealing with a nondisplaced fracture of the proximal phalanx of the right fifth toe.  Typically a fracture such as this will take 6 to 8 weeks to heal.  Explained that x-ray findings typically lag clinical findings.    Explained that motion will cause increased pain and delay healing.  Recommend taping fifth toe to fourth and explained to patient how to do so.  Dispensed medium women surgical shoe also to minimize motion.  Patient advised to limit walking is much as possible and as far as exercise, to limit lower extremity exercise.    She will be reassessed in 4 weeks for repeat x-rays.    No problem-specific Assessment & Plan notes found for this encounter.       Diagnoses and all orders for this visit:    Closed nondisplaced fracture of middle phalanx of lesser toe of right foot, initial encounter  -     Ambulatory Referral to Podiatry          Subjective:      Patient ID: Patricia Moore is a 63 y.o. female.    HPI    Patient, a 63-year-old female presents limping on her right foot.  Patient notes that she stubbed her small toe approximately 3 days ago and has been diagnosed with a fracture.  She is currently wearing a sneaker.  She rates her pain as a 6 out of 10.  Patient notes that she is extremely active.  Patient is taking Aleve for pain.    I personally reviewed note and orders by Dr. Moore dated 5/11/2024.  Fifth toe fracture was diagnosed at that time.    I personally reviewed x-rays of the right foot dated 5/12/2024.  Nondisplaced fracture of the proximal phalanx is noted.    The following portions of the patient's history were reviewed and updated as appropriate: allergies, current medications, past family history, past medical history, past social history, past surgical history, and problem list.    Review of Systems   Gastrointestinal:         History of ulcerative colitis and IBS   Musculoskeletal:  Positive for gait problem.  "  Psychiatric/Behavioral: Negative.               Objective:      /78   Pulse 70   Resp 18   Ht 5' 7\" (1.702 m)   LMP  (LMP Unknown)   BMI 19.26 kg/m²          Physical Exam  Constitutional:       Appearance: Normal appearance.   Cardiovascular:      Pulses: Normal pulses.   Musculoskeletal:         General: Swelling, tenderness and signs of injury present.      Comments: Right fifth toe is swollen and bruised.  It is very painful with attempts at motion.  Alignment is within normal limits.   Skin:     General: Skin is warm.   Neurological:      General: No focal deficit present.      Mental Status: She is oriented to person, place, and time.               "

## 2024-05-17 ENCOUNTER — APPOINTMENT (OUTPATIENT)
Dept: LAB | Facility: AMBULARY SURGERY CENTER | Age: 64
End: 2024-05-17
Payer: COMMERCIAL

## 2024-05-17 DIAGNOSIS — R73.09 ABNORMAL GLUCOSE: ICD-10-CM

## 2024-05-17 DIAGNOSIS — Z00.8 OTHER SPECIFIED GENERAL MEDICAL EXAMINATION: ICD-10-CM

## 2024-05-17 DIAGNOSIS — Z13.29 SCREENING FOR HYPOTHYROIDISM: ICD-10-CM

## 2024-05-17 DIAGNOSIS — E55.9 VITAMIN D DEFICIENCY: ICD-10-CM

## 2024-05-17 DIAGNOSIS — D70.9 GRANULOCYTOPENIA (HCC): ICD-10-CM

## 2024-05-17 DIAGNOSIS — R39.9 UTI SYMPTOMS: ICD-10-CM

## 2024-05-17 LAB
ALBUMIN SERPL BCP-MCNC: 4.5 G/DL (ref 3.5–5)
ALP SERPL-CCNC: 46 U/L (ref 34–104)
ALT SERPL W P-5'-P-CCNC: 21 U/L (ref 7–52)
ANION GAP SERPL CALCULATED.3IONS-SCNC: 9 MMOL/L (ref 4–13)
AST SERPL W P-5'-P-CCNC: 23 U/L (ref 13–39)
BILIRUB SERPL-MCNC: 0.64 MG/DL (ref 0.2–1)
BUN SERPL-MCNC: 18 MG/DL (ref 5–25)
CALCIUM SERPL-MCNC: 9.3 MG/DL (ref 8.4–10.2)
CHLORIDE SERPL-SCNC: 104 MMOL/L (ref 96–108)
CHOLEST SERPL-MCNC: 213 MG/DL
CO2 SERPL-SCNC: 28 MMOL/L (ref 21–32)
CREAT SERPL-MCNC: 0.81 MG/DL (ref 0.6–1.3)
ERYTHROCYTE [DISTWIDTH] IN BLOOD BY AUTOMATED COUNT: 13.2 % (ref 11.6–15.1)
EST. AVERAGE GLUCOSE BLD GHB EST-MCNC: 108 MG/DL
GFR SERPL CREATININE-BSD FRML MDRD: 77 ML/MIN/1.73SQ M
GLUCOSE P FAST SERPL-MCNC: 91 MG/DL (ref 65–99)
HBA1C MFR BLD: 5.4 %
HCT VFR BLD AUTO: 43.4 % (ref 34.8–46.1)
HDLC SERPL-MCNC: 78 MG/DL
HGB BLD-MCNC: 13.4 G/DL (ref 11.5–15.4)
LDLC SERPL CALC-MCNC: 120 MG/DL (ref 0–100)
MCH RBC QN AUTO: 28 PG (ref 26.8–34.3)
MCHC RBC AUTO-ENTMCNC: 30.9 G/DL (ref 31.4–37.4)
MCV RBC AUTO: 91 FL (ref 82–98)
NONHDLC SERPL-MCNC: 135 MG/DL
PLATELET # BLD AUTO: 144 THOUSANDS/UL (ref 149–390)
PMV BLD AUTO: 11.7 FL (ref 8.9–12.7)
POTASSIUM SERPL-SCNC: 3.6 MMOL/L (ref 3.5–5.3)
PROT SERPL-MCNC: 7.2 G/DL (ref 6.4–8.4)
RBC # BLD AUTO: 4.78 MILLION/UL (ref 3.81–5.12)
SODIUM SERPL-SCNC: 141 MMOL/L (ref 135–147)
TRIGL SERPL-MCNC: 75 MG/DL
TSH SERPL DL<=0.05 MIU/L-ACNC: 1.78 UIU/ML (ref 0.45–4.5)
WBC # BLD AUTO: 3.5 THOUSAND/UL (ref 4.31–10.16)

## 2024-05-17 PROCEDURE — 83036 HEMOGLOBIN GLYCOSYLATED A1C: CPT

## 2024-05-17 PROCEDURE — 82652 VIT D 1 25-DIHYDROXY: CPT

## 2024-05-17 PROCEDURE — 80061 LIPID PANEL: CPT

## 2024-05-17 PROCEDURE — 85027 COMPLETE CBC AUTOMATED: CPT

## 2024-05-17 PROCEDURE — 80053 COMPREHEN METABOLIC PANEL: CPT

## 2024-05-17 PROCEDURE — 84443 ASSAY THYROID STIM HORMONE: CPT

## 2024-05-17 PROCEDURE — 36415 COLL VENOUS BLD VENIPUNCTURE: CPT

## 2024-05-18 LAB — 1,25(OH)2D3 SERPL-MCNC: 61.5 PG/ML (ref 24.8–81.5)

## 2024-05-28 ENCOUNTER — OFFICE VISIT (OUTPATIENT)
Dept: INTERNAL MEDICINE CLINIC | Facility: CLINIC | Age: 64
End: 2024-05-28
Payer: COMMERCIAL

## 2024-05-28 VITALS
OXYGEN SATURATION: 98 % | HEART RATE: 77 BPM | RESPIRATION RATE: 16 BRPM | WEIGHT: 127 LBS | TEMPERATURE: 98.5 F | DIASTOLIC BLOOD PRESSURE: 74 MMHG | BODY MASS INDEX: 19.93 KG/M2 | SYSTOLIC BLOOD PRESSURE: 118 MMHG | HEIGHT: 67 IN

## 2024-05-28 DIAGNOSIS — E03.8 OTHER SPECIFIED HYPOTHYROIDISM: Primary | ICD-10-CM

## 2024-05-28 DIAGNOSIS — D69.6 THROMBOCYTOPENIA (HCC): ICD-10-CM

## 2024-05-28 DIAGNOSIS — F41.9 ANXIETY: ICD-10-CM

## 2024-05-28 DIAGNOSIS — E55.9 VITAMIN D DEFICIENCY: ICD-10-CM

## 2024-05-28 DIAGNOSIS — D70.9 GRANULOCYTOPENIA (HCC): ICD-10-CM

## 2024-05-28 DIAGNOSIS — K51.80 OTHER ULCERATIVE COLITIS WITHOUT COMPLICATION (HCC): ICD-10-CM

## 2024-05-28 DIAGNOSIS — K58.2 IRRITABLE BOWEL SYNDROME WITH BOTH CONSTIPATION AND DIARRHEA: ICD-10-CM

## 2024-05-28 DIAGNOSIS — S92.524A CLOSED NONDISPLACED FRACTURE OF MIDDLE PHALANX OF LESSER TOE OF RIGHT FOOT, INITIAL ENCOUNTER: ICD-10-CM

## 2024-05-28 PROCEDURE — 99396 PREV VISIT EST AGE 40-64: CPT | Performed by: INTERNAL MEDICINE

## 2024-05-28 NOTE — ASSESSMENT & PLAN NOTE
Most recent notes from GI services appreciated patient has been experiencing increase in stress levels recently symptoms of irritable bowel have flared slightly.  Continue current therapy with dicyclomine

## 2024-05-28 NOTE — ASSESSMENT & PLAN NOTE
Notes from podiatry regarding fractured fifth metatarsal of the right foot recommend stable rigid platform shoe during healing process will check DEXA scan

## 2024-05-28 NOTE — ASSESSMENT & PLAN NOTE
An increase in recent anxiety and stress due to health issues within the family recommend as needed use of alprazolam no indication of abnormal use or side effects of the drug

## 2024-05-28 NOTE — ASSESSMENT & PLAN NOTE
Chronic mild granulocytopenia reviewed with patient no recent significant infections recommend continued surveillance

## 2024-05-28 NOTE — ASSESSMENT & PLAN NOTE
Notes from GI services regarding inflammatory colitis type of condition reviewed flareup mildly of current symptoms due to increased stress in her life continue regular follow-up with GI services advised

## 2024-05-28 NOTE — ASSESSMENT & PLAN NOTE
Adamant D level reviewed and is in good range continue vitamin D supplementation at 3000 units daily

## 2024-05-28 NOTE — PROGRESS NOTES
Ambulatory Visit  Name: Patricia Moore      : 1960      MRN: [id number]  Encounter Provider: Doug Hernandez MD  Encounter Date: 2024   Encounter department: [facility] INTERNAL MEDICINE    Assessment & Plan   1. Irritable bowel syndrome with both constipation and diarrhea  Assessment & Plan:  Most recent notes from GI services appreciated patient has been experiencing increase in stress levels recently symptoms of irritable bowel have flared slightly.  Continue current therapy with dicyclomine  2. Other ulcerative colitis without complication (HCC)  Assessment & Plan:  Notes from GI services regarding inflammatory colitis type of condition reviewed flareup mildly of current symptoms due to increased stress in her life continue regular follow-up with GI services advised  3. Other specified hypothyroidism  Assessment & Plan:  Thyroid profiling confirms adequate replacement continue levothyroxine at 50 mcg daily  4. Closed nondisplaced fracture of middle phalanx of lesser toe of right foot, initial encounter  Assessment & Plan:  Notes from podiatry regarding fractured fifth metatarsal of the right foot recommend stable rigid platform shoe during healing process will check DEXA scan  5. Thrombocytopenia (HCC)  Assessment & Plan:  Chronic thrombocytopenia continues reviewed CBC with patient no abnormal bleeding appreciated recommend continued surveillance  6. Anxiety  Assessment & Plan:  An increase in recent anxiety and stress due to health issues within the family recommend as needed use of alprazolam no indication of abnormal use or side effects of the drug  7. 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HCC)  Assessment & Plan:  Chronic mild granulocytopenia reviewed with patient no recent significant infections recommend continued surveillance  8. Vitamin D deficiency  Assessment & Plan:  Adamant D level reviewed and is in good range continue vitamin D supplementation at 3000 units daily         History of  Present Illness     This pleasant 63-year-old female patient presents to our office today for an annual physical examination.  In addition to today's examination we have reviewed with the patient her comprehensive blood work.    The patient has been experiencing unusually high levels of stress due to issues with the health of both of her parents.  He had a long discussion today about the impact of the stressful situation that she has been experiencing having to place both of her parents into a nursing facility and go through their house which have been neglected for some time.  She is also had to deal with her mother's advancing dementia and hostility's as well as her father's recent hospitalizations.    She recently fractured fifth metatarsal of her right foot and has a walking platform shoe as prescribed by her podiatrist.  Expected period of healing is between 6 and 8 weeks.  Patient does have an upcoming DEXA scan to evaluate her bone density.    She continues to exercise at a limited rate due to her recent fractured fifth metatarsal.      Review of Systems   Musculoskeletal:         Pain right foot fifth metatarsal fracture   Psychiatric/Behavioral:  Positive for sleep disturbance. The patient is nervous/anxious.    All other systems reviewed and are negative.    Past Medical History:   Diagnosis Date    Atopic dermatitis     last assessed 12/2/13    Atrophic vaginitis     last assessed 9/2/15    Dermatitis     Dry eye     Frequency of urination     Fungal infection     last assessed 8/14/13    Herpes     last assessed 6/27/14    Hyperlipidemia     Hypothyroidism     Interstitial cystitis     Osteoporosis     Periodontal abscess     last assessed 9/6/13    PONV (postoperative nausea and vomiting)     Thyroid nodule     Ulcerative colitis (HCC)     Urinary frequency     resolved 2/22/17    UTI (urinary tract infection)     Vaginal atrophy     Vitamin D deficiency     last assessed 2/19/16    Voiding dysfunction      last assessed 10/7/15     Past Surgical History:   Procedure Laterality Date     SECTION      / and      COLONOSCOPY  2020    CYSTOSCOPY      diagnostic resolved 05    FL RETROGRADE PYELOGRAM  2020    GANGLION CYST EXCISION Right 2019    Procedure: EXCISION GANGLION CYST RIGHT FIRST DORSAL EXTENSOR COMPARTMENT;  Surgeon: Cheng Quintero MD;  [facility];  Service: Orthopedics    KIDNEY STONE SURGERY      ORIF PROXIMAL ULNA FRACTURE      metal plates and screws removed    OTHER SURGICAL HISTORY      punch biospy     UT COLONOSCOPY FLX DX W/COLLJ SPEC WHEN PFRMD N/A 2017    Procedure: COLONOSCOPY;  Surgeon: Hannah Luke MD;  [facility];  Service: Colorectal    UT CYSTO/URETERO W/LITHOTRIPSY &INDWELL STENT INSRT Left 2020    Procedure: CYSTOSCOPY URETEROSCOPY WITH LITHOTRIPSY HOLMIUM LASER, RETROGRADE PYELOGRAM AND INSERTION STENT URETERAL;  Surgeon: Joshua Parikh MD;  [facility];  Service: Urology    UT INCISION EXTENSOR TENDON SHEATH WRIST Right 2019    Procedure: RELEASE DEQUERVAINS;  Surgeon: Cheng Quintero MD;  [facility];  Service: Orthopedics    TUBAL LIGATION      UPPER GASTROINTESTINAL ENDOSCOPY  2020    WISDOM TOOTH EXTRACTION  1983    X2      Family History   Problem Relation Age of Onset    Gallbladder disease Mother     Thyroid disease Mother     Kidney disease Mother     Breast cancer Mother     Diabetes Father     Hypertension Father     Nephrolithiasis Father     Lymphoma Maternal Grandfather     Diabetes Paternal Grandmother     Breast cancer Paternal Aunt     No Known Problems Son     No Known Problems Son      Social History     Tobacco Use    Smoking status: Never    Smokeless tobacco: Never   Vaping Use    Vaping status: Never Used   Substance and Sexual Activity    Alcohol use: Yes     Comment: socially - 1-3 drinks per month    Drug use: No    Sexual activity: Yes     Partners:  Male     Current Outpatient Medications on File Prior to Visit   Medication Sig    ALPRAZolam (XANAX) 0.25 mg tablet Take 1 tablet (0.25 mg total) by mouth 3 (three) times a day as needed for anxiety or sleep    Cholecalciferol (VITAMIN D PO) Take 3,000 Units by mouth daily    Cranberry (ELLURA PO) Take 1 capsule by mouth daily    cycloSPORINE (RESTASIS) 0.05 % ophthalmic emulsion Administer 1 drop to both eyes 2 (two) times a day    dicyclomine (BENTYL) 10 mg capsule Take 2 capsules (20 mg total) by mouth 3 (three) times a day before meals    estradiol (ESTRACE) 0.1 mg/g vaginal cream INSERT 1 APPLICATOR FULL VAGINALLY NIGHTLY    levothyroxine 50 mcg tablet TAKE ONE TABLET BY MOUTH EVERY DAY    pentosan polysulfate (Elmiron) 100 mg capsule One pill 3 times a day    rosuvastatin (CRESTOR) 5 mg tablet Take 1 tablet (5 mg total) by mouth daily    Sodium Sulfate-Mag Sulfate-KCl 2151-281-338 MG TABS Take as per colonosocpy instructions    trospium chloride (SANCTURA) 20 mg tablet Take 1 tablet (20 mg total) by mouth 2 (two) times a day    mesalamine (ASACOL) 800 MG EC tablet Take 1 tablet (800 mg total) by mouth 2 (two) times a day (Patient not taking: Reported on 9/22/2023)    mometasone (NASONEX) 50 mcg/act nasal spray 2 sprays into each nostril daily    [DISCONTINUED] Meth-Hyo-M Bl-Na Phos-Ph Sal (URO-MP PO) Take 1 capsule by mouth 2 (two) times a day  (Patient not taking: Reported on 5/28/2024)    [DISCONTINUED] Meth-Hyo-M Bl-Na Phos-Ph Sal (Uro-MP) 118 MG CAPS      Allergies   Allergen Reactions    Apple Fruit Extract - Food Allergy Anaphylaxis    Iodine - Food Allergy Anaphylaxis     Allergy to Iodinated and non iodinated dye    Other Anaphylaxis     APPLES    TAPE  Also rash at times    Seasonal Ic [Cholestatin] Allergic Rhinitis    Latex      Immunization History   Administered Date(s) Administered    COVID-19 PFIZER VACCINE 0.3 ML IM 01/08/2021, 01/29/2021, 11/13/2021    INFLUENZA 10/17/2017, 10/16/2019,  "10/14/2020, 10/10/2021, 10/10/2021, 10/15/2022    Influenza, injectable, quadrivalent, preservative free 0.5 mL 10/24/2023    Influenza, recombinant, quadrivalent,injectable, preservative free 10/23/2018    Influenza, seasonal, injectable 10/17/2017     Objective     /74   Pulse 77   Temp 98.5 °F (36.9 °C)   Resp 16   Ht 5' 7\" (1.702 m)   Wt 57.6 kg (127 lb)   LMP  (LMP Unknown)   SpO2 98%   BMI 19.89 kg/m²     Physical Exam  Vitals and nursing note reviewed.   Constitutional:       General: She is not in acute distress.     Appearance: She is well-developed. She is not ill-appearing.   HENT:      Head: Normocephalic and atraumatic.      Right Ear: Tympanic membrane, ear canal and external ear normal.      Left Ear: Tympanic membrane, ear canal and external ear normal.      Nose: Nose normal.      Mouth/Throat:      Mouth: Mucous membranes are moist.      Pharynx: Oropharynx is clear.   Eyes:      Extraocular Movements: Extraocular movements intact.      Conjunctiva/sclera: Conjunctivae normal.      Pupils: Pupils are equal, round, and reactive to light.   Neck:      Vascular: No carotid bruit.   Cardiovascular:      Rate and Rhythm: Normal rate and regular rhythm.      Heart sounds: No murmur heard.  Pulmonary:      Effort: Pulmonary effort is normal. No respiratory distress.      Breath sounds: Normal breath sounds. No wheezing, rhonchi or rales.   Abdominal:      General: Abdomen is flat. Bowel sounds are normal. There is no distension.      Palpations: Abdomen is soft. There is no mass.      Tenderness: There is no abdominal tenderness. There is no guarding.   Musculoskeletal:         General: No swelling.      Cervical back: Normal range of motion and neck supple. No rigidity or tenderness.      Right lower leg: No edema.      Left lower leg: No edema.   Lymphadenopathy:      Cervical: No cervical adenopathy.   Skin:     General: Skin is warm and dry.      Capillary Refill: Capillary refill takes " less than 2 seconds.      Coloration: Skin is not jaundiced or pale.   Neurological:      General: No focal deficit present.      Mental Status: She is alert. Mental status is at baseline.   Psychiatric:         Mood and Affect: Mood normal.         Behavior: Behavior normal.         Thought Content: Thought content normal.         Judgment: Judgment normal.       Administrative Statements

## 2024-05-28 NOTE — ASSESSMENT & PLAN NOTE
Chronic thrombocytopenia continues reviewed CBC with patient no abnormal bleeding appreciated recommend continued surveillance

## 2024-05-31 ENCOUNTER — TELEPHONE (OUTPATIENT)
Age: 64
End: 2024-05-31

## 2024-05-31 ENCOUNTER — OFFICE VISIT (OUTPATIENT)
Dept: GASTROENTEROLOGY | Facility: MEDICAL CENTER | Age: 64
End: 2024-05-31
Payer: COMMERCIAL

## 2024-05-31 VITALS
TEMPERATURE: 97.4 F | HEART RATE: 80 BPM | SYSTOLIC BLOOD PRESSURE: 106 MMHG | DIASTOLIC BLOOD PRESSURE: 68 MMHG | BODY MASS INDEX: 18.79 KG/M2 | WEIGHT: 120 LBS

## 2024-05-31 DIAGNOSIS — G47.9 SLEEP DISTURBANCE: ICD-10-CM

## 2024-05-31 DIAGNOSIS — K58.2 IRRITABLE BOWEL SYNDROME WITH BOTH CONSTIPATION AND DIARRHEA: ICD-10-CM

## 2024-05-31 DIAGNOSIS — K52.9 COLITIS: Primary | ICD-10-CM

## 2024-05-31 DIAGNOSIS — K51.00 ULCERATIVE PANCOLITIS WITHOUT COMPLICATION (HCC): ICD-10-CM

## 2024-05-31 PROCEDURE — 99214 OFFICE O/P EST MOD 30 MIN: CPT | Performed by: INTERNAL MEDICINE

## 2024-05-31 RX ORDER — NORTRIPTYLINE HYDROCHLORIDE 10 MG/1
10 CAPSULE ORAL
Qty: 90 CAPSULE | Refills: 1 | Status: SHIPPED | OUTPATIENT
Start: 2024-05-31 | End: 2024-11-27

## 2024-05-31 NOTE — PROGRESS NOTES
Outpatient Follow up  [facility] [place] [address]    [facility] [address]  Buddy Madrid MD  Ph : [phone]  Fax : [phone]  Mobile : [phone]  Email : [email or web address]  Also available on Tiger Text    Patricia Moore 63 y.o. female MRN: [id number]    PCP: Doug Hernandez MD  Referring: Doug Hernandez MD  [address] [place] [address]  [address], [address]  [place] [address]    Patricia Moore presented for a follow up visit. My recommendations are included. Please do not hesitate to contact me with any questions you may have.     ASSESSMENT AND PLAN:      No problem-specific Assessment & Plan notes found for this encounter.      Diagnoses and all orders for this visit:    Colitis    Irritable bowel syndrome with both constipation and diarrhea  -     nortriptyline (PAMELOR) 10 mg capsule; Take 1 capsule (10 mg total) by mouth daily at bedtime    Ulcerative pancolitis without complication (HCC)    Sleep disturbance  -     nortriptyline (PAMELOR) 10 mg capsule; Take 1 capsule (10 mg total) by mouth daily at bedtime        Colitis  Colonoscopy in October. Biopsies   Off Asacol.   She did follow up with Dr. Blevins.   IBS with constipation/ diarrhea  Nortriptyline 10 mg daily.   Discussed   Abdominal pain  Nortriptyline.   Social stressors   Recommended taking time for herself.   She is going on vacation.   Sleep disturbances  The nortriptyline will hopefully help.   If does not help may consider a sleep medicine consult.     I spent 30 mins in chart review, face to face contact and in coordination of care.   ______________________________________________________________________    SUBJECTIVE:  63 y.o. year old who presents with Follow-up (Pt is here for a f/up ongoing symptoms )       Last GI office visit : 11/23 with me and 3/24 with Dr. Blevins.     Interval history :    She is going through a lot right now.  She has a lot of  stressors including concern for her parents.  Taking Xanax for sleep.   Ryan have now been moved to a nursing home.   More diarrhea now.   She is on dicyclomine.   She is off the Asacol as of now.   She did try the Elavil in the past but does not recall the effects.  She did not take it for very long.  Appetite is well.  No significant weight loss.      PRIOR EVALUATION :     Endoscopy : yes    Colonoscopy : 2020     Last Cologuard: Not on file      Lab Results:     Reviewed : yes    Radiology Results:     I have personally reviewed the imaging studies.       REVIEW OF SYSTEMS IS OTHERWISE NEGATIVE.      Historical Information   Past Medical History:   Diagnosis Date    Atopic dermatitis     last assessed 13    Atrophic vaginitis     last assessed 9/2/15    Dermatitis     Dry eye     Frequency of urination     Fungal infection     last assessed 13    Herpes     last assessed 14    Hyperlipidemia     Hypothyroidism     Interstitial cystitis     Osteoporosis     Periodontal abscess     last assessed 13    PONV (postoperative nausea and vomiting)     Thyroid nodule     Ulcerative colitis (HCC)     Urinary frequency     resolved 17    UTI (urinary tract infection)     Vaginal atrophy     Vitamin D deficiency     last assessed 16    Voiding dysfunction     last assessed 10/7/15     Past Surgical History:   Procedure Laterality Date     SECTION       and      COLONOSCOPY  2020    CYSTOSCOPY      diagnostic resolved 05    FL RETROGRADE PYELOGRAM  2020    GANGLION CYST EXCISION Right 2019    Procedure: EXCISION GANGLION CYST RIGHT FIRST DORSAL EXTENSOR COMPARTMENT;  Surgeon: Cheng Quintero MD;  [facility];  Service: Orthopedics    KIDNEY STONE SURGERY      ORIF PROXIMAL ULNA FRACTURE      metal plates and screws removed    OTHER SURGICAL HISTORY      punch biospy     WV COLONOSCOPY FLX DX W/COLLJ SPEC WHEN PFRMD N/A 2017     Procedure: COLONOSCOPY;  Surgeon: Hannah Luke MD;  [facility];  Service: Colorectal    CA CYSTO/URETERO W/LITHOTRIPSY &INDWELL STENT INSRT Left 1/7/2020    Procedure: CYSTOSCOPY URETEROSCOPY WITH LITHOTRIPSY HOLMIUM LASER, RETROGRADE PYELOGRAM AND INSERTION STENT URETERAL;  Surgeon: Joshua Parikh MD;  [facility];  Service: Urology    CA INCISION EXTENSOR TENDON SHEATH WRIST Right 2/19/2019    Procedure: RELEASE DEQUERVAINS;  Surgeon: Cheng Quintero MD;  [facility];  Service: Orthopedics    TUBAL LIGATION      UPPER GASTROINTESTINAL ENDOSCOPY  09/11/2020    WISDOM TOOTH EXTRACTION  1983    X2      Social History   Social History     Substance and Sexual Activity   Alcohol Use Yes    Comment: socially - 1-3 drinks per month     Social History     Substance and Sexual Activity   Drug Use No     Social History     Tobacco Use   Smoking Status Never   Smokeless Tobacco Never     Family History   Problem Relation Age of Onset    Gallbladder disease Mother     Thyroid disease Mother     Kidney disease Mother     Breast cancer Mother     Diabetes Father     Hypertension Father     Nephrolithiasis Father     Lymphoma Maternal Grandfather     Diabetes Paternal Grandmother     Breast cancer Paternal Aunt     No Known Problems Son     No Known Problems Son        Meds/Allergies       Current Outpatient Medications:     ALPRAZolam (XANAX) 0.25 mg tablet    Cholecalciferol (VITAMIN D PO)    Cranberry (ELLURA PO)    cycloSPORINE (RESTASIS) 0.05 % ophthalmic emulsion    dicyclomine (BENTYL) 10 mg capsule    estradiol (ESTRACE) 0.1 mg/g vaginal cream    levothyroxine 50 mcg tablet    mometasone (NASONEX) 50 mcg/act nasal spray    nortriptyline (PAMELOR) 10 mg capsule    pentosan polysulfate (Elmiron) 100 mg capsule    rosuvastatin (CRESTOR) 5 mg tablet    Sodium Sulfate-Mag Sulfate-KCl 3202-546-733 MG TABS    trospium chloride (SANCTURA) 20 mg tablet    mesalamine (ASACOL) 800 MG EC  tablet    Allergies   Allergen Reactions    Apple Fruit Extract - Food Allergy Anaphylaxis    Iodine - Food Allergy Anaphylaxis     Allergy to Iodinated and non iodinated dye    Other Anaphylaxis     APPLES    TAPE  Also rash at times    Seasonal Ic [Cholestatin] Allergic Rhinitis    Latex            Objective     Blood pressure 106/68, pulse 80, temperature (!) 97.4 °F (36.3 °C), weight 54.4 kg (120 lb). Body mass index is 18.79 kg/m².      PHYSICAL EXAM:      Physical Exam  Constitutional:       Appearance: Normal appearance. She is well-developed.   HENT:      Head: Normocephalic and atraumatic.   Eyes:      General: No scleral icterus.     Conjunctiva/sclera: Conjunctivae normal.      Pupils: Pupils are equal, round, and reactive to light.   Cardiovascular:      Rate and Rhythm: Normal rate and regular rhythm.      Heart sounds: Normal heart sounds.   Pulmonary:      Effort: Pulmonary effort is normal. No respiratory distress.      Breath sounds: Normal breath sounds.   Abdominal:      General: Bowel sounds are normal. There is no distension.      Palpations: Abdomen is soft. There is no mass.      Tenderness: There is no abdominal tenderness.      Hernia: No hernia is present.   Musculoskeletal:         General: Normal range of motion.      Cervical back: Normal range of motion.   Lymphadenopathy:      Cervical: No cervical adenopathy.   Skin:     General: Skin is warm.   Neurological:      Mental Status: She is alert and oriented to person, place, and time.   Psychiatric:         Behavior: Behavior normal.         Thought Content: Thought content normal.

## 2024-06-03 ENCOUNTER — HOSPITAL ENCOUNTER (OUTPATIENT)
Dept: RADIOLOGY | Age: 64
Discharge: HOME | End: 2024-06-03
Payer: COMMERCIAL

## 2024-06-03 DIAGNOSIS — M81.0 OSTEOPOROSIS, UNSPECIFIED OSTEOPOROSIS TYPE, UNSPECIFIED PATHOLOGICAL FRACTURE PRESENCE: ICD-10-CM

## 2024-06-03 PROCEDURE — 77080 DXA BONE DENSITY AXIAL: CPT

## 2024-06-11 ENCOUNTER — OFFICE VISIT (OUTPATIENT)
Dept: PODIATRY | Facility: CLINIC | Age: 64
End: 2024-06-11
Payer: COMMERCIAL

## 2024-06-11 VITALS
RESPIRATION RATE: 18 BRPM | DIASTOLIC BLOOD PRESSURE: 80 MMHG | HEIGHT: 67 IN | SYSTOLIC BLOOD PRESSURE: 113 MMHG | BODY MASS INDEX: 18.79 KG/M2 | HEART RATE: 71 BPM

## 2024-06-11 DIAGNOSIS — S92.524D CLOSED NONDISPLACED FRACTURE OF MIDDLE PHALANX OF LESSER TOE OF RIGHT FOOT WITH ROUTINE HEALING, SUBSEQUENT ENCOUNTER: Primary | ICD-10-CM

## 2024-06-11 PROCEDURE — 99213 OFFICE O/P EST LOW 20 MIN: CPT | Performed by: PODIATRIST

## 2024-06-11 NOTE — PROGRESS NOTES
Patient presents for assessment of right fifth toe.  Patient dealing with a fracture of the proximal phalanx of the fifth digit.  She continues to have discomfort with motion.  She is taping the fifth toe to the fourth and wearing the surgical shoe as recommended.    I personally viewed x-rays of the toes of the right foot taken today.  Continued presence of fracture of shaft of proximal phalanx right fifth toe primarily noted on the AP view.    Advised patient to continue taping the toe and wearing surgical shoe.  If feeling better in 2 weeks she may discontinue the surgical shoe but should continue taping the toe.  She will be reassessed in 4 weeks.

## 2024-06-14 DIAGNOSIS — B34.9 VIRAL INFECTION: Primary | ICD-10-CM

## 2024-06-14 RX ORDER — FAMCICLOVIR 500 MG/1
500 TABLET ORAL 2 TIMES DAILY
Qty: 14 TABLET | Refills: 1 | Status: SHIPPED | OUTPATIENT
Start: 2024-06-14 | End: 2024-06-21

## 2024-07-06 DIAGNOSIS — K58.0 IRRITABLE BOWEL SYNDROME WITH DIARRHEA: ICD-10-CM

## 2024-07-06 RX ORDER — DICYCLOMINE HYDROCHLORIDE 10 MG/1
20 CAPSULE ORAL
Qty: 270 CAPSULE | Refills: 3 | Status: SHIPPED | OUTPATIENT
Start: 2024-07-06

## 2024-07-09 ENCOUNTER — TELEPHONE (OUTPATIENT)
Dept: GASTROENTEROLOGY | Facility: AMBULARY SURGERY CENTER | Age: 64
End: 2024-07-09

## 2024-07-16 ENCOUNTER — OFFICE VISIT (OUTPATIENT)
Dept: PODIATRY | Facility: CLINIC | Age: 64
End: 2024-07-16
Payer: COMMERCIAL

## 2024-07-16 VITALS
HEART RATE: 81 BPM | HEIGHT: 67 IN | SYSTOLIC BLOOD PRESSURE: 98 MMHG | DIASTOLIC BLOOD PRESSURE: 64 MMHG | BODY MASS INDEX: 18.79 KG/M2 | RESPIRATION RATE: 18 BRPM

## 2024-07-16 DIAGNOSIS — S92.514D CLOSED NONDISPLACED FRACTURE OF PROXIMAL PHALANX OF LESSER TOE OF RIGHT FOOT WITH ROUTINE HEALING, SUBSEQUENT ENCOUNTER: Primary | ICD-10-CM

## 2024-07-16 PROCEDURE — 99213 OFFICE O/P EST LOW 20 MIN: CPT | Performed by: PODIATRIST

## 2024-07-16 NOTE — PROGRESS NOTES
Patient presents for assessment of her right fifth toe.  Patient has a nondisplaced fracture of the proximal phalanx.  This fracture occurred approximately 2 months ago.    The patient is currently wearing a sandal with minimal discomfort.  She still has difficulty with an enclosed shoe.    On exam, the right fifth toe is minimally edematous.  Only slight discomfort with direct pressure of the proximal phalanx.    I personally viewed x-rays of the right fifth toe taken today.  They reveal significant healing of the proximal phalanx with only slight evidence of fracture.    Explained to patient that she may return to shoes over the next few days when she feels comfortable.  No additional treatment is needed at this time.  Reappoint.

## 2024-07-25 DIAGNOSIS — E04.1 NONTOXIC UNINODULAR GOITER: ICD-10-CM

## 2024-07-25 DIAGNOSIS — N30.00 ACUTE CYSTITIS WITHOUT HEMATURIA: Primary | ICD-10-CM

## 2024-07-25 RX ORDER — LEVOTHYROXINE SODIUM 50 UG/1
50 TABLET ORAL DAILY
Qty: 100 TABLET | Refills: 3 | Status: SHIPPED | OUTPATIENT
Start: 2024-07-25

## 2024-07-25 RX ORDER — NITROFURANTOIN 25; 75 MG/1; MG/1
100 CAPSULE ORAL 2 TIMES DAILY
Qty: 10 CAPSULE | Refills: 0 | Status: SHIPPED | OUTPATIENT
Start: 2024-07-25 | End: 2024-07-30

## 2024-07-28 ENCOUNTER — APPOINTMENT (OUTPATIENT)
Dept: LAB | Facility: CLINIC | Age: 64
End: 2024-07-28
Payer: COMMERCIAL

## 2024-07-28 DIAGNOSIS — R30.0 DIFFICULT OR PAINFUL URINATION: ICD-10-CM

## 2024-07-28 DIAGNOSIS — R30.0 DIFFICULT OR PAINFUL URINATION: Primary | ICD-10-CM

## 2024-07-28 LAB
BACTERIA UR QL AUTO: ABNORMAL /HPF
BILIRUB UR QL STRIP: NEGATIVE
CLARITY UR: CLEAR
COLOR UR: COLORLESS
GLUCOSE UR STRIP-MCNC: NEGATIVE MG/DL
HGB UR QL STRIP.AUTO: NEGATIVE
KETONES UR STRIP-MCNC: NEGATIVE MG/DL
LEUKOCYTE ESTERASE UR QL STRIP: NEGATIVE
NITRITE UR QL STRIP: NEGATIVE
NON-SQ EPI CELLS URNS QL MICRO: ABNORMAL /HPF
PH UR STRIP.AUTO: 6.5 [PH]
PROT UR STRIP-MCNC: NEGATIVE MG/DL
RBC #/AREA URNS AUTO: ABNORMAL /HPF
SP GR UR STRIP.AUTO: 1.01 (ref 1–1.03)
TRANS CELLS #/AREA URNS HPF: PRESENT /[HPF]
UROBILINOGEN UR STRIP-ACNC: <2 MG/DL
WBC #/AREA URNS AUTO: ABNORMAL /HPF

## 2024-07-28 PROCEDURE — 81001 URINALYSIS AUTO W/SCOPE: CPT

## 2024-07-28 PROCEDURE — 87086 URINE CULTURE/COLONY COUNT: CPT

## 2024-07-29 ENCOUNTER — TELEPHONE (OUTPATIENT)
Age: 64
End: 2024-07-29

## 2024-07-29 ENCOUNTER — HOSPITAL ENCOUNTER (OUTPATIENT)
Dept: ULTRASOUND IMAGING | Facility: HOSPITAL | Age: 64
Discharge: HOME | End: 2024-07-29
Attending: UROLOGY
Payer: COMMERCIAL

## 2024-07-29 DIAGNOSIS — R33.9 INCOMPLETE BLADDER EMPTYING: ICD-10-CM

## 2024-07-29 LAB — BACTERIA UR CULT: NORMAL

## 2024-07-29 PROCEDURE — 76770 US EXAM ABDO BACK WALL COMP: CPT

## 2024-07-29 NOTE — TELEPHONE ENCOUNTER
Pt called stating she's not emptying bladder ,very little and just has U/S PVR she is very uncomfortable asking if she could be seen sooner than 08/08/24 scheduled appointment.

## 2024-07-29 NOTE — TELEPHONE ENCOUNTER
Called Patricia - she states she had a UTI 2 weeks ago and was on Macrobid and cleared up - then had another culture and no growth. She had u/s completed today and post void and it states she isn't emptying. She has interstiital  cystitis She has a follow up with Dr Parikh 8/9. She's feels uncomfortable and her PVR was @ 150 Please advise

## 2024-07-29 NOTE — TELEPHONE ENCOUNTER
150 mL is within acceptability.  Complained of discussion about incomplete bladder emptying at follow-up with Dr. Parikh.  Patient should be sure that she is not constipated as this can sometimes cause difficulty emptying the bladder.

## 2024-07-30 NOTE — TELEPHONE ENCOUNTER
Called vale back and notified her that constipation can also cause some difficulty urination as she had 150 ml in bladder after urination. Per greg can follow up as scheduled with Dr Parikh in 10 days.  Patient was very upset and will be calling Dr Parikh herself as she fel that this was bad practise

## 2024-08-08 DIAGNOSIS — N30.00 ACUTE CYSTITIS WITHOUT HEMATURIA: Primary | ICD-10-CM

## 2024-08-08 DIAGNOSIS — B37.9 YEAST INFECTION: ICD-10-CM

## 2024-08-08 RX ORDER — NITROFURANTOIN 25; 75 MG/1; MG/1
100 CAPSULE ORAL 2 TIMES DAILY
Qty: 14 CAPSULE | Refills: 0 | Status: SHIPPED | OUTPATIENT
Start: 2024-08-08 | End: 2024-08-15

## 2024-08-08 RX ORDER — FLUCONAZOLE 100 MG/1
100 TABLET ORAL DAILY
Qty: 10 TABLET | Refills: 0 | Status: SHIPPED | OUTPATIENT
Start: 2024-08-08 | End: 2024-08-18

## 2024-08-09 ENCOUNTER — OFFICE VISIT (OUTPATIENT)
Dept: UROLOGY | Facility: AMBULATORY SURGERY CENTER | Age: 64
End: 2024-08-09
Payer: COMMERCIAL

## 2024-08-09 VITALS
OXYGEN SATURATION: 95 % | SYSTOLIC BLOOD PRESSURE: 120 MMHG | HEART RATE: 90 BPM | HEIGHT: 67 IN | DIASTOLIC BLOOD PRESSURE: 72 MMHG | WEIGHT: 126 LBS | BODY MASS INDEX: 19.78 KG/M2

## 2024-08-09 DIAGNOSIS — R33.9 INCOMPLETE BLADDER EMPTYING: Primary | ICD-10-CM

## 2024-08-09 DIAGNOSIS — R39.15 URINARY URGENCY: ICD-10-CM

## 2024-08-09 DIAGNOSIS — N30.10 INTERSTITIAL CYSTITIS: ICD-10-CM

## 2024-08-09 LAB — POST-VOID RESIDUAL VOLUME, ML POC: 46 ML

## 2024-08-09 PROCEDURE — 51798 US URINE CAPACITY MEASURE: CPT | Performed by: UROLOGY

## 2024-08-09 PROCEDURE — 99214 OFFICE O/P EST MOD 30 MIN: CPT | Performed by: UROLOGY

## 2024-08-09 NOTE — PROGRESS NOTES
8/9/2024    Patricia Moore  1960  [id number]    1. Incomplete bladder emptying  -     POCT Measure PVR  2. Interstitial cystitis  -     Urinalysis with microscopic; Future  -     Urine culture; Future  3. Urinary urgency  Assessment & Plan:  Impression: History of interstitial cystitis, urgency and frequency of urination, history of pelvic organ prolapse surgery, nonspecific right lower quadrant abdominal pain    Plan: I provided the patient with reassurance that recent urine culture is negative for bacterial infection.  I recommend that she continue her Sanctura as well as the Elmiron.  We discussed her right lower quadrant abdominal pain and repeating a CT scan.  She wishes to hold at this time.  She was instructed to call at any time if she has worsening abdominal pain which would more aggressively prompt repeat imaging.  She is comfortable with this plan and will return in 6 months time.  A urinalysis and urine culture was ordered if she believes she has another infection.       History of Present Illness  64 y.o. female with a history of interstitial cystitis and bladder pain.  She follows with Dr. Schwarz after pelvic organ prolapse surgery many years ago.  She currently is on Sanctura as well as Elmiron.  She also takes a cranberry supplement.  Recent urine culture was negative.  Her chief complaint today is right lower quadrant abdominal pain.  This has been intermittently present over the course of the last few years.  She had previously been seen by general surgeon and a CT scan of the abdomen and pelvis with contrast was negative.  Recent renal and bladder ultrasound is negative.  She also has a history of ulcerative colitis and follows with GI.  She has some urgency and frequency of urination.  She occasionally has incomplete bladder emptying, however, her PVR in the office today was minimal.  She denies seeing hematuria.      AUA Symptom Score      Review of Systems    Past Medical History  Past  Medical History:   Diagnosis Date   • Atopic dermatitis     last assessed 13   • Atrophic vaginitis     last assessed 9/2/15   • Dermatitis    • Dry eye    • Frequency of urination    • Fungal infection     last assessed 13   • Herpes     last assessed 14   • Hyperlipidemia    • Hypothyroidism    • Interstitial cystitis    • Osteoporosis    • Periodontal abscess     last assessed 13   • PONV (postoperative nausea and vomiting)    • Thyroid nodule    • Ulcerative colitis (HCC)    • Urinary frequency     resolved 17   • UTI (urinary tract infection)    • Vaginal atrophy    • Vitamin D deficiency     last assessed 16   • Voiding dysfunction     last assessed 10/7/15       Past Social History  Past Surgical History:   Procedure Laterality Date   •  SECTION       and     • COLONOSCOPY  2020   • CYSTOSCOPY      diagnostic resolved 05   • FL RETROGRADE PYELOGRAM  2020   • GANGLION CYST EXCISION Right 2019    Procedure: EXCISION GANGLION CYST RIGHT FIRST DORSAL EXTENSOR COMPARTMENT;  Surgeon: Cheng Quintero MD;  [facility];  Service: Orthopedics   • KIDNEY STONE SURGERY     • ORIF PROXIMAL ULNA FRACTURE      metal plates and screws removed   • OTHER SURGICAL HISTORY      punch biospy    • TN COLONOSCOPY FLX DX W/COLLJ SPEC WHEN PFRMD N/A 2017    Procedure: COLONOSCOPY;  Surgeon: Hannah Luke MD;  [facility];  Service: Colorectal   • TN CYSTO/URETERO W/LITHOTRIPSY &INDWELL STENT INSRT Left 2020    Procedure: CYSTOSCOPY URETEROSCOPY WITH LITHOTRIPSY HOLMIUM LASER, RETROGRADE PYELOGRAM AND INSERTION STENT URETERAL;  Surgeon: Joshua Parikh MD;  [facility];  Service: Urology   • TN INCISION EXTENSOR TENDON SHEATH WRIST Right 2019    Procedure: RELEASE DEQUERVAINS;  Surgeon: Cheng Quintero MD;  [facility];  Service: Orthopedics   • TUBAL LIGATION     • UPPER GASTROINTESTINAL ENDOSCOPY   09/11/2020   • WISDOM TOOTH EXTRACTION  1983    X2        Past Family History  Family History   Problem Relation Age of Onset   • Gallbladder disease Mother    • Thyroid disease Mother    • Kidney disease Mother    • Breast cancer Mother    • Diabetes Father    • Hypertension Father    • Nephrolithiasis Father    • Lymphoma Maternal Grandfather    • Diabetes Paternal Grandmother    • Breast cancer Paternal Aunt    • No Known Problems Son    • No Known Problems Son        Past Social history  Social History     Socioeconomic History   • Marital status: /Civil Union     Spouse name: Tomas    • Number of children: 2   • Years of education: Not on file   • Highest education level: Not on file   Occupational History   • Not on file   Tobacco Use   • Smoking status: Never   • Smokeless tobacco: Never   Vaping Use   • Vaping status: Never Used   Substance and Sexual Activity   • Alcohol use: Yes     Comment: socially - 1-3 drinks per month   • Drug use: No   • Sexual activity: Yes     Partners: Male   Other Topics Concern   • Not on file   Social History Narrative    Currently         Patient lives in a home that was built in 1998    Gas/forced hot air     Carpet eloise in the bedroom     Finished basement-dry-no mold or musty smell     Dehumidifier in the basement     No air  or purifiers     Humidifier in the winter months     Central air     Home is smoke free     Patient does not live close to open fields or wooded area         No pets         Caffeine: seldom hot tea or soda     Chocolate consumed everyday         Patient lives with spouse and children      Social Determinants of Health     Financial Resource Strain: Not on file   Food Insecurity: Not on file   Transportation Needs: Not on file   Physical Activity: Sufficiently Active (12/5/2019)    Exercise Vital Sign    • Days of Exercise per Week: 4 days    • Minutes of Exercise per Session: 60 min   Stress: Not on file   Social  Connections: Not on file   Intimate Partner Violence: Not on file   Housing Stability: Not on file       Current Medications  Current Outpatient Medications   Medication Sig Dispense Refill   • ALPRAZolam (XANAX) 0.25 mg tablet Take 1 tablet (0.25 mg total) by mouth 3 (three) times a day as needed for anxiety or sleep 30 tablet 0   • Cholecalciferol (VITAMIN D PO) Take 3,000 Units by mouth daily     • Cranberry (ELLURA PO) Take 1 capsule by mouth daily     • cycloSPORINE (RESTASIS) 0.05 % ophthalmic emulsion Administer 1 drop to both eyes 2 (two) times a day     • dicyclomine (BENTYL) 10 mg capsule Take 2 capsules (20 mg total) by mouth 3 (three) times a day before meals 270 capsule 3   • estradiol (ESTRACE) 0.1 mg/g vaginal cream INSERT 1 APPLICATOR FULL VAGINALLY NIGHTLY 42.5 g 0   • fluconazole (DIFLUCAN) 100 mg tablet Take 1 tablet (100 mg total) by mouth daily for 10 days 10 tablet 0   • levothyroxine 50 mcg tablet Take 1 tablet (50 mcg total) by mouth daily 100 tablet 3   • mometasone (NASONEX) 50 mcg/act nasal spray 2 sprays into each nostril daily 51 g 3   • nitrofurantoin (MACROBID) 100 mg capsule Take 1 capsule (100 mg total) by mouth 2 (two) times a day for 7 days 14 capsule 0   • nortriptyline (PAMELOR) 10 mg capsule Take 1 capsule (10 mg total) by mouth daily at bedtime 90 capsule 1   • pentosan polysulfate (Elmiron) 100 mg capsule One pill 3 times a day 90 capsule 5   • rosuvastatin (CRESTOR) 5 mg tablet Take 1 tablet (5 mg total) by mouth daily 90 tablet 3   • Sodium Sulfate-Mag Sulfate-KCl 6546-126-097 MG TABS Take as per colonosocpy instructions 24 tablet 0   • trospium chloride (SANCTURA) 20 mg tablet Take 1 tablet (20 mg total) by mouth 2 (two) times a day 60 tablet 5   • famciclovir (FAMVIR) 500 mg tablet Take 1 tablet (500 mg total) by mouth 2 (two) times a day for 7 days 14 tablet 1   • mesalamine (ASACOL) 800 MG EC tablet Take 1 tablet (800 mg total) by mouth 2 (two) times a day (Patient not  "taking: Reported on 9/22/2023) 180 tablet 0     No current facility-administered medications for this visit.       Allergies  Allergies   Allergen Reactions   • Apple Fruit Extract - Food Allergy Anaphylaxis   • Iodine - Food Allergy Anaphylaxis     Allergy to Iodinated and non iodinated dye   • Other Anaphylaxis     APPLES    TAPE  Also rash at times   • Seasonal Ic [Cholestatin] Allergic Rhinitis   • Latex        Past Medical History, Social History, Family History, medications and allergies were reviewed.    Vitals  Vitals:    08/09/24 0811   BP: 120/72   BP Location: Left arm   Patient Position: Sitting   Cuff Size: Standard   Pulse: 90   SpO2: 95%   Weight: 57.2 kg (126 lb)   Height: 5' 7\" (1.702 m)       Physical Exam  On examination she is in no acute distress.  Her abdomen is soft and nondistended.  There is very mild right lower quadrant abdominal pain with voluntary guarding.  There are no peritoneal signs     results  No results found for: \"PSA\"  Lab Results   Component Value Date    GLUCOSE 89 08/07/2015    CALCIUM 9.3 05/17/2024     08/07/2015    K 3.6 05/17/2024    CO2 28 05/17/2024     05/17/2024    BUN 18 05/17/2024    CREATININE 0.81 05/17/2024     Lab Results   Component Value Date    WBC 3.50 (L) 05/17/2024    HGB 13.4 05/17/2024    HCT 43.4 05/17/2024    MCV 91 05/17/2024     (L) 05/17/2024       Office Urine Dip  Recent Results (from the past 1 hour(s))   POCT Measure PVR    Collection Time: 08/09/24  8:19 AM   Result Value Ref Range    POST-VOID RESIDUAL VOLUME, ML POC 46 mL   ]  "

## 2024-08-09 NOTE — ASSESSMENT & PLAN NOTE
Impression: History of interstitial cystitis, urgency and frequency of urination, history of pelvic organ prolapse surgery, nonspecific right lower quadrant abdominal pain    Plan: I provided the patient with reassurance that recent urine culture is negative for bacterial infection.  I recommend that she continue her Sanctura as well as the Elmiron.  We discussed her right lower quadrant abdominal pain and repeating a CT scan.  She wishes to hold at this time.  She was instructed to call at any time if she has worsening abdominal pain which would more aggressively prompt repeat imaging.  She is comfortable with this plan and will return in 6 months time.  A urinalysis and urine culture was ordered if she believes she has another infection.

## 2024-08-29 ENCOUNTER — PROBLEM (OUTPATIENT)
Dept: URBAN - METROPOLITAN AREA CLINIC 6 | Facility: CLINIC | Age: 64
End: 2024-08-29

## 2024-08-29 DIAGNOSIS — H10.45: ICD-10-CM

## 2024-08-29 DIAGNOSIS — H04.123: ICD-10-CM

## 2024-08-29 PROCEDURE — 92012 INTRM OPH EXAM EST PATIENT: CPT

## 2024-08-29 ASSESSMENT — TONOMETRY
OS_IOP_MMHG: 15
OD_IOP_MMHG: 12

## 2024-08-29 ASSESSMENT — VISUAL ACUITY
OS_CC: 20/30+2
OD_CC: 20/30+2

## 2024-09-09 ENCOUNTER — OFFICE VISIT (OUTPATIENT)
Dept: GASTROENTEROLOGY | Facility: AMBULARY SURGERY CENTER | Age: 64
End: 2024-09-09
Payer: COMMERCIAL

## 2024-09-09 ENCOUNTER — TELEPHONE (OUTPATIENT)
Dept: GASTROENTEROLOGY | Facility: AMBULARY SURGERY CENTER | Age: 64
End: 2024-09-09

## 2024-09-09 VITALS
SYSTOLIC BLOOD PRESSURE: 102 MMHG | OXYGEN SATURATION: 97 % | DIASTOLIC BLOOD PRESSURE: 62 MMHG | HEART RATE: 97 BPM | HEIGHT: 67 IN | WEIGHT: 126.8 LBS | BODY MASS INDEX: 19.9 KG/M2

## 2024-09-09 DIAGNOSIS — K51.818 OTHER ULCERATIVE COLITIS WITH OTHER COMPLICATION (HCC): Primary | ICD-10-CM

## 2024-09-09 DIAGNOSIS — R10.9 ABDOMINAL PAIN, UNSPECIFIED ABDOMINAL LOCATION: ICD-10-CM

## 2024-09-09 DIAGNOSIS — K58.9 IRRITABLE BOWEL SYNDROME WITHOUT DIARRHEA: ICD-10-CM

## 2024-09-09 PROCEDURE — 99214 OFFICE O/P EST MOD 30 MIN: CPT | Performed by: INTERNAL MEDICINE

## 2024-09-09 NOTE — TELEPHONE ENCOUNTER
Scheduled patient on 10/16 at 2pm. Fasting for 1 hour. Patient wanted a call back regarding location on campus.

## 2024-09-09 NOTE — PROGRESS NOTES
[facility] - Outpatient Follow-up Note  Patricia Moore 64 y.o. female MRN: [id number]  Encounter: [id number]          ASSESSMENT AND PLAN:    Patricia Moore is a 64 y.o. female with ulcerative colitis versus indeterminate colitis versus Crohn's colitis who presents for follow-up.  She has also been followed with Dr. Madrid for both ulcerative colitis as well as irritable bowel syndrome.  She has previously tried Elavil, Donnatel, Librax. Overall still with similar symptoms.     Colonoscopy 2020 endoscopically unremarkable aside from cecal polyp and biopsies with chronic inactive colitis throughout the colon.  Prior sits marker study normal however significant stool still seen within the x-ray.    DEXA from June 2024 with osteoporosis.    Most recent CMP with normal electrolytes, creatinine, liver test.  Vitamin D normal.  CBC with low white blood cell count at 3.5 but normal hemoglobin, MCV.  Platelets low at 144.  TSH and hemoglobin A1c normal.    1. Other ulcerative colitis with other complication (HCC)    2. Abdominal pain, unspecified abdominal location    3. Irritable bowel syndrome without diarrhea        Orders Placed This Encounter   Procedures    US abdomen limited    CBC and differential    Comprehensive metabolic panel    C-reactive protein     Repeat colonoscopy for dysplasia surveillance - October  Dicyclomine as needed for pain  Trial of IBGard  We discussed nortriptyline   Stool softener  Drink at least 8 cups of water per day  Can consider low residue diet  We discussed ways to help manage stress  Repeat blood work today  Intestinal ultrasound    ______________________________________________________________________    SUBJECTIVE:    Patricia Moore is a 64 y.o. female who presents with complaint of colitis.     She has been having some stress. Slowly getting better.   She was doing well for sometimes but recently with some attacks. She tried the nortriptyline. She tried  it with the episode but not sure if it worked. The episodes can not occur for months, or it can hit her daily or even multiple times a day. The length can be all day or short. The stools are normal. No constipation or diarrhea. She uses bentyl and takes an extra. The pain can be low, cut across her abdomen or cut across the left side.     REVIEW OF SYSTEMS IS OTHERWISE NEGATIVE.  10 point ROS reviewed and negative, except as above      Historical Information   Past Medical History:   Diagnosis Date    Atopic dermatitis     last assessed 13    Atrophic vaginitis     last assessed 9/2/15    Dermatitis     Dry eye     Frequency of urination     Fungal infection     last assessed 13    Herpes     last assessed 14    Hyperlipidemia     Hypothyroidism     Interstitial cystitis     Osteoporosis     Periodontal abscess     last assessed 13    PONV (postoperative nausea and vomiting)     Thyroid nodule     Ulcerative colitis (HCC)     Urinary frequency     resolved 17    UTI (urinary tract infection)     Vaginal atrophy     Vitamin D deficiency     last assessed 16    Voiding dysfunction     last assessed 10/7/15     Past Surgical History:   Procedure Laterality Date     SECTION       and      COLONOSCOPY  2020    CYSTOSCOPY      diagnostic resolved 05    FL RETROGRADE PYELOGRAM  2020    GANGLION CYST EXCISION Right 2019    Procedure: EXCISION GANGLION CYST RIGHT FIRST DORSAL EXTENSOR COMPARTMENT;  Surgeon: Cheng Quintero MD;  [facility];  Service: Orthopedics    KIDNEY STONE SURGERY      ORIF PROXIMAL ULNA FRACTURE      metal plates and screws removed    OTHER SURGICAL HISTORY      punch biospy     AL COLONOSCOPY FLX DX W/COLLJ SPEC WHEN PFRMD N/A 2017    Procedure: COLONOSCOPY;  Surgeon: Hannah Luke MD;  [facility];  Service: Colorectal    AL CYSTO/URETERO W/LITHOTRIPSY &INDWELL STENT INSRT Left 2020    Procedure:  CYSTOSCOPY URETEROSCOPY WITH LITHOTRIPSY HOLMIUM LASER, RETROGRADE PYELOGRAM AND INSERTION STENT URETERAL;  Surgeon: Joshua Parikh MD;  [facility];  Service: Urology    ID INCISION EXTENSOR TENDON SHEATH WRIST Right 2/19/2019    Procedure: RELEASE DEQUERVAINS;  Surgeon: Cheng Quintero MD;  [facility];  Service: Orthopedics    TUBAL LIGATION      UPPER GASTROINTESTINAL ENDOSCOPY  09/11/2020    WISDOM TOOTH EXTRACTION  1983    X2      Social History   Social History     Substance and Sexual Activity   Alcohol Use Yes    Comment: socially - 1-3 drinks per month     Social History     Substance and Sexual Activity   Drug Use No     Social History     Tobacco Use   Smoking Status Never   Smokeless Tobacco Never     Family History   Problem Relation Age of Onset    Gallbladder disease Mother     Thyroid disease Mother     Kidney disease Mother     Breast cancer Mother     Diabetes Father     Hypertension Father     Nephrolithiasis Father     Lymphoma Maternal Grandfather     Diabetes Paternal Grandmother     Breast cancer Paternal Aunt     No Known Problems Son     No Known Problems Son        Meds/Allergies       Current Outpatient Medications:     ALPRAZolam (XANAX) 0.25 mg tablet    Cholecalciferol (VITAMIN D PO)    Cranberry (ELLURA PO)    cycloSPORINE (RESTASIS) 0.05 % ophthalmic emulsion    dicyclomine (BENTYL) 10 mg capsule    estradiol (ESTRACE) 0.1 mg/g vaginal cream    levothyroxine 50 mcg tablet    mometasone (NASONEX) 50 mcg/act nasal spray    pentosan polysulfate (Elmiron) 100 mg capsule    rosuvastatin (CRESTOR) 5 mg tablet    trospium chloride (SANCTURA) 20 mg tablet    famciclovir (FAMVIR) 500 mg tablet    mesalamine (ASACOL) 800 MG EC tablet    nortriptyline (PAMELOR) 10 mg capsule    Sodium Sulfate-Mag Sulfate-KCl 3933-779-316 MG TABS    Allergies   Allergen Reactions    Apple Fruit Extract - Food Allergy Anaphylaxis    Iodine - Food Allergy Anaphylaxis     Allergy to Iodinated  "and non iodinated dye    Other Anaphylaxis     APPLES    TAPE  Also rash at times    Seasonal Ic [Cholestatin] Allergic Rhinitis    Latex            Objective     Blood pressure 102/62, pulse 97, height 5' 7\" (1.702 m), weight 57.5 kg (126 lb 12.8 oz), SpO2 97%. Body mass index is 19.86 kg/m².    PHYSICAL EXAMINATION:    General Appearance:   Alert, cooperative, no distress   HEENT:  Normocephalic, atraumatic, anicteric. Neck supple, symmetrical, trachea midline.   Lungs:   Equal chest rise and unlabored breathing, normal effort, no coughing.   Cardiovascular:   No visualized JVD.   Abdomen:   No abdominal distension.   Skin:   No jaundice, rashes, or lesions.    Musculoskeletal:   Normal range of motion visualized.   Psych:  Normal affect and normal insight.   Neuro:  Alert and appropriate.         Lab Results:   No visits with results within 1 Day(s) from this visit.   Latest known visit with results is:   Office Visit on 08/09/2024   Component Date Value    POST-VOID RESIDUAL VOLUM* 08/09/2024 46        Lab Results   Component Value Date    WBC 3.50 (L) 05/17/2024    HGB 13.4 05/17/2024    HCT 43.4 05/17/2024    MCV 91 05/17/2024     (L) 05/17/2024       Lab Results   Component Value Date     08/07/2015    SODIUM 141 05/17/2024    K 3.6 05/17/2024     05/17/2024    CO2 28 05/17/2024    ANIONGAP 5 08/07/2015    AGAP 9 05/17/2024    BUN 18 05/17/2024    CREATININE 0.81 05/17/2024    GLUC 78 12/18/2019    GLUF 91 05/17/2024    CALCIUM 9.3 05/17/2024    AST 23 05/17/2024    ALT 21 05/17/2024    ALKPHOS 46 05/17/2024    PROT 7.2 07/10/2015    TP 7.2 05/17/2024    BILITOT 0.60 07/10/2015    TBILI 0.64 05/17/2024    EGFR 77 05/17/2024       Lab Results   Component Value Date    CRP <3.0 10/31/2022       Lab Results   Component Value Date    EVW1MIPBDKEB 1.784 05/17/2024       No results found for: \"IRON\", \"TIBC\", \"FERRITIN\"    Radiology Results:   No results found.  "

## 2024-09-09 NOTE — TELEPHONE ENCOUNTER
Patient called back and my message cut out. I relayed my myChart message I sent her. She was appreciative.

## 2024-09-27 ENCOUNTER — LAB (OUTPATIENT)
Dept: LAB | Facility: AMBULARY SURGERY CENTER | Age: 64
End: 2024-09-27
Payer: COMMERCIAL

## 2024-09-27 ENCOUNTER — HOSPITAL ENCOUNTER (OUTPATIENT)
Dept: MAMMOGRAPHY | Facility: HOSPITAL | Age: 64
Discharge: HOME | End: 2024-09-27
Attending: OBSTETRICS & GYNECOLOGY
Payer: COMMERCIAL

## 2024-09-27 VITALS — BODY MASS INDEX: 19.78 KG/M2 | HEIGHT: 67 IN | WEIGHT: 126 LBS

## 2024-09-27 DIAGNOSIS — K58.9 IRRITABLE BOWEL SYNDROME WITHOUT DIARRHEA: ICD-10-CM

## 2024-09-27 DIAGNOSIS — Z12.31 ENCOUNTER FOR SCREENING MAMMOGRAM FOR BREAST CANCER: ICD-10-CM

## 2024-09-27 DIAGNOSIS — R10.9 ABDOMINAL PAIN, UNSPECIFIED ABDOMINAL LOCATION: ICD-10-CM

## 2024-09-27 DIAGNOSIS — K51.818 OTHER ULCERATIVE COLITIS WITH OTHER COMPLICATION (HCC): ICD-10-CM

## 2024-09-27 LAB
ALBUMIN SERPL BCG-MCNC: 4.5 G/DL (ref 3.5–5)
ALP SERPL-CCNC: 40 U/L (ref 34–104)
ALT SERPL W P-5'-P-CCNC: 14 U/L (ref 7–52)
ANION GAP SERPL CALCULATED.3IONS-SCNC: 10 MMOL/L (ref 4–13)
AST SERPL W P-5'-P-CCNC: 17 U/L (ref 13–39)
BASOPHILS # BLD AUTO: 0.03 THOUSANDS/ΜL (ref 0–0.1)
BASOPHILS NFR BLD AUTO: 1 % (ref 0–1)
BILIRUB SERPL-MCNC: 0.47 MG/DL (ref 0.2–1)
BUN SERPL-MCNC: 13 MG/DL (ref 5–25)
CALCIUM SERPL-MCNC: 9.1 MG/DL (ref 8.4–10.2)
CHLORIDE SERPL-SCNC: 104 MMOL/L (ref 96–108)
CO2 SERPL-SCNC: 28 MMOL/L (ref 21–32)
CREAT SERPL-MCNC: 0.81 MG/DL (ref 0.6–1.3)
CRP SERPL QL: <1 MG/L
EOSINOPHIL # BLD AUTO: 0.03 THOUSAND/ΜL (ref 0–0.61)
EOSINOPHIL NFR BLD AUTO: 1 % (ref 0–6)
ERYTHROCYTE [DISTWIDTH] IN BLOOD BY AUTOMATED COUNT: 13 % (ref 11.6–15.1)
GFR SERPL CREATININE-BSD FRML MDRD: 76 ML/MIN/1.73SQ M
GLUCOSE P FAST SERPL-MCNC: 93 MG/DL (ref 65–99)
HCT VFR BLD AUTO: 43.4 % (ref 34.8–46.1)
HGB BLD-MCNC: 13.5 G/DL (ref 11.5–15.4)
IMM GRANULOCYTES # BLD AUTO: 0.01 THOUSAND/UL (ref 0–0.2)
IMM GRANULOCYTES NFR BLD AUTO: 0 % (ref 0–2)
LYMPHOCYTES # BLD AUTO: 0.71 THOUSANDS/ΜL (ref 0.6–4.47)
LYMPHOCYTES NFR BLD AUTO: 25 % (ref 14–44)
MCH RBC QN AUTO: 28.3 PG (ref 26.8–34.3)
MCHC RBC AUTO-ENTMCNC: 31.1 G/DL (ref 31.4–37.4)
MCV RBC AUTO: 91 FL (ref 82–98)
MONOCYTES # BLD AUTO: 0.46 THOUSAND/ΜL (ref 0.17–1.22)
MONOCYTES NFR BLD AUTO: 16 % (ref 4–12)
NEUTROPHILS # BLD AUTO: 1.6 THOUSANDS/ΜL (ref 1.85–7.62)
NEUTS SEG NFR BLD AUTO: 57 % (ref 43–75)
NRBC BLD AUTO-RTO: 0 /100 WBCS
PLATELET # BLD AUTO: 125 THOUSANDS/UL (ref 149–390)
PMV BLD AUTO: 11.5 FL (ref 8.9–12.7)
POTASSIUM SERPL-SCNC: 3.6 MMOL/L (ref 3.5–5.3)
PROT SERPL-MCNC: 7.1 G/DL (ref 6.4–8.4)
RBC # BLD AUTO: 4.77 MILLION/UL (ref 3.81–5.12)
SODIUM SERPL-SCNC: 142 MMOL/L (ref 135–147)
WBC # BLD AUTO: 2.84 THOUSAND/UL (ref 4.31–10.16)

## 2024-09-27 PROCEDURE — 36415 COLL VENOUS BLD VENIPUNCTURE: CPT

## 2024-09-27 PROCEDURE — 77067 SCR MAMMO BI INCL CAD: CPT

## 2024-09-27 PROCEDURE — 77063 BREAST TOMOSYNTHESIS BI: CPT

## 2024-09-27 PROCEDURE — 80053 COMPREHEN METABOLIC PANEL: CPT

## 2024-09-27 PROCEDURE — 85025 COMPLETE CBC W/AUTO DIFF WBC: CPT

## 2024-09-27 PROCEDURE — 86140 C-REACTIVE PROTEIN: CPT

## 2024-09-27 NOTE — RESULT ENCOUNTER NOTE
Hi,    Please let the patient know her blood work came back and her white blood cell count and platelets did go down a little bit.  Would it be okay with her if we refer her to hematology to evaluate these small blood count abnormalities?    Thank you!

## 2024-10-02 ENCOUNTER — TELEPHONE (OUTPATIENT)
Dept: GASTROENTEROLOGY | Facility: CLINIC | Age: 64
End: 2024-10-02

## 2024-10-04 ENCOUNTER — OFFICE VISIT (OUTPATIENT)
Age: 64
End: 2024-10-04
Payer: COMMERCIAL

## 2024-10-04 VITALS
OXYGEN SATURATION: 99 % | HEIGHT: 67 IN | DIASTOLIC BLOOD PRESSURE: 64 MMHG | TEMPERATURE: 97.7 F | BODY MASS INDEX: 19.62 KG/M2 | SYSTOLIC BLOOD PRESSURE: 114 MMHG | HEART RATE: 95 BPM | WEIGHT: 125 LBS

## 2024-10-04 DIAGNOSIS — D69.6 THROMBOCYTOPENIA (HCC): ICD-10-CM

## 2024-10-04 DIAGNOSIS — D70.9 GRANULOCYTOPENIA (HCC): Primary | ICD-10-CM

## 2024-10-04 DIAGNOSIS — K52.9 COLITIS: ICD-10-CM

## 2024-10-04 PROBLEM — S92.524A CLOSED NONDISPLACED FRACTURE OF MIDDLE PHALANX OF LESSER TOE OF RIGHT FOOT, INITIAL ENCOUNTER: Status: RESOLVED | Noted: 2024-05-14 | Resolved: 2024-10-04

## 2024-10-04 PROCEDURE — 99214 OFFICE O/P EST MOD 30 MIN: CPT | Performed by: INTERNAL MEDICINE

## 2024-10-04 NOTE — ASSESSMENT & PLAN NOTE
Appreciate the notes from [facility]'s GI services regarding patient's ongoing bowel issues.  Recommend continued use of dicyclomine.

## 2024-10-04 NOTE — PROGRESS NOTES
Ambulatory Visit  Name: Patricia Moore      : 1960      MRN: [id number]  Encounter Provider: Doug Hernandez MD  Encounter Date: 10/4/2024   Encounter department: [facility] INTERNAL MEDICINE    Assessment & Plan  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HCC)    Orders:    CBC and differential; Future    Colitis  Appreciate the notes from [facility] GI services regarding patient's ongoing bowel issues.  Recommend continued use of dicyclomine.         Thrombocytopenia (HCC)  Platelet count reviewed with the patient no abnormal bleeding appreciated recommend follow-up in 3 months              History of Present Illness     This 64-year-old female patient returns to our office today for a routine follow-up visit.  During today's visit we have reviewed the patient's most recent laboratory testing with her.  Specifically we discussed her chronically low white blood cell count and platelet count.  I suspect that a combination of chronic stress and the use of Elmiron medication are likely a factor in the low values.  In addition there may be some type of genetic predisposition as well.  She certainly does not seem to have any frequent infection issues.  We discussed possibility of a hematology consultation at present time she would like to wait on that.  I recommend that we have another blood test in 3 months with an evaluation in the office at that time to reevaluate her platelet and white blood cell counts.      Review of Systems   Psychiatric/Behavioral:  The patient is nervous/anxious.    All other systems reviewed and are negative.    Past Medical History:   Diagnosis Date    Atopic dermatitis     last assessed 13    Atrophic vaginitis     last assessed 9/2/15    Dermatitis     Dry eye     Frequency of urination     Fungal infection     last assessed 13    Herpes     last assessed 14    Hyperlipidemia     Hypothyroidism     Interstitial cystitis     Osteoporosis     Periodontal abscess     last  assessed 13    PONV (postoperative nausea and vomiting)     Thyroid nodule     Ulcerative colitis (HCC)     Urinary frequency     resolved 17    UTI (urinary tract infection)     Vaginal atrophy     Vitamin D deficiency     last assessed 16    Voiding dysfunction     last assessed 10/7/15     Past Surgical History:   Procedure Laterality Date     SECTION       and      COLONOSCOPY  2020    CYSTOSCOPY      diagnostic resolved 05    FL RETROGRADE PYELOGRAM  2020    GANGLION CYST EXCISION Right 2019    Procedure: EXCISION GANGLION CYST RIGHT FIRST DORSAL EXTENSOR COMPARTMENT;  Surgeon: Cheng Quintero MD;  Location: [facility];  Service: Orthopedics    KIDNEY STONE SURGERY      ORIF PROXIMAL ULNA FRACTURE      metal plates and screws removed    OTHER SURGICAL HISTORY      punch biospy     RI COLONOSCOPY FLX DX W/COLLJ SPEC WHEN PFRMD N/A 2017    Procedure: COLONOSCOPY;  Surgeon: Hannah Luke MD;  Location: [facility];  Service: Colorectal    RI CYSTO/URETERO W/LITHOTRIPSY &INDWELL STENT INSRT Left 2020    Procedure: CYSTOSCOPY URETEROSCOPY WITH LITHOTRIPSY HOLMIUM LASER, RETROGRADE PYELOGRAM AND INSERTION STENT URETERAL;  Surgeon: Joshua Parikh MD;  Location: [facility];  Service: Urology    RI INCISION EXTENSOR TENDON SHEATH WRIST Right 2019    Procedure: RELEASE DEQUERVAINS;  Surgeon: Cheng Quintero MD;  Location: [facility];  Service: Orthopedics    TUBAL LIGATION      UPPER GASTROINTESTINAL ENDOSCOPY  2020    WISDOM TOOTH EXTRACTION  1983    X2      Family History   Problem Relation Age of Onset    Gallbladder disease Mother     Thyroid disease Mother     Kidney disease Mother     Breast cancer Mother 80    Diabetes Father     Hypertension Father     Nephrolithiasis Father     Lymphoma Maternal Grandfather     Diabetes Paternal Grandmother     No Known Problems Paternal Grandfather     No Known Problems Son     No Known  Problems Son     Breast cancer Paternal Aunt      Social History     Tobacco Use    Smoking status: Never    Smokeless tobacco: Never   Vaping Use    Vaping status: Never Used   Substance and Sexual Activity    Alcohol use: Yes     Comment: socially - 1-3 drinks per month    Drug use: No    Sexual activity: Yes     Partners: Male     Current Outpatient Medications on File Prior to Visit   Medication Sig    ALPRAZolam (XANAX) 0.25 mg tablet Take 1 tablet (0.25 mg total) by mouth 3 (three) times a day as needed for anxiety or sleep    Cholecalciferol (VITAMIN D PO) Take 3,000 Units by mouth daily    Cranberry (ELLURA PO) Take 1 capsule by mouth daily    cycloSPORINE (RESTASIS) 0.05 % ophthalmic emulsion Administer 1 drop to both eyes 2 (two) times a day    dicyclomine (BENTYL) 10 mg capsule Take 2 capsules (20 mg total) by mouth 3 (three) times a day before meals    estradiol (ESTRACE) 0.1 mg/g vaginal cream INSERT 1 APPLICATOR FULL VAGINALLY NIGHTLY    levothyroxine 50 mcg tablet Take 1 tablet (50 mcg total) by mouth daily    mometasone (NASONEX) 50 mcg/act nasal spray 2 sprays into each nostril daily    pentosan polysulfate (Elmiron) 100 mg capsule One pill 3 times a day    rosuvastatin (CRESTOR) 5 mg tablet Take 1 tablet (5 mg total) by mouth daily    trospium chloride (SANCTURA) 20 mg tablet Take 1 tablet (20 mg total) by mouth 2 (two) times a day    famciclovir (FAMVIR) 500 mg tablet Take 1 tablet (500 mg total) by mouth 2 (two) times a day for 7 days    [DISCONTINUED] mesalamine (ASACOL) 800 MG EC tablet Take 1 tablet (800 mg total) by mouth 2 (two) times a day (Patient not taking: Reported on 9/22/2023)    [DISCONTINUED] nortriptyline (PAMELOR) 10 mg capsule Take 1 capsule (10 mg total) by mouth daily at bedtime (Patient not taking: Reported on 9/9/2024)    [DISCONTINUED] Sodium Sulfate-Mag Sulfate-KCl 9634-536-916 MG TABS Take as per colonosocpy instructions (Patient not taking: Reported on 9/9/2024)  "    Allergies   Allergen Reactions    Apple Fruit Extract - Food Allergy Anaphylaxis    Iodine - Food Allergy Anaphylaxis     Allergy to Iodinated and non iodinated dye    Other Anaphylaxis     APPLES    TAPE  Also rash at times    Seasonal Ic [Cholestatin] Allergic Rhinitis    Latex      Immunization History   Administered Date(s) Administered    COVID-19 PFIZER VACCINE 0.3 ML IM 01/08/2021, 01/29/2021, 11/13/2021    INFLUENZA 10/17/2017, 10/16/2019, 10/14/2020, 10/10/2021, 10/10/2021, 10/15/2022    Influenza, injectable, quadrivalent, preservative free 0.5 mL 10/24/2023    Influenza, recombinant, quadrivalent,injectable, preservative free 10/23/2018    Influenza, seasonal, injectable 10/17/2017     Objective     /64   Pulse 95   Temp 97.7 °F (36.5 °C) (Tympanic)   Ht 5' 7\" (1.702 m)   Wt 56.7 kg (125 lb)   LMP  (LMP Unknown)   SpO2 99%   BMI 19.58 kg/m²     Physical Exam  Vitals reviewed.   Constitutional:       General: She is not in acute distress.     Appearance: Normal appearance. She is well-developed. She is not ill-appearing.   HENT:      Head: Normocephalic.      Right Ear: Hearing and external ear normal.      Left Ear: Hearing and external ear normal.      Nose: Nose normal. No mucosal edema.      Mouth/Throat:      Mouth: Mucous membranes are moist.      Pharynx: Oropharynx is clear. Uvula midline.   Eyes:      General: Lids are normal.      Conjunctiva/sclera: Conjunctivae normal.      Pupils: Pupils are equal, round, and reactive to light.   Neck:      Thyroid: No thyromegaly.      Vascular: No carotid bruit or JVD.   Cardiovascular:      Rate and Rhythm: Normal rate and regular rhythm.      Heart sounds: Normal heart sounds. No murmur heard.  Pulmonary:      Effort: Pulmonary effort is normal. No respiratory distress.      Breath sounds: Normal breath sounds. No wheezing, rhonchi or rales.   Abdominal:      General: Bowel sounds are normal.      Palpations: Abdomen is soft. "   Musculoskeletal:         General: Normal range of motion.      Right lower leg: No edema.      Left lower leg: No edema.   Lymphadenopathy:      Cervical: No cervical adenopathy.   Skin:     General: Skin is warm and dry.      Coloration: Skin is not jaundiced or pale.      Findings: No bruising or lesion.   Neurological:      Mental Status: She is alert and oriented to person, place, and time. Mental status is at baseline.      Deep Tendon Reflexes: Reflexes are normal and symmetric. Reflexes normal.   Psychiatric:         Mood and Affect: Mood normal.         Speech: Speech normal.         Behavior: Behavior normal. Behavior is cooperative.         Thought Content: Thought content normal.         Judgment: Judgment normal.

## 2024-10-04 NOTE — ASSESSMENT & PLAN NOTE
Platelet count reviewed with the patient no abnormal bleeding appreciated recommend follow-up in 3 months

## 2024-10-10 ENCOUNTER — TELEPHONE (OUTPATIENT)
Age: 64
End: 2024-10-10

## 2024-10-10 NOTE — TELEPHONE ENCOUNTER
"Spoke with patient to reschedule colonoscopy on 10/11/24 with Dr. Luke due to family emergency. Patient very upset and declined rescheduling due to starting her prep/ clear liquid diet. Pt stated she will \"not be getting colonoscopy at all due to last minute rescheduling\". I did apologize on the doctor's behalf and told her to please give us a call back if she changes her mind.   "

## 2024-10-16 ENCOUNTER — HOSPITAL ENCOUNTER (OUTPATIENT)
Dept: ULTRASOUND IMAGING | Facility: HOSPITAL | Age: 64
Discharge: HOME | End: 2024-10-16
Payer: COMMERCIAL

## 2024-10-16 DIAGNOSIS — K51.818 OTHER ULCERATIVE COLITIS WITH OTHER COMPLICATION (HCC): ICD-10-CM

## 2024-10-16 PROCEDURE — 76705 ECHO EXAM OF ABDOMEN: CPT

## 2024-10-21 DIAGNOSIS — N95.2 VAGINAL ATROPHY: ICD-10-CM

## 2024-10-21 RX ORDER — ESTRADIOL 0.1 MG/G
CREAM VAGINAL
Qty: 42.5 G | Refills: 4 | Status: SHIPPED | OUTPATIENT
Start: 2024-10-21

## 2024-10-24 DIAGNOSIS — Z23 ENCOUNTER FOR IMMUNIZATION: Primary | ICD-10-CM

## 2024-10-28 PROCEDURE — 90471 IMMUNIZATION ADMIN: CPT | Performed by: INTERNAL MEDICINE

## 2024-10-28 PROCEDURE — 90673 RIV3 VACCINE NO PRESERV IM: CPT | Performed by: INTERNAL MEDICINE

## 2024-10-29 ENCOUNTER — ANNUAL EXAM (OUTPATIENT)
Dept: OBGYN CLINIC | Facility: CLINIC | Age: 64
End: 2024-10-29
Payer: COMMERCIAL

## 2024-10-29 VITALS — BODY MASS INDEX: 20.02 KG/M2 | WEIGHT: 127.8 LBS

## 2024-10-29 DIAGNOSIS — N95.2 ATROPHIC VAGINITIS: ICD-10-CM

## 2024-10-29 DIAGNOSIS — Z01.419 WOMEN'S ANNUAL ROUTINE GYNECOLOGICAL EXAMINATION: Primary | ICD-10-CM

## 2024-10-29 DIAGNOSIS — Z12.31 ENCOUNTER FOR SCREENING MAMMOGRAM FOR BREAST CANCER: ICD-10-CM

## 2024-10-29 DIAGNOSIS — Z01.419 PAP SMEAR, AS PART OF ROUTINE GYNECOLOGICAL EXAMINATION: ICD-10-CM

## 2024-10-29 PROCEDURE — G0476 HPV COMBO ASSAY CA SCREEN: HCPCS | Performed by: OBSTETRICS & GYNECOLOGY

## 2024-10-29 PROCEDURE — G0145 SCR C/V CYTO,THINLAYER,RESCR: HCPCS | Performed by: OBSTETRICS & GYNECOLOGY

## 2024-10-29 PROCEDURE — S0612 ANNUAL GYNECOLOGICAL EXAMINA: HCPCS | Performed by: OBSTETRICS & GYNECOLOGY

## 2024-10-29 NOTE — PROGRESS NOTES
Assessment/Plan:    No problem-specific Assessment & Plan notes found for this encounter.       Diagnoses and all orders for this visit:    Women's annual routine gynecological examination  -     Liquid-based pap, screening    Pap smear, as part of routine gynecological examination    Encounter for screening mammogram for breast cancer  -     Mammo screening bilateral w 3d and cad; Future    Atrophic vaginitis          Normal gynecological physical examination.  Self-breast examination stressed.  Mammogram ordered.  Discussed regular exercise, healthy diet, importance of vitamin D and calcium supplements.  Discussed importance of sun block use during periods of prolonged sun exposure.  Patient will be seen in 1 year for routine gynecologic and medical examination.  Patient will call office for any problems, concerns, or issues which may arise during the interim.     Subjective:          KB is a 65yo f with a PMH sig for dense breast tissue and atrophic vaginitis presenting to the office for her yearly exam. Pt reports that she feels well and has no current complaints. UTD on mammograms, pap, and colonoscopies. BP, blood sugar, and cholesterol are all well controlled and she regularly sees her PCP.     Pt denies vaginal bleeding, spotting, and discharge. She also denies abdominal pain, pelvic pain, n/v, diarrhea, constipation. Denies dysuria, hematuria, hematochezia. No chest pain, sob, hot/cold intolerances, fevers, chills, unintentional weight loss/gain. No breast tenderness, masses, or nipple discharge.    Pt denies insomnia, irritability, hot flashes, vaginal dryness.     Informed pt that her physical exam revealed no abnormalitles. Follow up in one year for next annual exam. Call office with any questions, comments, or concerns in the interim.       Patient ID: Patricia Moore is a 64 y.o. female who presents today for her annual gynecologic and medical examination    Menstrual status: Patient is postmenopausal  and denies any vaginal bleeding    Vasomotor symptoms: Reports vaginal dryness    Patient reports normal appetite    Patient reports normal bowel and bladder habits    Patient denies any significant pelvic or abdominal pain    Patient denies any headaches, chest pain, shortness of breath fever shakes or chills    Patient denies any COVID 19 symptoms including cough or loss of taste or smell    COVID vaccine status: Aware COVID vaccination protocols    Medical problems: Followed for cholesterol    Colonoscopy status: Up-to-date with screening colonoscopy    Mammogram status: Does regular self breast exams and is up-to-date with screening mammography as well.  Appropriate arrangements for her annual screening mammogram were placed into the EMR system at today's visit.    The following portions of the patient's history were reviewed and updated as appropriate: allergies, current medications, past family history, past medical history, past social history, past surgical history and problem list.    Review of Systems   Constitutional: Negative.  Negative for appetite change, diaphoresis, fatigue, fever and unexpected weight change.   HENT: Negative.     Eyes: Negative.    Respiratory: Negative.     Cardiovascular: Negative.         Followed for cholesterol   Gastrointestinal: Negative.  Negative for abdominal pain, blood in stool, constipation, diarrhea, nausea and vomiting.   Endocrine: Negative.  Negative for cold intolerance and heat intolerance.   Genitourinary: Negative.  Negative for dysuria, frequency, hematuria, urgency, vaginal bleeding, vaginal discharge and vaginal pain.   Musculoskeletal: Negative.    Skin: Negative.    Allergic/Immunologic: Negative.    Neurological: Negative.    Hematological: Negative.  Negative for adenopathy.   Psychiatric/Behavioral: Negative.           Objective:      Wt 58 kg (127 lb 12.8 oz)   LMP  (LMP Unknown)   BMI 20.02 kg/m²          Physical Exam  Constitutional:        General: She is not in acute distress.     Appearance: Normal appearance. She is well-developed. She is not diaphoretic.   HENT:      Head: Normocephalic and atraumatic.   Eyes:      Pupils: Pupils are equal, round, and reactive to light.   Cardiovascular:      Rate and Rhythm: Normal rate and regular rhythm.      Heart sounds: Normal heart sounds. No murmur heard.     No friction rub. No gallop.   Pulmonary:      Effort: Pulmonary effort is normal.      Breath sounds: Normal breath sounds.   Chest:   Breasts:     Breasts are symmetrical.      Right: No inverted nipple, mass, nipple discharge, skin change or tenderness.      Left: No inverted nipple, mass, nipple discharge, skin change or tenderness.   Abdominal:      General: Bowel sounds are normal.      Palpations: Abdomen is soft.   Genitourinary:     General: Normal vulva.      Exam position: Supine.      Labia:         Right: No rash or lesion.         Left: No rash or lesion.       Urethra: No urethral swelling or urethral lesion.      Vagina: Normal. No vaginal discharge, erythema, tenderness or bleeding.      Cervix: No discharge or friability.      Uterus: Not enlarged and not tender.       Adnexa:         Right: No mass, tenderness or fullness.          Left: No mass, tenderness or fullness.        Rectum: Normal. Guaiac result negative.      Comments: Pelvic exam revealed mild atrophic vaginitis  Good pelvic support confirmed  Musculoskeletal:         General: Normal range of motion.      Cervical back: Normal range of motion and neck supple.   Lymphadenopathy:      Cervical: No cervical adenopathy.      Upper Body:      Right upper body: No supraclavicular adenopathy.      Left upper body: No supraclavicular adenopathy.   Skin:     General: Skin is warm and dry.      Findings: No rash.   Neurological:      General: No focal deficit present.      Mental Status: She is alert and oriented to person, place, and time.   Psychiatric:         Mood and Affect:  Mood normal.         Speech: Speech normal.         Behavior: Behavior normal.         Thought Content: Thought content normal.         Judgment: Judgment normal.

## 2024-11-01 LAB
LAB AP GYN PRIMARY INTERPRETATION: NORMAL
Lab: NORMAL

## 2024-11-18 ENCOUNTER — TELEPHONE (OUTPATIENT)
Age: 64
End: 2024-11-18

## 2024-11-18 NOTE — TELEPHONE ENCOUNTER
Caller: Patricia Moore    Doctor and/or Office: Dr. Katia ALVARES#: [phone]    Escalation: Appointment Patient cannot put any pressure on the ball of her foot. Requesting a forced appt. Please return call and let her know if this is possible or schedule her for next available. Thank you

## 2024-11-19 ENCOUNTER — OFFICE VISIT (OUTPATIENT)
Dept: PODIATRY | Facility: CLINIC | Age: 64
End: 2024-11-19
Payer: COMMERCIAL

## 2024-11-19 VITALS — WEIGHT: 127 LBS | BODY MASS INDEX: 19.89 KG/M2

## 2024-11-19 DIAGNOSIS — M25.572 ARTHRALGIA OF LEFT FOOT: Primary | ICD-10-CM

## 2024-11-19 PROCEDURE — 99214 OFFICE O/P EST MOD 30 MIN: CPT | Performed by: PODIATRIST

## 2024-11-19 RX ORDER — NAPROXEN 500 MG/1
500 TABLET ORAL 2 TIMES DAILY WITH MEALS
Qty: 60 TABLET | Refills: 0 | Status: SHIPPED | OUTPATIENT
Start: 2024-11-19 | End: 2024-12-19

## 2024-11-19 NOTE — ASSESSMENT & PLAN NOTE
Orders:    XR foot 3+ vw left; Future    naproxen (Naprosyn) 500 mg tablet; Take 1 tablet (500 mg total) by mouth 2 (two) times a day with meals

## 2024-11-19 NOTE — PROGRESS NOTES
: Patricia Moore      : 1960      MRN: [id number]  Encounter Provider: Donavon Montalvo DPM  Encounter Date: 2024   Encounter department: [facility] PODIATRY [place]    I personally reviewed x-rays of the left foot taken today.  They are negative for osseous pathology or fracture.    Explained to patient that symptoms suggest pain from exercise or hyperextension of her digits.  She was placed on naproxen 500 mg twice daily PC.  A small surgical shoe was dispensed to aid in walking.  She may switch to a comfortable shoe if feeling better.  Reappoint 3 to 4 weeks.  :  Assessment & Plan  Arthralgia of left foot    Orders:    XR foot 3+ vw left; Future    naproxen (Naprosyn) 500 mg tablet; Take 1 tablet (500 mg total) by mouth 2 (two) times a day with meals        History of Present Illness     HPI  Patricia Moore is a 64 y.o. female who presents with pain in her left foot of approximate 2-week duration.  Patient does not recall any specific trauma to the foot but she notes that she has been having difficulty walking for the past few weeks.  On questioning, she exercises regularly along with barre.  This leads to hyperextension of her lesser toes.  Her chief pain is in the area of the second MPJ.    Initially she felt burning from the ball of the foot into the toes but the most significant pain now is with pressure right at the second MPJ.  She is concerned that she could have a fracture.  She does have a history of osteoporosis.  She rates her current pain as a 5 out of 10.  She has been taking Aleve for discomfort.      Review of Systems   Gastrointestinal:         Irritable bowel syndrome   Musculoskeletal:  Positive for gait problem.   Psychiatric/Behavioral: Negative.                Objective   Wt 57.6 kg (127 lb)   LMP  (LMP Unknown)   BMI 19.89 kg/m²      Physical Exam  Constitutional:       Appearance: Normal appearance.   Cardiovascular:      Pulses: Normal pulses.   Musculoskeletal:          General: Tenderness present.      Comments: Sharp pain with palpation second MPJ left foot.  Pain with palpation second metatarsal interspace left foot.  No pain with palpation of second or third metatarsal shafts.   Skin:     General: Skin is warm.   Neurological:      General: No focal deficit present.      Mental Status: She is oriented to person, place, and time.

## 2024-12-21 DIAGNOSIS — E78.01 FAMILIAL HYPERCHOLESTEROLEMIA: ICD-10-CM

## 2024-12-21 RX ORDER — ROSUVASTATIN CALCIUM 5 MG/1
5 TABLET, COATED ORAL DAILY
Qty: 90 TABLET | Refills: 3 | Status: SHIPPED | OUTPATIENT
Start: 2024-12-21

## 2024-12-23 ENCOUNTER — OFFICE VISIT (OUTPATIENT)
Dept: PODIATRY | Facility: CLINIC | Age: 64
End: 2024-12-23
Payer: COMMERCIAL

## 2024-12-23 DIAGNOSIS — G57.62 LESION OF LEFT PLANTAR NERVE: Primary | ICD-10-CM

## 2024-12-23 PROCEDURE — RECHECK: Performed by: PODIATRIST

## 2024-12-23 PROCEDURE — 64455 NJX AA&/STRD PLTR COM DG NRV: CPT | Performed by: PODIATRIST

## 2024-12-23 NOTE — PROGRESS NOTES
Patient presents for assessment of left foot.  Patient notes an inability to tolerate naproxen due to GI issues.  Surgical shoe was not helpful and patient has the same pain as she had at last visit.    On exam, sharp pain with palpation plantar aspect left foot at the second metatarsal interspace and the dorsum of the second MPJ.  No bruising or swelling noted.    Symptoms are currently consistent with a neuroma at the second metatarsal interspace left foot.    Treatment: Injected second metatarsal interspace left foot and dorsum of second MPJ left foot with 0.6 cc Kenalog 40 and 1 cc 1% Xylocaine.  Patient may return to her shoes.  She may return to activities.  Reappoint 4 weeks.    Nerve block    Date/Time: 12/23/2024 1:45 PM    Performed by: Donavon Montalvo DPM  Authorized by: Donavon Montalvo DPM    Patient location:  [facility] Protocol:  procedure performed by consultantConsent: Verbal consent obtained.  Risks and benefits: risks, benefits and alternatives were discussed  Consent given by: patient  Patient understanding: patient states understanding of the procedure being performed    Indications:     Indications:  Pain relief  Location:     Body area:  Lower extremity    Lower extremity nerve:  Digital    Laterality:  Left  Pre-procedure details:     Skin preparation:  Alcohol  Procedure details (see MAR for exact dosages):     Block needle gauge:  25 G    Anesthetic injected:  Lidocaine 1% w/o epi    Steroid injected:  Triamcinolone    Injection procedure:  Anatomic landmarks identified and anatomic landmarks palpated  Post-procedure details:     Dressing:  None    Outcome:  Anesthesia achieved    Patient tolerance of procedure:  Tolerated well, no immediate complications

## 2025-01-10 ENCOUNTER — OFFICE VISIT (OUTPATIENT)
Dept: GASTROENTEROLOGY | Facility: MEDICAL CENTER | Age: 65
End: 2025-01-10
Payer: COMMERCIAL

## 2025-01-10 VITALS
WEIGHT: 124.4 LBS | DIASTOLIC BLOOD PRESSURE: 95 MMHG | HEART RATE: 88 BPM | SYSTOLIC BLOOD PRESSURE: 155 MMHG | BODY MASS INDEX: 19.48 KG/M2 | TEMPERATURE: 97.9 F

## 2025-01-10 DIAGNOSIS — K52.9 COLITIS: ICD-10-CM

## 2025-01-10 DIAGNOSIS — K58.2 IRRITABLE BOWEL SYNDROME WITH BOTH CONSTIPATION AND DIARRHEA: Primary | ICD-10-CM

## 2025-01-10 DIAGNOSIS — D70.9 GRANULOCYTOPENIA (HCC): ICD-10-CM

## 2025-01-10 PROCEDURE — 99213 OFFICE O/P EST LOW 20 MIN: CPT | Performed by: INTERNAL MEDICINE

## 2025-01-10 NOTE — PROGRESS NOTES
Name: Patricia Moore      : 1960      MRN: [id number]  Encounter Provider: Buddy Madrid MD  Encounter Date: 1/10/2025   [facility]  :  Assessment & Plan  Irritable bowel syndrome with both constipation and diarrhea  Continue IB Estuardo  Recommend heating pad.          Colitis    Follow up with Dr. Luke for colonoscopy.          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HCC)  Follow up with Dr. Hernandez.   Repeat blood work.              History of Present Illness   HPI  Patricia Moore is a 64 y.o. female who presents here for follow up.       On IB Estuardo.   Attacks are not as frequent.   She tried nortriptyline for 2 weeks and did not like how it made her feel.   Could not get the colonoscopy due to physician schedule change.   No bleeding in the stools.   She is going every other day.       Review of Systems   Constitutional: Negative.    HENT: Negative.     Eyes: Negative.    Respiratory: Negative.     Cardiovascular: Negative.    Gastrointestinal:         See HPI   Endocrine: Negative.    Genitourinary: Negative.    Musculoskeletal: Negative.    Skin: Negative.    Allergic/Immunologic: Negative.    Neurological: Negative.    Hematological: Negative.    Psychiatric/Behavioral: Negative.     All other systems reviewed and are negative.         Objective   /95   Pulse 88   Temp 97.9 °F (36.6 °C)   Wt 56.4 kg (124 lb 6.4 oz)   LMP  (LMP Unknown)   BMI 19.48 kg/m²      Physical Exam  Constitutional:       Appearance: Normal appearance. She is well-developed.   HENT:      Head: Normocephalic and atraumatic.   Eyes:      General: No scleral icterus.     Conjunctiva/sclera: Conjunctivae normal.      Pupils: Pupils are equal, round, and reactive to light.   Cardiovascular:      Rate and Rhythm: Normal rate and regular rhythm.      Heart sounds: Normal heart sounds.   Pulmonary:      Effort: Pulmonary effort is normal. No respiratory distress.      Breath sounds:  Normal breath sounds.   Abdominal:      General: Bowel sounds are normal. There is no distension.      Palpations: Abdomen is soft. There is no mass.      Tenderness: There is no abdominal tenderness.      Hernia: No hernia is present.   Musculoskeletal:         General: Normal range of motion.      Cervical back: Normal range of motion.   Lymphadenopathy:      Cervical: No cervical adenopathy.   Skin:     General: Skin is warm.   Neurological:      Mental Status: She is alert and oriented to person, place, and time.   Psychiatric:         Behavior: Behavior normal.         Thought Content: Thought content normal.

## 2025-01-17 ENCOUNTER — APPOINTMENT (OUTPATIENT)
Dept: LAB | Facility: AMBULARY SURGERY CENTER | Age: 65
End: 2025-01-17
Payer: COMMERCIAL

## 2025-01-17 DIAGNOSIS — D69.6 THROMBOCYTOPENIA (HCC): ICD-10-CM

## 2025-01-17 DIAGNOSIS — D69.6 THROMBOCYTOPENIA (HCC): Primary | ICD-10-CM

## 2025-01-17 LAB
BASOPHILS # BLD AUTO: 0.03 THOUSANDS/ΜL (ref 0–0.1)
BASOPHILS NFR BLD AUTO: 1 % (ref 0–1)
EOSINOPHIL # BLD AUTO: 0.04 THOUSAND/ΜL (ref 0–0.61)
EOSINOPHIL NFR BLD AUTO: 1 % (ref 0–6)
ERYTHROCYTE [DISTWIDTH] IN BLOOD BY AUTOMATED COUNT: 12.7 % (ref 11.6–15.1)
HCT VFR BLD AUTO: 42.9 % (ref 34.8–46.1)
HGB BLD-MCNC: 13.3 G/DL (ref 11.5–15.4)
IMM GRANULOCYTES # BLD AUTO: 0.02 THOUSAND/UL (ref 0–0.2)
IMM GRANULOCYTES NFR BLD AUTO: 0 % (ref 0–2)
LYMPHOCYTES # BLD AUTO: 0.81 THOUSANDS/ΜL (ref 0.6–4.47)
LYMPHOCYTES NFR BLD AUTO: 13 % (ref 14–44)
MCH RBC QN AUTO: 28.2 PG (ref 26.8–34.3)
MCHC RBC AUTO-ENTMCNC: 31 G/DL (ref 31.4–37.4)
MCV RBC AUTO: 91 FL (ref 82–98)
MONOCYTES # BLD AUTO: 0.48 THOUSAND/ΜL (ref 0.17–1.22)
MONOCYTES NFR BLD AUTO: 8 % (ref 4–12)
NEUTROPHILS # BLD AUTO: 4.75 THOUSANDS/ΜL (ref 1.85–7.62)
NEUTS SEG NFR BLD AUTO: 77 % (ref 43–75)
NRBC BLD AUTO-RTO: 0 /100 WBCS
PLATELET # BLD AUTO: 137 THOUSANDS/UL (ref 149–390)
PMV BLD AUTO: 12.1 FL (ref 8.9–12.7)
RBC # BLD AUTO: 4.71 MILLION/UL (ref 3.81–5.12)
WBC # BLD AUTO: 6.13 THOUSAND/UL (ref 4.31–10.16)

## 2025-01-17 PROCEDURE — 85025 COMPLETE CBC W/AUTO DIFF WBC: CPT

## 2025-01-17 PROCEDURE — 36415 COLL VENOUS BLD VENIPUNCTURE: CPT

## 2025-01-23 ENCOUNTER — OFFICE VISIT (OUTPATIENT)
Age: 65
End: 2025-01-23
Payer: COMMERCIAL

## 2025-01-23 VITALS
BODY MASS INDEX: 19.73 KG/M2 | SYSTOLIC BLOOD PRESSURE: 110 MMHG | DIASTOLIC BLOOD PRESSURE: 64 MMHG | WEIGHT: 126 LBS | HEART RATE: 92 BPM | OXYGEN SATURATION: 99 % | TEMPERATURE: 98.4 F

## 2025-01-23 DIAGNOSIS — R73.09 ABNORMAL GLUCOSE: ICD-10-CM

## 2025-01-23 DIAGNOSIS — E55.9 VITAMIN D DEFICIENCY: ICD-10-CM

## 2025-01-23 DIAGNOSIS — R39.15 URINARY URGENCY: ICD-10-CM

## 2025-01-23 DIAGNOSIS — K58.2 IRRITABLE BOWEL SYNDROME WITH BOTH CONSTIPATION AND DIARRHEA: ICD-10-CM

## 2025-01-23 DIAGNOSIS — E78.2 MIXED HYPERLIPIDEMIA: ICD-10-CM

## 2025-01-23 DIAGNOSIS — F41.9 ANXIETY: ICD-10-CM

## 2025-01-23 DIAGNOSIS — E03.8 OTHER SPECIFIED HYPOTHYROIDISM: ICD-10-CM

## 2025-01-23 DIAGNOSIS — D69.6 THROMBOCYTOPENIA (HCC): ICD-10-CM

## 2025-01-23 DIAGNOSIS — D70.9 GRANULOCYTOPENIA (HCC): Primary | ICD-10-CM

## 2025-01-23 PROCEDURE — 99214 OFFICE O/P EST MOD 30 MIN: CPT | Performed by: INTERNAL MEDICINE

## 2025-01-23 NOTE — ASSESSMENT & PLAN NOTE
Current white blood cell count has rebounded into normal range.  Recommend continued surveillance indication of increase in susceptibility to infections    Orders:    CBC and differential; Future

## 2025-01-23 NOTE — ASSESSMENT & PLAN NOTE
Platelet count is 137,000 with no evidence of abnormal bleeding or clotting recommend continued surveillance    Orders:    CBC and differential; Future

## 2025-01-23 NOTE — PROGRESS NOTES
Name: Patricia Moore      : 1960      MRN: [id number]  Encounter Provider: Doug Hernandez MD  Encounter Date: 2025   Encounter department: [facility] INTERNAL MEDICINE    Assessment & Plan  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HCC)  Current white blood cell count has rebounded into normal range.  Recommend continued surveillance indication of increase in susceptibility to infections    Orders:    CBC and differential; Future    Thrombocytopenia (HCC)  Platelet count is 137,000 with no evidence of abnormal bleeding or clotting recommend continued surveillance    Orders:    CBC and differential; Future    Irritable bowel syndrome with both constipation and diarrhea  Notes from GI appreciated regarding ongoing management of irritable bowel syndrome agree with their recommendation         Anxiety  Reviewed the patient's current social situation with her mother and father both in nursing home care.  Significant stress levels due to their ongoing medical problems.         Other specified hypothyroidism    Orders:    TSH, 3rd generation with Free T4 reflex; Future    Mixed hyperlipidemia    Orders:    Lipid panel; Future    Vitamin D deficiency    Orders:    Vitamin D 25 hydroxy; Future    Urinary urgency    Orders:    UA w Reflex to Microscopic w Reflex to Culture; Future    Abnormal glucose    Orders:    Comprehensive metabolic panel; Future         History of Present Illness     This pleasant 64-year-old female patient returns to our office today for a follow-up visit pertaining to previous blood work indicating granulocytopenia and thrombocytopenia.  Most recent updated blood work was reviewed with the patient today.      Review of Systems   All other systems reviewed and are negative.    Past Medical History:   Diagnosis Date    Atopic dermatitis     last assessed 13    Atrophic vaginitis     last assessed 9/2/15    Dermatitis     Dry eye     Frequency of urination     Fungal infection     last  assessed 13    Herpes     last assessed 14    Hyperlipidemia     Hypothyroidism     Interstitial cystitis     Osteoporosis     Periodontal abscess     last assessed 13    PONV (postoperative nausea and vomiting)     Thyroid nodule     Ulcerative colitis (HCC)     Urinary frequency     resolved 17    UTI (urinary tract infection)     Vaginal atrophy     Vitamin D deficiency     last assessed 16    Voiding dysfunction     last assessed 10/7/15     Past Surgical History:   Procedure Laterality Date     SECTION       and      COLONOSCOPY  2020    CYSTOSCOPY      diagnostic resolved 05    FL RETROGRADE PYELOGRAM  2020    GANGLION CYST EXCISION Right 2019    Procedure: EXCISION GANGLION CYST RIGHT FIRST DORSAL EXTENSOR COMPARTMENT;  Surgeon: Cheng Quintero MD;  Location: [facility];  Service: Orthopedics    KIDNEY STONE SURGERY      ORIF PROXIMAL ULNA FRACTURE      metal plates and screws removed    OTHER SURGICAL HISTORY      punch biospy     NJ COLONOSCOPY FLX DX W/COLLJ SPEC WHEN PFRMD N/A 2017    Procedure: COLONOSCOPY;  Surgeon: Hannah Luke MD;  [facility]: [facility];  Service: Colorectal    NJ CYSTO/URETERO W/LITHOTRIPSY &INDWELL STENT INSRT Left 2020    Procedure: CYSTOSCOPY URETEROSCOPY WITH LITHOTRIPSY HOLMIUM LASER, RETROGRADE PYELOGRAM AND INSERTION STENT URETERAL;  Surgeon: Joshua Parikh MD;  Location: [facility];  Service: Urology    NJ INCISION EXTENSOR TENDON SHEATH WRIST Right 2019    Procedure: RELEASE DEQUERVAINS;  Surgeon: Cheng Quintero MD;  Location: [facility];  Service: Orthopedics    TUBAL LIGATION      UPPER GASTROINTESTINAL ENDOSCOPY  2020    WISDOM TOOTH EXTRACTION  1983    X2      Family History   Problem Relation Age of Onset    Gallbladder disease Mother     Thyroid disease Mother     Kidney disease Mother     Breast cancer Mother 80    Diabetes Father     Hypertension Father      Nephrolithiasis Father     Lymphoma Maternal Grandfather     Diabetes Paternal Grandmother     No Known Problems Paternal Grandfather     No Known Problems Son     No Known Problems Son     Breast cancer Paternal Aunt      Social History     Tobacco Use    Smoking status: Never    Smokeless tobacco: Never   Vaping Use    Vaping status: Never Used   Substance and Sexual Activity    Alcohol use: Yes     Comment: socially - 1-3 drinks per month    Drug use: No    Sexual activity: Yes     Partners: Male     Current Outpatient Medications on File Prior to Visit   Medication Sig    Cholecalciferol (VITAMIN D PO) Take 3,000 Units by mouth daily    Cranberry (ELLURA PO) Take 1 capsule by mouth daily    Cromolyn Sodium (NASAL ALLERGY CONTROL NA) into each nostril    cycloSPORINE (RESTASIS) 0.05 % ophthalmic emulsion Administer 1 drop to both eyes 2 (two) times a day    dicyclomine (BENTYL) 10 mg capsule Take 2 capsules (20 mg total) by mouth 3 (three) times a day before meals    estradiol (ESTRACE) 0.1 mg/g vaginal cream Insert 1 applicator full vaginally nightly    levothyroxine 50 mcg tablet Take 1 tablet (50 mcg total) by mouth daily    Meth-Hyo-M Bl-Maikel Acd-Ph Sal (URIBEL PO) Take by mouth    mometasone (NASONEX) 50 mcg/act nasal spray 2 sprays into each nostril daily    pentosan polysulfate (Elmiron) 100 mg capsule One pill 3 times a day    Pentosan Polysulfate Sodium (ELMIRON PO) Take by mouth    rosuvastatin (CRESTOR) 5 mg tablet Take 1 tablet (5 mg total) by mouth daily    trospium chloride (SANCTURA) 20 mg tablet Take 1 tablet (20 mg total) by mouth 2 (two) times a day    ALPRAZolam (XANAX) 0.25 mg tablet Take 1 tablet (0.25 mg total) by mouth 3 (three) times a day as needed for anxiety or sleep (Patient not taking: Reported on 1/10/2025)    famciclovir (FAMVIR) 500 mg tablet Take 1 tablet (500 mg total) by mouth 2 (two) times a day for 7 days    naproxen (Naprosyn) 500 mg tablet Take 1 tablet (500 mg total) by  mouth 2 (two) times a day with meals     Allergies   Allergen Reactions    Apple Fruit Extract - Food Allergy Anaphylaxis    Iodine - Food Allergy Anaphylaxis     Allergy to Iodinated and non iodinated dye    Other Anaphylaxis     APPLES    TAPE  Also rash at times    Seasonal Ic [Cholestatin] Allergic Rhinitis    Latex      Immunization History   Administered Date(s) Administered    COVID-19 PFIZER VACCINE 0.3 ML IM 01/08/2021, 01/29/2021, 11/13/2021    INFLUENZA 10/17/2017, 10/16/2019, 10/14/2020, 10/10/2021, 10/10/2021, 10/15/2022    Influenza Recombinant Preservative Free Im 10/28/2024    Influenza, injectable, quadrivalent, preservative free 0.5 mL 10/24/2023    Influenza, recombinant, quadrivalent,injectable, preservative free 10/23/2018    Influenza, seasonal, injectable 10/17/2017     Objective   /64   Pulse 92   Temp 98.4 °F (36.9 °C) (Tympanic)   Wt 57.2 kg (126 lb)   LMP  (LMP Unknown)   SpO2 99%   BMI 19.73 kg/m²     Physical Exam  Vitals and nursing note reviewed.   Constitutional:       General: She is not in acute distress.     Appearance: She is well-developed.   HENT:      Head: Normocephalic and atraumatic.   Eyes:      Conjunctiva/sclera: Conjunctivae normal.   Cardiovascular:      Rate and Rhythm: Normal rate and regular rhythm.      Heart sounds: Normal heart sounds. No murmur heard.  Pulmonary:      Effort: Pulmonary effort is normal. No respiratory distress.      Breath sounds: Normal breath sounds. No wheezing, rhonchi or rales.   Abdominal:      Palpations: Abdomen is soft.      Tenderness: There is no abdominal tenderness.   Musculoskeletal:         General: No swelling.      Cervical back: Neck supple.   Skin:     General: Skin is warm and dry.      Capillary Refill: Capillary refill takes less than 2 seconds.   Neurological:      Mental Status: She is alert.   Psychiatric:         Mood and Affect: Mood normal.

## 2025-01-23 NOTE — ASSESSMENT & PLAN NOTE
Notes from GI appreciated regarding ongoing management of irritable bowel syndrome agree with their recommendation

## 2025-01-24 ENCOUNTER — TELEPHONE (OUTPATIENT)
Dept: UROLOGY | Facility: CLINIC | Age: 65
End: 2025-01-24

## 2025-01-24 NOTE — TELEPHONE ENCOUNTER
Dr. Parikh's template changed for 2/21. Dr. Parikh did send a [facility] message to patient checking in with her. He would like to see her in the Spring.     Called and LM for patient about r/s appt to Spring. Informed to call back.    Holding 4/30 for a spot.

## 2025-02-03 ENCOUNTER — OFFICE VISIT (OUTPATIENT)
Dept: PODIATRY | Facility: CLINIC | Age: 65
End: 2025-02-03
Payer: COMMERCIAL

## 2025-02-03 VITALS — BODY MASS INDEX: 21.35 KG/M2 | RESPIRATION RATE: 18 BRPM | WEIGHT: 136 LBS | HEIGHT: 67 IN

## 2025-02-03 DIAGNOSIS — G57.62 LESION OF LEFT PLANTAR NERVE: Primary | ICD-10-CM

## 2025-02-03 PROCEDURE — RECHECK: Performed by: PODIATRIST

## 2025-02-03 PROCEDURE — 64455 NJX AA&/STRD PLTR COM DG NRV: CPT | Performed by: PODIATRIST

## 2025-02-03 NOTE — PROGRESS NOTES
Patient presents for assessment of left foot.  At last visit, patient was diagnosed with the second metatarsal interspace left foot neuroma.  She was given cortisone injection but unfortunately got minimal if any relief.    Currently she still has pain in each step.  This pain is not radiating towards the toe but she does have pain at the dorsal medial aspect of the left second toe.    On exam, pain with palpation second metatarsal interspace left foot.  Also pain with palpation dorsal medial aspect left second toe.    Treatment: Reinjected second metatarsal interspace left foot with 0.5 cc Kenalog 40.  Medication also given to the dorsomedial aspect left second toe.  Patient told that she may continue with activities.  She was referred for an MRI to confirm neuroma diagnosis.  Reappoint 4 weeks.    Nerve block    Date/Time: 2/3/2025 1:00 PM    Performed by: Donavon Montalvo DPM  Authorized by: Donavon Montalvo DPM    Patient location:  Clinic  [place] Protocol:  procedure performed by consultantConsent: Verbal consent obtained.  Risks and benefits: risks, benefits and alternatives were discussed  Consent given by: patient  Patient understanding: patient states understanding of the procedure being performed  Patient identity confirmed: verbally with patient    Indications:     Indications:  Pain relief  Location:     Body area:  Lower extremity    Lower extremity nerve:  Digital    Laterality:  Left  Pre-procedure details:     Skin preparation:  Alcohol  Procedure details (see MAR for exact dosages):     Block needle gauge:  25 G    Anesthetic injected:  Lidocaine 1% w/o epi    Steroid injected:  Triamcinolone    Injection procedure:  Anatomic landmarks identified and anatomic landmarks palpated  Post-procedure details:     Outcome:  Anesthesia achieved    Patient tolerance of procedure:  Tolerated well, no immediate complications

## 2025-02-05 DIAGNOSIS — R11.2 NAUSEA AND VOMITING, UNSPECIFIED VOMITING TYPE: Primary | ICD-10-CM

## 2025-02-05 RX ORDER — ONDANSETRON 8 MG/1
8 TABLET, FILM COATED ORAL EVERY 8 HOURS PRN
Qty: 12 TABLET | Refills: 1 | Status: SHIPPED | OUTPATIENT
Start: 2025-02-05

## 2025-02-28 ENCOUNTER — HOSPITAL ENCOUNTER (OUTPATIENT)
Dept: MRI IMAGING | Facility: HOSPITAL | Age: 65
Discharge: HOME | End: 2025-02-28
Attending: PODIATRIST
Payer: COMMERCIAL

## 2025-02-28 DIAGNOSIS — G57.62 LESION OF LEFT PLANTAR NERVE: ICD-10-CM

## 2025-02-28 PROCEDURE — 73718 MRI LOWER EXTREMITY W/O DYE: CPT

## 2025-03-10 ENCOUNTER — OFFICE VISIT (OUTPATIENT)
Dept: PODIATRY | Facility: CLINIC | Age: 65
End: 2025-03-10
Payer: COMMERCIAL

## 2025-03-10 DIAGNOSIS — S99.922D INJURY OF PLANTAR PLATE OF LEFT FOOT, SUBSEQUENT ENCOUNTER: ICD-10-CM

## 2025-03-10 DIAGNOSIS — M20.42 HAMMER TOE OF LEFT FOOT: ICD-10-CM

## 2025-03-10 DIAGNOSIS — M25.572 ARTHRALGIA OF LEFT FOOT: Primary | ICD-10-CM

## 2025-03-10 PROCEDURE — 99214 OFFICE O/P EST MOD 30 MIN: CPT | Performed by: PODIATRIST

## 2025-03-10 RX ORDER — CELECOXIB 200 MG/1
200 CAPSULE ORAL DAILY
Qty: 60 CAPSULE | Refills: 0 | Status: SHIPPED | OUTPATIENT
Start: 2025-03-10 | End: 2025-05-09

## 2025-03-10 NOTE — PROGRESS NOTES
Patient presents for assessment of left foot.  Patient continues to have significant pain at the base of the left second toe.  Pain at the top of the second toe at the level of the MPJ seems to have gone.    I personally reviewed x-rays and MRI of the left foot dated 2/28/2025.  They reveal no evidence of neuroma.  They noticed degenerative changes in the foot and mild strain of the first and second dorsal interossei.    On exam, the left second and third toes seem to be changing their shape compared to earlier visits.  Hammertoe deformities are noted left second and third toe.  The second toe of the left foot still purchases but there is sharp pain at the plantar aspect of the MTPJ consistent with plantar plate dystrophy.  This pain diminishes significantly when second toe is plantarflexed.  The toe does not appear dislocated.    I personally reviewed x-rays of the left foot taken today.  They reveal hammertoe deformities of the left second and third toes.  These hammertoes were not noted in earlier x-ray November 2024.    Explained to patient that her foot pain likely was due to developing hammertoes and now her diagnosis is that of a painful hammertoe deformity left second toe with plantar plate malady.    Discussed conservative and surgical treatment plans.  Surgery would involve a Weil osteotomy of the second metatarsal with hammertoe correction and implant.  Patient is employed in a ambulatory position and she is unsure whether she could handle such a surgery.  For now, patient placed on Celebrex 200 mg at bedtime for 60 days.  Reappoint 6 weeks.

## 2025-03-11 ENCOUNTER — TELEPHONE (OUTPATIENT)
Age: 65
End: 2025-03-11

## 2025-03-11 ENCOUNTER — TELEPHONE (OUTPATIENT)
Dept: PODIATRY | Facility: CLINIC | Age: 65
End: 2025-03-11

## 2025-03-11 NOTE — TELEPHONE ENCOUNTER
Spoke with patient regarding x-ray report from radiologist.  A fracture is noted at the lateral aspect of the base of the proximal phalanx of the left second toe on x-ray oblique view.  This is exactly where patient is comfortable but it is unknown when and how she fractured this site.  MRI was negative for fracture along with original x-ray in November when patient first had pain.    In any case, for now, advised her to tape left second toe to the third toe and to return to surgical shoe.  She will keep previously scheduled appointment.

## 2025-03-11 NOTE — ASSESSMENT & PLAN NOTE
Recent episode of irritable bowel syndrome with diarrhea reviewed recommend continued use of Bentyl medication for symptomatic relief    Suspect irritable bowel syndrome trigger is stress related MED/SURG

## 2025-03-11 NOTE — TELEPHONE ENCOUNTER
Caller: Patricia     Doctor and/or Office: Dr. Montalvo     #: [phone]    Escalation: Care Patient called in and stated she got her x ray report and it does show a fracture. She said she would like to speak to Dr Montalvo that this is going to change there plans.  Please advise thank you

## 2025-03-12 ENCOUNTER — TELEPHONE (OUTPATIENT)
Age: 65
End: 2025-03-12

## 2025-03-12 NOTE — TELEPHONE ENCOUNTER
Caller: Rakesh / [place]    Doctor: Dr. Montalvo    Reason for call: Patricia Teresa has significant findings.      Call back#: [phone]

## 2025-03-21 DIAGNOSIS — N95.2 VAGINAL ATROPHY: ICD-10-CM

## 2025-03-21 DIAGNOSIS — N30.10 CHRONIC INTERSTITIAL CYSTITIS: ICD-10-CM

## 2025-03-21 RX ORDER — TROSPIUM CHLORIDE 20 MG/1
20 TABLET, FILM COATED ORAL 2 TIMES DAILY
Qty: 60 TABLET | Refills: 5 | Status: SHIPPED | OUTPATIENT
Start: 2025-03-21

## 2025-03-21 RX ORDER — ESTRADIOL 0.1 MG/G
CREAM VAGINAL
Qty: 42.5 G | Refills: 4 | Status: SHIPPED | OUTPATIENT
Start: 2025-03-21

## 2025-03-31 NOTE — PROGRESS NOTES
AMB US Pelvic Non OB    Date/Time: 4/1/2025 10:00 AM    Performed by: Nany Collins  Authorized by: Zeb Chaudhry MD  Universal Protocol:  Consent: Verbal consent obtained.  Consent given by: patient  Timeout called at: 4/1/2025 10:04 AM.  Patient understanding: patient states understanding of the procedure being performed  Patient identity confirmed: verbally with patient    Procedure details:     SIS Procedure: No    Indications comment:  Endometrial thickening    Technique:  US Pelvic, Non-OB with complete exam    Position: supine exam    Uterine findings:     Length (cm): 8    Height (cm):  3.33    Width (cm):  5.88    Endometrial stripe: identified      Endometrium thickness (mm):  7.38  Left ovary findings:     Left ovary:  Visualized    Length (cm): 2.59    Height (cm): 1.76    Width (cm): 2.01  Right ovary findings:     Right ovary:  Visualized    Length (cm): 2.81    Height (cm): 1.66    Width (cm): 1.85  Other findings:     Free pelvic fluid: not identified      Free peritoneal fluid: not identified    Post-Procedure Details:     Impression:  Anteverted uterus demonstrates a thickened endometrium for postmenopausal patient. Otherwise, the uterus and bilateral ovaries appear within normal limits. No free fluid.     Tolerance:  Tolerated well, no immediate complications    Complications: no complications    Additional Procedure Comments:      Transabdominal ultrasound technique utilized for today's exam at patient request.     Ultrasound performed at:     [facility]  [address]  [address]  [place] [address]  Phone  [phone]  Fax  [phone]

## 2025-04-01 ENCOUNTER — TELEPHONE (OUTPATIENT)
Dept: OBGYN CLINIC | Facility: CLINIC | Age: 65
End: 2025-04-01

## 2025-04-01 ENCOUNTER — ULTRASOUND (OUTPATIENT)
Dept: OBGYN CLINIC | Facility: CLINIC | Age: 65
End: 2025-04-01
Payer: COMMERCIAL

## 2025-04-01 DIAGNOSIS — R93.89 ENDOMETRIAL THICKENING ON ULTRASOUND: Primary | ICD-10-CM

## 2025-04-01 PROCEDURE — 76856 US EXAM PELVIC COMPLETE: CPT | Performed by: OBSTETRICS & GYNECOLOGY

## 2025-04-01 NOTE — TELEPHONE ENCOUNTER
Patient stated she was unable to stay for her follow for results after her ultra sound, due to work. Requested a call back for her results.

## 2025-04-01 NOTE — PROGRESS NOTES
Please make an appointment for the patient in 3 to 4 months for follow-up ultrasound and visit with me afterwards    I already spoke with her today to explain the clinical findings on her pelvic ultrasound and that we would be calling to set up this follow-up visit    Thanks

## 2025-04-01 NOTE — PROGRESS NOTES
Pelvic ultrasound revealed an endometrial stripe of 7 mm.  Patient has had stripe's which have varied between 5 and 8 mm over the past several years.    She denies any vaginal bleeding at this point    I advised her that we will follow-up with another ultrasound in approximately 3 to 4 months to check for stability    All questions were answered for her    She was told to call immediately should any bleeding develop during the interim    In addition she should always feel free to call for any problems, questions, issues or concerns which may arise for her

## 2025-04-02 NOTE — PROGRESS NOTES
Pelvic ultrasound results reviewed and endometrial stripe was 7.38 mm.    Patricia's endometrial stripe has always remained between 4 and 8 the most recent being in the high 4 and low 5 range.    Patient denies any vaginal bleeding at this time    We will do a repeat study in 3 to 4 months to ensure stability    Patient will call for any bleeding should it develop    She will also call for any problems, questions, issues or concerns which may arise for her

## 2025-04-08 DIAGNOSIS — T78.40XA ALLERGY, INITIAL ENCOUNTER: ICD-10-CM

## 2025-04-08 RX ORDER — MOMETASONE FUROATE MONOHYDRATE 50 UG/1
2 SPRAY, METERED NASAL DAILY
Qty: 51 G | Refills: 3 | Status: SHIPPED | OUTPATIENT
Start: 2025-04-08

## 2025-04-09 ENCOUNTER — APPOINTMENT (OUTPATIENT)
Dept: LAB | Facility: CLINIC | Age: 65
End: 2025-04-09

## 2025-04-09 DIAGNOSIS — Z00.8 ENCOUNTER FOR OTHER GENERAL EXAMINATION: ICD-10-CM

## 2025-04-09 LAB
CHOLEST SERPL-MCNC: 217 MG/DL (ref ?–200)
EST. AVERAGE GLUCOSE BLD GHB EST-MCNC: 117 MG/DL
HBA1C MFR BLD: 5.7 %
HDLC SERPL-MCNC: 76 MG/DL
LDLC SERPL CALC-MCNC: 117 MG/DL (ref 0–100)
NONHDLC SERPL-MCNC: 141 MG/DL
TRIGL SERPL-MCNC: 120 MG/DL (ref ?–150)

## 2025-04-09 PROCEDURE — 36415 COLL VENOUS BLD VENIPUNCTURE: CPT

## 2025-04-09 PROCEDURE — 83036 HEMOGLOBIN GLYCOSYLATED A1C: CPT

## 2025-04-09 PROCEDURE — 80061 LIPID PANEL: CPT

## 2025-04-21 ENCOUNTER — OFFICE VISIT (OUTPATIENT)
Dept: PODIATRY | Facility: CLINIC | Age: 65
End: 2025-04-21
Payer: COMMERCIAL

## 2025-04-21 DIAGNOSIS — S92.515G CLOSED NONDISPLACED FRACTURE OF PROXIMAL PHALANX OF LESSER TOE OF LEFT FOOT WITH DELAYED HEALING, SUBSEQUENT ENCOUNTER: Primary | ICD-10-CM

## 2025-04-21 PROCEDURE — 99213 OFFICE O/P EST LOW 20 MIN: CPT | Performed by: PODIATRIST

## 2025-04-21 NOTE — PROGRESS NOTES
Patient presents to assess second MPJ of the left foot.  Reviewed x-ray findings with patient with revealed a fracture of the lateral base of the proximal phalanx of the second toe.  Patient has been taping the left second toe to the third and wearing surgical shoe for the past 5 weeks.    She still has significant pain and is very frustrated.    Explained that she appears to have a fracture that is delayed in healing.  For now, she will continue with taping the digits and wearing surgical shoe.  Patient told to consider having fragment that is fractured or surgically removed.  She will consider.  Reappoint 4 weeks.

## 2025-04-30 ENCOUNTER — OFFICE VISIT (OUTPATIENT)
Dept: UROLOGY | Facility: AMBULATORY SURGERY CENTER | Age: 65
End: 2025-04-30
Payer: COMMERCIAL

## 2025-04-30 VITALS
HEIGHT: 67 IN | BODY MASS INDEX: 19.46 KG/M2 | WEIGHT: 124 LBS | SYSTOLIC BLOOD PRESSURE: 110 MMHG | HEART RATE: 78 BPM | DIASTOLIC BLOOD PRESSURE: 64 MMHG | OXYGEN SATURATION: 98 %

## 2025-04-30 DIAGNOSIS — R39.9 UTI SYMPTOMS: Primary | ICD-10-CM

## 2025-04-30 DIAGNOSIS — N30.10 INTERSTITIAL CYSTITIS: ICD-10-CM

## 2025-04-30 LAB
SL AMB  POCT GLUCOSE, UA: NORMAL
SL AMB LEUKOCYTE ESTERASE,UA: NORMAL
SL AMB POCT BILIRUBIN,UA: NORMAL
SL AMB POCT BLOOD,UA: NORMAL
SL AMB POCT CLARITY,UA: CLEAR
SL AMB POCT COLOR,UA: NORMAL
SL AMB POCT KETONES,UA: NORMAL
SL AMB POCT NITRITE,UA: NORMAL
SL AMB POCT PH,UA: 5
SL AMB POCT SPECIFIC GRAVITY,UA: 1.01
SL AMB POCT URINE PROTEIN: NORMAL
SL AMB POCT UROBILINOGEN: 0.2

## 2025-04-30 PROCEDURE — 81002 URINALYSIS NONAUTO W/O SCOPE: CPT | Performed by: UROLOGY

## 2025-04-30 PROCEDURE — 99213 OFFICE O/P EST LOW 20 MIN: CPT | Performed by: UROLOGY

## 2025-04-30 PROCEDURE — 87086 URINE CULTURE/COLONY COUNT: CPT | Performed by: UROLOGY

## 2025-04-30 RX ORDER — METHENAMINE, SODIUM PHOSPHATE, MONOBASIC, ANHYDROUS, PHENYL SALICYLATE, METHYLENE BLUE AND HYOSCYAMINE SULFATE 118; 40.8; 36; 10; .12 MG/1; MG/1; MG/1; MG/1; MG/1
CAPSULE ORAL
COMMUNITY
Start: 2025-03-31

## 2025-04-30 NOTE — PROGRESS NOTES
"Name: Patricia Moore      : 1960      MRN: [id number]  Encounter Provider: Joshua Parikh MD  Encounter Date: 2025   Encounter department: [facility] UROLOGY [place]  :  Assessment & Plan  UTI symptoms    Orders:  •  POCT urine dip  •  Urine culture  •  Urinalysis with microscopic; Future  •  Urine culture; Future  •  US kidney and bladder with pvr; Future    Interstitial cystitis           Impression: History of recurrent UTI and interstitial cystitis    Plan: Patricia will continue on her current regimen including Elmiron and Sanctura.  Urine culture was sent from the office today.  I would only treat with antibiotics if the culture is positive as she often has lower urinary tract symptoms.  A follow-up ultrasound with PVR is recommended in the next 6 months time.  A repeat urinalysis and urine culture has been ordered at [facility] if she believes she has another infection.  I recommend that she continue to follow with urogynecology for her interstitial cystitis.      History of Present Illness   Patricia Moore is a 64 y.o. female who presents in follow-up for a history of urgency of urination.  She was also given a diagnosis of interstitial cystitis and bladder pain.  She had pelvic organ prolapse surgery with Dr. Juan Daniel Kwon many years ago.  She is still currently taking Sanctura and Elmiron.  She is concerned at this time that she may have another bladder infection.  She obtained a urine specimen at home this morning and brings it to the office with her today for evaluation.    Review of Systems       Objective   /64 (BP Location: Left arm, Patient Position: Sitting, Cuff Size: Standard)   Pulse 78   Ht 5' 7\" (1.702 m)   Wt 56.2 kg (124 lb)   LMP  (LMP Unknown)   SpO2 98%   BMI 19.42 kg/m²     Physical Exam on examination she is in no acute distress.  Her abdomen is soft nontender nondistended.   examination of is no CVA tenderness.  Skin is warm.  Right foot " "orthopedic shoe noted for toe fracture.    Results   No results found for: \"PSA\"  Lab Results   Component Value Date    GLUCOSE 89 08/07/2015    CALCIUM 9.1 09/27/2024     08/07/2015    K 3.6 09/27/2024    CO2 28 09/27/2024     09/27/2024    BUN 13 09/27/2024    CREATININE 0.81 09/27/2024     Lab Results   Component Value Date    WBC 6.13 01/17/2025    HGB 13.3 01/17/2025    HCT 42.9 01/17/2025    MCV 91 01/17/2025     (L) 01/17/2025       Office Urine Dip  Recent Results (from the past hour)   POCT urine dip    Collection Time: 04/30/25  8:03 AM   Result Value Ref Range    LEUKOCYTE ESTERASE,UA -     NITRITE,UA -     SL AMB POCT UROBILINOGEN 0.2     POCT URINE PROTEIN -      PH,UA 5.0     BLOOD,UA -     SPECIFIC GRAVITY,UA 1.010     KETONES,UA -     BILIRUBIN,UA -     GLUCOSE, UA -      COLOR,UA blue     CLARITY,UA clear          "

## 2025-04-30 NOTE — LETTER
2025     Doug Hernandez MD  [address] [place] [address]  [address], [address]  [facility] [address]    Patient: Patricia Moore   YOB: 1960   Date of Visit: 2025       Dear MD Reji Fish MD:    Thank you for referring Patricia Moore to me for evaluation. Below are my notes for this consultation.    If you have questions, please do not hesitate to call me. I look forward to following your patient along with you.         Sincerely,        Joshua Parikh MD        CC: MD Joshua Travis MD  2025  8:47 AM  Sign when Signing Visit  Name: Patricia Moore      : 1960      MRN: [id number]  Encounter Provider: Joshua Parikh MD  Encounter Date: 2025   Encounter department: [facility] [place]  :  Assessment & Plan  UTI symptoms    Orders:  •  POCT urine dip  •  Urine culture  •  Urinalysis with microscopic; Future  •  Urine culture; Future  •  US kidney and bladder with pvr; Future    Interstitial cystitis           Impression: History of recurrent UTI and interstitial cystitis    Plan: Patricia will continue on her current regimen including Elmiron and Sanctura.  Urine culture was sent from the office today.  I would only treat with antibiotics if the culture is positive as she often has lower urinary tract symptoms.  A follow-up ultrasound with PVR is recommended in the next 6 months time.  A repeat urinalysis and urine culture has been ordered at [facility] if she believes she has another infection.  I recommend that she continue to follow with urogynecology for her interstitial cystitis.      History of Present Illness  Patricia Moore is a 64 y.o. female who presents in follow-up for a history of urgency of urination.  She was also given a diagnosis of interstitial cystitis and bladder pain.  She had pelvic organ prolapse surgery with Dr. Juan Daniel Kwon many years ago.  She is still currently taking  "Sanctura and Elmiron.  She is concerned at this time that she may have another bladder infection.  She obtained a urine specimen at home this morning and brings it to the office with her today for evaluation.    Review of Systems       Objective  /64 (BP Location: Left arm, Patient Position: Sitting, Cuff Size: Standard)   Pulse 78   Ht 5' 7\" (1.702 m)   Wt 56.2 kg (124 lb)   LMP  (LMP Unknown)   SpO2 98%   BMI 19.42 kg/m²     Physical Exam on examination she is in no acute distress.  Her abdomen is soft nontender nondistended.   examination of is no CVA tenderness.  Skin is warm.  Right foot orthopedic shoe noted for toe fracture.    Results   No results found for: \"PSA\"  Lab Results   Component Value Date    GLUCOSE 89 08/07/2015    CALCIUM 9.1 09/27/2024     08/07/2015    K 3.6 09/27/2024    CO2 28 09/27/2024     09/27/2024    BUN 13 09/27/2024    CREATININE 0.81 09/27/2024     Lab Results   Component Value Date    WBC 6.13 01/17/2025    HGB 13.3 01/17/2025    HCT 42.9 01/17/2025    MCV 91 01/17/2025     (L) 01/17/2025       Office Urine Dip  Recent Results (from the past hour)   POCT urine dip    Collection Time: 04/30/25  8:03 AM   Result Value Ref Range    LEUKOCYTE ESTERASE,UA -     NITRITE,UA -     SL AMB POCT UROBILINOGEN 0.2     POCT URINE PROTEIN -      PH,UA 5.0     BLOOD,UA -     SPECIFIC GRAVITY,UA 1.010     KETONES,UA -     BILIRUBIN,UA -     GLUCOSE, UA -      COLOR,UA blue     CLARITY,UA clear           "

## 2025-05-01 LAB — BACTERIA UR CULT: NORMAL

## 2025-05-14 ENCOUNTER — CONSULTATION (OUTPATIENT)
Dept: URBAN - METROPOLITAN AREA CLINIC 6 | Facility: CLINIC | Age: 65
End: 2025-05-14

## 2025-05-14 DIAGNOSIS — H25.13: ICD-10-CM

## 2025-05-14 DIAGNOSIS — H53.2: ICD-10-CM

## 2025-05-14 DIAGNOSIS — H50.00: ICD-10-CM

## 2025-05-14 DIAGNOSIS — H04.123: ICD-10-CM

## 2025-05-14 PROCEDURE — 92012 INTRM OPH EXAM EST PATIENT: CPT

## 2025-05-14 PROCEDURE — 92060 SENSORIMOTOR EXAMINATION: CPT

## 2025-05-14 ASSESSMENT — VISUAL ACUITY
OD_CC: 20/30-1
OS_CC: 20/30+1
OU_CC: J1+

## 2025-05-20 ENCOUNTER — OFFICE VISIT (OUTPATIENT)
Dept: PODIATRY | Facility: CLINIC | Age: 65
End: 2025-05-20
Payer: COMMERCIAL

## 2025-05-20 DIAGNOSIS — S92.515G CLOSED NONDISPLACED FRACTURE OF PROXIMAL PHALANX OF LESSER TOE OF LEFT FOOT WITH DELAYED HEALING, SUBSEQUENT ENCOUNTER: Primary | ICD-10-CM

## 2025-05-20 PROBLEM — S92.515A CLOSED NONDISPLACED FRACTURE OF PROXIMAL PHALANX OF LESSER TOE OF LEFT FOOT: Status: ACTIVE | Noted: 2025-05-20

## 2025-05-20 PROCEDURE — 99213 OFFICE O/P EST LOW 20 MIN: CPT | Performed by: PODIATRIST

## 2025-05-20 NOTE — PROGRESS NOTES
Name: Patricia Moore      : 1960      MRN: [id number]  Encounter Provider: Donavon Montalvo DPM  Encounter Date: 2025   Encounter department: [facility] [place]    I personally viewed x-rays of the left foot specifically the left second toe.  Continued presence of fracture on the oblique view at the lateral base of the second toe proximal phalanx.    Explained to patient that x-rays findings will lag clinical healing.  Patient was given the okay to return to walking in a normal comfortable running shoe.  If pain is minimal after 1 week she may resume power walking for short distances.  Will reassess in 6 weeks.  :  Assessment & Plan  Closed nondisplaced fracture of proximal phalanx of lesser toe of left foot with delayed healing, subsequent encounter    Orders:    XR toe left second min 2 views; Future        History of Present Illness   HPI  Patricia Moore is a 64 y.o. female who presents for assessment of left second toe.  Patient relates improvement but still discomfort when walking.  She also feels as if the second toe is numb.  She does that she is feeling much better than she had.      Review of Systems       Objective   LMP  (LMP Unknown)      Physical Exam  Constitutional:       Appearance: Normal appearance.     Musculoskeletal:      Comments: Mild discomfort with palpation base left second toe.

## 2025-05-30 ENCOUNTER — APPOINTMENT (OUTPATIENT)
Dept: LAB | Facility: CLINIC | Age: 65
End: 2025-05-30
Attending: INTERNAL MEDICINE
Payer: COMMERCIAL

## 2025-05-30 DIAGNOSIS — E55.9 VITAMIN D DEFICIENCY: ICD-10-CM

## 2025-05-30 DIAGNOSIS — R39.15 URINARY URGENCY: ICD-10-CM

## 2025-05-30 DIAGNOSIS — D70.9 GRANULOCYTOPENIA (HCC): ICD-10-CM

## 2025-05-30 DIAGNOSIS — D69.6 THROMBOCYTOPENIA (HCC): ICD-10-CM

## 2025-05-30 DIAGNOSIS — E78.2 MIXED HYPERLIPIDEMIA: ICD-10-CM

## 2025-05-30 DIAGNOSIS — R73.09 ABNORMAL GLUCOSE: ICD-10-CM

## 2025-05-30 DIAGNOSIS — E03.8 OTHER SPECIFIED HYPOTHYROIDISM: ICD-10-CM

## 2025-05-30 DIAGNOSIS — R39.9 UTI SYMPTOMS: ICD-10-CM

## 2025-05-30 LAB
25(OH)D3 SERPL-MCNC: 68.5 NG/ML (ref 30–100)
ALBUMIN SERPL BCG-MCNC: 4.5 G/DL (ref 3.5–5)
ALP SERPL-CCNC: 38 U/L (ref 34–104)
ALT SERPL W P-5'-P-CCNC: 14 U/L (ref 7–52)
ANION GAP SERPL CALCULATED.3IONS-SCNC: 5 MMOL/L (ref 4–13)
AST SERPL W P-5'-P-CCNC: 18 U/L (ref 13–39)
BASOPHILS # BLD AUTO: 0.02 THOUSANDS/ÂΜL (ref 0–0.1)
BASOPHILS NFR BLD AUTO: 1 % (ref 0–1)
BILIRUB SERPL-MCNC: 0.55 MG/DL (ref 0.2–1)
BILIRUB UR QL STRIP: NEGATIVE
BUN SERPL-MCNC: 20 MG/DL (ref 5–25)
CALCIUM SERPL-MCNC: 9.3 MG/DL (ref 8.4–10.2)
CHLORIDE SERPL-SCNC: 105 MMOL/L (ref 96–108)
CHOLEST SERPL-MCNC: 211 MG/DL (ref ?–200)
CLARITY UR: NORMAL
CO2 SERPL-SCNC: 30 MMOL/L (ref 21–32)
COLOR UR: NORMAL
CREAT SERPL-MCNC: 0.79 MG/DL (ref 0.6–1.3)
EOSINOPHIL # BLD AUTO: 0.06 THOUSAND/ÂΜL (ref 0–0.61)
EOSINOPHIL NFR BLD AUTO: 2 % (ref 0–6)
ERYTHROCYTE [DISTWIDTH] IN BLOOD BY AUTOMATED COUNT: 12.7 % (ref 11.6–15.1)
GFR SERPL CREATININE-BSD FRML MDRD: 79 ML/MIN/1.73SQ M
GLUCOSE P FAST SERPL-MCNC: 92 MG/DL (ref 65–99)
GLUCOSE UR STRIP-MCNC: NEGATIVE MG/DL
HCT VFR BLD AUTO: 43 % (ref 34.8–46.1)
HDLC SERPL-MCNC: 73 MG/DL
HGB BLD-MCNC: 13.5 G/DL (ref 11.5–15.4)
HGB UR QL STRIP.AUTO: NEGATIVE
IMM GRANULOCYTES # BLD AUTO: 0.01 THOUSAND/UL (ref 0–0.2)
IMM GRANULOCYTES NFR BLD AUTO: 0 % (ref 0–2)
KETONES UR STRIP-MCNC: NEGATIVE MG/DL
LDLC SERPL CALC-MCNC: 124 MG/DL (ref 0–100)
LEUKOCYTE ESTERASE UR QL STRIP: NEGATIVE
LYMPHOCYTES # BLD AUTO: 0.77 THOUSANDS/ÂΜL (ref 0.6–4.47)
LYMPHOCYTES NFR BLD AUTO: 21 % (ref 14–44)
MCH RBC QN AUTO: 27.8 PG (ref 26.8–34.3)
MCHC RBC AUTO-ENTMCNC: 31.4 G/DL (ref 31.4–37.4)
MCV RBC AUTO: 89 FL (ref 82–98)
MONOCYTES # BLD AUTO: 0.32 THOUSAND/ÂΜL (ref 0.17–1.22)
MONOCYTES NFR BLD AUTO: 9 % (ref 4–12)
NEUTROPHILS # BLD AUTO: 2.45 THOUSANDS/ÂΜL (ref 1.85–7.62)
NEUTS SEG NFR BLD AUTO: 67 % (ref 43–75)
NITRITE UR QL STRIP: NEGATIVE
NONHDLC SERPL-MCNC: 138 MG/DL
NRBC BLD AUTO-RTO: 0 /100 WBCS
PH UR STRIP.AUTO: 5.5 [PH]
PLATELET # BLD AUTO: 145 THOUSANDS/UL (ref 149–390)
PMV BLD AUTO: 10.9 FL (ref 8.9–12.7)
POTASSIUM SERPL-SCNC: 4.1 MMOL/L (ref 3.5–5.3)
PROT SERPL-MCNC: 7.2 G/DL (ref 6.4–8.4)
PROT UR STRIP-MCNC: NEGATIVE MG/DL
RBC # BLD AUTO: 4.85 MILLION/UL (ref 3.81–5.12)
SODIUM SERPL-SCNC: 140 MMOL/L (ref 135–147)
SP GR UR STRIP.AUTO: 1.01 (ref 1–1.03)
TRIGL SERPL-MCNC: 68 MG/DL (ref ?–150)
TSH SERPL DL<=0.05 MIU/L-ACNC: 1.55 UIU/ML (ref 0.45–4.5)
UROBILINOGEN UR STRIP-ACNC: <2 MG/DL
WBC # BLD AUTO: 3.63 THOUSAND/UL (ref 4.31–10.16)

## 2025-05-30 PROCEDURE — 81003 URINALYSIS AUTO W/O SCOPE: CPT

## 2025-05-30 PROCEDURE — 80053 COMPREHEN METABOLIC PANEL: CPT

## 2025-05-30 PROCEDURE — 85025 COMPLETE CBC W/AUTO DIFF WBC: CPT

## 2025-05-30 PROCEDURE — 80061 LIPID PANEL: CPT

## 2025-05-30 PROCEDURE — 82306 VITAMIN D 25 HYDROXY: CPT

## 2025-05-30 PROCEDURE — 84443 ASSAY THYROID STIM HORMONE: CPT

## 2025-05-30 PROCEDURE — 36415 COLL VENOUS BLD VENIPUNCTURE: CPT

## 2025-06-03 ENCOUNTER — OFFICE VISIT (OUTPATIENT)
Age: 65
End: 2025-06-03
Payer: COMMERCIAL

## 2025-06-03 VITALS
HEART RATE: 88 BPM | SYSTOLIC BLOOD PRESSURE: 108 MMHG | WEIGHT: 122.4 LBS | TEMPERATURE: 98 F | HEIGHT: 67 IN | BODY MASS INDEX: 19.21 KG/M2 | DIASTOLIC BLOOD PRESSURE: 68 MMHG | OXYGEN SATURATION: 99 %

## 2025-06-03 DIAGNOSIS — E03.8 OTHER SPECIFIED HYPOTHYROIDISM: ICD-10-CM

## 2025-06-03 DIAGNOSIS — D70.9 GRANULOCYTOPENIA (HCC): ICD-10-CM

## 2025-06-03 DIAGNOSIS — N20.0 KIDNEY STONE: ICD-10-CM

## 2025-06-03 DIAGNOSIS — K43.9 VENTRAL HERNIA WITHOUT OBSTRUCTION OR GANGRENE: ICD-10-CM

## 2025-06-03 DIAGNOSIS — D69.6 THROMBOCYTOPENIA (HCC): Primary | ICD-10-CM

## 2025-06-03 DIAGNOSIS — Z12.11 COLON CANCER SCREENING: ICD-10-CM

## 2025-06-03 DIAGNOSIS — K58.2 IRRITABLE BOWEL SYNDROME WITH BOTH CONSTIPATION AND DIARRHEA: ICD-10-CM

## 2025-06-03 DIAGNOSIS — S92.515G CLOSED NONDISPLACED FRACTURE OF PROXIMAL PHALANX OF LESSER TOE OF LEFT FOOT WITH DELAYED HEALING, SUBSEQUENT ENCOUNTER: ICD-10-CM

## 2025-06-03 DIAGNOSIS — E78.2 MIXED HYPERLIPIDEMIA: ICD-10-CM

## 2025-06-03 PROCEDURE — 99396 PREV VISIT EST AGE 40-64: CPT | Performed by: INTERNAL MEDICINE

## 2025-06-03 NOTE — ASSESSMENT & PLAN NOTE
Chronic thrombocytopenia remains stable no adverse bleeding issues noted continue surveillance

## 2025-06-03 NOTE — ASSESSMENT & PLAN NOTE
Chronic granulocytopenia without increase in rate of infections recommend continued surveillance

## 2025-06-03 NOTE — PROGRESS NOTES
Name: Patricia Moore      : 1960      MRN: [id number]  Encounter Provider: Doug Hernandez MD  Encounter Date: 6/3/2025   Encounter department: [facility] INTERNAL MEDICINE    Assessment & Plan  Colon cancer screening         Mixed hyperlipidemia  Recommend increased effort to decrease consumption of saturated fats with follow-up testing in 4 months    Orders:    Lipid panel; Future    Irritable bowel syndrome with both constipation and diarrhea  Symptoms currently stable continue use of dicyclomine 20 mg 3 times a day before meals.         Other specified hypothyroidism  Thyroid stable continue levothyroxine 50 mcg daily         Closed nondisplaced fracture of proximal phalanx of lesser toe of left foot with delayed healing, subsequent encounter  Continue follow-up with podiatry for slow healing fracture         Kidney stone  No indication of crystals on urinalysis encouraged the patient to maintain aggressive daily hydration         Thrombocytopenia (HCC)  Chronic thrombocytopenia remains stable no adverse bleeding issues noted continue surveillance         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HCC)  Chronic granulocytopenia without increase in rate of infections recommend continued surveillance         Ventral hernia without obstruction or gangrene  Ventral hernia stable no indication of obstruction of bowel continue surveillance advised              History of Present Illness     This pleasant 64-year-old female patient presents to our office today for her annual physical examination and review of comprehensive blood work.  She indicates that her stress level has improved.  She has been under increased stress over the past months with her parents both in a assisted living environment.  Her mother has advanced dementia father has been through multiple medical conditions as well.    Patient currently remains quite active with regular exercise up until a recent fracture of her left foot.  She is followed by   "Katia of the podiatry department at [facility].  Currently she is wearing a cast shoe for support of the metatarsal fracture.  She does have some mild neuropathy type pain in the area of the fracture.  She does bar type of exercises which require to stand on her tippy toes which may be causing some nerve irritation and was likely the cause of the fractured metatarsal bone.    Her irritable bowel symptoms are currently stable as is her history of renal calculi.    She has a chronic history of mild white blood cell depression along with mild platelet depression both of these are asymptomatic.    We did review her lipid profile shows a significant upward trend in cholesterol values and I have asked her to work on reducing consumption of saturated fats with a follow-up test in 4 months      Review of Systems   Musculoskeletal:         Left foot pain fractured metatarsal bone and likely nerve pain as well   All other systems reviewed and are negative.    Past Medical History[1]  Past Surgical History[2]  Family History[3]  Social History[4]  Medications[5]  Allergies   Allergen Reactions    Apple Fruit Extract - Food Allergy Anaphylaxis    Iodine - Food Allergy Anaphylaxis     Allergy to Iodinated and non iodinated dye    Other Anaphylaxis     Adhesive  TAPE  Also rash at times    Seasonal Ic [Cholestatin] Allergic Rhinitis    Latex      Immunization History   Administered Date(s) Administered    COVID-19 PFIZER VACCINE 0.3 ML IM 01/08/2021, 01/29/2021, 11/13/2021    INFLUENZA 10/17/2017, 10/16/2019, 10/14/2020, 10/10/2021, 10/10/2021, 10/15/2022    Influenza Recombinant Preservative Free Im 10/28/2024    Influenza, injectable, quadrivalent, preservative free 0.5 mL 10/24/2023    Influenza, recombinant, quadrivalent,injectable, preservative free 10/23/2018    Influenza, seasonal, injectable 10/17/2017     Objective   /68   Pulse 88   Temp 98 °F (36.7 °C) (Tympanic)   Ht 5' 7\" (1.702 m)   Wt 55.5 kg (122 lb " 6.4 oz)   LMP  (LMP Unknown)   SpO2 99%   BMI 19.17 kg/m²     Physical Exam  Vitals and nursing note reviewed.   Constitutional:       General: She is not in acute distress.     Appearance: She is well-developed.   HENT:      Head: Normocephalic and atraumatic.      Right Ear: Tympanic membrane, ear canal and external ear normal.      Left Ear: Tympanic membrane, ear canal and external ear normal.      Nose: Nose normal.      Mouth/Throat:      Mouth: Mucous membranes are moist.      Pharynx: Oropharynx is clear.     Eyes:      Extraocular Movements: Extraocular movements intact.      Conjunctiva/sclera: Conjunctivae normal.      Pupils: Pupils are equal, round, and reactive to light.     Neck:      Vascular: No carotid bruit.     Cardiovascular:      Rate and Rhythm: Normal rate and regular rhythm.      Heart sounds: Normal heart sounds. No murmur heard.  Pulmonary:      Effort: Pulmonary effort is normal. No respiratory distress.      Breath sounds: Normal breath sounds. No wheezing, rhonchi or rales.   Abdominal:      General: Abdomen is flat. There is no distension.      Palpations: Abdomen is soft. There is no mass.      Tenderness: There is no abdominal tenderness. There is no guarding.     Musculoskeletal:      Cervical back: Normal range of motion and neck supple. No rigidity or tenderness.      Right lower leg: No edema.      Left lower leg: No edema.   Lymphadenopathy:      Cervical: No cervical adenopathy.     Skin:     General: Skin is warm and dry.      Capillary Refill: Capillary refill takes less than 2 seconds.     Neurological:      General: No focal deficit present.      Mental Status: She is alert. Mental status is at baseline.     Psychiatric:         Mood and Affect: Mood normal.         Behavior: Behavior normal.         Thought Content: Thought content normal.         Judgment: Judgment normal.              [1]   Past Medical History:  Diagnosis Date    Atopic dermatitis     last assessed  13    Atrophic vaginitis     last assessed 9/2/15    Dermatitis     Dry eye     Frequency of urination     Fungal infection     last assessed 13    Herpes     last assessed 14    Hyperlipidemia     Hypothyroidism     Interstitial cystitis     Osteoporosis     Periodontal abscess     last assessed 13    PONV (postoperative nausea and vomiting)     Thyroid nodule     Ulcerative colitis (HCC)     Urinary frequency     resolved 17    UTI (urinary tract infection)     Vaginal atrophy     Vitamin D deficiency     last assessed 16    Voiding dysfunction     last assessed 10/7/15   [2]   Past Surgical History:  Procedure Laterality Date     SECTION       and      COLONOSCOPY  2020    CYSTOSCOPY      diagnostic resolved 05    FL RETROGRADE PYELOGRAM  2020    GANGLION CYST EXCISION Right 2019    Procedure: EXCISION GANGLION CYST RIGHT FIRST DORSAL EXTENSOR COMPARTMENT;  Surgeon: Cheng Quintero MD;  Location: [facility];  Service: Orthopedics    KIDNEY STONE SURGERY      ORIF PROXIMAL ULNA FRACTURE      metal plates and screws removed    OTHER SURGICAL HISTORY      punch biospy     IN COLONOSCOPY FLX DX W/COLLJ SPEC WHEN PFRMD N/A 2017    Procedure: COLONOSCOPY;  Surgeon: Hannah Luke MD;  Location: [facility];  Service: Colorectal    IN CYSTO/URETERO W/LITHOTRIPSY &INDWELL STENT INSRT Left 2020    Procedure: CYSTOSCOPY URETEROSCOPY WITH LITHOTRIPSY HOLMIUM LASER, RETROGRADE PYELOGRAM AND INSERTION STENT URETERAL;  Surgeon: Joshua Parikh MD;  Location: [facility];  Service: Urology    IN INCISION EXTENSOR TENDON SHEATH WRIST Right 2019    Procedure: RELEASE DEQUERVAINS;  Surgeon: Cheng Quintero MD;  Location: [facility];  Service: Orthopedics    TUBAL LIGATION      UPPER GASTROINTESTINAL ENDOSCOPY  2020    WISDOM TOOTH EXTRACTION  1983    X2    [3]   Family History  Problem Relation Name Age of Onset    Gallbladder disease  Mother Kourtney     Thyroid disease Mother Kourtney     Kidney disease Mother Kourtney     Breast cancer Mother Kourtney 80    Diabetes Father Beto     Hypertension Father Beto     Nephrolithiasis Father Beto     Lymphoma Maternal Grandfather      Diabetes Paternal Grandmother      No Known Problems Paternal Grandfather      No Known Problems Son Sen     No Known Problems Son Dhaval     Breast cancer Paternal Aunt     [4]   Social History  Tobacco Use    Smoking status: Never    Smokeless tobacco: Never   Vaping Use    Vaping status: Never Used   Substance and Sexual Activity    Alcohol use: Yes     Comment: socially - 1-3 drinks per month    Drug use: No    Sexual activity: Yes     Partners: Male   [5]   Current Outpatient Medications on File Prior to Visit   Medication Sig    Cholecalciferol (VITAMIN D PO) Take 3,000 Units by mouth in the morning.    Cranberry (ELLURA PO) Take 1 capsule by mouth in the morning.    Cromolyn Sodium (NASAL ALLERGY CONTROL NA) into each nostril    cycloSPORINE (RESTASIS) 0.05 % ophthalmic emulsion Administer 1 drop to both eyes in the morning and 1 drop in the evening.    dicyclomine (BENTYL) 10 mg capsule Take 2 capsules (20 mg total) by mouth 3 (three) times a day before meals    estradiol (ESTRACE) 0.1 mg/g vaginal cream Insert 1 applicator full vaginally nightly    levothyroxine 50 mcg tablet Take 1 tablet (50 mcg total) by mouth daily    Meth-Hyo-M Bl-Maikel Acd-Ph Sal (URIBEL PO) Take by mouth    Meth-Hyo-M Bl-Na Phos-Ph Sal (Uro-MP) 118 MG CAPS     mometasone (NASONEX) 50 mcg/act nasal spray 2 sprays into each nostril daily    ondansetron (ZOFRAN) 8 mg tablet Take 1 tablet (8 mg total) by mouth every 8 (eight) hours as needed for nausea or vomiting    pentosan polysulfate (Elmiron) 100 mg capsule One pill 3 times a day    Pentosan Polysulfate Sodium (ELMIRON PO) Take by mouth    rosuvastatin (CRESTOR) 5 mg tablet Take 1 tablet (5 mg total) by mouth daily    trospium chloride (SANCTURA)  20 mg tablet Take 1 tablet (20 mg total) by mouth 2 (two) times a day    ALPRAZolam (XANAX) 0.25 mg tablet Take 1 tablet (0.25 mg total) by mouth 3 (three) times a day as needed for anxiety or sleep (Patient not taking: Reported on 1/10/2025)    celecoxib (CeleBREX) 200 mg capsule Take 1 capsule (200 mg total) by mouth daily    famciclovir (FAMVIR) 500 mg tablet Take 1 tablet (500 mg total) by mouth 2 (two) times a day for 7 days    naproxen (Naprosyn) 500 mg tablet Take 1 tablet (500 mg total) by mouth 2 (two) times a day with meals

## 2025-06-03 NOTE — ASSESSMENT & PLAN NOTE
Recommend increased effort to decrease consumption of saturated fats with follow-up testing in 4 months    Orders:    Lipid panel; Future

## 2025-06-03 NOTE — ASSESSMENT & PLAN NOTE
No indication of crystals on urinalysis encouraged the patient to maintain aggressive daily hydration

## 2025-06-23 ENCOUNTER — TELEPHONE (OUTPATIENT)
Age: 65
End: 2025-06-23

## 2025-06-23 NOTE — TELEPHONE ENCOUNTER
Caller: Patricia Moore    Doctor: Dr. Montalvo    Reason for call: Patricia has to cancel her next appointment.  Her son is having surgery.  I cannot get her in before August and she is dealing with a broken foot that is not healing. Can she be forced on ? Thanks    Call back#: [phone]   
Scheduled  
stretcher

## 2025-06-25 ENCOUNTER — ESTABLISHED COMPREHENSIVE EXAM (OUTPATIENT)
Dept: URBAN - METROPOLITAN AREA CLINIC 6 | Facility: CLINIC | Age: 65
End: 2025-06-25

## 2025-06-25 DIAGNOSIS — H50.00: ICD-10-CM

## 2025-06-25 DIAGNOSIS — H53.2: ICD-10-CM

## 2025-06-25 PROCEDURE — 92015 DETERMINE REFRACTIVE STATE: CPT

## 2025-06-25 ASSESSMENT — VISUAL ACUITY
OD_CC: 20/25
OS_CC: 20/25

## 2025-06-30 ENCOUNTER — TELEPHONE (OUTPATIENT)
Age: 65
End: 2025-06-30

## 2025-06-30 NOTE — TELEPHONE ENCOUNTER
Caller: Patricia Moore    Doctor: Dr. Montalvo    Reason for call: Patricia has been marked as a NO SHOW for today.  She had already called last week to change her appt to 7/3 at 915.  Can someone please remove the NO SHOW from her record?  Thank you.     Call back#: [phone]

## 2025-07-03 ENCOUNTER — OFFICE VISIT (OUTPATIENT)
Dept: PODIATRY | Facility: CLINIC | Age: 65
End: 2025-07-03
Payer: COMMERCIAL

## 2025-07-03 DIAGNOSIS — S92.515G CLOSED NONDISPLACED FRACTURE OF PROXIMAL PHALANX OF LESSER TOE OF LEFT FOOT WITH DELAYED HEALING, SUBSEQUENT ENCOUNTER: Primary | ICD-10-CM

## 2025-07-03 PROCEDURE — 99214 OFFICE O/P EST MOD 30 MIN: CPT | Performed by: PODIATRIST

## 2025-07-03 RX ORDER — SODIUM CHLORIDE, SODIUM LACTATE, POTASSIUM CHLORIDE, CALCIUM CHLORIDE 600; 310; 30; 20 MG/100ML; MG/100ML; MG/100ML; MG/100ML
20 INJECTION, SOLUTION INTRAVENOUS CONTINUOUS
OUTPATIENT
Start: 2025-07-03

## 2025-07-03 NOTE — PROGRESS NOTES
Name: Patricia Moore      : 1960      MRN: [id number]  Encounter Provider: Donavon Montalvo DPM  Encounter Date: 7/3/2025   Encounter department: [facility] [place]    Reviewed x-rays with patient and .  Explained that the condition has deteriorated since her last visit.  The fracture fragment has  from the base of the proximal phalanx.  Surgical removal is recommended.  This procedure will be scheduled at patient convenience in August as she has vacations planned and can tolerate the current discomfort.  She will sign her consent: Beba.  Procedure was explained including pre and postoperative course, risk and complications.  :  Assessment & Plan  Closed nondisplaced fracture of proximal phalanx of lesser toe of left foot with delayed healing, subsequent encounter    Orders:    XR foot 3+ vw left; Future        History of Present Illness   HPI  Patricia Moore is a 65 y.o. female who presents for assessment of her left second toe.  Patient is wearing a sneaker at this time.  She still has significant pain in the toe especially with extended walking.  She does do better with toes taped together and a surgical shoe but she is trying to return to walking.  By the end of the day she has significant pain.    I personally reviewed x-rays of the left foot taken today.  They reveal displacement of the fracture fragment that had been present at the base of the left second toe at the medial aspect of the toe.  This fragment nail appears to be within the second metatarsal phalangeal joint.      Review of Systems       Objective   LMP  (LMP Unknown)      Physical Exam    Musculoskeletal:         General: Tenderness and deformity present.      Comments: Sharp pain with palpation plantar aspect left second toe at second MPJ.

## 2025-07-07 ENCOUNTER — TELEPHONE (OUTPATIENT)
Age: 65
End: 2025-07-07

## 2025-07-07 ENCOUNTER — TELEPHONE (OUTPATIENT)
Dept: OBGYN CLINIC | Facility: CLINIC | Age: 65
End: 2025-07-07

## 2025-07-07 NOTE — TELEPHONE ENCOUNTER
Called patient and scheduled surgery with Dr Montalvo for 8/15/25 at [facility].  Consent - pt will sign online   Labs- ordered  EKG-ordered  PCP- pt is calling PCP for clearance   Cardio- not needed  PO appts- scheduled

## 2025-07-07 NOTE — TELEPHONE ENCOUNTER
Caller: Patricia Moore    Doctor: Dr. Montalvo / Janet    Reason for call: Patricia has some questions about her pre op procedures.  Can Aracely give Patricia a call back. Thank you    Call back#: [phone]

## 2025-07-08 ENCOUNTER — APPOINTMENT (OUTPATIENT)
Dept: LAB | Facility: CLINIC | Age: 65
End: 2025-07-08
Payer: COMMERCIAL

## 2025-07-08 DIAGNOSIS — S92.515G: ICD-10-CM

## 2025-07-08 DIAGNOSIS — Z01.818 PRE-OP TESTING: ICD-10-CM

## 2025-07-08 LAB
ANION GAP SERPL CALCULATED.3IONS-SCNC: 4 MMOL/L (ref 4–13)
BASOPHILS # BLD AUTO: 0.04 THOUSANDS/ÂΜL (ref 0–0.1)
BASOPHILS NFR BLD AUTO: 1 % (ref 0–1)
BUN SERPL-MCNC: 20 MG/DL (ref 5–25)
CALCIUM SERPL-MCNC: 9.7 MG/DL (ref 8.4–10.2)
CHLORIDE SERPL-SCNC: 103 MMOL/L (ref 96–108)
CO2 SERPL-SCNC: 33 MMOL/L (ref 21–32)
CREAT SERPL-MCNC: 0.81 MG/DL (ref 0.6–1.3)
EOSINOPHIL # BLD AUTO: 0.08 THOUSAND/ÂΜL (ref 0–0.61)
EOSINOPHIL NFR BLD AUTO: 2 % (ref 0–6)
ERYTHROCYTE [DISTWIDTH] IN BLOOD BY AUTOMATED COUNT: 13.5 % (ref 11.6–15.1)
GFR SERPL CREATININE-BSD FRML MDRD: 76 ML/MIN/1.73SQ M
GLUCOSE SERPL-MCNC: 83 MG/DL (ref 65–140)
HCT VFR BLD AUTO: 44.3 % (ref 34.8–46.1)
HGB BLD-MCNC: 13.8 G/DL (ref 11.5–15.4)
IMM GRANULOCYTES # BLD AUTO: 0.02 THOUSAND/UL (ref 0–0.2)
IMM GRANULOCYTES NFR BLD AUTO: 1 % (ref 0–2)
LYMPHOCYTES # BLD AUTO: 0.88 THOUSANDS/ÂΜL (ref 0.6–4.47)
LYMPHOCYTES NFR BLD AUTO: 20 % (ref 14–44)
MCH RBC QN AUTO: 28.4 PG (ref 26.8–34.3)
MCHC RBC AUTO-ENTMCNC: 31.2 G/DL (ref 31.4–37.4)
MCV RBC AUTO: 91 FL (ref 82–98)
MONOCYTES # BLD AUTO: 0.39 THOUSAND/ÂΜL (ref 0.17–1.22)
MONOCYTES NFR BLD AUTO: 9 % (ref 4–12)
NEUTROPHILS # BLD AUTO: 2.95 THOUSANDS/ÂΜL (ref 1.85–7.62)
NEUTS SEG NFR BLD AUTO: 67 % (ref 43–75)
NRBC BLD AUTO-RTO: 0 /100 WBCS
PLATELET # BLD AUTO: 162 THOUSANDS/UL (ref 149–390)
PMV BLD AUTO: 11.6 FL (ref 8.9–12.7)
POTASSIUM SERPL-SCNC: 4.6 MMOL/L (ref 3.5–5.3)
RBC # BLD AUTO: 4.86 MILLION/UL (ref 3.81–5.12)
SODIUM SERPL-SCNC: 140 MMOL/L (ref 135–147)
WBC # BLD AUTO: 4.36 THOUSAND/UL (ref 4.31–10.16)

## 2025-07-08 PROCEDURE — 80048 BASIC METABOLIC PNL TOTAL CA: CPT

## 2025-07-08 PROCEDURE — 85025 COMPLETE CBC W/AUTO DIFF WBC: CPT

## 2025-07-08 PROCEDURE — 36415 COLL VENOUS BLD VENIPUNCTURE: CPT

## 2025-07-08 NOTE — TELEPHONE ENCOUNTER
UPDATE:  Consent- signed   PCP appt 7/30/25  EKG- doing at PCP appt   Labs- pt going to get done in the next few days

## 2025-07-10 DIAGNOSIS — J06.9 URTI (ACUTE UPPER RESPIRATORY INFECTION): Primary | ICD-10-CM

## 2025-07-10 DIAGNOSIS — R39.9 UTI SYMPTOMS: ICD-10-CM

## 2025-07-10 DIAGNOSIS — B34.9 VIRAL INFECTION: ICD-10-CM

## 2025-07-10 RX ORDER — FAMCICLOVIR 500 MG/1
500 TABLET ORAL 2 TIMES DAILY
Qty: 14 TABLET | Refills: 1 | Status: SHIPPED | OUTPATIENT
Start: 2025-07-10 | End: 2025-07-24

## 2025-07-10 RX ORDER — NITROFURANTOIN 25; 75 MG/1; MG/1
100 CAPSULE ORAL 2 TIMES DAILY
Qty: 14 CAPSULE | Refills: 0 | Status: SHIPPED | OUTPATIENT
Start: 2025-07-10 | End: 2025-07-17

## 2025-07-10 RX ORDER — AZITHROMYCIN 250 MG/1
TABLET, FILM COATED ORAL
Qty: 6 TABLET | Refills: 0 | Status: SHIPPED | OUTPATIENT
Start: 2025-07-10 | End: 2025-07-15

## 2025-07-28 ENCOUNTER — TELEPHONE (OUTPATIENT)
Age: 65
End: 2025-07-28

## 2025-07-28 DIAGNOSIS — S92.515G CLOSED NONDISPLACED FRACTURE OF PROXIMAL PHALANX OF LESSER TOE OF LEFT FOOT WITH DELAYED HEALING, SUBSEQUENT ENCOUNTER: Primary | ICD-10-CM

## 2025-07-29 ENCOUNTER — ULTRASOUND (OUTPATIENT)
Dept: OBGYN CLINIC | Facility: CLINIC | Age: 65
End: 2025-07-29
Payer: COMMERCIAL

## 2025-07-29 DIAGNOSIS — R93.89 ENDOMETRIAL THICKENING ON ULTRASOUND: Primary | ICD-10-CM

## 2025-07-29 PROCEDURE — 76856 US EXAM PELVIC COMPLETE: CPT | Performed by: OBSTETRICS & GYNECOLOGY

## 2025-07-30 ENCOUNTER — CONSULT (OUTPATIENT)
Age: 65
End: 2025-07-30
Payer: COMMERCIAL

## 2025-07-30 VITALS
HEIGHT: 67 IN | WEIGHT: 124 LBS | OXYGEN SATURATION: 99 % | SYSTOLIC BLOOD PRESSURE: 110 MMHG | HEART RATE: 71 BPM | DIASTOLIC BLOOD PRESSURE: 62 MMHG | TEMPERATURE: 98.2 F | BODY MASS INDEX: 19.46 KG/M2

## 2025-07-30 DIAGNOSIS — Z01.818 PREOP TESTING: ICD-10-CM

## 2025-07-30 DIAGNOSIS — Z01.818 PREOP EXAMINATION: Primary | ICD-10-CM

## 2025-07-30 PROCEDURE — 99214 OFFICE O/P EST MOD 30 MIN: CPT | Performed by: INTERNAL MEDICINE

## 2025-07-31 ENCOUNTER — TELEPHONE (OUTPATIENT)
Age: 65
End: 2025-07-31

## 2025-07-31 DIAGNOSIS — S92.515G CLOSED NONDISPLACED FRACTURE OF PROXIMAL PHALANX OF LESSER TOE OF LEFT FOOT WITH DELAYED HEALING, SUBSEQUENT ENCOUNTER: Primary | ICD-10-CM

## 2025-07-31 DIAGNOSIS — Z98.890 POST-OPERATIVE STATE: ICD-10-CM

## 2025-07-31 PROCEDURE — 93000 ELECTROCARDIOGRAM COMPLETE: CPT | Performed by: INTERNAL MEDICINE

## 2025-08-04 ENCOUNTER — HOSPITAL ENCOUNTER (OUTPATIENT)
Dept: RADIOLOGY | Facility: HOSPITAL | Age: 65
Discharge: HOME | End: 2025-08-04
Payer: COMMERCIAL

## 2025-08-04 ENCOUNTER — TELEPHONE (OUTPATIENT)
Dept: OBGYN CLINIC | Facility: CLINIC | Age: 65
End: 2025-08-04

## 2025-08-04 DIAGNOSIS — S92.515G CLOSED NONDISPLACED FRACTURE OF PROXIMAL PHALANX OF LESSER TOE OF LEFT FOOT WITH DELAYED HEALING, SUBSEQUENT ENCOUNTER: Primary | ICD-10-CM

## 2025-08-04 DIAGNOSIS — S92.515G CLOSED NONDISPLACED FRACTURE OF PROXIMAL PHALANX OF LESSER TOE OF LEFT FOOT WITH DELAYED HEALING, SUBSEQUENT ENCOUNTER: ICD-10-CM

## 2025-08-04 PROCEDURE — 73630 X-RAY EXAM OF FOOT: CPT

## 2025-08-05 ENCOUNTER — OFFICE VISIT (OUTPATIENT)
Dept: PODIATRY | Facility: CLINIC | Age: 65
End: 2025-08-05
Payer: COMMERCIAL

## 2025-08-05 DIAGNOSIS — S92.515G CLOSED NONDISPLACED FRACTURE OF PROXIMAL PHALANX OF LESSER TOE OF LEFT FOOT WITH DELAYED HEALING, SUBSEQUENT ENCOUNTER: Primary | ICD-10-CM

## 2025-08-05 PROCEDURE — 99213 OFFICE O/P EST LOW 20 MIN: CPT | Performed by: PODIATRIST

## 2025-08-06 ENCOUNTER — FOLLOW UP (OUTPATIENT)
Dept: URBAN - METROPOLITAN AREA CLINIC 6 | Facility: CLINIC | Age: 65
End: 2025-08-06

## 2025-08-06 DIAGNOSIS — H50.00: ICD-10-CM

## 2025-08-06 DIAGNOSIS — H53.2: ICD-10-CM

## 2025-08-06 PROCEDURE — 92012 INTRM OPH EXAM EST PATIENT: CPT

## 2025-08-06 ASSESSMENT — VISUAL ACUITY
OS_CC: 20/30-1
OD_CC: 20/30-1
OU_CC: J1+

## 2025-08-14 ENCOUNTER — ANESTHESIA EVENT (OUTPATIENT)
Dept: PERIOP | Facility: HOSPITAL | Age: 65
End: 2025-08-14
Payer: COMMERCIAL

## 2025-08-15 ENCOUNTER — HOSPITAL ENCOUNTER (OUTPATIENT)
Facility: HOSPITAL | Age: 65
Setting detail: OUTPATIENT SURGERY
Discharge: HOME | End: 2025-08-15
Attending: PODIATRIST | Admitting: PODIATRIST
Payer: COMMERCIAL

## 2025-08-15 ENCOUNTER — HOSPITAL ENCOUNTER (OUTPATIENT)
Dept: RADIOLOGY | Facility: HOSPITAL | Age: 65
Setting detail: OUTPATIENT SURGERY
Discharge: HOME | End: 2025-08-15
Payer: COMMERCIAL

## 2025-08-15 ENCOUNTER — ANESTHESIA (OUTPATIENT)
Dept: PERIOP | Facility: HOSPITAL | Age: 65
End: 2025-08-15
Payer: COMMERCIAL

## 2025-08-15 ENCOUNTER — APPOINTMENT (OUTPATIENT)
Dept: RADIOLOGY | Facility: HOSPITAL | Age: 65
End: 2025-08-15
Payer: COMMERCIAL

## 2025-08-21 ENCOUNTER — OFFICE VISIT (OUTPATIENT)
Dept: PODIATRY | Facility: CLINIC | Age: 65
End: 2025-08-21

## 2025-08-21 DIAGNOSIS — S92.515G CLOSED NONDISPLACED FRACTURE OF PROXIMAL PHALANX OF LESSER TOE OF LEFT FOOT WITH DELAYED HEALING, SUBSEQUENT ENCOUNTER: Primary | ICD-10-CM

## 2025-08-21 PROCEDURE — 99024 POSTOP FOLLOW-UP VISIT: CPT | Performed by: PODIATRIST

## (undated) DEVICE — SPONGE PVP SCRUB WING STERILE

## (undated) DEVICE — DISPOSABLE EQUIPMENT COVER: Brand: SMALL TOWEL DRAPE

## (undated) DEVICE — GUIDEWIRE STRGHT TIP 0.035 IN  SOLO PLUS

## (undated) DEVICE — INTENDED FOR TISSUE SEPARATION, AND OTHER PROCEDURES THAT REQUIRE A SHARP SURGICAL BLADE TO PUNCTURE OR CUT.: Brand: BARD-PARKER SAFETY BLADES SIZE 15, STERILE

## (undated) DEVICE — CATH URETERAL 5FR X 70 CM FLEX TIP POLYUR BARD

## (undated) DEVICE — SUT PROLENE 4-0 PS-2 18 IN 8682G

## (undated) DEVICE — 3M™ TEGADERM™ TRANSPARENT FILM DRESSING FRAME STYLE, 1622W, 1-3/4 IN X 1-3/4 IN (4.4 CM X 4.4 CM), 100/CT 4CT/CASE: Brand: 3M™ TEGADERM™

## (undated) DEVICE — GLOVE SRG BIOGEL 7.5

## (undated) DEVICE — OCCLUSIVE GAUZE STRIP,3% BISMUTH TRIBROMOPHENATE IN PETROLATUM BLEND: Brand: XEROFORM

## (undated) DEVICE — CUFF TOURNIQUET 18 X 5.5 IN QUICK CONNECT 2BLA

## (undated) DEVICE — GLOVE INDICATOR PI UNDERGLOVE SZ 8 BLUE

## (undated) DEVICE — STERILE BETHLEHEM PLASTIC HAND: Brand: CARDINAL HEALTH

## (undated) DEVICE — GAUZE SPONGES,16 PLY: Brand: CURITY

## (undated) DEVICE — ADHESIVE SKN CLSR HISTOACRYL FLEX 0.5ML LF

## (undated) DEVICE — 3M™ TEGADERM™ TRANSPARENT FILM DRESSING FRAME STYLE, 1626W, 4 IN X 4-3/4 IN (10 CM X 12 CM), 50/CT 4CT/CASE: Brand: 3M™ TEGADERM™

## (undated) DEVICE — GLOVE SRG BIOGEL ECLIPSE 7.5

## (undated) DEVICE — ACE WRAP 4 IN STERILE

## (undated) DEVICE — PADDING CAST 4 IN  COTTON STRL

## (undated) DEVICE — 3M™ STERI-STRIP™ REINFORCED ADHESIVE SKIN CLOSURES, R1542, 1/4 IN X 1-1/2 IN (6 MM X 38 MM), 6 STRIPS/ENVELOPE: Brand: 3M™ STERI-STRIP™

## (undated) DEVICE — SPECIMEN CONTAINER STERILE PEEL PACK

## (undated) DEVICE — BASKET STONE RTRVL ZERO TIP 2.4FR

## (undated) DEVICE — PACK TUR

## (undated) DEVICE — NEEDLE 25G X 1 1/2

## (undated) DEVICE — SYRINGE 10ML LL

## (undated) DEVICE — CHLORAPREP HI-LITE 26ML ORANGE

## (undated) DEVICE — CATH FOLEY 12FR 5ML 2 WAY SILICONE ELASTIMER

## (undated) RX ORDER — PREDNISOLONE ACETATE 10 MG/ML: 1 SUSPENSION/ DROPS OPHTHALMIC